# Patient Record
Sex: FEMALE | Race: WHITE | NOT HISPANIC OR LATINO | Employment: FULL TIME | URBAN - METROPOLITAN AREA
[De-identification: names, ages, dates, MRNs, and addresses within clinical notes are randomized per-mention and may not be internally consistent; named-entity substitution may affect disease eponyms.]

---

## 2017-01-13 ENCOUNTER — OFFICE VISIT (OUTPATIENT)
Dept: URGENT CARE | Facility: PHYSICIAN GROUP | Age: 57
End: 2017-01-13
Payer: COMMERCIAL

## 2017-01-13 VITALS
TEMPERATURE: 97 F | SYSTOLIC BLOOD PRESSURE: 104 MMHG | RESPIRATION RATE: 16 BRPM | DIASTOLIC BLOOD PRESSURE: 76 MMHG | HEART RATE: 72 BPM | WEIGHT: 202 LBS | HEIGHT: 62 IN | OXYGEN SATURATION: 98 % | BODY MASS INDEX: 37.17 KG/M2

## 2017-01-13 DIAGNOSIS — R11.0 NAUSEA IN ADULT: ICD-10-CM

## 2017-01-13 PROCEDURE — 99214 OFFICE O/P EST MOD 30 MIN: CPT | Performed by: NURSE PRACTITIONER

## 2017-01-13 RX ORDER — ONDANSETRON 2 MG/ML
4 INJECTION INTRAMUSCULAR; INTRAVENOUS ONCE
Status: COMPLETED | OUTPATIENT
Start: 2017-01-13 | End: 2017-01-13

## 2017-01-13 RX ORDER — ONDANSETRON 4 MG/1
4 TABLET, FILM COATED ORAL EVERY 4 HOURS PRN
Qty: 20 TAB | Refills: 0 | Status: SHIPPED | OUTPATIENT
Start: 2017-01-13 | End: 2017-05-23

## 2017-01-13 RX ADMIN — ONDANSETRON 4 MG: 2 INJECTION INTRAMUSCULAR; INTRAVENOUS at 08:25

## 2017-01-13 ASSESSMENT — ENCOUNTER SYMPTOMS
FEVER: 0
VOMITING: 1
ABDOMINAL PAIN: 0
CONSTIPATION: 0
CHILLS: 0
DIZZINESS: 0
NUMBER OF EPISODES OF EMESIS TODAY: 1
MYALGIAS: 0
SHORTNESS OF BREATH: 0
HEADACHES: 0
DIARRHEA: 0
COUGH: 0
WEAKNESS: 0
NAUSEA: 1

## 2017-01-13 NOTE — PROGRESS NOTES
"Subjective:      Harmony Monroe is a 56 y.o. female who presents with Emesis            Emesis  Associated symptoms include nausea and vomiting. Pertinent negatives include no abdominal pain, chills, coughing, fever, headaches, myalgias or weakness.   Harmony is a 56 year old female who is here for n/v x 2 days. States has been \"sipping\" fluids and taking small amounts of crackers. Vomited 2x in the nighttime 2 days ago then has had a \"queasy\" stomach yesterday and today. Denies fever, HA, abdominal pain/cramping, diarrhea or back pain. States unknown trigger of n/v. Denies recent illness.     PMH:  has a past medical history of Allergy; Hypertension; ASTHMA; Depression (2010); Tobacco use disorder (2010); Post-menopausal (2014); Basal cell carcinoma of left medial cheek (2015); and History of tobacco use disorder (2010). She also has no past medical history of Adrenal disorder (HCC), Clotting disorder (HCC), Heart attack (HCC), OSTEOPOROSIS, COPD, Heart murmur, Pituitary disease (HCC), Anemia, Cushings syndrome (HCC), HIV (human immunodeficiency virus infection), Seizure (HCC), Anxiety, Hyperlipidemia, Sickle cell disease (HCC), Arrhythmia, Diabetes, Parathyroid disorder (HCC), Stroke (HCC), Arthritis, Diabetic neuropathy (HCC), Substance abuse, EMPHYSEMA, IBD (inflammatory bowel disease), Thyroid disease, Blood transfusion, GERD (gastroesophageal reflux disease), Kidney disease, Tuberculosis, Glaucoma, Meningitis, Ulcer (HCC), CATARACT, Goiter, Migraine, or UTI of .  MEDS:   Current outpatient prescriptions:   •  ondansetron (ZOFRAN) 4 MG Tab tablet, Take 1 Tab by mouth every four hours as needed for Nausea/Vomiting., Disp: 20 Tab, Rfl: 0  •  temazepam (RESTORIL) 15 MG Cap, TAKE 1 TO 2 CAPSULES BY MOUTH AT BEDTIME AS NEEDED FOR SLEEP, Disp: 60 Cap, Rfl: 1  •  fluticasone (FLONASE) 50 MCG/ACT nasal spray, Spray 1 Spray in nose every day., Disp: 16 g, Rfl: 11  •  " amoxicillin-clavulanate (AUGMENTIN) 875-125 MG Tab, Take 1 Tab by mouth 2 times a day., Disp: 20 Tab, Rfl: 0  •  escitalopram (LEXAPRO) 10 MG Tab, Take 1 Tab by mouth every day. TAKE 1 TAB BY MOUTH EVERY DAY., Disp: 90 Tab, Rfl: 4  •   verapamil ER (CALAN SR) 120 MG CAPSULE SR 24 HR, Take 1 Cap by mouth every day., Disp: 90 Cap, Rfl: 4  •  hydrochlorothiazide (HYDRODIURIL) 25 MG Tab, Take 1 Tab by mouth every day. TAKE 1 TAB BY MOUTH EVERY DAY., Disp: 90 Tab, Rfl: 4  •  EPINEPHrine (EPIPEN 2-ESTEVAN) 0.3 MG/0.3ML Solution Auto-injector solution for injection, Inject 0.3 mL as instructed as needed., Disp: 1 Each, Rfl: 2  •  terbinafine (LAMISIL) 250 MG Tab, Take 1 Tab by mouth every day., Disp: 30 Tab, Rfl: 3  •  simvastatin (ZOCOR) 40 MG Tab, TAKE 1 TAB BY MOUTH EVERY EVENING., Disp: 90 Tab, Rfl: 4  •  estradiol (ESTRACE) 1 MG TABS, Take 1 mg by mouth every day. From gyn, Disp: , Rfl:   ALLERGIES:   Allergies   Allergen Reactions   • Avelox [Moxifloxacin Hcl In Nacl] Swelling   • Levaquin Anaphylaxis     Myalgias arthralgias    • Promethazine      Twitching feeling   • Tape      SURGHX:   Past Surgical History   Procedure Laterality Date   • Cyst excision  1974     Anterior Left Foot   • Abdominal hysterectomy total  11/2005     SOCHX:  reports that she quit smoking about a year ago. She started smoking about 19 years ago. She has never used smokeless tobacco. She reports that she does not drink alcohol or use illicit drugs.  FH: Family history was reviewed, no pertinent findings to report      Review of Systems   Constitutional: Negative for fever, chills and malaise/fatigue.   Respiratory: Negative for cough and shortness of breath.    Gastrointestinal: Positive for nausea and vomiting. Negative for abdominal pain, diarrhea and constipation.   Genitourinary: Negative for dysuria.   Musculoskeletal: Negative for myalgias.   Neurological: Negative for dizziness, weakness and headaches.          Objective:     /76  "mmHg  Pulse 72  Temp(Src) 36.1 °C (97 °F)  Resp 16  Ht 1.575 m (5' 2.01\")  Wt 91.627 kg (202 lb)  BMI 36.94 kg/m2  SpO2 98%     Physical Exam   Constitutional: She is oriented to person, place, and time. She appears well-developed and well-nourished.   HENT:   Head: Normocephalic.   Eyes: Conjunctivae and EOM are normal. Pupils are equal, round, and reactive to light.   Cardiovascular: Normal rate.    Pulmonary/Chest: Effort normal.   Abdominal: Soft. Bowel sounds are normal. She exhibits no distension. There is no tenderness. There is no rigidity, no rebound, no guarding and no CVA tenderness.   Musculoskeletal: Normal range of motion.   Neurological: She is alert and oriented to person, place, and time.   Skin: Skin is warm and dry.   Vitals reviewed.              Assessment/Plan:     1. Nausea in adult    - ondansetron (ZOFRAN) syringe/vial injection 4 mg; 2 mL by Intramuscular route Once.  - ondansetron (ZOFRAN) 4 MG Tab tablet; Take 1 Tab by mouth every four hours as needed for Nausea/Vomiting.  Dispense: 20 Tab; Refill: 0        "

## 2017-01-13 NOTE — PROGRESS NOTES
Chief Complaint   Patient presents with   • Emesis     2 days       HISTORY OF PRESENT ILLNESS: Patient is a 56 y.o. female who presents today for    Patient Active Problem List    Diagnosis Date Noted   • Actinic keratosis 06/20/2016   • Basal cell carcinoma of left medial cheek 11/30/2015   • Essential hypertension 08/19/2015   • Post-menopausal 08/26/2014   • Bee sting allergy 04/19/2012   • Asthma in adult 01/07/2010   • Depression 01/07/2010   • History of tobacco use disorder 01/07/2010       Allergies:Avelox; Levaquin; Promethazine; and Tape    Current Outpatient Prescriptions Ordered in [facility]   Medication Sig Dispense Refill   • temazepam (RESTORIL) 15 MG Cap TAKE 1 TO 2 CAPSULES BY MOUTH AT BEDTIME AS NEEDED FOR SLEEP 60 Cap 1   • fluticasone (FLONASE) 50 MCG/ACT nasal spray Spray 1 Spray in nose every day. 16 g 11   • amoxicillin-clavulanate (AUGMENTIN) 875-125 MG Tab Take 1 Tab by mouth 2 times a day. 20 Tab 0   • escitalopram (LEXAPRO) 10 MG Tab Take 1 Tab by mouth every day. TAKE 1 TAB BY MOUTH EVERY DAY. 90 Tab 4   •  verapamil ER (CALAN SR) 120 MG CAPSULE SR 24 HR Take 1 Cap by mouth every day. 90 Cap 4   • hydrochlorothiazide (HYDRODIURIL) 25 MG Tab Take 1 Tab by mouth every day. TAKE 1 TAB BY MOUTH EVERY DAY. 90 Tab 4   • EPINEPHrine (EPIPEN 2-ESTEVAN) 0.3 MG/0.3ML Solution Auto-injector solution for injection Inject 0.3 mL as instructed as needed. 1 Each 2   • terbinafine (LAMISIL) 250 MG Tab Take 1 Tab by mouth every day. 30 Tab 3   • simvastatin (ZOCOR) 40 MG Tab TAKE 1 TAB BY MOUTH EVERY EVENING. 90 Tab 4   • estradiol (ESTRACE) 1 MG TABS Take 1 mg by mouth every day. From gyn       No current Epic-ordered facility-administered medications on file.       Past Medical History   Diagnosis Date   • Allergy    • Hypertension    • ASTHMA    • Depression 1/7/2010   • Tobacco use disorder 1/7/2010   • Post-menopausal 8/26/2014   • Basal cell carcinoma of left medial cheek 11/30/2015   • History of tobacco  use disorder 2010       Social History   Substance Use Topics   • Smoking status: Former Smoker -- 0.50 packs/day for 19 years     Start date: 1997     Quit date: 2016   • Smokeless tobacco: Never Used      Comment: encouraged to not restart   • Alcohol Use: No       Family Status   Relation Status Death Age   • Mother Alive    • Father       Family History   Problem Relation Age of Onset   • Hyperlipidemia Mother    • Arthritis Mother    • Heart Disease Father    • Cancer Neg Hx    • Hypertension Neg Hx    • Stroke Neg Hx    • Diabetes Sister        ROS:  Review of Systems   Constitutional: Negative for fever, chills, weight loss and malaise/fatigue.   HENT: Negative for ear pain, nosebleeds, congestion, sore throat and neck pain.    Eyes: Negative for blurred vision.   Respiratory: Negative for cough, sputum production, shortness of breath and wheezing.    Cardiovascular: Negative for chest pain, palpitations, orthopnea and leg swelling.   Gastrointestinal: Negative for heartburn, nausea, vomiting and abdominal pain.   Genitourinary: Negative for dysuria, urgency and frequency.     Exam:  There were no vitals taken for this visit.  General:  Well nourished, well developed female in NAD  Eyes: PERRLA, EOM within normal limits, no conjunctival injection, no scleral icterus, visual fields and acuity grossly intact.  Ears: Normal shape and symmetry, no tenderness, no discharge. External canals are without any significant edema or erythema. Tympanic membranes are without any inflammation, no effusion. Gross auditory acuity is intact  Nose: Symmetrical, sinuses without tenderness, no discharge.   Mouth: reasonable hygiene, no erythema exudates or tonsillar enlargement.  Neck: no masses, range of motion within normal limits, no tracheal deviation. No lymphadenopathy  Pulmonary: Normal respiratory effort, no wheezes, crackles, or rhonchi.  Cardiovascular: regular rate and rhythm without murmurs,  rubs, or gallops.  Abdomen: Soft, nontender, nondistended. Normal bowel sounds. No hepatosplenomegaly or masses, or hernias. No rebound or guarding.  Skin: No visible rashes or lesion. Warm, pink, dry.   Extremities: no clubbing, cyanosis, or edema.  Neuro: A&O x 3. Speech normal/clear.  Normal gait.         Assessment/Plan:  No diagnosis found.               Supportive care, differential diagnoses, and indications for immediate follow-up discussed with patient.   Pathogenesis of diagnosis discussed including typical length and natural progression.   Instructed to return to clinic or nearest emergency department for any change in condition, further concerns, or worsening of symptoms.  Patient states understanding of the plan of care and discharge instructions.  Instructed to make an appointment, for follow up, with their primary care provider.      Kelly Moss PA-C

## 2017-01-13 NOTE — MR AVS SNAPSHOT
"        Harmony Monroe   2017 8:00 AM   Office Visit   MRN: [id number]    Department:  [facility]   Dept Phone:  [phone]    Description:  Female : 1960   Provider:  WILFREDO Cruz           Reason for Visit     Emesis 2 days      Allergies as of 2017     Allergen Noted Reactions    Avelox [Moxifloxacin Hcl In Nacl] 2011   Swelling    Levaquin 10/26/2014   Anaphylaxis    Myalgias arthralgias     Promethazine 2016       Twitching feeling    Tape 2016         You were diagnosed with     Nausea in adult   [403802]         Vital Signs     Blood Pressure Pulse Temperature Respirations Height Weight    104/76 mmHg 72 36.1 °C (97 °F) 16 1.575 m (5' 2.01\") 91.627 kg (202 lb)    Body Mass Index Oxygen Saturation Smoking Status             36.94 kg/m2 98% Former Smoker         Basic Information     Date Of Birth Sex Race Ethnicity Preferred Language    1960 Female White Non- English      Your appointments     2017 10:40 AM   Established Patient with Safia Park D.O.   [facility])    [address], [address]  [place] [address] [phone]           You will be receiving a confirmation call a few days before your appointment from our automated call confirmation system.            2017  1:00 PM   DX INJECTION PROC-SHOULDER with [address] DX 2, RADIOLOGIST, [facility] - DIAGNOSTIC - [address]    [address]  [place] [address] [phone]           Need an H&P faxed (good for only 30 days).            2017  2:00 PM   MR EXTREMITY 60 with [address] MRI 1   [facility] - MRI - [address]    [address]  [place] [address] [phone]              Problem List              ICD-10-CM Priority Class Noted - Resolved    Asthma in adult J45.909   2010 - Present    Depression F32.9   2010 - Present    History of tobacco use " disorder Z87.891   1/7/2010 - Present    Bee sting allergy    4/19/2012 - Present    Post-menopausal Z78.0   8/26/2014 - Present    Essential hypertension I10   8/19/2015 - Present    Basal cell carcinoma of left medial cheek C44.319   11/30/2015 - Present    Actinic keratosis L57.0   6/20/2016 - Present      Health Maintenance        Date Due Completion Dates    MAMMOGRAM 8/7/2017 8/7/2015, 4/11/2014, 2/24/2005    COLONOSCOPY 8/12/2021 8/12/2011 (N/S)    Override on 8/12/2011: (N/S)    IMM DTaP/Tdap/Td Vaccine (2 - Td) 10/3/2022 10/3/2012            Current Immunizations     Hepatitis A Vaccine, Ped/Adol 4/19/2012    Hepatitis B Vaccine Non-Recombivax (Ped/Adol) 4/19/2012    Influenza TIV (IM) 1/2/2014    Influenza Vaccine Pediatric 12/4/2012    Influenza Vaccine Quad Inj (Preserved) 11/7/2016, 9/25/2015  3:00 PM, 10/10/2014    Pneumococcal polysaccharide vaccine (PPSV-23) 6/20/2016    Tdap Vaccine 10/3/2012    Tuberculin Skin Test 7/21/2014 10:00 AM, 7/30/2013  3:25 PM, 7/22/2013  3:16 PM, 5/21/2013  9:57 AM, 5/14/2013  9:50 AM, 5/9/2012  9:35 AM, 5/16/2011 12:50 PM      Below and/or attached are the medications your provider expects you to take. Review all of your home medications and newly ordered medications with your provider and/or pharmacist. Follow medication instructions as directed by your provider and/or pharmacist. Please keep your medication list with you and share with your provider. Update the information when medications are discontinued, doses are changed, or new medications (including over-the-counter products) are added; and carry medication information at all times in the event of emergency situations     Allergies:  AVELOX - Swelling     LEVAQUIN - Anaphylaxis     PROMETHAZINE - (reactions not documented)     TAPE - (reactions not documented)               Medications  Valid as of: January 13, 2017 -  9:20 AM    Generic Name Brand Name Tablet Size Instructions for use    Amoxicillin-Pot  Clavulanate (Tab) AUGMENTIN 875-125 MG Take 1 Tab by mouth 2 times a day.        EPINEPHrine (Solution Auto-injector) EPIPEN 0.3 MG/0.3ML Inject 0.3 mL as instructed as needed.        Escitalopram Oxalate (Tab) LEXAPRO 10 MG Take 1 Tab by mouth every day. TAKE 1 TAB BY MOUTH EVERY DAY.        Estradiol (Tab) ESTRACE 1 MG Take 1 mg by mouth every day. From gyn        Fluticasone Propionate (Suspension) FLONASE 50 MCG/ACT Spray 1 Spray in nose every day.        HydroCHLOROthiazide (Tab) HYDRODIURIL 25 MG Take 1 Tab by mouth every day. TAKE 1 TAB BY MOUTH EVERY DAY.        Ondansetron HCl (Tab) ZOFRAN 4 MG Take 1 Tab by mouth every four hours as needed for Nausea/Vomiting.        Simvastatin (Tab) ZOCOR 40 MG TAKE 1 TAB BY MOUTH EVERY EVENING.        Temazepam (Cap) RESTORIL 15 MG TAKE 1 TO 2 CAPSULES BY MOUTH AT BEDTIME AS NEEDED FOR SLEEP        Terbinafine HCl (Tab) LAMISIL 250 MG Take 1 Tab by mouth every day.        Verapamil HCl (CAPSULE SR 24 HR) CALAN  MG Take 1 Cap by mouth every day.        .                 Medicines prescribed today were sent to:     [id number] - [place] - [address]    [address] [place] [address]    Phone: [phone] Fax: [phone]    Open 24 Hours?: No      Medication refill instructions:       If your prescription bottle indicates you have medication refills left, it is not necessary to call your provider’s office. Please contact your pharmacy and they will refill your medication.    If your prescription bottle indicates you do not have any refills left, you may request refills at any time through one of the following ways: The online [employer] system (except Urgent Care), by calling your provider’s office, or by asking your pharmacy to contact your provider’s office with a refill request. Medication refills are processed only during regular business hours and may not be available until the next business day. Your provider may request additional information or to  have a follow-up visit with you prior to refilling your medication.   *Please Note: Medication refills are assigned a new Rx number when refilled electronically. Your pharmacy may indicate that no refills were authorized even though a new prescription for the same medication is available at the pharmacy. Please request the medicine by name with the pharmacy before contacting your provider for a refill.           [employer] Access Code: Activation code not generated  Current [employer] Status: Active

## 2017-01-25 ENCOUNTER — APPOINTMENT (OUTPATIENT)
Dept: RADIOLOGY | Facility: IMAGING CENTER | Age: 57
End: 2017-01-25
Attending: FAMILY MEDICINE
Payer: COMMERCIAL

## 2017-01-25 ENCOUNTER — OFFICE VISIT (OUTPATIENT)
Dept: MEDICAL GROUP | Facility: PHYSICIAN GROUP | Age: 57
End: 2017-01-25
Payer: COMMERCIAL

## 2017-01-25 VITALS
HEIGHT: 62 IN | TEMPERATURE: 97.9 F | SYSTOLIC BLOOD PRESSURE: 120 MMHG | HEART RATE: 91 BPM | WEIGHT: 202 LBS | DIASTOLIC BLOOD PRESSURE: 78 MMHG | BODY MASS INDEX: 37.17 KG/M2 | RESPIRATION RATE: 16 BRPM | OXYGEN SATURATION: 97 %

## 2017-01-25 DIAGNOSIS — M25.561 ACUTE PAIN OF RIGHT KNEE: ICD-10-CM

## 2017-01-25 PROCEDURE — 99213 OFFICE O/P EST LOW 20 MIN: CPT | Performed by: FAMILY MEDICINE

## 2017-01-25 PROCEDURE — 73560 X-RAY EXAM OF KNEE 1 OR 2: CPT | Mod: TC,RT | Performed by: FAMILY MEDICINE

## 2017-01-25 PROCEDURE — A6448 LT COMPRES BAND <3"/YD: HCPCS | Performed by: FAMILY MEDICINE

## 2017-01-25 NOTE — PROGRESS NOTES
Subjective:     Chief Complaint   Patient presents with   • Knee Pain       Harmony Monroe is a 57 y.o. female here today for right knee pain and swelling. Patient states that she was on vacation in [place] and walking a great deal each day. Patient states that right knee pain started during walking. Patient reports that she was walking greater than 5 miles per day. Pain is aching/stabbing. Patient states the pain is sometimes so significant that she feels she is going to fall. Patient denies numbness, tingling, loss of sensation. Patient has been trying ibuprofen and ice with mild relief. At worst pain is 6/10 with use of ibuprofen and ice pain is approximately 3 of 10. Pain continues to be worsening with excessive use and standing. Patient reports initial swelling has improved. Patient has tried using Ace bandage without improvement.    Pt has history of PE anaphylactic allergy. Patient never needed to use Epipen.   Pt is taking Lexapro and Restoril to help with sleep.   Patient quit smoking on January 1, 2016.    Allergies   Allergen Reactions   • Avelox [Moxifloxacin Hcl In Nacl] Swelling   • Levaquin Anaphylaxis     Myalgias arthralgias    • Promethazine      Twitching feeling   • Tape      Current medicines (including changes today)  Current Outpatient Prescriptions   Medication Sig Dispense Refill   • ondansetron (ZOFRAN) 4 MG Tab tablet Take 1 Tab by mouth every four hours as needed for Nausea/Vomiting. 20 Tab 0   • temazepam (RESTORIL) 15 MG Cap TAKE 1 TO 2 CAPSULES BY MOUTH AT BEDTIME AS NEEDED FOR SLEEP 60 Cap 1   • fluticasone (FLONASE) 50 MCG/ACT nasal spray Spray 1 Spray in nose every day. 16 g 11   • escitalopram (LEXAPRO) 10 MG Tab Take 1 Tab by mouth every day. TAKE 1 TAB BY MOUTH EVERY DAY. 90 Tab 4   •  verapamil ER (CALAN SR) 120 MG CAPSULE SR 24 HR Take 1 Cap by mouth every day. 90 Cap 4   • hydrochlorothiazide (HYDRODIURIL) 25 MG Tab Take 1 Tab by mouth every day. TAKE 1 TAB BY MOUTH  EVERY DAY. 90 Tab 4   • EPINEPHrine (EPIPEN 2-ESTEVAN) 0.3 MG/0.3ML Solution Auto-injector solution for injection Inject 0.3 mL as instructed as needed. 1 Each 2   • simvastatin (ZOCOR) 40 MG Tab TAKE 1 TAB BY MOUTH EVERY EVENING. 90 Tab 4   • estradiol (ESTRACE) 1 MG TABS Take 1 mg by mouth every day. From gyn       No current facility-administered medications for this visit.     Social History   Substance Use Topics   • Smoking status: Former Smoker -- 0.50 packs/day for 19 years     Start date: 1997     Quit date: 2016   • Smokeless tobacco: Never Used      Comment: encouraged to not restart   • Alcohol Use: No     Family Status   Relation Status Death Age   • Mother Alive    • Father       Family History   Problem Relation Age of Onset   • Hyperlipidemia Mother    • Arthritis Mother    • Heart Disease Father    • Cancer Neg Hx    • Hypertension Neg Hx    • Stroke Neg Hx    • Diabetes Sister      She    has a past medical history of Allergy; Hypertension; ASTHMA; Depression (2010); Post-menopausal (2014); Basal cell carcinoma of left medial cheek (2015); and History of tobacco use disorder (2010). She also has no past medical history of Adrenal disorder (CMS-[facility]), Clotting disorder (CMS-HCC), Heart attack (CMS-HCC), OSTEOPOROSIS, COPD, Heart murmur, Pituitary disease (CMS-HCC), Anemia, Cushings syndrome (CMS-HCC), HIV (human immunodeficiency virus infection), Seizure (CMS-HCC), Anxiety, Hyperlipidemia, Sickle cell disease (CMS-HCC), Arrhythmia, Diabetes, Parathyroid disorder (CMS-HCC), Stroke (CMS-HCC), Arthritis, Diabetic neuropathy (CMS-HCC), Substance abuse, EMPHYSEMA, IBD (inflammatory bowel disease), Thyroid disease, Blood transfusion, GERD (gastroesophageal reflux disease), Kidney disease, Tuberculosis, Glaucoma, Meningitis, Ulcer (CMS-[facility]), CATARACT, Goiter, Migraine, or UTI of .        ROS  GEN: no weight loss, fevers, or chills  Resp: no shortness of breath, no  "cough  CV: no racing heart, no irregular beats, no chest pain  Abd: no nausea, no vomiting, no diarrhea, no constipation, no blood in stool, no dark stools, no incontinence  : no dysuria, no hematuria, no urinary incontinence, no increased frequency  MSK: R knee pain as per HPI  Neuro: no headaches, no dizziness, no LOC, no weakness, no numbness/tingling       Objective:     Blood pressure 120/78, pulse 91, temperature 36.6 °C (97.9 °F), resp. rate 16, height 1.575 m (5' 2.01\"), weight 91.627 kg (202 lb), SpO2 97 %. Body mass index is 36.94 kg/(m^2).   Physical Exam:  Constitutional: Alert, no distress.  Skin: Warm, dry, good turgor, no rashes in visible areas.  Eye: Equal, round and reactive, conjunctiva clear, lids normal.  ENMT: Lips without lesions, good dentition, oropharynx non-erythematous, no exudate, moist oral mucosa  Neck: Trachea midline, no masses, no thyromegaly. No cervical or supraclavicular lymphadenopathy. Full ROM  Respiratory: Unlabored respiratory effort, good air movement, lungs clear to auscultation, no wheezes, no ronchi.  Cardiovascular:RRR, +S1, S2, no murmur, no peripheral edema, pedal and radial pulses equal and intact bilat  MSK:5/5 muscle strength in upper extremities as well as lower extremity bilaterally, right knee tender to palpation, positive crepitus, no laxity with valgus or varus strain, negative grind test, mild diffuse edema, no palpable bursitis,   Psych: Alert and oriented x3, appropriate affect and mood.  Neuro: CN2-12 intact, no gross motor or sensory deficits      Assessment and Plan:   The following treatment plan was discussed    1. Acute pain of right knee  Likely osteoarthritis worsened by excessive use. Advised patient to continue ibuprofen and ice. No effusion or bursitis evident.  Advised ice for 15 minutes 3-4 times per day. Also advised to keep knee elevated as much as possible. X-ray results sent via my chart. If pain continues recommend steroid injection.  - " DX-KNEE 2- RIGHT; Future      Followup: Return if symptoms worsen or fail to improve.    Please note that this dictation was created using voice recognition software. I have made every reasonable attempt to correct obvious errors, but I expect that there are errors of grammar and possibly content that I did not discover before finalizing the note.

## 2017-01-25 NOTE — MR AVS SNAPSHOT
"        Harmony Monroe   2017 10:40 AM   Office Visit   MRN: [id number]    Department:  [facility]   Dept Phone:  [phone]    Description:  Female : 1960   Provider:  Safia Park D.O.           Reason for Visit     Medication Refill           Allergies as of 2017     Allergen Noted Reactions    Avelox [Moxifloxacin Hcl In Nacl] 2011   Swelling    Levaquin 10/26/2014   Anaphylaxis    Myalgias arthralgias     Promethazine 2016       Twitching feeling    Tape 2016         You were diagnosed with     Acute pain of right knee   [0033629]         Vital Signs     Blood Pressure Pulse Temperature Respirations Height Weight    120/78 mmHg 91 36.6 °C (97.9 °F) 16 1.575 m (5' 2.01\") 91.627 kg (202 lb)    Body Mass Index Oxygen Saturation Smoking Status             36.94 kg/m2 97% Former Smoker         Basic Information     Date Of Birth Sex Race Ethnicity Preferred Language    1960 Female White Non- English      Your appointments     2017  1:00 PM   DX INJECTION PROC-SHOULDER with [address] DX 2, RADIOLOGIST, [facility]   [facility] - [address]    [address]  [place] [id number] [phone]           Need an H&P faxed (good for only 30 days).            2017  2:00 PM   MR EXTREMITY 60 with [address] MRI 1   [facility] - MRI - [address]    [address]  [place] [id number] [phone]              Problem List              ICD-10-CM Priority Class Noted - Resolved    Asthma in adult J45.909   2010 - Present    Primary insomnia F51.01   2010 - Present    History of tobacco use disorder Z87.891   2010 - Present    Bee sting allergy    2012 - Present    Post-menopausal Z78.0   2014 - Present    Essential hypertension I10   2015 - Present    Basal cell carcinoma of left medial cheek C44.319   2015 - Present    Actinic keratosis L57.0   2016 - Present      "   Health Maintenance        Date Due Completion Dates    MAMMOGRAM 8/7/2017 8/7/2015, 4/11/2014, 2/24/2005    COLONOSCOPY 8/12/2021 8/12/2011 (N/S)    Override on 8/12/2011: (N/S)    IMM DTaP/Tdap/Td Vaccine (2 - Td) 10/3/2022 10/3/2012            Current Immunizations     Hepatitis A Vaccine, Ped/Adol 4/19/2012    Hepatitis B Vaccine Non-Recombivax (Ped/Adol) 4/19/2012    Influenza TIV (IM) 1/2/2014    Influenza Vaccine Pediatric 12/4/2012    Influenza Vaccine Quad Inj (Preserved) 11/7/2016, 9/25/2015  3:00 PM, 10/10/2014    Pneumococcal polysaccharide vaccine (PPSV-23) 6/20/2016    Tdap Vaccine 10/3/2012    Tuberculin Skin Test 7/21/2014 10:00 AM, 7/30/2013  3:25 PM, 7/22/2013  3:16 PM, 5/21/2013  9:57 AM, 5/14/2013  9:50 AM, 5/9/2012  9:35 AM, 5/16/2011 12:50 PM      Below and/or attached are the medications your provider expects you to take. Review all of your home medications and newly ordered medications with your provider and/or pharmacist. Follow medication instructions as directed by your provider and/or pharmacist. Please keep your medication list with you and share with your provider. Update the information when medications are discontinued, doses are changed, or new medications (including over-the-counter products) are added; and carry medication information at all times in the event of emergency situations     Allergies:  AVELOX - Swelling     LEVAQUIN - Anaphylaxis     PROMETHAZINE - (reactions not documented)     TAPE - (reactions not documented)               Medications  Valid as of: January 25, 2017 - 12:34 PM    Generic Name Brand Name Tablet Size Instructions for use    EPINEPHrine (Solution Auto-injector) EPIPEN 0.3 MG/0.3ML Inject 0.3 mL as instructed as needed.        Escitalopram Oxalate (Tab) LEXAPRO 10 MG Take 1 Tab by mouth every day. TAKE 1 TAB BY MOUTH EVERY DAY.        Estradiol (Tab) ESTRACE 1 MG Take 1 mg by mouth every day. From gyn        Fluticasone Propionate (Suspension) FLONASE  50 MCG/ACT Spray 1 Spray in nose every day.        HydroCHLOROthiazide (Tab) HYDRODIURIL 25 MG Take 1 Tab by mouth every day. TAKE 1 TAB BY MOUTH EVERY DAY.        Ondansetron HCl (Tab) ZOFRAN 4 MG Take 1 Tab by mouth every four hours as needed for Nausea/Vomiting.        Simvastatin (Tab) ZOCOR 40 MG TAKE 1 TAB BY MOUTH EVERY EVENING.        Temazepam (Cap) RESTORIL 15 MG TAKE 1 TO 2 CAPSULES BY MOUTH AT BEDTIME AS NEEDED FOR SLEEP        Verapamil HCl (CAPSULE SR 24 HR) CALAN  MG Take 1 Cap by mouth every day.        .                 Medicines prescribed today were sent to:     [facility]Y #[id number] - [place], [place] - [address]    [address] [place] [address]    Phone: [phone] Fax: [phone]    Open 24 Hours?: No      Medication refill instructions:       If your prescription bottle indicates you have medication refills left, it is not necessary to call your provider’s office. Please contact your pharmacy and they will refill your medication.    If your prescription bottle indicates you do not have any refills left, you may request refills at any time through one of the following ways: The online [employer] system (except Urgent Care), by calling your provider’s office, or by asking your pharmacy to contact your provider’s office with a refill request. Medication refills are processed only during regular business hours and may not be available until the next business day. Your provider may request additional information or to have a follow-up visit with you prior to refilling your medication.   *Please Note: Medication refills are assigned a new Rx number when refilled electronically. Your pharmacy may indicate that no refills were authorized even though a new prescription for the same medication is available at the pharmacy. Please request the medicine by name with the pharmacy before contacting your provider for a refill.        Your To Do List     Future Labs/Procedures Complete By Expires    DX-KNEE 2-  RIGHT  As directed 1/25/2018         [employer] Access Code: Activation code not generated  Current [employer] Status: Active

## 2017-01-30 NOTE — TELEPHONE ENCOUNTER
Was the patient seen in the last year in this department? Yes     Does patient have an active prescription for medications requested? No     Received Request Via: Pharmacy      Pt met protocol?: Yes    LAST OV 01/25/17    NO A1C OR LIPIDPROFILE LABS FOUND    BP Readings from Last 1 Encounters:   01/25/17 120/78

## 2017-01-31 RX ORDER — SIMVASTATIN 40 MG
TABLET ORAL
Qty: 90 TAB | Refills: 1 | Status: SHIPPED | OUTPATIENT
Start: 2017-01-31 | End: 2017-08-06 | Stop reason: SDUPTHER

## 2017-01-31 NOTE — TELEPHONE ENCOUNTER
Pt has had OV within the 12 month protocol and lipid panel is current from 9/16. 6 month supply sent to pharmacy.   Lab Results   Component Value Date/Time    CHOLESTEROL, 09/01/2016 07:15 AM    * 09/01/2016 07:15 AM    HDL 51 09/01/2016 07:15 AM    TRIGLYCERIDES 177* 09/01/2016 07:15 AM       Lab Results   Component Value Date/Time    SODIUM 135 08/20/2015 08:07 AM    POTASSIUM 4.2 08/20/2015 08:07 AM    CHLORIDE 102 08/20/2015 08:07 AM    CO2 27 08/20/2015 08:07 AM    GLUCOSE 96 09/01/2016 07:15 AM    BUN 13 08/20/2015 08:07 AM    CREATININE 0.87 08/20/2015 08:07 AM     Lab Results   Component Value Date/Time    ALKALINE PHOSPHATASE 63 01/28/2016 07:17 AM    AST(SGOT) 22 01/28/2016 07:17 AM    ALT(SGPT) 30 01/28/2016 07:17 AM    TOTAL BILIRUBIN 0.3 01/28/2016 07:17 AM

## 2017-02-06 ENCOUNTER — OFFICE VISIT (OUTPATIENT)
Dept: MEDICAL GROUP | Facility: PHYSICIAN GROUP | Age: 57
End: 2017-02-06
Payer: COMMERCIAL

## 2017-02-06 VITALS
DIASTOLIC BLOOD PRESSURE: 72 MMHG | TEMPERATURE: 98.1 F | SYSTOLIC BLOOD PRESSURE: 120 MMHG | HEART RATE: 80 BPM | OXYGEN SATURATION: 97 % | BODY MASS INDEX: 37.17 KG/M2 | WEIGHT: 202 LBS | HEIGHT: 62 IN | RESPIRATION RATE: 16 BRPM

## 2017-02-06 DIAGNOSIS — M25.561 ACUTE PAIN OF RIGHT KNEE: ICD-10-CM

## 2017-02-06 DIAGNOSIS — E66.9 OBESITY (BMI 35.0-39.9 WITHOUT COMORBIDITY): ICD-10-CM

## 2017-02-06 PROCEDURE — 99213 OFFICE O/P EST LOW 20 MIN: CPT | Mod: 25 | Performed by: FAMILY MEDICINE

## 2017-02-06 PROCEDURE — 20610 DRAIN/INJ JOINT/BURSA W/O US: CPT | Performed by: FAMILY MEDICINE

## 2017-02-06 NOTE — MR AVS SNAPSHOT
"        Harmony Monroe   2017 4:00 PM   Office Visit   MRN: [id number]    Department:  [facility]   Dept Phone:  [phone]    Description:  Female : 1960   Provider:  Safia Park D.O.           Reason for Visit     Knee Pain           Allergies as of 2017     Allergen Noted Reactions    Avelox [Moxifloxacin Hcl In Nacl] 2011   Swelling    Levaquin 10/26/2014   Anaphylaxis    Myalgias arthralgias     Promethazine 2016       Twitching feeling    Tape 2016         You were diagnosed with     Acute pain of right knee   [[id number]]       Obesity (BMI 35.0-39.9 without comorbidity) ([facility])   [[id number]]         Vital Signs     Blood Pressure Pulse Temperature Respirations Height Weight    120/72 mmHg 80 36.7 °C (98.1 °F) 16 1.575 m (5' 2.01\") 91.627 kg (202 lb)    Body Mass Index Oxygen Saturation Smoking Status             36.94 kg/m2 97% Former Smoker         Basic Information     Date Of Birth Sex Race Ethnicity Preferred Language    1960 Female White Non- English      Your appointments     2017  4:00 PM   Established Patient with Safia Park D.O.   Formerly [facility])    [address] [place] [address], [address]  [facility] [address] [phone]           You will be receiving a confirmation call a few days before your appointment from our automated call confirmation system.              Problem List              ICD-10-CM Priority Class Noted - Resolved    Asthma in adult J45.909   2010 - Present    Primary insomnia F51.01   2010 - Present    History of tobacco use disorder Z87.891   2010 - Present    Bee sting allergy    2012 - Present    Post-menopausal Z78.0   2014 - Present    Essential hypertension I10   2015 - Present    Basal cell carcinoma of left medial cheek C44.319   2015 - Present    Actinic keratosis L57.0   2016 - Present    Obesity (BMI 35.0-39.9 without comorbidity) ([facility]) " E66.9   2/6/2017 - Present      Health Maintenance        Date Due Completion Dates    MAMMOGRAM 8/7/2017 8/7/2015, 4/11/2014, 2/24/2005    COLONOSCOPY 8/12/2021 8/12/2011 (N/S)    Override on 8/12/2011: (N/S)    IMM DTaP/Tdap/Td Vaccine (2 - Td) 10/3/2022 10/3/2012            Current Immunizations     Hepatitis A Vaccine, Ped/Adol 4/19/2012    Hepatitis B Vaccine Non-Recombivax (Ped/Adol) 4/19/2012    Influenza TIV (IM) 1/2/2014    Influenza Vaccine Pediatric 12/4/2012    Influenza Vaccine Quad Inj (Preserved) 11/7/2016, 9/25/2015  3:00 PM, 10/10/2014    Pneumococcal polysaccharide vaccine (PPSV-23) 6/20/2016    Tdap Vaccine 10/3/2012    Tuberculin Skin Test 7/21/2014 10:00 AM, 7/30/2013  3:25 PM, 7/22/2013  3:16 PM, 5/21/2013  9:57 AM, 5/14/2013  9:50 AM, 5/9/2012  9:35 AM, 5/16/2011 12:50 PM      Below and/or attached are the medications your provider expects you to take. Review all of your home medications and newly ordered medications with your provider and/or pharmacist. Follow medication instructions as directed by your provider and/or pharmacist. Please keep your medication list with you and share with your provider. Update the information when medications are discontinued, doses are changed, or new medications (including over-the-counter products) are added; and carry medication information at all times in the event of emergency situations     Allergies:  AVELOX - Swelling     LEVAQUIN - Anaphylaxis     PROMETHAZINE - (reactions not documented)     TAPE - (reactions not documented)               Medications  Valid as of: February 06, 2017 -  1:58 PM    Generic Name Brand Name Tablet Size Instructions for use    EPINEPHrine (Solution Auto-injector) EPIPEN 0.3 MG/0.3ML Inject 0.3 mL as instructed as needed.        Escitalopram Oxalate (Tab) LEXAPRO 10 MG Take 1 Tab by mouth every day. TAKE 1 TAB BY MOUTH EVERY DAY.        Estradiol (Tab) ESTRACE 1 MG Take 1 mg by mouth every day. From gyn        Fluticasone  Propionate (Suspension) FLONASE 50 MCG/ACT Spray 1 Spray in nose every day.        HydroCHLOROthiazide (Tab) HYDRODIURIL 25 MG Take 1 Tab by mouth every day. TAKE 1 TAB BY MOUTH EVERY DAY.        Ondansetron HCl (Tab) ZOFRAN 4 MG Take 1 Tab by mouth every four hours as needed for Nausea/Vomiting.        Simvastatin (Tab) ZOCOR 40 MG TAKE 1 TAB BY MOUTH EVERY EVENING.        Temazepam (Cap) RESTORIL 15 MG TAKE 1 TO 2 CAPSULES BY MOUTH AT BEDTIME AS NEEDED FOR SLEEP        Verapamil HCl (CAPSULE SR 24 HR) CALAN  MG Take 1 Cap by mouth every day.        .                 Medicines prescribed today were sent to:     [facility]ARMACY #[id number] - [place] - [address]    [address] [place] [address]    Phone: [phone] Fax: [phone]    Open 24 Hours?: No      Medication refill instructions:       If your prescription bottle indicates you have medication refills left, it is not necessary to call your provider’s office. Please contact your pharmacy and they will refill your medication.    If your prescription bottle indicates you do not have any refills left, you may request refills at any time through one of the following ways: The online [employer] system (except Urgent Care), by calling your provider’s office, or by asking your pharmacy to contact your provider’s office with a refill request. Medication refills are processed only during regular business hours and may not be available until the next business day. Your provider may request additional information or to have a follow-up visit with you prior to refilling your medication.   *Please Note: Medication refills are assigned a new Rx number when refilled electronically. Your pharmacy may indicate that no refills were authorized even though a new prescription for the same medication is available at the pharmacy. Please request the medicine by name with the pharmacy before contacting your provider for a refill.           Answer[employer] Access Code: Activation code not  generated  Current MyChart Status: Active

## 2017-02-06 NOTE — PROGRESS NOTES
Subjective:     Chief Complaint   Patient presents with   • Knee Pain       Harmony Monroe is a 57 y.o. female here today for follow up on right knee pain. Patient states that he continues to be in 10 pain. Pain is worse with pressure and walking. Patient states she is barely able to ambulatory pain. Patient requests steroid injection.  Allergies   Allergen Reactions   • Avelox [Moxifloxacin Hcl In Nacl] Swelling   • Levaquin Anaphylaxis     Myalgias arthralgias    • Promethazine      Twitching feeling   • Tape      Current medicines (including changes today)  Current Outpatient Prescriptions   Medication Sig Dispense Refill   • simvastatin (ZOCOR) 40 MG Tab TAKE 1 TAB BY MOUTH EVERY EVENING. 90 Tab 1   • temazepam (RESTORIL) 15 MG Cap TAKE 1 TO 2 CAPSULES BY MOUTH AT BEDTIME AS NEEDED FOR SLEEP 60 Cap 1   • fluticasone (FLONASE) 50 MCG/ACT nasal spray Spray 1 Spray in nose every day. 16 g 11   • escitalopram (LEXAPRO) 10 MG Tab Take 1 Tab by mouth every day. TAKE 1 TAB BY MOUTH EVERY DAY. 90 Tab 4   •  verapamil ER (CALAN SR) 120 MG CAPSULE SR 24 HR Take 1 Cap by mouth every day. 90 Cap 4   • hydrochlorothiazide (HYDRODIURIL) 25 MG Tab Take 1 Tab by mouth every day. TAKE 1 TAB BY MOUTH EVERY DAY. 90 Tab 4   • estradiol (ESTRACE) 1 MG TABS Take 1 mg by mouth every day. From gyn     • ondansetron (ZOFRAN) 4 MG Tab tablet Take 1 Tab by mouth every four hours as needed for Nausea/Vomiting. 20 Tab 0   • EPINEPHrine (EPIPEN 2-ESTEVAN) 0.3 MG/0.3ML Solution Auto-injector solution for injection Inject 0.3 mL as instructed as needed. 1 Each 2     No current facility-administered medications for this visit.     Social History   Substance Use Topics   • Smoking status: Former Smoker -- 0.50 packs/day for 19 years     Start date: 09/01/1997     Quit date: 01/01/2016   • Smokeless tobacco: Never Used      Comment: encouraged to not restart   • Alcohol Use: No     Family Status   Relation Status Death Age   • Mother Alive   "  • Father       Family History   Problem Relation Age of Onset   • Hyperlipidemia Mother    • Arthritis Mother    • Heart Disease Father    • Cancer Neg Hx    • Hypertension Neg Hx    • Stroke Neg Hx    • Diabetes Sister      She    has a past medical history of Allergy; Hypertension; ASTHMA; Depression (2010); Post-menopausal (2014); Basal cell carcinoma of left medial cheek (2015); and History of tobacco use disorder (2010). She also has no past medical history of Adrenal disorder (CMS-[facility]), Clotting disorder (CMS-HCC), Heart attack (CMS-[facility]), OSTEOPOROSIS, COPD, Heart murmur, Pituitary disease (CMS-HCC), Anemia, Cushings syndrome (CMS-HCC), HIV (human immunodeficiency virus infection), Seizure (CMS-HCC), Anxiety, Hyperlipidemia, Sickle cell disease (CMS-HCC), Arrhythmia, Diabetes, Parathyroid disorder (CMS-HCC), Stroke (CMS-HCC), Arthritis, Diabetic neuropathy (CMS-HCC), Substance abuse, EMPHYSEMA, IBD (inflammatory bowel disease), Thyroid disease, Blood transfusion, GERD (gastroesophageal reflux disease), Kidney disease, Tuberculosis, Glaucoma, Meningitis, Ulcer (CMS-[facility]), CATARACT, Goiter, Migraine, or UTI of .        ROS   GEN: no weight loss, fevers, or chills  Resp: no shortness of breath, no cough  CV: no racing heart, no irregular beats, no chest pain  MSK: Right knee pain as per history of present illness          Objective:     Blood pressure 120/72, pulse 80, temperature 36.7 °C (98.1 °F), resp. rate 16, height 1.575 m (5' 2.01\"), weight 91.627 kg (202 lb), SpO2 97 %. Body mass index is 36.94 kg/(m^2).   Physical Exam:  Constitutional: Alert, no distress.  Skin: Warm, dry, good turgor, no rashes in visible areas.  MSK: Right knee tenderness to palpation particularly on the medial aspect, no laxity of ligaments  Psych:  appropriate affect and mood.  Neuro no gross motor or sensory deficits    PROCEDURE NOTE.  Discussed risk and benefit and patient agrees to kenalog " injection of right knee. The joint was prepped with betadine which was allowed to dry. A 22 guage needle was inserted into the anterior medial aspect of the joint. 1 cc of 2% lidocaine without epi, 1cc of bupivacaine and 1 cc of Depo-Medrol 40 was then injected into the joint. The area was cleaned with alcohol swab and band-aid was applied. Patient tolerated procedure well with no complications, scant blood loss. Pt advised to move knee over the next few hours.  Effects of treatment will slowly onset over the next 24-48hrs        Assessment and Plan:   The following treatment plan was discussed    1. Acute pain of right knee  Treated with joint injection as above    2. Obesity (BMI 35.0-39.9 without comorbidity) (HCC)  - Patient identified as having weight management issue.  Appropriate orders and counseling given.        Followup: Return if symptoms worsen or fail to improve.    Please note that this dictation was created using voice recognition software. I have made every reasonable attempt to correct obvious errors, but I expect that there are errors of grammar and possibly content that I did not discover before finalizing the note.

## 2017-02-23 ENCOUNTER — PATIENT MESSAGE (OUTPATIENT)
Dept: MEDICAL GROUP | Facility: PHYSICIAN GROUP | Age: 57
End: 2017-02-23

## 2017-02-23 DIAGNOSIS — M17.11 PRIMARY OSTEOARTHRITIS OF RIGHT KNEE: ICD-10-CM

## 2017-02-23 RX ORDER — IBUPROFEN 800 MG/1
800 TABLET ORAL EVERY 8 HOURS PRN
Qty: 30 TAB | Refills: 3 | Status: SHIPPED | OUTPATIENT
Start: 2017-02-23 | End: 2017-03-21

## 2017-03-09 DIAGNOSIS — G47.00 INSOMNIA, UNSPECIFIED TYPE: ICD-10-CM

## 2017-03-09 RX ORDER — TEMAZEPAM 15 MG/1
CAPSULE ORAL
Qty: 60 CAP | Refills: 1 | Status: SHIPPED
Start: 2017-03-09 | End: 2017-05-17 | Stop reason: SDUPTHER

## 2017-03-13 ENCOUNTER — APPOINTMENT (OUTPATIENT)
Dept: RADIOLOGY | Facility: MEDICAL CENTER | Age: 57
End: 2017-03-13
Attending: ORTHOPAEDIC SURGERY
Payer: COMMERCIAL

## 2017-03-13 DIAGNOSIS — S83.241A ACUTE MEDIAL MENISCUS TEAR OF RIGHT KNEE, INITIAL ENCOUNTER: ICD-10-CM

## 2017-03-13 PROCEDURE — 73721 MRI JNT OF LWR EXTRE W/O DYE: CPT | Mod: RT

## 2017-03-21 DIAGNOSIS — Z01.810 PRE-OPERATIVE CARDIOVASCULAR EXAMINATION: ICD-10-CM

## 2017-03-21 DIAGNOSIS — Z01.812 PRE-OPERATIVE LABORATORY EXAMINATION: ICD-10-CM

## 2017-03-21 LAB
ANION GAP SERPL CALC-SCNC: 6 MMOL/L (ref 0–11.9)
BUN SERPL-MCNC: 15 MG/DL (ref 8–22)
CALCIUM SERPL-MCNC: 9.6 MG/DL (ref 8.4–10.2)
CHLORIDE SERPL-SCNC: 98 MMOL/L (ref 96–112)
CO2 SERPL-SCNC: 29 MMOL/L (ref 20–33)
CREAT SERPL-MCNC: 0.91 MG/DL (ref 0.5–1.4)
EKG IMPRESSION: NORMAL
GFR SERPL CREATININE-BSD FRML MDRD: >60 ML/MIN/1.73 M 2
GLUCOSE SERPL-MCNC: 96 MG/DL (ref 65–99)
POTASSIUM SERPL-SCNC: 3.7 MMOL/L (ref 3.6–5.5)
SODIUM SERPL-SCNC: 133 MMOL/L (ref 135–145)

## 2017-03-21 PROCEDURE — 80048 BASIC METABOLIC PNL TOTAL CA: CPT

## 2017-03-21 PROCEDURE — 36415 COLL VENOUS BLD VENIPUNCTURE: CPT

## 2017-03-21 RX ORDER — OXYCODONE AND ACETAMINOPHEN 10; 325 MG/1; MG/1
1-2 TABLET ORAL EVERY 4 HOURS PRN
COMMUNITY
End: 2017-10-04

## 2017-03-21 NOTE — OR NURSING
Pre admit appt: Pt instructed to continue regularly prescribed medications through day before surgery.Per anesthesia protocol pt instructed to take these medications with a sip of water the day of surgery- per cocet if needed and verapamil.

## 2017-03-23 ENCOUNTER — HOSPITAL ENCOUNTER (OUTPATIENT)
Facility: MEDICAL CENTER | Age: 57
End: 2017-03-23
Attending: ORTHOPAEDIC SURGERY | Admitting: ORTHOPAEDIC SURGERY
Payer: COMMERCIAL

## 2017-03-23 VITALS
OXYGEN SATURATION: 93 % | TEMPERATURE: 97.7 F | SYSTOLIC BLOOD PRESSURE: 104 MMHG | BODY MASS INDEX: 36.05 KG/M2 | HEART RATE: 63 BPM | WEIGHT: 203.48 LBS | RESPIRATION RATE: 16 BRPM | DIASTOLIC BLOOD PRESSURE: 59 MMHG | HEIGHT: 63 IN

## 2017-03-23 PROBLEM — S83.241A ACUTE MEDIAL MENISCUS TEAR OF RIGHT KNEE: Status: ACTIVE | Noted: 2017-03-23

## 2017-03-23 PROCEDURE — 110371 HCHG SHELL REV 272: Performed by: ORTHOPAEDIC SURGERY

## 2017-03-23 PROCEDURE — 160035 HCHG PACU - 1ST 60 MINS PHASE I: Performed by: ORTHOPAEDIC SURGERY

## 2017-03-23 PROCEDURE — 160041 HCHG SURGERY MINUTES - EA ADDL 1 MIN LEVEL 4: Performed by: ORTHOPAEDIC SURGERY

## 2017-03-23 PROCEDURE — 110382 HCHG SHELL REV 271: Performed by: ORTHOPAEDIC SURGERY

## 2017-03-23 PROCEDURE — 160047 HCHG PACU  - EA ADDL 30 MINS PHASE II: Performed by: ORTHOPAEDIC SURGERY

## 2017-03-23 PROCEDURE — 160036 HCHG PACU - EA ADDL 30 MINS PHASE I: Performed by: ORTHOPAEDIC SURGERY

## 2017-03-23 PROCEDURE — 501654 HCHG TUBING, ARTHREX PATIENT REDEUCE: Performed by: ORTHOPAEDIC SURGERY

## 2017-03-23 PROCEDURE — 501336 HCHG SHAVER: Performed by: ORTHOPAEDIC SURGERY

## 2017-03-23 PROCEDURE — 160002 HCHG RECOVERY MINUTES (STAT): Performed by: ORTHOPAEDIC SURGERY

## 2017-03-23 PROCEDURE — 160025 RECOVERY II MINUTES (STATS): Performed by: ORTHOPAEDIC SURGERY

## 2017-03-23 PROCEDURE — 700111 HCHG RX REV CODE 636 W/ 250 OVERRIDE (IP)

## 2017-03-23 PROCEDURE — 160009 HCHG ANES TIME/MIN: Performed by: ORTHOPAEDIC SURGERY

## 2017-03-23 PROCEDURE — 160048 HCHG OR STATISTICAL LEVEL 1-5: Performed by: ORTHOPAEDIC SURGERY

## 2017-03-23 PROCEDURE — 502580 HCHG PACK, KNEE ARTHROSCOPY: Performed by: ORTHOPAEDIC SURGERY

## 2017-03-23 PROCEDURE — 160046 HCHG PACU - 1ST 60 MINS PHASE II: Performed by: ORTHOPAEDIC SURGERY

## 2017-03-23 PROCEDURE — 700101 HCHG RX REV CODE 250

## 2017-03-23 PROCEDURE — 160029 HCHG SURGERY MINUTES - 1ST 30 MINS LEVEL 4: Performed by: ORTHOPAEDIC SURGERY

## 2017-03-23 PROCEDURE — 501838 HCHG SUTURE GENERAL: Performed by: ORTHOPAEDIC SURGERY

## 2017-03-23 PROCEDURE — A4606 OXYGEN PROBE USED W OXIMETER: HCPCS | Performed by: ORTHOPAEDIC SURGERY

## 2017-03-23 PROCEDURE — L1830 KO IMMOB CANVAS LONG PRE OTS: HCPCS | Performed by: ORTHOPAEDIC SURGERY

## 2017-03-23 RX ORDER — DEXAMETHASONE SODIUM PHOSPHATE 4 MG/ML
4 INJECTION, SOLUTION INTRA-ARTICULAR; INTRALESIONAL; INTRAMUSCULAR; INTRAVENOUS; SOFT TISSUE
Status: DISCONTINUED | OUTPATIENT
Start: 2017-03-23 | End: 2017-03-23 | Stop reason: HOSPADM

## 2017-03-23 RX ORDER — ONDANSETRON 2 MG/ML
4 INJECTION INTRAMUSCULAR; INTRAVENOUS EVERY 4 HOURS PRN
Status: DISCONTINUED | OUTPATIENT
Start: 2017-03-23 | End: 2017-03-23 | Stop reason: HOSPADM

## 2017-03-23 RX ORDER — DEXTROSE MONOHYDRATE, SODIUM CHLORIDE, AND POTASSIUM CHLORIDE 50; 1.49; 4.5 G/1000ML; G/1000ML; G/1000ML
INJECTION, SOLUTION INTRAVENOUS CONTINUOUS
Status: DISCONTINUED | OUTPATIENT
Start: 2017-03-23 | End: 2017-03-23 | Stop reason: HOSPADM

## 2017-03-23 RX ORDER — SODIUM CHLORIDE, SODIUM LACTATE, POTASSIUM CHLORIDE, CALCIUM CHLORIDE 600; 310; 30; 20 MG/100ML; MG/100ML; MG/100ML; MG/100ML
1000 INJECTION, SOLUTION INTRAVENOUS
Status: ACTIVE | OUTPATIENT
Start: 2017-03-23 | End: 2017-03-23

## 2017-03-23 RX ORDER — OXYCODONE HYDROCHLORIDE 10 MG/1
10 TABLET ORAL
Status: DISCONTINUED | OUTPATIENT
Start: 2017-03-23 | End: 2017-03-23 | Stop reason: HOSPADM

## 2017-03-23 RX ORDER — DIPHENHYDRAMINE HYDROCHLORIDE 50 MG/ML
25 INJECTION INTRAMUSCULAR; INTRAVENOUS EVERY 6 HOURS PRN
Status: DISCONTINUED | OUTPATIENT
Start: 2017-03-23 | End: 2017-03-23 | Stop reason: HOSPADM

## 2017-03-23 RX ORDER — BUPIVACAINE HYDROCHLORIDE 2.5 MG/ML
INJECTION, SOLUTION EPIDURAL; INFILTRATION; INTRACAUDAL
Status: DISCONTINUED | OUTPATIENT
Start: 2017-03-23 | End: 2017-03-23 | Stop reason: HOSPADM

## 2017-03-23 RX ORDER — OXYCODONE HYDROCHLORIDE 5 MG/1
5 TABLET ORAL
Status: DISCONTINUED | OUTPATIENT
Start: 2017-03-23 | End: 2017-03-23 | Stop reason: HOSPADM

## 2017-03-23 ASSESSMENT — PAIN SCALES - GENERAL
PAINLEVEL_OUTOF10: 0
PAINLEVEL_OUTOF10: 0
PAINLEVEL_OUTOF10: ASSUMED PAIN PRESENT
PAINLEVEL_OUTOF10: 0

## 2017-03-23 NOTE — IP AVS SNAPSHOT
" Home Care Instructions                                                                                                                Name:Harmony Monroe  Medical Record Number:[id number]  CSN: [id number]    YOB: 1960   Age: 57 y.o.  Sex: female  HT:1.6 m (5' 2.99\") WT: 92.3 kg (203 lb 7.8 oz)          Admit Date: 3/23/2017     Discharge Date:   Today's Date: 3/23/2017  Attending Doctor:  Andrews Castro M.D.                  Allergies:  Avelox; Bee; Levaquin; Promethazine; and Tape                Discharge Instructions         ACTIVITY: Rest and take it easy for the first 24 hours.  A responsible adult is recommended to remain with you during that time.  It is normal to feel sleepy.  We encourage you to not do anything that requires balance, judgment or coordination.    MILD FLU-LIKE SYMPTOMS ARE NORMAL. YOU MAY EXPERIENCE GENERALIZED MUSCLE ACHES, THROAT IRRITATION, HEADACHE AND/OR SOME NAUSEA.    FOR 24 HOURS DO NOT:  Drive, operate machinery or run household appliances.  Drink beer or alcoholic beverages.   Make important decisions or sign legal documents.    SPECIAL INSTRUCTIONS: Weight bearing as tolerated to left lower extremity. Keep immobilizer in place until otherwise instructed by Dr Castro. Ice and elevate extremity.    DIET: To avoid nausea, slowly advance diet as tolerated, avoiding spicy or greasy foods for the first day.  Add more substantial food to your diet according to your physician's instructions.  INCREASE FLUIDS AND FIBER TO AVOID CONSTIPATION.    SURGICAL DRESSING/BATHING: Keep dressing clean, dry and intact until otherwise instructed by Dr Casrto.     FOLLOW-UP APPOINTMENT:  A follow-up appointment should be arranged with your doctor in office as instructed; call to schedule.    You should CALL YOUR PHYSICIAN if you develop:  Fever greater than 101 degrees F.  Pain not relieved by medication, or persistent nausea or vomiting.  Excessive bleeding (blood soaking through dressing) " or unexpected drainage from the wound.  Extreme redness or swelling around the incision site, drainage of pus or foul smelling drainage.  Inability to urinate or empty your bladder within 8 hours.  Problems with breathing or chest pain.    You should call 911 if you develop problems with breathing or chest pain.  If you are unable to contact your doctor or surgical center, you should go to the nearest emergency room or urgent care center.  Dr Castro's telephone #: [phone]    If any questions arise, call your doctor.  If your doctor is not available, please feel free to call the Surgical Center at [phone].  The Center is open Monday through Friday from 7AM to 7PM.  You can also call the HEALTH HOTLINE open 24 hours/day, 7 days/week and speak to a nurse at [phone], or toll free at [phone].    A registered nurse may call you a few days after your surgery to see how you are doing after your procedure.    MEDICATIONS: Resume taking daily medication.  Take prescribed pain medication with food.  If no medication is prescribed, you may take non-aspirin pain medication if needed.  PAIN MEDICATION CAN BE VERY CONSTIPATING.  Take a stool softener or laxative such as senokot, pericolace, or milk of magnesia if needed.    Prescription given for Home.  Last pain medication given at _______.    If your physician has prescribed pain medication that includes Acetaminophen (Tylenol), do not take additional Acetaminophen (Tylenol) while taking the prescribed medication.  Depression / Suicide Risk    As you are discharged from this [facility] facility, it is important to learn how to keep safe from harming yourself.    Recognize the warning signs:  · Abrupt changes in personality, positive or negative- including increase in energy   · Giving away possessions  · Change in eating patterns- significant weight changes-  positive or negative  · Change in sleeping patterns- unable to sleep or sleeping all the  time   · Unwillingness or inability to communicate  · Depression  · Unusual sadness, discouragement and loneliness  · Talk of wanting to die  · Neglect of personal appearance   · Rebelliousness- reckless behavior  · Withdrawal from people/activities they love  · Confusion- inability to concentrate     If you or a loved one observes any of these behaviors or has concerns about self-harm, here's what you can do:  · Talk about it- your feelings and reasons for harming yourself  · Remove any means that you might use to hurt yourself (examples: pills, rope, extension cords, firearm)  · Get professional help from the community (Mental Health, Substance Abuse, psychological counseling)  · Do not be alone:Call your Safe Contact- someone whom you trust who will be there for you.  · Call your local CRISIS HOTLINE 820-0498 or 510-364-3862  · Call your local Children's Mobile Crisis Response Team [place] (207) 074-3246 or www.[employer]  · Call the toll free National Suicide Prevention Hotlines   · National Suicide Prevention Lifeline 015-129-RQUV (6190)  [place] Hope Line Network 800-SUICIDE (930-1996)    Discharge Education for patients on LETTY (Obstructive Sleep Apnea) Protocol    Prior to receiving sedation or anesthesia, we screen all patients for Obstructive Sleep Apnea.  During your screening, you were identified as having suspected, but not confirmed Obstructive Sleep Apnea(LETTY).    What is Obstructive Sleep Apnea?  Sleep apnea (AP-ne-ah) is a common disorder which involves breathing pauses that occur during sleep.  These can last from 10 seconds to a minute or longer.  Normal breathing resumes often with a loud snort or choking sound.    Sleep apnea occurs in all age groups and both genders but is more common in men and people over 40 years of age.  It has been estimated that as many as 18 million Americans have sleep apnea.  Most people who have sleep apnea don’t know they have it because it only occurs  during sleep.  A family member and/or bed partner may first notice the signs of sleep apnea.  Sleep apnea is a chronic (ongoing) condition that disrupts the quality and quantity of your sleep repeatedly throughout the night.  This often results in excessive daytime sleepiness or fatigue during the day.  It may also contribute to high blood pressure, heart problems, and complications following medications used for surgery and procedures.    To establish a definitive diagnosis, further testing from a specialist would be needed.  We recommend that you follow up with your primary care physician.    We recommend that you should be with an adult observer for at least 24 hours after your sedation/anesthesia.  If you have a CPAP machine, you should wear it during any sleep period (day or night) for the week following your procedure.  We encourage you to sleep on your side or in a sitting position, even with napping.  Lying flat on your back increases the risk of apnea and airway obstruction during your post procedure recovery period.    It is important to prevent over-sedation that could increase your risk for apnea.  Please take all pain medication as directed by your physician.  If you are not getting pain relief, please contact your physician to discuss possible approaches to relieving pain while minimizing medications that can affect your breathing and oxygen levels.      ·        Medication List      CONTINUE taking these medications        Instructions     verapamil  MG Cp24   Commonly known as:  CALAN SR    Take 1 Cap by mouth every day.   Dose:  120 mg       escitalopram 10 MG Tabs   Commonly known as:  LEXAPRO    Take 1 Tab by mouth every day. TAKE 1 TAB BY MOUTH EVERY DAY.   Dose:  10 mg       estradiol 1 MG Tabs   Commonly known as:  ESTRACE    Take 1 mg by mouth every day. From gyn   Dose:  1 mg       hydrochlorothiazide 25 MG Tabs   Commonly known as:  HYDRODIURIL    Take 1 Tab by mouth every day. TAKE 1  TAB BY MOUTH EVERY DAY.   Dose:  25 mg       ondansetron 4 MG Tabs tablet   Commonly known as:  ZOFRAN    Take 1 Tab by mouth every four hours as needed for Nausea/Vomiting.   Dose:  4 mg       oxycodone-acetaminophen  MG Tabs   Commonly known as:  PERCOCET-10    Take 1-2 Tabs by mouth every four hours as needed for Severe Pain. 1/2 tab as needed   Dose:  1-2 Tab       simvastatin 40 MG Tabs   Commonly known as:  ZOCOR    Doctor's comments:  Authorization of a refill request.   TAKE 1 TAB BY MOUTH EVERY EVENING.       temazepam 15 MG Caps   Commonly known as:  RESTORIL    TAKE 1 TO 2 CAPSULES BY MOUTH AT BEDTIME AS NEEDED FOR SLEEP               Medication Information     Above and/or attached are the medications your physician expects you to take upon discharge. Review all of your home medications and newly ordered medications with your doctor and/or pharmacist. Follow medication instructions as directed by your doctor and/or pharmacist. Please keep your medication list with you and share with your physician. Update the information when medications are discontinued, doses are changed, or new medications (including over-the-counter products) are added; and carry medication information at all times in the event of emergency situations.        Resources     Quit Smoking / Tobacco Use:    I understand the use of any tobacco products increases my chance of suffering from future heart disease or stroke and could cause other illnesses which may shorten my life. Quitting the use of tobacco products is the single most important thing I can do to improve my health. For further information on smoking / tobacco cessation call a Toll Free Quit Line at 1-752.979.9010 (*National Cancer [place]) or 1-451.164.6443 (American Lung Association) or you can access the web based program at www.lungusa.org.    [place] Tobacco Users Help Line:  (912) 395-9497       Toll Free: 1-152.459.9484  Quit Tobacco Program [facility]  Management Services (723)058-1667    Crisis Hotline:    [place] Crisis Hotline:  7-196-BFHKPTI or 1-930.469.4897    [place] Crisis Hotline:    1-639.961.8536 or 271-006-3549    Discharge Survey:   Thank you for choosing [facility]. We hope we did everything we could to make your hospital stay a pleasant one. You may be receiving a survey and we would appreciate your time and participation in answering the questions. Your input is very valuable to us in our efforts to improve our service to our patients and their families.            Signatures     My signature on this form indicates that:    1. I acknowledge receipt and understanding of these Home Care Instruction.  2. My questions regarding this information have been answered to my satisfaction.  3. I have formulated a plan with my discharge nurse to obtain my prescribed medications for home.    __________________________________      __________________________________                   Patient Signature                                 Guardian/Responsible Adult Signature      __________________________________                 __________       ________                       Nurse Signature                                               Date                 Time

## 2017-03-23 NOTE — DISCHARGE INSTRUCTIONS
ACTIVITY: Rest and take it easy for the first 24 hours.  A responsible adult is recommended to remain with you during that time.  It is normal to feel sleepy.  We encourage you to not do anything that requires balance, judgment or coordination.    MILD FLU-LIKE SYMPTOMS ARE NORMAL. YOU MAY EXPERIENCE GENERALIZED MUSCLE ACHES, THROAT IRRITATION, HEADACHE AND/OR SOME NAUSEA.    FOR 24 HOURS DO NOT:  Drive, operate machinery or run household appliances.  Drink beer or alcoholic beverages.   Make important decisions or sign legal documents.    SPECIAL INSTRUCTIONS: Weight bearing as tolerated to left lower extremity. Keep immobilizer in place until otherwise instructed by Dr Castro. Ice and elevate extremity.    DIET: To avoid nausea, slowly advance diet as tolerated, avoiding spicy or greasy foods for the first day.  Add more substantial food to your diet according to your physician's instructions.  INCREASE FLUIDS AND FIBER TO AVOID CONSTIPATION.    SURGICAL DRESSING/BATHING: Keep dressing clean, dry and intact until otherwise instructed by Dr Castro.     FOLLOW-UP APPOINTMENT:  A follow-up appointment should be arranged with your doctor in office as instructed; call to schedule.    You should CALL YOUR PHYSICIAN if you develop:  Fever greater than 101 degrees F.  Pain not relieved by medication, or persistent nausea or vomiting.  Excessive bleeding (blood soaking through dressing) or unexpected drainage from the wound.  Extreme redness or swelling around the incision site, drainage of pus or foul smelling drainage.  Inability to urinate or empty your bladder within 8 hours.  Problems with breathing or chest pain.    You should call 911 if you develop problems with breathing or chest pain.  If you are unable to contact your doctor or surgical center, you should go to the nearest emergency room or urgent care center.  Dr Castro's telephone #: [phone]    If any questions arise, call your doctor.  If your doctor is not  available, please feel free to call the Surgical Center at [phone].  The Center is open Monday through Friday from 7AM to 7PM.  You can also call the HEALTH HOTLINE open 24 hours/day, 7 days/week and speak to a nurse at [phone], or toll free at [phone].    A registered nurse may call you a few days after your surgery to see how you are doing after your procedure.    MEDICATIONS: Resume taking daily medication.  Take prescribed pain medication with food.  If no medication is prescribed, you may take non-aspirin pain medication if needed.  PAIN MEDICATION CAN BE VERY CONSTIPATING.  Take a stool softener or laxative such as senokot, pericolace, or milk of magnesia if needed.    Prescription given for Home.  Last pain medication given at _______.    If your physician has prescribed pain medication that includes Acetaminophen (Tylenol), do not take additional Acetaminophen (Tylenol) while taking the prescribed medication.  Depression / Suicide Risk    As you are discharged from this [facility] facility, it is important to learn how to keep safe from harming yourself.    Recognize the warning signs:  · Abrupt changes in personality, positive or negative- including increase in energy   · Giving away possessions  · Change in eating patterns- significant weight changes-  positive or negative  · Change in sleeping patterns- unable to sleep or sleeping all the time   · Unwillingness or inability to communicate  · Depression  · Unusual sadness, discouragement and loneliness  · Talk of wanting to die  · Neglect of personal appearance   · Rebelliousness- reckless behavior  · Withdrawal from people/activities they love  · Confusion- inability to concentrate     If you or a loved one observes any of these behaviors or has concerns about self-harm, here's what you can do:  · Talk about it- your feelings and reasons for harming yourself  · Remove any means that you might use to hurt yourself (examples: pills,  rope, extension cords, firearm)  · Get professional help from the community (Mental Health, Substance Abuse, psychological counseling)  · Do not be alone:Call your Safe Contact- someone whom you trust who will be there for you.  · Call your local CRISIS HOTLINE 742-7341 or 430-404-7754  · Call your local Children's Mobile Crisis Response Team [place] (635) 767-8905 or www.[employer]  · Call the toll free National Suicide Prevention Hotlines   · National Suicide Prevention Lifeline 339-622-ZUCL (4307)  [place] Hope Line Network 800-SUICIDE (938-8172)    Discharge Education for patients on LETTY (Obstructive Sleep Apnea) Protocol    Prior to receiving sedation or anesthesia, we screen all patients for Obstructive Sleep Apnea.  During your screening, you were identified as having suspected, but not confirmed Obstructive Sleep Apnea(LETTY).    What is Obstructive Sleep Apnea?  Sleep apnea (AP-ne-ah) is a common disorder which involves breathing pauses that occur during sleep.  These can last from 10 seconds to a minute or longer.  Normal breathing resumes often with a loud snort or choking sound.    Sleep apnea occurs in all age groups and both genders but is more common in men and people over 40 years of age.  It has been estimated that as many as 18 million Americans have sleep apnea.  Most people who have sleep apnea don’t know they have it because it only occurs during sleep.  A family member and/or bed partner may first notice the signs of sleep apnea.  Sleep apnea is a chronic (ongoing) condition that disrupts the quality and quantity of your sleep repeatedly throughout the night.  This often results in excessive daytime sleepiness or fatigue during the day.  It may also contribute to high blood pressure, heart problems, and complications following medications used for surgery and procedures.    To establish a definitive diagnosis, further testing from a specialist would be needed.  We recommend that you  follow up with your primary care physician.    We recommend that you should be with an adult observer for at least 24 hours after your sedation/anesthesia.  If you have a CPAP machine, you should wear it during any sleep period (day or night) for the week following your procedure.  We encourage you to sleep on your side or in a sitting position, even with napping.  Lying flat on your back increases the risk of apnea and airway obstruction during your post procedure recovery period.    It is important to prevent over-sedation that could increase your risk for apnea.  Please take all pain medication as directed by your physician.  If you are not getting pain relief, please contact your physician to discuss possible approaches to relieving pain while minimizing medications that can affect your breathing and oxygen levels.      ·

## 2017-03-23 NOTE — IP AVS SNAPSHOT
3/23/2017          Harmony Monroe  [address] [place] [address]    Dear Harmony:    Critical access hospital wants to ensure your discharge home is safe and you or your loved ones have had all your questions answered regarding your care after you leave the hospital.    You may receive a telephone call within two days of your discharge.  This call is to make certain you understand your discharge instructions as well as ensure we provided you with the best care possible during your stay with us.     The call will only last approximately 3-5 minutes and will be done by a nurse.    Once again, we want to ensure your discharge home is safe and that you have a clear understanding of any next steps in your care.  If you have any questions or concerns, please do not hesitate to contact us, we are here for you.  Thank you for choosing [facility] for your healthcare needs.    Sincerely,    Anshul Ley    [facility]

## 2017-03-23 NOTE — OR NURSING
1513 Assumed care of patient; pt does not arouse to voice or touch and in trendelenberg due to hypotension, IVF to gravity through IV free of redness, swelling. 4 extremities remain warm, pink and <3 sec cap refill. +1 R pedal pulse with pink/warm toes as previously charted. RLE elevated on 2 pillows above heart level.   1525 CMS intact to RLE. Pt remains awake without stimulation. HOB elevated to 30 degrees and tolerating well. IVF remain to gravity although BP improving. Pt resting quietly on [facility].   1540 Pt remains awake and appropriate. Denies nausea and pain. Instructed to DB/C; demonstrated understanding. Plan to titrate to RA.   1555 Pt denies pain or nausea. No desaturations noted on RA. Will continue to monitor.   1610 No changes  1617 Transfer back to stage II via [facility].

## 2017-03-23 NOTE — OR SURGEON
Immediate Post-Operative Note      PreOp Diagnosis: R MMT    PostOp Diagnosis: R MMT/LMT    Procedure(s):  R KNEE ARTHROSCOPY - Wound Class: Clean  MEDIAL and lateral  MENISCECTOMY - PARTIAL - Wound Class: Clean  CHONDROPLASTY -  PATELLAR - Wound Class: Clean    Surgeon(s):  Andrews Castro M.D.    Anesthesiologist/Type of Anesthesia:  Anesthesiologist: Mau Morrow M.D./General    Surgical Staff:  Circulator: Marcellus Guo R.N.  Scrub Person: Hema Denise    Specimen: none    Estimated Blood Loss: min    Findings: above    Complications: none        3/23/2017 3:01 PM Andrews Castro

## 2017-03-23 NOTE — OR NURSING
1455 PT RECEIVED IN PACU, REPORT RECEIVED.  PT NON RESPONSIVE, NASAL TRUMPET AS OPA, IN PLACE, NO ACUTE RESPIRATORY DISTRESS OBSERVED. 1513 REPORT GIVEN TO April RN TO ASSUME CARE OF THIS PT. VSS

## 2017-03-24 NOTE — OP REPORT
DATE OF SERVICE:  03/23/2017    PREOPERATIVE DIAGNOSIS:  Right knee medial meniscal tear.    POSTOPERATIVE DIAGNOSES:  1.  Right knee medial meniscal tear.  2.  Lateral meniscal tear.  3.  Early patellofemoral and medial compartment degenerative joint disease.    PROCEDURES PERFORMED:  1.  Right knee arthroscopy with partial medial and lateral meniscectomies.  2.  Chondroplasty of patella and medial compartment.  3.  Anterior synovectomy.    SURGEON:  Andrews Castro MD    ANESTHESIA:  General.    COMPLICATIONS:  None.    BLOOD LOSS:  Minimal.    TOURNIQUET:  None.    INDICATION:  The patient is a 57-year-old woman with a history of right medial   knee pain and an MRI showing medial meniscal tear.  She is indicated for   arthroscopic treatment.    FINDINGS:  Exam under anesthesia revealed a moderate effusion _____ degree   block to extension and otherwise good flexion.  Ligamentous exam was stable.    Arthroscopic examination of the suprapatellar pouch and medial and lateral   gutters revealed diffuse synovitis.  Examination of the patella revealed   diffuse grade III chondrosis.  Examination of the trochlea revealed mild grade   II chondrosis.  Examination of the notch revealed the cruciate ligaments to   be intact.  There was synovitis of the anterior fat pad and anterior synovium.    Examination of the lateral compartment revealed the articular surfaces to be   relatively intact.  There was a small flap tear of the free edge of the   posterior horn of the lateral meniscus and some fraying of the free edge.    Examination of medial compartment revealed extensive grade III to IV   chondrosis with loose chondral flaps on the weightbearing surfaces of the   medial femoral condyle and medial tibial plateau.  There were some free   floating chondral fragments in the joint.  There was complex tearing of the   posterior horn of the medial meniscus with multiple large unstable flaps.    DESCRIPTION OF PROCEDURE:  Following  induction of general anesthesia, time-out   was performed confirming patient, site, and procedure.  2 grams of Ancef IV   were ordered and administered.  The right thigh tourniquet and thigh schaffer   were applied.  The left leg was placed in a well leg schaffer.  Under sterile   conditions, the right knee was injected with 30 mL of 0.25% plain Marcaine in   the standard fashion.  The right lower extremity was prepped and draped in the   usual fashion.  Standard anterolateral and anteromedial portals with   superolateral outflow were established and diagnostic arthroscopy was   performed with the findings as above.  The shaver was used to resect the small   flap from the posterior horn of the lateral meniscus and to trim the free   edge of the lateral meniscus.  Next, the shaver was used to debride the loose   chondral flaps from the medial femoral condyle and medial tibial plateau.    Extensive posterior horn partial medial meniscectomy was done with the shaver,   removing the unstable flaps and smoothing the remaining meniscus.  A portion   of this debridement also had to be done posteromedially through a   posteromedial portal while viewing through the notch.  Anterior fat pad and   synovitis were debrided with the shaver.  A supralateral portal was   established and the shaver was used to debride the fibrillated articular   cartilage on the patella.  The arthroscope was withdrawn.  The portals were   closed with nylon sutures and 30 mL of 0.25% plain Marcaine was injected into   the joint.  Sterile dressing was applied followed by NEVA hose and knee   immobilizer.  The patient was awakened and extubated in the operating room and   transferred to recovery room in stable condition.       ____________________________________     MD WALDO Manzano / ROSELYN    DD:  03/23/2017 14:57:37  DT:  03/23/2017 18:27:02    D#:  [id number]  Job#:  [id number]

## 2017-05-12 DIAGNOSIS — G47.00 INSOMNIA, UNSPECIFIED TYPE: ICD-10-CM

## 2017-05-15 RX ORDER — TEMAZEPAM 15 MG/1
CAPSULE ORAL
Refills: 1 | OUTPATIENT
Start: 2017-05-15

## 2017-05-17 RX ORDER — TEMAZEPAM 15 MG/1
CAPSULE ORAL
Qty: 60 CAP | Refills: 5 | Status: SHIPPED
Start: 2017-05-17 | End: 2017-11-16 | Stop reason: SDUPTHER

## 2017-05-23 ENCOUNTER — OFFICE VISIT (OUTPATIENT)
Dept: URGENT CARE | Facility: PHYSICIAN GROUP | Age: 57
End: 2017-05-23
Payer: COMMERCIAL

## 2017-05-23 VITALS
TEMPERATURE: 98.4 F | HEIGHT: 63 IN | SYSTOLIC BLOOD PRESSURE: 130 MMHG | DIASTOLIC BLOOD PRESSURE: 86 MMHG | RESPIRATION RATE: 16 BRPM | WEIGHT: 194 LBS | BODY MASS INDEX: 34.38 KG/M2 | OXYGEN SATURATION: 98 % | HEART RATE: 68 BPM

## 2017-05-23 DIAGNOSIS — R11.0 NAUSEA: ICD-10-CM

## 2017-05-23 PROCEDURE — 99999 PR NO CHARGE: CPT | Performed by: PHYSICIAN ASSISTANT

## 2017-05-23 PROCEDURE — 99214 OFFICE O/P EST MOD 30 MIN: CPT | Performed by: PHYSICIAN ASSISTANT

## 2017-05-23 RX ORDER — ONDANSETRON 4 MG/1
4 TABLET, ORALLY DISINTEGRATING ORAL ONCE
Status: COMPLETED | OUTPATIENT
Start: 2017-05-23 | End: 2017-05-23

## 2017-05-23 RX ORDER — ONDANSETRON 4 MG/1
4 TABLET, ORALLY DISINTEGRATING ORAL EVERY 8 HOURS PRN
Qty: 10 TAB | Refills: 0 | Status: SHIPPED | OUTPATIENT
Start: 2017-05-23 | End: 2017-10-04

## 2017-05-23 RX ADMIN — ONDANSETRON 4 MG: 4 TABLET, ORALLY DISINTEGRATING ORAL at 19:05

## 2017-05-23 RX ADMIN — ONDANSETRON 4 MG: 4 TABLET, ORALLY DISINTEGRATING ORAL at 11:09

## 2017-05-23 ASSESSMENT — ENCOUNTER SYMPTOMS
DIARRHEA: 0
MYALGIAS: 0
ABDOMINAL PAIN: 0
SORE THROAT: 0
HEADACHES: 0
FEVER: 0
CHILLS: 0
COUGH: 0
PALPITATIONS: 0
SHORTNESS OF BREATH: 0
WHEEZING: 0
VOMITING: 0
NAUSEA: 1

## 2017-05-23 NOTE — MR AVS SNAPSHOT
"        Harmony Ashleytony   2017 10:15 AM   Office Visit   MRN: [id number]    Department:  [facility]   Dept Phone:  [phone]    Description:  Female : 1960   Provider:  Jasper Sebastian PA-C           Reason for Visit     Nausea C/o nausea onset this morning      Allergies as of 2017     Allergen Noted Reactions    Avelox [Moxifloxacin Hcl In Nacl] 2011   Swelling    Bee 2017       Levaquin 10/26/2014   Anaphylaxis    Myalgias arthralgias     Promethazine 2016       Twitching feeling    Tape 2016         You were diagnosed with     Nausea   [[id number]]         Vital Signs     Blood Pressure Pulse Temperature Respirations Height Weight    130/86 mmHg 68 36.9 °C (98.4 °F) 16 1.6 m (5' 2.99\") 87.998 kg (194 lb)    Body Mass Index Oxygen Saturation Smoking Status             34.37 kg/m2 98% Former Smoker         Basic Information     Date Of Birth Sex Race Ethnicity Preferred Language    1960 Female White Non- English      Problem List              ICD-10-CM Priority Class Noted - Resolved    Asthma in adult J45.909   2010 - Present    Primary insomnia F51.01   2010 - Present    History of tobacco use disorder Z87.891   2010 - Present    Bee sting allergy    2012 - Present    Post-menopausal Z78.0   2014 - Present    Essential hypertension I10   2015 - Present    Basal cell carcinoma of left medial cheek C44.319   2015 - Present    Actinic keratosis L57.0   2016 - Present    Obesity (BMI 35.0-39.9 without comorbidity) ([facility]) E66.9   2017 - Present    Acute medial meniscus tear of right knee S83.241A   3/23/2017 - Present      Health Maintenance        Date Due Completion Dates    MAMMOGRAM 2017, 2014, 2005    COLONOSCOPY 2021 (N/S)    Override on 2011: (N/S)    IMM DTaP/Tdap/Td Vaccine (2 - Td) 10/3/2022 10/3/2012            Current Immunizations     Hepatitis A Vaccine, " Ped/Adol 4/19/2012    Hepatitis B Vaccine Non-Recombivax (Ped/Adol) 4/19/2012    Influenza TIV (IM) 1/2/2014    Influenza Vaccine Pediatric 12/4/2012    Influenza Vaccine Quad Inj (Preserved) 11/7/2016, 9/25/2015  3:00 PM, 10/10/2014    Pneumococcal polysaccharide vaccine (PPSV-23) 6/20/2016    Tdap Vaccine 10/3/2012    Tuberculin Skin Test 7/21/2014 10:00 AM, 7/30/2013  3:25 PM, 7/22/2013  3:16 PM, 5/21/2013  9:57 AM, 5/14/2013  9:50 AM, 5/9/2012  9:35 AM, 5/16/2011 12:50 PM      Below and/or attached are the medications your provider expects you to take. Review all of your home medications and newly ordered medications with your provider and/or pharmacist. Follow medication instructions as directed by your provider and/or pharmacist. Please keep your medication list with you and share with your provider. Update the information when medications are discontinued, doses are changed, or new medications (including over-the-counter products) are added; and carry medication information at all times in the event of emergency situations     Allergies:  AVELOX - Swelling     BEE - (reactions not documented)     LEVAQUIN - Anaphylaxis     PROMETHAZINE - (reactions not documented)     TAPE - (reactions not documented)               Medications  Valid as of: May 23, 2017 - 10:41 AM    Generic Name Brand Name Tablet Size Instructions for use    Escitalopram Oxalate (Tab) LEXAPRO 10 MG Take 1 Tab by mouth every day. TAKE 1 TAB BY MOUTH EVERY DAY.        Estradiol (Tab) ESTRACE 1 MG Take 1 mg by mouth every day. From gyn        HydroCHLOROthiazide (Tab) HYDRODIURIL 25 MG Take 1 Tab by mouth every day. TAKE 1 TAB BY MOUTH EVERY DAY.        Oxycodone-Acetaminophen (Tab) PERCOCET-10  MG Take 1-2 Tabs by mouth every four hours as needed for Severe Pain. 1/2 tab as needed        Simvastatin (Tab) ZOCOR 40 MG TAKE 1 TAB BY MOUTH EVERY EVENING.        Temazepam (Cap) RESTORIL 15 MG TAKE 1 TO 2 CAPSULES BY MOUTH AT BEDTIME AS  NEEDED FOR SLEEP        Verapamil HCl (CAPSULE SR 24 HR) CALAN  MG Take 1 Cap by mouth every day.        .                 Medicines prescribed today were sent to:     [facility] #[id number] - [place] [address] - [address] [place] [address]    Phone: [phone] Fax: [phone]    Open 24 Hours?: No      Medication refill instructions:       If your prescription bottle indicates you have medication refills left, it is not necessary to call your provider’s office. Please contact your pharmacy and they will refill your medication.    If your prescription bottle indicates you do not have any refills left, you may request refills at any time through one of the following ways: The online [employer] system (except Urgent Care), by calling your provider’s office, or by asking your pharmacy to contact your provider’s office with a refill request. Medication refills are processed only during regular business hours and may not be available until the next business day. Your provider may request additional information or to have a follow-up visit with you prior to refilling your medication.   *Please Note: Medication refills are assigned a new Rx number when refilled electronically. Your pharmacy may indicate that no refills were authorized even though a new prescription for the same medication is available at the pharmacy. Please request the medicine by name with the pharmacy before contacting your provider for a refill.           Expl[employer]s Access Code: Activation code not generated  Current Expl[employer]s Status: Active

## 2017-05-23 NOTE — PROGRESS NOTES
Subjective:      Harmony Monroe is a 57 y.o. female who presents with Nausea            Nausea  This is a new problem. The current episode started today. The problem occurs constantly. The problem has been gradually worsening. Associated symptoms include nausea. Pertinent negatives include no abdominal pain, chest pain, chills, congestion, coughing, fever, headaches, myalgias, sore throat, urinary symptoms or vomiting. The symptoms are aggravated by eating and drinking. She has tried nothing for the symptoms.   Nausea since this morning. Otherwise completely asymptomatic. No ill contacts, no suspect food intake, no new medications or travel. No dietary changes.      PMH:  has a past medical history of Allergy; Hypertension; ASTHMA; Depression (1/7/2010); Post-menopausal (8/26/2014); Basal cell carcinoma of left medial cheek (11/30/2015); History of tobacco use disorder (1/7/2010); High cholesterol; and Pneumonia (1990,2001). She also has no past medical history of Adrenal disorder ([facility]), Clotting disorder ([facility]), OSTEOPOROSIS, COPD, Pituitary disease ([facility]), Anemia, Cushings syndrome ([facility]), Anxiety, Hyperlipidemia, Sickle cell disease ([facility]), Parathyroid disorder ([facility]), Substance abuse, IBD (inflammatory bowel disease), Blood transfusion, GERD (gastroesophageal reflux disease), Meningitis, Ulcer ([facility]), or Migraine.  MEDS:   Current outpatient prescriptions:   •  temazepam (RESTORIL) 15 MG Cap, TAKE 1 TO 2 CAPSULES BY MOUTH AT BEDTIME AS NEEDED FOR SLEEP, Disp: 60 Cap, Rfl: 5  •  simvastatin (ZOCOR) 40 MG Tab, TAKE 1 TAB BY MOUTH EVERY EVENING., Disp: 90 Tab, Rfl: 1  •  escitalopram (LEXAPRO) 10 MG Tab, Take 1 Tab by mouth every day. TAKE 1 TAB BY MOUTH EVERY DAY., Disp: 90 Tab, Rfl: 4  •   verapamil ER (CALAN SR) 120 MG CAPSULE SR 24 HR, Take 1 Cap by mouth every day., Disp: 90 Cap, Rfl: 4  •  hydrochlorothiazide (HYDRODIURIL) 25 MG Tab, Take 1 Tab by mouth every day. TAKE 1 TAB BY  MOUTH EVERY DAY., Disp: 90 Tab, Rfl: 4  •  estradiol (ESTRACE) 1 MG TABS, Take 1 mg by mouth every day. From gyn, Disp: , Rfl:   •  oxycodone-acetaminophen (PERCOCET-10)  MG Tab, Take 1-2 Tabs by mouth every four hours as needed for Severe Pain. 1/2 tab as needed, Disp: , Rfl:   •  ondansetron (ZOFRAN) 4 MG Tab tablet, Take 1 Tab by mouth every four hours as needed for Nausea/Vomiting., Disp: 20 Tab, Rfl: 0  ALLERGIES:   Allergies   Allergen Reactions   • Avelox [Moxifloxacin Hcl In Nacl] Swelling   • Bee    • Levaquin Anaphylaxis     Myalgias arthralgias    • Promethazine      Twitching feeling   • Tape      SURGHX:   Past Surgical History   Procedure Laterality Date   • Cyst excision  1974     Anterior Left Foot   • Abdominal hysterectomy total  11/2005   • Tonsillectomy and adenoidectomy  2005   • Knee arthroscopy Right 3/23/2017     Procedure: KNEE ARTHROSCOPY;  Surgeon: Andrews Castro M.D.;  Location: SURGERY [facility];  Service:    • Medial meniscectomy  3/23/2017     Procedure: MEDIAL and lateral  MENISCECTOMY - PARTIAL;  Surgeon: Andrews Castro M.D.;  Location: [facility];  Service:    • Chondroplasty  3/23/2017     Procedure: CHONDROPLASTY -  PATELLAR;  Surgeon: Andrews Castro M.D.;  Location: [facility];  Service:      SOCHX:  reports that she quit smoking about 16 months ago. She started smoking about 19 years ago. She has never used smokeless tobacco. She reports that she does not drink alcohol or use illicit drugs.  FH: family history includes Arthritis in her mother; Diabetes in her sister; Heart Disease in her father; Hyperlipidemia in her mother. There is no history of Cancer, Hypertension, or Stroke.      Review of Systems   Constitutional: Negative for fever, chills and malaise/fatigue.   HENT: Negative for congestion, ear pain and sore throat.    Respiratory: Negative for cough, shortness of breath and wheezing.    Cardiovascular: Negative for chest  "pain, palpitations and leg swelling.   Gastrointestinal: Positive for nausea. Negative for vomiting, abdominal pain and diarrhea.   Genitourinary: Negative for dysuria, urgency and hematuria.   Musculoskeletal: Negative for myalgias and joint pain.   Neurological: Negative for headaches.       Medications, Allergies, and current problem list reviewed today in Epic     Objective:     /86 mmHg  Pulse 68  Temp(Src) 36.9 °C (98.4 °F)  Resp 16  Ht 1.6 m (5' 2.99\")  Wt 87.998 kg (194 lb)  BMI 34.37 kg/m2  SpO2 98%     Physical Exam   Constitutional: She is oriented to person, place, and time. She appears well-developed and well-nourished. No distress.   HENT:   Head: Normocephalic and atraumatic.   Right Ear: External ear normal.   Left Ear: External ear normal.   Nose: Nose normal.   Mouth/Throat: Oropharynx is clear and moist. No oropharyngeal exudate.   Eyes: Conjunctivae and EOM are normal. Right eye exhibits no discharge. Left eye exhibits no discharge.   Neck: Normal range of motion. Neck supple.   Cardiovascular: Normal rate, regular rhythm and normal heart sounds.    Pulmonary/Chest: Effort normal and breath sounds normal. No respiratory distress. She has no wheezes.   Abdominal: Soft. She exhibits no distension. There is no tenderness. There is no rebound and no guarding.   Musculoskeletal:   No flank pain bilaterally, no lower leg swelling   Lymphadenopathy:     She has no cervical adenopathy.   Neurological: She is alert and oriented to person, place, and time.   Skin: Skin is warm and dry. She is not diaphoretic.   Psychiatric: She has a normal mood and affect. Her behavior is normal. Judgment and thought content normal.   Nursing note and vitals reviewed.              Assessment/Plan:     1. Nausea  ondansetron (ZOFRAN ODT) dispertab 4 mg    ondansetron (ZOFRAN ODT) dispertab 4 mg    ondansetron (ZOFRAN ODT) 4 MG TABLET DISPERSIBLE     Unclear etiology. Vital signs normal, exam normal. Patient " otherwise asymptomatic. Possible early viral illness  Patient given 4 mg tablet of Zofran. She reports significant improvement posttreatment.  Prescription of Zofran sent to pharmacy.  OTC meds and conservative measures as discussed  Return to clinic or go to ED if symptoms worsen or persist. Indications for ED discussed at length. Patient voices understanding. Follow-up with your primary care provider in 3-5 days. Red flags discussed.    Please note that this dictation was created using voice recognition software. I have made every reasonable attempt to correct obvious errors, but I expect that there are errors of grammar and possibly content that I did not discover before finalizing the note.

## 2017-06-29 DIAGNOSIS — I10 ESSENTIAL HYPERTENSION: ICD-10-CM

## 2017-06-29 RX ORDER — VERAPAMIL HYDROCHLORIDE 120 MG/1
CAPSULE, EXTENDED RELEASE ORAL
Qty: 90 CAP | Refills: 0 | OUTPATIENT
Start: 2017-06-29

## 2017-06-29 NOTE — TELEPHONE ENCOUNTER
Was the patient seen in the last year in this department? Yes     Does patient have an active prescription for medications requested? No     Received Request Via: Pharmacy      Pt met protocol?: Yes pt last ov 2/2017   BP Readings from Last 1 Encounters:   05/23/17 130/86

## 2017-06-30 RX ORDER — VERAPAMIL HYDROCHLORIDE 120 MG/1
120 CAPSULE, EXTENDED RELEASE ORAL
Qty: 90 CAP | Refills: 1 | Status: SHIPPED | OUTPATIENT
Start: 2017-06-30 | End: 2018-01-12 | Stop reason: SDUPTHER

## 2017-06-30 NOTE — TELEPHONE ENCOUNTER
Refill X 6 months, sent to pharmacy.Pt. Seen in the last 6 months per protocol.   Lab Results   Component Value Date/Time    SODIUM 133* 03/21/2017 02:45 PM    POTASSIUM 3.7 03/21/2017 02:45 PM    CHLORIDE 98 03/21/2017 02:45 PM    CO2 29 03/21/2017 02:45 PM    GLUCOSE 96 03/21/2017 02:45 PM    BUN 15 03/21/2017 02:45 PM    CREATININE 0.91 03/21/2017 02:45 PM

## 2017-07-26 ENCOUNTER — OFFICE VISIT (OUTPATIENT)
Dept: URGENT CARE | Facility: PHYSICIAN GROUP | Age: 57
End: 2017-07-26
Payer: COMMERCIAL

## 2017-07-26 VITALS
SYSTOLIC BLOOD PRESSURE: 152 MMHG | HEART RATE: 77 BPM | BODY MASS INDEX: 34.23 KG/M2 | TEMPERATURE: 98.1 F | HEIGHT: 62 IN | WEIGHT: 186 LBS | RESPIRATION RATE: 16 BRPM | OXYGEN SATURATION: 98 % | DIASTOLIC BLOOD PRESSURE: 86 MMHG

## 2017-07-26 DIAGNOSIS — M54.50 ACUTE LEFT-SIDED LOW BACK PAIN WITHOUT SCIATICA: ICD-10-CM

## 2017-07-26 PROCEDURE — 96372 THER/PROPH/DIAG INJ SC/IM: CPT | Performed by: PHYSICIAN ASSISTANT

## 2017-07-26 PROCEDURE — 99213 OFFICE O/P EST LOW 20 MIN: CPT | Mod: 25 | Performed by: PHYSICIAN ASSISTANT

## 2017-07-26 RX ORDER — FLUTICASONE PROPIONATE 50 MCG
SPRAY, SUSPENSION (ML) NASAL
COMMUNITY
Start: 2017-06-27 | End: 2017-10-04

## 2017-07-26 RX ORDER — IBUPROFEN 800 MG/1
TABLET ORAL
COMMUNITY
Start: 2017-05-10 | End: 2017-10-04

## 2017-07-26 RX ORDER — KETOROLAC TROMETHAMINE 30 MG/ML
60 INJECTION, SOLUTION INTRAMUSCULAR; INTRAVENOUS ONCE
Status: COMPLETED | OUTPATIENT
Start: 2017-07-26 | End: 2017-07-26

## 2017-07-26 RX ADMIN — KETOROLAC TROMETHAMINE 60 MG: 30 INJECTION, SOLUTION INTRAMUSCULAR; INTRAVENOUS at 13:00

## 2017-07-26 ASSESSMENT — ENCOUNTER SYMPTOMS
VOMITING: 0
CONSTITUTIONAL NEGATIVE: 1
BRUISES/BLEEDS EASILY: 0
ABDOMINAL PAIN: 0
MYALGIAS: 1
NEUROLOGICAL NEGATIVE: 1
CARDIOVASCULAR NEGATIVE: 1
BACK PAIN: 1
HEADACHES: 0
NAUSEA: 0
RESPIRATORY NEGATIVE: 1

## 2017-07-26 ASSESSMENT — PAIN SCALES - GENERAL: PAINLEVEL: 8=MODERATE-SEVERE PAIN

## 2017-07-26 NOTE — Clinical Note
July 26, 2017         Patient: Harmony Monroe   YOB: 1960   Date of Visit: 7/26/2017           To Whom it May Concern:    Harmony Monroe was seen in my clinic on 7/26/2017. She is to be out tomorrow and Friday to recover.    If you have any questions or concerns, please don't hesitate to call.        Sincerely,           Kelly Moss PA-C  Electronically Signed

## 2017-07-26 NOTE — PROGRESS NOTES
"Subjective:      Harmony Monroe is a 57 y.o. female who presents with Back Pain        Back Pain  Pertinent negatives include no abdominal pain or headaches.   Patient presents today with few hour onset of left lower back pain.  She states she got out of bed this morning and she felt pain and spasm in her back and felt \"it went out on her\"  Happens once or so a year light this.  Hx of DDD in lumbar spine.  Pain is localized to left lower back.  Does not radiate to right back and does not radiate to leg.  No leg weakness, numbness or tingling.  No changes in bladder or bowel function.   Sitting still is most comfortable but even this is not.  Walking, bending and any twisting aggravates her symptoms.   She has not taken anything.  Feels a great deal of stiffness in her back.     Review of Systems   Constitutional: Negative.    Respiratory: Negative.    Cardiovascular: Negative.    Gastrointestinal: Negative for nausea, vomiting and abdominal pain.   Genitourinary: Negative.    Musculoskeletal: Positive for myalgias and back pain.   Skin: Negative for rash.   Neurological: Negative.  Negative for headaches.   Endo/Heme/Allergies: Does not bruise/bleed easily.   All other systems reviewed and are negative.       PMH:  has a past medical history of Allergy; Hypertension; ASTHMA; Depression (1/7/2010); Post-menopausal (8/26/2014); Basal cell carcinoma of left medial cheek (11/30/2015); History of tobacco use disorder (1/7/2010); High cholesterol; and Pneumonia (1990,2001). She also has no past medical history of Adrenal disorder ([facility]), Clotting disorder (CMS-[facility]), OSTEOPOROSIS, COPD, Pituitary disease (CMS-H[facility]), Anemia, Cushings syndrome ([facility]), Anxiety, Hyperlipidemia, Sickle cell disease ([facility]), Parathyroid disorder ([facility]), Substance abuse, IBD (inflammatory bowel disease), Blood transfusion, GERD (gastroesophageal reflux disease), Meningitis, Ulcer ([facility]), or Migraine.  MEDS:   Current " outpatient prescriptions:   •   verapamil ER (CALAN SR) 120 MG CAPSULE SR 24 HR, Take 1 Cap by mouth every day., Disp: 90 Cap, Rfl: 1  •  temazepam (RESTORIL) 15 MG Cap, TAKE 1 TO 2 CAPSULES BY MOUTH AT BEDTIME AS NEEDED FOR SLEEP, Disp: 60 Cap, Rfl: 5  •  simvastatin (ZOCOR) 40 MG Tab, TAKE 1 TAB BY MOUTH EVERY EVENING., Disp: 90 Tab, Rfl: 1  •  escitalopram (LEXAPRO) 10 MG Tab, Take 1 Tab by mouth every day. TAKE 1 TAB BY MOUTH EVERY DAY., Disp: 90 Tab, Rfl: 4  •  hydrochlorothiazide (HYDRODIURIL) 25 MG Tab, Take 1 Tab by mouth every day. TAKE 1 TAB BY MOUTH EVERY DAY., Disp: 90 Tab, Rfl: 4  •  estradiol (ESTRACE) 1 MG TABS, Take 1 mg by mouth every day. From gyn, Disp: , Rfl:   •  fluticasone (FLONASE) 50 MCG/ACT nasal spray, , Disp: , Rfl:   •  ibuprofen (MOTRIN) 800 MG Tab, , Disp: , Rfl:   •  ondansetron (ZOFRAN ODT) 4 MG TABLET DISPERSIBLE, Take 1 Tab by mouth every 8 hours as needed for Nausea/Vomiting., Disp: 10 Tab, Rfl: 0  •  oxycodone-acetaminophen (PERCOCET-10)  MG Tab, Take 1-2 Tabs by mouth every four hours as needed for Severe Pain. 1/2 tab as needed, Disp: , Rfl:   ALLERGIES:   Allergies   Allergen Reactions   • Avelox [Moxifloxacin Hcl In Nacl] Swelling   • Bee    • Levaquin Anaphylaxis     Myalgias arthralgias    • Promethazine      Twitching feeling   • Tape      SURGHX:   Past Surgical History   Procedure Laterality Date   • Cyst excision  1974     Anterior Left Foot   • Abdominal hysterectomy total  11/2005   • Tonsillectomy and adenoidectomy  2005   • Knee arthroscopy Right 3/23/2017     Procedure: KNEE ARTHROSCOPY;  Surgeon: Andrews Castro M.D.;  Location: [facility];  Service:    • Medial meniscectomy  3/23/2017     Procedure: MEDIAL and lateral  MENISCECTOMY - PARTIAL;  Surgeon: Andrews Castro M.D.;  Location: [facility];  Service:    • Chondroplasty  3/23/2017     Procedure: CHONDROPLASTY -  PATELLAR;  Surgeon: Andrews Castro M.D.;  Location: SURGERY  "[facility];  Service:      SOCX:  reports that she quit smoking about 18 months ago. She started smoking about 19 years ago. She has never used smokeless tobacco. She reports that she does not drink alcohol or use illicit drugs.  FH: Family history was reviewed, no pertinent findings to report     Objective:     /86 mmHg  Pulse 77  Temp(Src) 36.7 °C (98.1 °F)  Resp 16  Ht 1.575 m (5' 2\")  Wt 84.369 kg (186 lb)  BMI 34.01 kg/m2  SpO2 98%  Breastfeeding? No     Physical Exam   Constitutional: She is oriented to person, place, and time. She appears well-developed and well-nourished. No distress.   HENT:   Head: Normocephalic and atraumatic.   Eyes: Conjunctivae and EOM are normal. Pupils are equal, round, and reactive to light.   Neck: Normal range of motion.   Cardiovascular: Normal rate and regular rhythm.    Pulmonary/Chest: Effort normal and breath sounds normal.   Musculoskeletal:        Lumbar back: She exhibits decreased range of motion and spasm. She exhibits no bony tenderness and no swelling.        Back:    Neurological: She is alert and oriented to person, place, and time.   Skin: Skin is warm and dry. No rash noted.   Psychiatric: She has a normal mood and affect. Her behavior is normal.   Vitals reviewed.          Assessment/Plan:     1. Acute left-sided low back pain without sciatica  ketorolac (TORADOL) injection 60 mg       -Toradol shot given in clinic, patient tolerated well.  Monitored for 10 mins s/p shot.   -back care discussed, proper lifting mechanics discussed  -recommend heat/ice prn.  Gentle stretches daily.   -Flexeril if needed for bedtime/spasms. No driving, caution drowsy.   -back pain red flags and ER precautions discussed with patient.     Supportive care, differential diagnoses, and indications for immediate follow-up discussed with patient.   Pathogenesis of diagnosis discussed including typical length and natural progression.   Instructed to return to clinic or " nearest emergency department for any change in condition, further concerns, or worsening of symptoms.  Patient states understanding of the plan of care and discharge instructions.  Instructed to make an appointment, for follow up, with their primary care provider.      Kelly Moss PA-C

## 2017-07-26 NOTE — MR AVS SNAPSHOT
"        Harmony Monroe   2017 11:30 AM   Office Visit   MRN: [id number]    Department:  [facility]   Dept Phone:  [phone]    Description:  Female : 1960   Provider:  Kelly Moss PA-C           Reason for Visit     Back Pain Pt was getting out of bed and felt a pull in back. Pain when walking, moving.      Allergies as of 2017     Allergen Noted Reactions    Avelox [Moxifloxacin Hcl In Nacl] 2011   Swelling    Bee 2017       Levaquin 10/26/2014   Anaphylaxis    Myalgias arthralgias     Promethazine 2016       Twitching feeling    Tape 2016         You were diagnosed with     Acute left-sided low back pain without sciatica   [[id number]]         Vital Signs     Blood Pressure Pulse Temperature Respirations Height Weight    152/86 mmHg 77 36.7 °C (98.1 °F) 16 1.575 m (5' 2\") 84.369 kg (186 lb)    Body Mass Index Oxygen Saturation Breastfeeding? Smoking Status          34.01 kg/m2 98% No Former Smoker        Basic Information     Date Of Birth Sex Race Ethnicity Preferred Language    1960 Female White Non- English      Your appointments     2017  8:20 AM   Established Patient with Safia Park D.O.   [facility] [address], [address]  [facility] [id number] [phone]           You will be receiving a confirmation call a few days before your appointment from our automated call confirmation system.              Problem List              ICD-10-CM Priority Class Noted - Resolved    Asthma in adult J45.909   2010 - Present    Primary insomnia F51.01   2010 - Present    History of tobacco use disorder Z87.891   2010 - Present    Bee sting allergy    2012 - Present    Post-menopausal Z78.0   2014 - Present    Essential hypertension I10   2015 - Present    Basal cell carcinoma of left medial cheek C44.319   2015 - Present    Actinic keratosis L57.0   " 6/20/2016 - Present    Obesity (BMI 35.0-39.9 without comorbidity) ([facility]) E66.9   2/6/2017 - Present    Acute medial meniscus tear of right knee S83.241A   3/23/2017 - Present      Health Maintenance        Date Due Completion Dates    MAMMOGRAM 8/7/2017 8/7/2015, 4/11/2014, 2/24/2005    IMM INFLUENZA (1) 9/1/2017 11/7/2016, 9/25/2015, 10/10/2014, 1/2/2014, 12/4/2012    COLONOSCOPY 8/12/2021 8/12/2011 (N/S)    Override on 8/12/2011: (N/S)    IMM DTaP/Tdap/Td Vaccine (2 - Td) 10/3/2022 10/3/2012            Current Immunizations     Hepatitis A Vaccine, Ped/Adol 4/19/2012    Hepatitis B Vaccine Non-Recombivax (Ped/Adol) 4/19/2012    Influenza TIV (IM) 1/2/2014    Influenza Vaccine Pediatric 12/4/2012    Influenza Vaccine Quad Inj (Preserved) 11/7/2016, 9/25/2015  3:00 PM, 10/10/2014    Pneumococcal polysaccharide vaccine (PPSV-23) 6/20/2016    Tdap Vaccine 10/3/2012    Tuberculin Skin Test 7/21/2014 10:00 AM, 7/30/2013  3:25 PM, 7/22/2013  3:16 PM, 5/21/2013  9:57 AM, 5/14/2013  9:50 AM, 5/9/2012  9:35 AM, 5/16/2011 12:50 PM      Below and/or attached are the medications your provider expects you to take. Review all of your home medications and newly ordered medications with your provider and/or pharmacist. Follow medication instructions as directed by your provider and/or pharmacist. Please keep your medication list with you and share with your provider. Update the information when medications are discontinued, doses are changed, or new medications (including over-the-counter products) are added; and carry medication information at all times in the event of emergency situations     Allergies:  AVELOX - Swelling     BEE - (reactions not documented)     LEVAQUIN - Anaphylaxis     PROMETHAZINE - (reactions not documented)     TAPE - (reactions not documented)               Medications  Valid as of: July 26, 2017 -  1:32 PM    Generic Name Brand Name Tablet Size Instructions for use    Escitalopram Oxalate (Tab) LEXAPRO  10 MG Take 1 Tab by mouth every day. TAKE 1 TAB BY MOUTH EVERY DAY.        Estradiol (Tab) ESTRACE 1 MG Take 1 mg by mouth every day. From gyn        Fluticasone Propionate (Suspension) FLONASE 50 MCG/ACT         HydroCHLOROthiazide (Tab) HYDRODIURIL 25 MG Take 1 Tab by mouth every day. TAKE 1 TAB BY MOUTH EVERY DAY.        Ibuprofen (Tab) MOTRIN 800 MG         Ondansetron (TABLET DISPERSIBLE) ZOFRAN ODT 4 MG Take 1 Tab by mouth every 8 hours as needed for Nausea/Vomiting.        Oxycodone-Acetaminophen (Tab) PERCOCET-10  MG Take 1-2 Tabs by mouth every four hours as needed for Severe Pain. 1/2 tab as needed        Simvastatin (Tab) ZOCOR 40 MG TAKE 1 TAB BY MOUTH EVERY EVENING.        Temazepam (Cap) RESTORIL 15 MG TAKE 1 TO 2 CAPSULES BY MOUTH AT BEDTIME AS NEEDED FOR SLEEP        Verapamil HCl (CAPSULE SR 24 HR) CALAN  MG Take 1 Cap by mouth every day.        .                 Medicines prescribed today were sent to:     [facility][id number] - [place] - [address]    [address] [place] [address]    Phone: [phone] Fax: [phone]    Open 24 Hours?: No      Medication refill instructions:       If your prescription bottle indicates you have medication refills left, it is not necessary to call your provider’s office. Please contact your pharmacy and they will refill your medication.    If your prescription bottle indicates you do not have any refills left, you may request refills at any time through one of the following ways: The online [employer] system (except Urgent Care), by calling your provider’s office, or by asking your pharmacy to contact your provider’s office with a refill request. Medication refills are processed only during regular business hours and may not be available until the next business day. Your provider may request additional information or to have a follow-up visit with you prior to refilling your medication.   *Please Note: Medication refills are assigned a new Rx number  when refilled electronically. Your pharmacy may indicate that no refills were authorized even though a new prescription for the same medication is available at the pharmacy. Please request the medicine by name with the pharmacy before contacting your provider for a refill.           MyChart Access Code: Activation code not generated  Current Interanahart Status: Active

## 2017-08-07 RX ORDER — SIMVASTATIN 40 MG
TABLET ORAL
Qty: 90 TAB | Refills: 1 | Status: SHIPPED | OUTPATIENT
Start: 2017-08-07 | End: 2018-02-13 | Stop reason: SDUPTHER

## 2017-08-07 NOTE — TELEPHONE ENCOUNTER
Pt has had OV within the 12 month protocol and lipid panel is current. 6 month supply sent to pharmacy.   Lab Results   Component Value Date/Time    CHOLESTEROL, 09/01/2016 07:15 AM    * 09/01/2016 07:15 AM    HDL 51 09/01/2016 07:15 AM    TRIGLYCERIDES 177* 09/01/2016 07:15 AM       Lab Results   Component Value Date/Time    SODIUM 133* 03/21/2017 02:45 PM    POTASSIUM 3.7 03/21/2017 02:45 PM    CHLORIDE 98 03/21/2017 02:45 PM    CO2 29 03/21/2017 02:45 PM    GLUCOSE 96 03/21/2017 02:45 PM    BUN 15 03/21/2017 02:45 PM    CREATININE 0.91 03/21/2017 02:45 PM     Lab Results   Component Value Date/Time    ALKALINE PHOSPHATASE 63 01/28/2016 07:17 AM    AST(SGOT) 22 01/28/2016 07:17 AM    ALT(SGPT) 30 01/28/2016 07:17 AM    TOTAL BILIRUBIN 0.3 01/28/2016 07:17 AM

## 2017-09-19 DIAGNOSIS — I10 ESSENTIAL HYPERTENSION: ICD-10-CM

## 2017-09-19 RX ORDER — HYDROCHLOROTHIAZIDE 25 MG/1
25 TABLET ORAL
Qty: 90 TAB | Refills: 0 | Status: SHIPPED | OUTPATIENT
Start: 2017-09-19 | End: 2017-12-26 | Stop reason: SDUPTHER

## 2017-09-19 NOTE — TELEPHONE ENCOUNTER
Was the patient seen in the last year in this department? Yes     Does patient have an active prescription for medications requested? No     Received Request Via: Pharmacy      Pt met protocol?: No, OV 2/17   BP Readings from Last 1 Encounters:   07/26/17 152/86

## 2017-09-19 NOTE — TELEPHONE ENCOUNTER
Patient has upcoming appointment with PCP, will send three months to pharmacy.  Varghese Whitehead, PharmD

## 2017-09-20 RX ORDER — ESCITALOPRAM OXALATE 10 MG/1
10 TABLET ORAL
Qty: 90 TAB | Refills: 4 | Status: SHIPPED | OUTPATIENT
Start: 2017-09-20 | End: 2018-10-24 | Stop reason: SDUPTHER

## 2017-09-28 ENCOUNTER — HOSPITAL ENCOUNTER (OUTPATIENT)
Dept: LAB | Facility: MEDICAL CENTER | Age: 57
End: 2017-09-28
Payer: COMMERCIAL

## 2017-09-28 LAB
BDY FAT % MEASURED: 43.2 %
BP DIAS: 74 MMHG
BP SYS: 119 MMHG
CHOLEST SERPL-MCNC: 176 MG/DL (ref 100–199)
DIABETES HTDIA: NO
EVENT NAME HTEVT: NORMAL
FASTING HTFAS: YES
GLUCOSE SERPL-MCNC: 98 MG/DL (ref 65–99)
HDLC SERPL-MCNC: 58 MG/DL
HYPERTENSION HTHYP: YES
LDLC SERPL CALC-MCNC: 86 MG/DL
SCREENING LOC CITY HTCIT: NORMAL
SCREENING LOC STATE HTSTA: NORMAL
SCREENING LOCATION HTLOC: NORMAL
SMOKING HTSMO: YES
SUBSCRIBER ID HTSID: NORMAL
TRIGL SERPL-MCNC: 161 MG/DL (ref 0–149)

## 2017-09-28 PROCEDURE — S5190 WELLNESS ASSESSMENT BY NONPH: HCPCS

## 2017-09-28 PROCEDURE — 80061 LIPID PANEL: CPT

## 2017-09-28 PROCEDURE — 82947 ASSAY GLUCOSE BLOOD QUANT: CPT

## 2017-09-28 PROCEDURE — 36415 COLL VENOUS BLD VENIPUNCTURE: CPT

## 2017-09-29 ENCOUNTER — NON-PROVIDER VISIT (OUTPATIENT)
Dept: URGENT CARE | Facility: PHYSICIAN GROUP | Age: 57
End: 2017-09-29

## 2017-09-29 DIAGNOSIS — Z23 NEED FOR INFLUENZA VACCINATION: ICD-10-CM

## 2017-09-29 PROCEDURE — 90686 IIV4 VACC NO PRSV 0.5 ML IM: CPT | Performed by: PHYSICIAN ASSISTANT

## 2017-09-29 NOTE — NON-PROVIDER
Harmony Monroe 57 year old female  Non provider visit for flu shot   Patient tolerated well   Administered by Iftikhar Zuñiga

## 2017-10-04 ENCOUNTER — OFFICE VISIT (OUTPATIENT)
Dept: MEDICAL GROUP | Facility: PHYSICIAN GROUP | Age: 57
End: 2017-10-04
Payer: COMMERCIAL

## 2017-10-04 VITALS
OXYGEN SATURATION: 97 % | SYSTOLIC BLOOD PRESSURE: 118 MMHG | TEMPERATURE: 98 F | DIASTOLIC BLOOD PRESSURE: 62 MMHG | RESPIRATION RATE: 16 BRPM | HEART RATE: 72 BPM | BODY MASS INDEX: 34.23 KG/M2 | HEIGHT: 62 IN | WEIGHT: 186 LBS

## 2017-10-04 DIAGNOSIS — E66.9 OBESITY (BMI 30.0-34.9): ICD-10-CM

## 2017-10-04 DIAGNOSIS — Z23 NEED FOR VACCINATION: ICD-10-CM

## 2017-10-04 DIAGNOSIS — L82.1 SEBORRHEIC KERATOSIS: ICD-10-CM

## 2017-10-04 DIAGNOSIS — F51.01 PRIMARY INSOMNIA: ICD-10-CM

## 2017-10-04 DIAGNOSIS — I10 ESSENTIAL HYPERTENSION: ICD-10-CM

## 2017-10-04 DIAGNOSIS — C44.319 BASAL CELL CARCINOMA OF LEFT MEDIAL CHEEK: ICD-10-CM

## 2017-10-04 PROBLEM — S83.241A ACUTE MEDIAL MENISCUS TEAR OF RIGHT KNEE: Status: RESOLVED | Noted: 2017-03-23 | Resolved: 2017-10-04

## 2017-10-04 PROCEDURE — 99214 OFFICE O/P EST MOD 30 MIN: CPT | Mod: 25 | Performed by: FAMILY MEDICINE

## 2017-10-04 PROCEDURE — 90471 IMMUNIZATION ADMIN: CPT | Performed by: FAMILY MEDICINE

## 2017-10-04 PROCEDURE — 90736 HZV VACCINE LIVE SUBQ: CPT | Performed by: FAMILY MEDICINE

## 2017-10-04 NOTE — PROGRESS NOTES
Subjective:     Chief Complaint   Patient presents with   • Hypertension       Harmony Monroe is a 57 y.o. female here today forFollow up on chronic conditions including hypertension.    Hypertension: Patient is taking all medications as directed without side effects. Denies blurry vision, change of vision, headaches, chest pain, change in urination or lower extremity swelling.    Patient was previously seen by dermatology for basal cell carcinoma of left cheek. She has been using topical Efudex without significant change. Patient also reports skin changes on low back.    Patient reports good sleep with use of Lexapro and temazepam only if needed.    Patient is followed by OB/GYN and continues estrogen through OB/GYN provider.        Allergies   Allergen Reactions   • Avelox [Moxifloxacin Hcl In Nacl] Swelling   • Bee    • Levaquin Anaphylaxis     Myalgias arthralgias    • Promethazine      Twitching feeling   • Tape      Current medicines (including changes today)  Current Outpatient Prescriptions   Medication Sig Dispense Refill   • escitalopram (LEXAPRO) 10 MG Tab Take 1 Tab by mouth every day. TAKE 1 TAB BY MOUTH EVERY DAY. 90 Tab 4   • hydrochlorothiazide (HYDRODIURIL) 25 MG Tab Take 1 Tab by mouth every day. 90 Tab 0   • simvastatin (ZOCOR) 40 MG Tab TAKE 1 TAB BY MOUTH EVERY EVENING. 90 Tab 1   •  verapamil ER (CALAN SR) 120 MG CAPSULE SR 24 HR Take 1 Cap by mouth every day. 90 Cap 1   • temazepam (RESTORIL) 15 MG Cap TAKE 1 TO 2 CAPSULES BY MOUTH AT BEDTIME AS NEEDED FOR SLEEP 60 Cap 5   • estradiol (ESTRACE) 1 MG TABS Take 1 mg by mouth every day. From gyn       No current facility-administered medications for this visit.      Social History   Substance Use Topics   • Smoking status: Former Smoker     Packs/day: 0.50     Years: 19.00     Start date: 9/1/1997     Quit date: 1/1/2016   • Smokeless tobacco: Never Used      Comment: encouraged to not restart   • Alcohol use No     Family Status  "  Relation Status   • Mother Alive   • Father    • Sister    • Neg Hx      Family History   Problem Relation Age of Onset   • Hyperlipidemia Mother    • Arthritis Mother    • Heart Disease Father    • Diabetes Sister    • Cancer Neg Hx    • Hypertension Neg Hx    • Stroke Neg Hx      She    has a past medical history of Allergy; ASTHMA; Basal cell carcinoma of left medial cheek (2015); Depression (2010); High cholesterol; History of tobacco use disorder (2010); Hypertension; Pneumonia (,); and Post-menopausal (2014). She also has no past medical history of Adrenal disorder (CMS-HCC); Anemia; Anxiety; Blood transfusion; Clotting disorder (CMS-HCC); COPD; Cushings syndrome (CMS-HCC); GERD (gastroesophageal reflux disease); Hyperlipidemia; IBD (inflammatory bowel disease); Meningitis; Migraine; OSTEOPOROSIS; Parathyroid disorder (CMS-HCC); Pituitary disease (CMS-HCC); Sickle cell disease (CMS-HCC); Substance abuse; or Ulcer (CMS-HCC).        ROS   GEN: no weight loss, fevers, or chills  HEENT: no blurry vision or change in vision, no ear pain, no difficulty swallowing, no throat pain, no runny nose, no nasal congestion  Resp: no shortness of breath, no cough  CV: no racing heart, no irregular beats, no chest pain  Abd: no nausea, no vomiting, no diarrhea, no constipation, no blood in stool, no dark stools, no incontinence  : no dysuria, no hematuria, no urinary incontinence, no increased frequency  MSK: no muscle aches, no joint pain, no limited motion  Neuro: no headaches, no dizziness, no LOC, no weakness, no numbness/tingling       Objective:     Blood pressure 118/62, pulse 72, temperature 36.7 °C (98 °F), resp. rate 16, height 1.575 m (5' 2\"), weight 84.4 kg (186 lb), SpO2 97 %. Body mass index is 34.02 kg/m².   Physical Exam:  Constitutional: Alert, no distress.  Skin: Warm, dry, good turgor, L cheek ulcerated 0.5cm skin, mid back and upper back stuck on appearing 2 " hyperpigmented scaly lesions   Eye: Equal, round and reactive, conjunctiva clear, lids normal.  ENMT: Lips without lesions, oropharynx non-erythematous, no exudate, moist oral mucosa  Neck: Trachea midline, no masses, no thyromegaly. No cervical or supraclavicular lymphadenopathy. Full ROM  Respiratory: Unlabored respiratory effort, good air movement, lungs clear to auscultation, no wheezes, no ronchi.  Cardiovascular:RRR, +S1, S2, no murmur, no peripheral edema, pedal and radial pulses equal and intact bilat  Abdomen: Soft, non-tender, no masses, no hepatosplenomegaly.  MSK:5/5 muscle strength in upper extremities as well as lower extremity bilaterally  Psych: Alert and oriented, appropriate affect and mood.  Neuro: CN2-12 intact, no gross motor or sensory deficits      Assessment and Plan:   The following treatment plan was discussed    1. Essential hypertension  Chronic: well controlled    2. Primary insomnia  Chronic: well controlled, suggest weaning off medications as tolerated    3. Seborrheic keratosis  New diagnoses:Recommend Cyro    4. Basal cell carcinoma of left medial cheek  Previously diagnosed. Recommend follow-up with Derm  - REFERRAL TO DERMATOLOGY    5. Obesity (BMI 30.0-34.9)  Pt educated on the increase of morbidity and mortality associated with excess weight including DM, Heart Disease, HTN, stroke, and sleep apnea.  Pt advised weight loss of 5% through reduced calorie, low fat diet and 150 mins of exercise a week  - OBESITY COUNSELING (No Charge): Patient identified as having weight management issue.  Appropriate orders and counseling given.    6. Need for vaccination  I discussed benefits and side effects of each vaccine with patient, and I answered all patient's questions about vaccines.  - VARICELLA ZOSTER VACCINE SQ      Followup: Return removal of skin lesion.    Please note that this dictation was created using voice recognition software. I have made every reasonable attempt to correct  obvious errors, but I expect that there are errors of grammar and possibly content that I did not discover before finalizing the note.

## 2017-10-05 PROBLEM — E66.811 OBESITY (BMI 30.0-34.9): Status: ACTIVE | Noted: 2017-02-06

## 2017-10-09 ENCOUNTER — OFFICE VISIT (OUTPATIENT)
Dept: MEDICAL GROUP | Facility: PHYSICIAN GROUP | Age: 57
End: 2017-10-09
Payer: COMMERCIAL

## 2017-10-09 VITALS
BODY MASS INDEX: 34.23 KG/M2 | OXYGEN SATURATION: 95 % | TEMPERATURE: 97.6 F | SYSTOLIC BLOOD PRESSURE: 120 MMHG | HEART RATE: 76 BPM | HEIGHT: 62 IN | DIASTOLIC BLOOD PRESSURE: 60 MMHG | WEIGHT: 186 LBS | RESPIRATION RATE: 16 BRPM

## 2017-10-09 DIAGNOSIS — D22.9 CHANGING NEVUS: ICD-10-CM

## 2017-10-09 DIAGNOSIS — L82.0 SEBORRHEIC KERATOSES, INFLAMED: ICD-10-CM

## 2017-10-09 PROCEDURE — 11400 EXC TR-EXT B9+MARG 0.5 CM<: CPT | Mod: 59 | Performed by: FAMILY MEDICINE

## 2017-10-09 PROCEDURE — 17110 DESTRUCTION B9 LES UP TO 14: CPT | Performed by: FAMILY MEDICINE

## 2017-10-10 ENCOUNTER — HOSPITAL ENCOUNTER (OUTPATIENT)
Facility: MEDICAL CENTER | Age: 57
End: 2017-10-10
Attending: FAMILY MEDICINE
Payer: COMMERCIAL

## 2017-10-10 PROCEDURE — 88305 TISSUE EXAM BY PATHOLOGIST: CPT

## 2017-10-12 NOTE — PROGRESS NOTES
Subjective:     Chief Complaint   Patient presents with   • Other     skin issues       Harmony Monroe is a 57 y.o. female here today for 3 changing pigmented skin lesions on back. Patient reports they have been present for many years. Patient reports chronic irritation and inflammation due to irritation from [place]. Patient is not able to monitor lesions consistently due to location and difficulty in seeing.        Allergies   Allergen Reactions   • Avelox [Moxifloxacin Hcl In Nacl] Swelling   • Bee    • Levaquin Anaphylaxis     Myalgias arthralgias    • Promethazine      Twitching feeling   • Tape      Current medicines (including changes today)  Current Outpatient Prescriptions   Medication Sig Dispense Refill   • escitalopram (LEXAPRO) 10 MG Tab Take 1 Tab by mouth every day. TAKE 1 TAB BY MOUTH EVERY DAY. 90 Tab 4   • hydrochlorothiazide (HYDRODIURIL) 25 MG Tab Take 1 Tab by mouth every day. 90 Tab 0   • simvastatin (ZOCOR) 40 MG Tab TAKE 1 TAB BY MOUTH EVERY EVENING. 90 Tab 1   •  verapamil ER (CALAN SR) 120 MG CAPSULE SR 24 HR Take 1 Cap by mouth every day. 90 Cap 1   • temazepam (RESTORIL) 15 MG Cap TAKE 1 TO 2 CAPSULES BY MOUTH AT BEDTIME AS NEEDED FOR SLEEP 60 Cap 5   • estradiol (ESTRACE) 1 MG TABS Take 1 mg by mouth every day. From gyn       No current facility-administered medications for this visit.      Social History   Substance Use Topics   • Smoking status: Former Smoker     Packs/day: 0.50     Years: 19.00     Start date: 1997     Quit date: 2016   • Smokeless tobacco: Never Used      Comment: encouraged to not restart   • Alcohol use No     Family Status   Relation Status   • Mother Alive   • Father    • Sister    • Neg Hx      Family History   Problem Relation Age of Onset   • Hyperlipidemia Mother    • Arthritis Mother    • Heart Disease Father    • Diabetes Sister    • Cancer Neg Hx    • Hypertension Neg Hx    • Stroke Neg Hx      She    has a past medical history of  "Allergy; ASTHMA; Basal cell carcinoma of left medial cheek (11/30/2015); Depression (1/7/2010); High cholesterol; History of tobacco use disorder (1/7/2010); Hypertension; Pneumonia (1990,2001); and Post-menopausal (8/26/2014). She also has no past medical history of Adrenal disorder (CMS-HCC); Anemia; Anxiety; Blood transfusion; Clotting disorder (CMS-HCC); COPD; Cushings syndrome (CMS-HCC); GERD (gastroesophageal reflux disease); Hyperlipidemia; IBD (inflammatory bowel disease); Meningitis; Migraine; OSTEOPOROSIS; Parathyroid disorder (CMS-HCC); Pituitary disease (CMS-HCC); Sickle cell disease (CMS-HCC); Substance abuse; or Ulcer (CMS-HCC).        ROS   As per history of present illness. All others reviewed and negative       Objective:     Blood pressure 120/60, pulse 76, temperature 36.4 °C (97.6 °F), resp. rate 16, height 1.575 m (5' 2\"), weight 84.4 kg (186 lb), SpO2 95 %. Body mass index is 34.02 kg/m².   Physical Exam:  Constitutional: Alert, no distress.  Skin: Warm, dry, good turgor,  Lesion 1: Right lower back approximately L1   4 inches lateral to midline. A 0.3 cm raised regular stuck on scaly hyperpigmented papule,  Lesion 2: right low back approximately T 11 8 inches lateral to midline there is 8.4 cm stuck on hyperpigmented scaly papule  Lesion 3: Right upper back approximately 4 inches lateral to midline at T2 there is a 0.4 cm raised regular erythematous papule   Eye: Equal, round and reactive, conjunctiva clear, lids normal.  ENMT: Lips without lesions, oropharynx non-erythematous, no exudate, moist oral mucosa, bilateral tympanic membranes: No bulging, no retraction, no fluid, nonerythematous, positive light reflex, external auditory canals: Clear, scant cerumen, nonerythematous  Neck: Trachea midline, no masses, no thyromegaly. No cervical or supraclavicular lymphadenopathy. Full ROM  Respiratory: Unlabored respiratory effort, good air movement, lungs clear to auscultation, no wheezes, no " chinyere.  Cardiovascular:RRR, +S1, S2, no murmur, no peripheral edema, pedal and radial pulses equal and intact bilat      Assessment and Plan:   The following treatment plan was discussed    1. Changing nevus  Lesion 3 as above  PROCEDURE NOTE, Excision/Biopsy  Indication: Changing nevi unable to be monitored      History of allergy to iodine: no     The risks (including bleeding and infection) and benefits of the   procedure and verbal informed consent obtained.     Local anesthesia was performed with    Lidocaine 2% without epinephrine, prepped with povidone iodine, and   draped in the usual sterile fashion. Shave biopsy was performed with dermal blade.    The wound was closed with cauterization.  The specimen was sent for pathologic examination.     The patient tolerated the procedure well and without apparent   complications.     The patient was instructed to keep the wound dry and covered for   24-48h and clean thereafter, and warning signs of infection were   reviewed. Followup sooner for any concerns.  - PATHOLOGY SPECIMEN; Future    2. Seborrheic keratoses, inflamed  CRYOTHERAPY:  Discussed risks and benefits of cryotherapy. Patient verbally agreed. 3 applications of cryotherapy were applied to 2 lesion on back. (Noted as lesions one and 2 above). Patient tolerated procedure well. Aftercare instructions given.        Followup: Return if symptoms worsen or fail to improve.    Please note that this dictation was created using voice recognition software. I have made every reasonable attempt to correct obvious errors, but I expect that there are errors of grammar and possibly content that I did not discover before finalizing the note.

## 2017-10-16 DIAGNOSIS — Z11.59 NEED FOR HEPATITIS C SCREENING TEST: ICD-10-CM

## 2017-10-16 DIAGNOSIS — I10 ESSENTIAL HYPERTENSION: ICD-10-CM

## 2017-10-18 ENCOUNTER — HOSPITAL ENCOUNTER (OUTPATIENT)
Dept: LAB | Facility: MEDICAL CENTER | Age: 57
End: 2017-10-18
Attending: FAMILY MEDICINE
Payer: COMMERCIAL

## 2017-10-18 DIAGNOSIS — I10 ESSENTIAL HYPERTENSION: ICD-10-CM

## 2017-10-18 LAB
ALBUMIN SERPL BCP-MCNC: 4.2 G/DL (ref 3.2–4.9)
ALBUMIN/GLOB SERPL: 1.6 G/DL
ALP SERPL-CCNC: 63 U/L (ref 30–99)
ALT SERPL-CCNC: 29 U/L (ref 2–50)
ANION GAP SERPL CALC-SCNC: 10 MMOL/L (ref 0–11.9)
AST SERPL-CCNC: 25 U/L (ref 12–45)
BILIRUB SERPL-MCNC: 0.3 MG/DL (ref 0.1–1.5)
BUN SERPL-MCNC: 22 MG/DL (ref 8–22)
CALCIUM SERPL-MCNC: 9.9 MG/DL (ref 8.5–10.5)
CHLORIDE SERPL-SCNC: 104 MMOL/L (ref 96–112)
CO2 SERPL-SCNC: 24 MMOL/L (ref 20–33)
CREAT SERPL-MCNC: 0.66 MG/DL (ref 0.5–1.4)
GFR SERPL CREATININE-BSD FRML MDRD: >60 ML/MIN/1.73 M 2
GLOBULIN SER CALC-MCNC: 2.7 G/DL (ref 1.9–3.5)
GLUCOSE SERPL-MCNC: 106 MG/DL (ref 65–99)
HCV AB SER QL: NEGATIVE
POTASSIUM SERPL-SCNC: 4 MMOL/L (ref 3.6–5.5)
PROT SERPL-MCNC: 6.9 G/DL (ref 6–8.2)
SODIUM SERPL-SCNC: 138 MMOL/L (ref 135–145)

## 2017-10-18 PROCEDURE — 80053 COMPREHEN METABOLIC PANEL: CPT

## 2017-10-18 PROCEDURE — 36415 COLL VENOUS BLD VENIPUNCTURE: CPT

## 2017-10-18 PROCEDURE — 86803 HEPATITIS C AB TEST: CPT

## 2017-11-17 DIAGNOSIS — G47.00 INSOMNIA, UNSPECIFIED TYPE: ICD-10-CM

## 2017-11-17 RX ORDER — TEMAZEPAM 15 MG/1
CAPSULE ORAL
Qty: 60 CAP | Refills: 5 | Status: SHIPPED
Start: 2017-11-17 | End: 2018-05-23 | Stop reason: SDUPTHER

## 2017-12-07 ENCOUNTER — OFFICE VISIT (OUTPATIENT)
Dept: DERMATOLOGY | Facility: IMAGING CENTER | Age: 57
End: 2017-12-07
Payer: COMMERCIAL

## 2017-12-07 ENCOUNTER — HOSPITAL ENCOUNTER (OUTPATIENT)
Facility: MEDICAL CENTER | Age: 57
End: 2017-12-07
Attending: DERMATOLOGY
Payer: COMMERCIAL

## 2017-12-07 VITALS — WEIGHT: 196 LBS | BODY MASS INDEX: 34.73 KG/M2 | TEMPERATURE: 98.3 F | HEIGHT: 63 IN

## 2017-12-07 DIAGNOSIS — L81.4 LENTIGINES: ICD-10-CM

## 2017-12-07 DIAGNOSIS — D18.01 CHERRY ANGIOMA: ICD-10-CM

## 2017-12-07 DIAGNOSIS — D48.5 NEOPLASM OF UNCERTAIN BEHAVIOR OF SKIN: ICD-10-CM

## 2017-12-07 PROCEDURE — 11100 PR BIOPSY OF SKIN LESION: CPT | Performed by: DERMATOLOGY

## 2017-12-07 PROCEDURE — 11101 PR BIOPSY, EACH ADDED LESION: CPT | Performed by: DERMATOLOGY

## 2017-12-07 PROCEDURE — 99203 OFFICE O/P NEW LOW 30 MIN: CPT | Mod: 25 | Performed by: DERMATOLOGY

## 2017-12-07 PROCEDURE — 88305 TISSUE EXAM BY PATHOLOGIST: CPT

## 2017-12-07 ASSESSMENT — ENCOUNTER SYMPTOMS
FEVER: 0
CHILLS: 0

## 2017-12-07 NOTE — PROGRESS NOTES
Dermatology New Patient Visit    Chief Complaint   Patient presents with   • Skin Lesion       Subjective:     HPI:   Harmony Monroe is a 57 y.o. female presenting for    Full skin exam - was diagnosed with BCC on the left cheek 10/2015, was treated with efudex  Believes lesion has grown back in the same area  Intermittently itchy, bleeds easily  No recent treatments    Spot on the mid lower back   First appeared a few months ago  Irritating, occasionally painful  No aggravating/alleviating factors  No treatment    Burning along the frontal (right) hairline  Started a few weeks ago  Occurs intermittently  No aggravating/alleviating factors  No treatments    Does not like the brown spots all over the face  Interested in cosmetic treatment options    History of skin cancer: Yes, Details:  Basal 2 years ago  (above)  History of blistering/severe sunburns:No  Family history of skin cancer:No - but brother with ocular melanoma, dx 03/2017  Family history of atypical moles:No        Past Medical History:   Diagnosis Date   • Allergy    • ASTHMA    • Basal cell carcinoma of left medial cheek 11/30/2015   • Depression 1/7/2010   • High cholesterol    • History of tobacco use disorder 1/7/2010   • Hypertension    • Pneumonia 1990,2001   • Post-menopausal 8/26/2014       Current Outpatient Prescriptions on File Prior to Visit   Medication Sig Dispense Refill   • temazepam (RESTORIL) 15 MG Cap TAKE 1 TO 2 CAPSULES BY MOUTH AT BEDTIME AS NEEDED FOR SLEEP 60 Cap 5   • escitalopram (LEXAPRO) 10 MG Tab Take 1 Tab by mouth every day. TAKE 1 TAB BY MOUTH EVERY DAY. 90 Tab 4   • hydrochlorothiazide (HYDRODIURIL) 25 MG Tab Take 1 Tab by mouth every day. 90 Tab 0   • simvastatin (ZOCOR) 40 MG Tab TAKE 1 TAB BY MOUTH EVERY EVENING. 90 Tab 1   •  verapamil ER (CALAN SR) 120 MG CAPSULE SR 24 HR Take 1 Cap by mouth every day. 90 Cap 1   • estradiol (ESTRACE) 1 MG TABS Take 1 mg by mouth every day. From gyn       No current  "facility-administered medications on file prior to visit.        Allergies   Allergen Reactions   • Avelox [Moxifloxacin Hcl In Nacl] Swelling   • Bee    • Levaquin Anaphylaxis     Myalgias arthralgias    • Promethazine      Twitching feeling   • Tape        Family History   Problem Relation Age of Onset   • Hyperlipidemia Mother    • Arthritis Mother    • Heart Disease Father    • Diabetes Sister    • Cancer Neg Hx    • Hypertension Neg Hx    • Stroke Neg Hx        Social History     Social History   • Marital status: Single     Spouse name: N/A   • Number of children: N/A   • Years of education: N/A     Occupational History   • Not on file.     Social History Main Topics   • Smoking status: Former Smoker     Packs/day: 0.50     Years: 19.00     Start date: 9/1/1997     Quit date: 1/1/2016   • Smokeless tobacco: Never Used      Comment: encouraged to not restart   • Alcohol use No   • Drug use: No   • Sexual activity: No     Other Topics Concern   • Not on file     Social History Narrative   • No narrative on file       Review of Systems   Constitutional: Negative for chills and fever.   Skin: Negative for itching and rash.   All other systems reviewed and are negative.       Objective:     A full mucocutaneous exam was completed including: scalp, hair, ears, face, eyelids, conjunctiva, lips, gums/tongue/oropharynx, neck, chest breasts, abdomen, back, left and right upper extremities (including hands/digits and fingernails), left and right lower extremities (including feet/toes, toenails), buttocks, excluding external genitalia (patient refusal) with the following pertinent findings listed below. Remaining above-listed examined areas within normal limits / negative for rashes or lesions.    Temperature 36.8 °C (98.3 °F), height 1.6 m (5' 3\"), weight 88.9 kg (196 lb).    Physical Exam   Constitutional: She is oriented to person, place, and time and well-developed, well-nourished, and in no distress.   HENT:   Head: " Normocephalic and atraumatic.       Right Ear: External ear normal.   Left Ear: External ear normal.   Nose: Nose normal.   Mouth/Throat: Oropharynx is clear and moist.   Eyes: Conjunctivae and lids are normal.   Neck: Normal range of motion. Neck supple.   Cardiovascular: Intact distal pulses.    Pulmonary/Chest: Effort normal.   Neurological: She is alert and oriented to person, place, and time.   Skin: Skin is warm and dry.        Psychiatric: Mood and affect normal.   Vitals reviewed.      DATA: none applicable to review    Assessment and Plan:     1. Neoplasm of uncertain behavior of skin - left cheek  Procedure Note   Procedure: Biopsy by shave technique  Location: as noted above  Size: as noted in exam  Preoperative diagnosis: recurrent BCC  Risks, benefits and alternatives of procedure discussed and written informed consent obtained. Time out completed. Area of biopsy prepped with alcohol. Anesthesia with 1% lidocaine with epinephrine administered with 30 gauge needle. Shave biopsy of the site performed. Hemostasis achieved with pressure and aluminum chloride. Vaseline applied to wound with bandage. Patient tolerated procedure well and there were no complications. The specimen was sent to the pathology lab by the staff. Wound care was discussed.    2. Neoplasm of uncertain behavior of skin - mid lower back  Procedure Note   Procedure: Biopsy by shave technique  Location: as noted above  Size: as noted in exam  Preoperative diagnosis:neurofibroma vs other  Risks, benefits and alternatives of procedure discussed and written informed consent obtained. Time out completed. Area of biopsy prepped with alcohol. Anesthesia with 1% lidocaine with epinephrine administered with 30 gauge needle. Shave biopsy of the site performed. Hemostasis achieved with pressure and aluminum chloride. Vaseline applied to wound with bandage. Patient tolerated procedure well and there were no complications. The specimen was sent to the  pathology lab by the staff. Wound care was discussed.    3. Lentigines  - Benign-appearing nature of lesions discussed. Advised to return to clinic for any new or concerning changes.  - patient will make cosmetic consultation appointment per her schedul    4. Cherry angiomas  - Benign-appearing nature of lesions discussed. Advised to return to clinic for any new or concerning changes.    5. Altered sensation over the right frontal hairline - unclear etiology   - no concerning features for morphea or any evidence of dermatitis  - will monitor    Followup: Return in about 3 months (around 3/7/2018) for thanh.    Sidra Jones M.D.

## 2017-12-12 ENCOUNTER — TELEPHONE (OUTPATIENT)
Dept: DERMATOLOGY | Facility: IMAGING CENTER | Age: 57
End: 2017-12-12

## 2017-12-12 DIAGNOSIS — C44.319 BASAL CELL CARCINOMA OF SKIN OF OTHER PART OF FACE: ICD-10-CM

## 2017-12-26 DIAGNOSIS — I10 ESSENTIAL HYPERTENSION: ICD-10-CM

## 2017-12-27 RX ORDER — HYDROCHLOROTHIAZIDE 25 MG/1
25 TABLET ORAL
Qty: 90 TAB | Refills: 1 | Status: SHIPPED | OUTPATIENT
Start: 2017-12-27 | End: 2018-06-25 | Stop reason: SDUPTHER

## 2017-12-27 NOTE — TELEPHONE ENCOUNTER
Was the patient seen in the last year in this department? Yes     Does patient have an active prescription for medications requested? No     Received Request Via: Pharmacy      Pt met protocol?: Yes, OV 10/17   BP Readings from Last 1 Encounters:   10/09/17 120/60

## 2017-12-27 NOTE — TELEPHONE ENCOUNTER
Refill X 6 months, sent to pharmacy.Pt. Seen in the last 6 months per protocol.   Lab Results   Component Value Date/Time    SODIUM 138 10/18/2017 10:08 AM    POTASSIUM 4.0 10/18/2017 10:08 AM    CHLORIDE 104 10/18/2017 10:08 AM    CO2 24 10/18/2017 10:08 AM    GLUCOSE 106 (H) 10/18/2017 10:08 AM    BUN 22 10/18/2017 10:08 AM    CREATININE 0.66 10/18/2017 10:08 AM    CREATININE 0.8 10/24/2005 12:00 PM

## 2018-01-05 ENCOUNTER — OFFICE VISIT (OUTPATIENT)
Dept: MEDICAL GROUP | Facility: PHYSICIAN GROUP | Age: 58
End: 2018-01-05
Payer: COMMERCIAL

## 2018-01-05 ENCOUNTER — APPOINTMENT (OUTPATIENT)
Dept: RADIOLOGY | Facility: IMAGING CENTER | Age: 58
End: 2018-01-05
Attending: FAMILY MEDICINE
Payer: COMMERCIAL

## 2018-01-05 VITALS
TEMPERATURE: 98.5 F | BODY MASS INDEX: 34.2 KG/M2 | SYSTOLIC BLOOD PRESSURE: 118 MMHG | HEIGHT: 63 IN | HEART RATE: 66 BPM | WEIGHT: 193 LBS | OXYGEN SATURATION: 96 % | DIASTOLIC BLOOD PRESSURE: 60 MMHG

## 2018-01-05 DIAGNOSIS — M25.562 MEDIAL KNEE PAIN, LEFT: ICD-10-CM

## 2018-01-05 PROCEDURE — 73564 X-RAY EXAM KNEE 4 OR MORE: CPT | Mod: TC,LT | Performed by: FAMILY MEDICINE

## 2018-01-05 PROCEDURE — 99214 OFFICE O/P EST MOD 30 MIN: CPT | Performed by: FAMILY MEDICINE

## 2018-01-05 RX ORDER — IBUPROFEN 800 MG/1
800 TABLET ORAL EVERY 8 HOURS PRN
Qty: 90 TAB | Refills: 0 | Status: SHIPPED | OUTPATIENT
Start: 2018-01-05 | End: 2018-02-20 | Stop reason: SDUPTHER

## 2018-01-05 ASSESSMENT — PATIENT HEALTH QUESTIONNAIRE - PHQ9: CLINICAL INTERPRETATION OF PHQ2 SCORE: 0

## 2018-01-05 NOTE — PROGRESS NOTES
Subjective:     Chief Complaint   Patient presents with   • Knee Pain     Left       Harmony Monroe is a 57 y.o. female here today for Left knee pain.  Pt reports pain began 3 weeks prior after dancing for several hours. Pain is located on medial and patellar aspects. It is 8/10 with walking, climbing stairs, and standing.  Pt is ambulating with a limp due to pain Pain is impairing ambulation at work. It is taking ibuprofen 800 daily if working with improvement in pain.        Allergies   Allergen Reactions   • Avelox [Moxifloxacin Hcl In Nacl] Swelling   • Bee    • Levaquin Anaphylaxis     Myalgias arthralgias    • Promethazine      Twitching feeling   • Tape      Current medicines (including changes today)  Current Outpatient Prescriptions   Medication Sig Dispense Refill   • ibuprofen (MOTRIN) 800 MG Tab Take 1 Tab by mouth every 8 hours as needed for up to 30 days. 90 Tab 0   • hydrochlorothiazide (HYDRODIURIL) 25 MG Tab TAKE 1 TAB BY MOUTH EVERY DAY. 90 Tab 1   • temazepam (RESTORIL) 15 MG Cap TAKE 1 TO 2 CAPSULES BY MOUTH AT BEDTIME AS NEEDED FOR SLEEP 60 Cap 5   • escitalopram (LEXAPRO) 10 MG Tab Take 1 Tab by mouth every day. TAKE 1 TAB BY MOUTH EVERY DAY. 90 Tab 4   • simvastatin (ZOCOR) 40 MG Tab TAKE 1 TAB BY MOUTH EVERY EVENING. 90 Tab 1   •  verapamil ER (CALAN SR) 120 MG CAPSULE SR 24 HR Take 1 Cap by mouth every day. 90 Cap 1   • estradiol (ESTRACE) 1 MG TABS Take 1 mg by mouth every day. From gyn       No current facility-administered medications for this visit.      Social History   Substance Use Topics   • Smoking status: Former Smoker     Packs/day: 0.50     Years: 19.00     Types: Cigarettes     Start date: 1997     Quit date: 2016   • Smokeless tobacco: Never Used      Comment: encouraged to not restart   • Alcohol use No     Family Status   Relation Status   • Mother Alive   • Father    • Sister    • Neg Hx      Family History   Problem Relation Age of Onset   •  "Hyperlipidemia Mother    • Arthritis Mother    • Heart Disease Father    • Diabetes Sister    • Cancer Neg Hx    • Hypertension Neg Hx    • Stroke Neg Hx      She    has a past medical history of Allergy; ASTHMA; Basal cell carcinoma of left medial cheek (11/30/2015); Depression (1/7/2010); High cholesterol; History of tobacco use disorder (1/7/2010); Hypertension; Pneumonia (1990,2001); and Post-menopausal (8/26/2014).        ROS   As per history of present illness. All others reviewed and negative         Objective:     Blood pressure 118/60, pulse 66, temperature 36.9 °C (98.5 °F), height 1.6 m (5' 3\"), weight 87.5 kg (193 lb), SpO2 96 %. Body mass index is 34.19 kg/m².   Physical Exam:  Constitutional: Alert, no distress.  Skin: Warm, dry, good turgor, no rashes in visible areas.  Eye: Equal, round and reactive, conjunctiva clear, lids normal.  Respiratory: Unlabored respiratory effort, good air movement, lungs clear to auscultation, no wheezes, no ronchi.  Cardiovascular:RRR, +S1, S2, no murmur, no peripheral edema, pedal and radial pulses equal and intact bilat  MSK:5/5 muscle strength in upper extremities as well as lower extremity bilaterally, left knee tenderness to palpation over medial aspect with edema, TTP over the patellar tendon, positive medial click with strain, negative anterior drawers  Psych: Alert and oriented, appropriate affect and mood.  Neuro: no gross motor or sensory deficits      Assessment and Plan:   The following treatment plan was discussed    1. Medial knee pain, left  + medial click, concerning for meniscal tear. Recommend ibuprofen with food PRN  - DX-KNEE COMPLETE 4+ LEFT; Future  - REFERRAL TO ORTHOPEDICS  - ibuprofen (MOTRIN) 800 MG Tab; Take 1 Tab by mouth every 8 hours as needed for up to 30 days.  Dispense: 90 Tab; Refill: 0      Followup: Return if symptoms worsen or fail to improve, for follow up with Dr. Wilkes .    Please note that this dictation was created using voice " recognition software. I have made every reasonable attempt to correct obvious errors, but I expect that there are errors of grammar and possibly content that I did not discover before finalizing the note.

## 2018-01-11 ENCOUNTER — HOSPITAL ENCOUNTER (OUTPATIENT)
Dept: RADIOLOGY | Facility: MEDICAL CENTER | Age: 58
End: 2018-01-11
Attending: ORTHOPAEDIC SURGERY
Payer: COMMERCIAL

## 2018-01-11 DIAGNOSIS — S83.242A OTHER TEAR OF MEDIAL MENISCUS, CURRENT INJURY, LEFT KNEE, INITIAL ENCOUNTER: ICD-10-CM

## 2018-01-11 PROCEDURE — 73721 MRI JNT OF LWR EXTRE W/O DYE: CPT | Mod: LT

## 2018-01-12 DIAGNOSIS — I10 ESSENTIAL HYPERTENSION: ICD-10-CM

## 2018-01-12 RX ORDER — VERAPAMIL HYDROCHLORIDE 120 MG/1
120 CAPSULE, EXTENDED RELEASE ORAL
Qty: 90 CAP | Refills: 1 | Status: SHIPPED | OUTPATIENT
Start: 2018-01-12 | End: 2018-09-05 | Stop reason: SDUPTHER

## 2018-01-12 NOTE — TELEPHONE ENCOUNTER
Was the patient seen in the last year in this department? Yes     Does patient have an active prescription for medications requested? No     Received Request Via: Pharmacy      Pt met protocol?: Yes, OV earlier this month   BP Readings from Last 1 Encounters:   01/05/18 118/60

## 2018-01-29 DIAGNOSIS — Z01.812 PRE-OPERATIVE LABORATORY EXAMINATION: ICD-10-CM

## 2018-01-29 DIAGNOSIS — Z01.810 PRE-OPERATIVE CARDIOVASCULAR EXAMINATION: ICD-10-CM

## 2018-01-29 LAB — EKG IMPRESSION: NORMAL

## 2018-01-29 PROCEDURE — 80048 BASIC METABOLIC PNL TOTAL CA: CPT

## 2018-01-29 PROCEDURE — 93005 ELECTROCARDIOGRAM TRACING: CPT

## 2018-01-29 PROCEDURE — 36415 COLL VENOUS BLD VENIPUNCTURE: CPT

## 2018-01-29 PROCEDURE — 93010 ELECTROCARDIOGRAM REPORT: CPT | Performed by: INTERNAL MEDICINE

## 2018-01-29 NOTE — OR NURSING
"Preadmit appointment: \" Preparing for your Procedure information\" sheet given to patient with verbal and written instructions. Patient instructed to continue prescribed medications through the day before surgery, instructed to take the following medications the day of surgery per anesthesia protocol: none,  Pt states her blood pressure medication at 1400 daily  "

## 2018-01-30 LAB
ANION GAP SERPL CALC-SCNC: 6 MMOL/L (ref 0–11.9)
BUN SERPL-MCNC: 15 MG/DL (ref 8–22)
CALCIUM SERPL-MCNC: 9.5 MG/DL (ref 8.5–10.5)
CHLORIDE SERPL-SCNC: 105 MMOL/L (ref 96–112)
CO2 SERPL-SCNC: 26 MMOL/L (ref 20–33)
CREAT SERPL-MCNC: 0.73 MG/DL (ref 0.5–1.4)
GLUCOSE SERPL-MCNC: 92 MG/DL (ref 65–99)
POTASSIUM SERPL-SCNC: 4 MMOL/L (ref 3.6–5.5)
SODIUM SERPL-SCNC: 137 MMOL/L (ref 135–145)

## 2018-02-05 ENCOUNTER — HOSPITAL ENCOUNTER (OUTPATIENT)
Facility: MEDICAL CENTER | Age: 58
End: 2018-02-05
Attending: ORTHOPAEDIC SURGERY | Admitting: ORTHOPAEDIC SURGERY
Payer: COMMERCIAL

## 2018-02-05 VITALS
HEIGHT: 63 IN | TEMPERATURE: 97.2 F | OXYGEN SATURATION: 94 % | DIASTOLIC BLOOD PRESSURE: 80 MMHG | HEART RATE: 68 BPM | RESPIRATION RATE: 16 BRPM | WEIGHT: 194.89 LBS | SYSTOLIC BLOOD PRESSURE: 123 MMHG | BODY MASS INDEX: 34.53 KG/M2

## 2018-02-05 PROCEDURE — 160029 HCHG SURGERY MINUTES - 1ST 30 MINS LEVEL 4: Performed by: ORTHOPAEDIC SURGERY

## 2018-02-05 PROCEDURE — A9270 NON-COVERED ITEM OR SERVICE: HCPCS | Performed by: ORTHOPAEDIC SURGERY

## 2018-02-05 PROCEDURE — 160025 RECOVERY II MINUTES (STATS): Performed by: ORTHOPAEDIC SURGERY

## 2018-02-05 PROCEDURE — 160035 HCHG PACU - 1ST 60 MINS PHASE I: Performed by: ORTHOPAEDIC SURGERY

## 2018-02-05 PROCEDURE — 700111 HCHG RX REV CODE 636 W/ 250 OVERRIDE (IP): Performed by: ORTHOPAEDIC SURGERY

## 2018-02-05 PROCEDURE — 700111 HCHG RX REV CODE 636 W/ 250 OVERRIDE (IP)

## 2018-02-05 PROCEDURE — A6222 GAUZE <=16 IN NO W/SAL W/O B: HCPCS | Performed by: ORTHOPAEDIC SURGERY

## 2018-02-05 PROCEDURE — 160048 HCHG OR STATISTICAL LEVEL 1-5: Performed by: ORTHOPAEDIC SURGERY

## 2018-02-05 PROCEDURE — 160046 HCHG PACU - 1ST 60 MINS PHASE II: Performed by: ORTHOPAEDIC SURGERY

## 2018-02-05 PROCEDURE — 160002 HCHG RECOVERY MINUTES (STAT): Performed by: ORTHOPAEDIC SURGERY

## 2018-02-05 PROCEDURE — A9270 NON-COVERED ITEM OR SERVICE: HCPCS

## 2018-02-05 PROCEDURE — 160041 HCHG SURGERY MINUTES - EA ADDL 1 MIN LEVEL 4: Performed by: ORTHOPAEDIC SURGERY

## 2018-02-05 PROCEDURE — 700101 HCHG RX REV CODE 250

## 2018-02-05 PROCEDURE — 502580 HCHG PACK, KNEE ARTHROSCOPY: Performed by: ORTHOPAEDIC SURGERY

## 2018-02-05 PROCEDURE — 700102 HCHG RX REV CODE 250 W/ 637 OVERRIDE(OP): Performed by: ORTHOPAEDIC SURGERY

## 2018-02-05 PROCEDURE — 160036 HCHG PACU - EA ADDL 30 MINS PHASE I: Performed by: ORTHOPAEDIC SURGERY

## 2018-02-05 PROCEDURE — 160009 HCHG ANES TIME/MIN: Performed by: ORTHOPAEDIC SURGERY

## 2018-02-05 PROCEDURE — 700102 HCHG RX REV CODE 250 W/ 637 OVERRIDE(OP)

## 2018-02-05 PROCEDURE — 501838 HCHG SUTURE GENERAL: Performed by: ORTHOPAEDIC SURGERY

## 2018-02-05 PROCEDURE — 700105 HCHG RX REV CODE 258: Performed by: ORTHOPAEDIC SURGERY

## 2018-02-05 RX ORDER — CELECOXIB 200 MG/1
CAPSULE ORAL
Status: COMPLETED
Start: 2018-02-05 | End: 2018-02-05

## 2018-02-05 RX ORDER — BUPIVACAINE HYDROCHLORIDE 2.5 MG/ML
INJECTION, SOLUTION EPIDURAL; INFILTRATION; INTRACAUDAL
Status: DISCONTINUED | OUTPATIENT
Start: 2018-02-05 | End: 2018-02-05 | Stop reason: HOSPADM

## 2018-02-05 RX ORDER — DIPHENHYDRAMINE HYDROCHLORIDE 50 MG/ML
INJECTION INTRAMUSCULAR; INTRAVENOUS
Status: COMPLETED
Start: 2018-02-05 | End: 2018-02-05

## 2018-02-05 RX ORDER — ACETAMINOPHEN 500 MG
TABLET ORAL
Status: COMPLETED
Start: 2018-02-05 | End: 2018-02-05

## 2018-02-05 RX ORDER — OXYCODONE HYDROCHLORIDE AND ACETAMINOPHEN 5; 325 MG/1; MG/1
2 TABLET ORAL EVERY 4 HOURS PRN
Status: DISCONTINUED | OUTPATIENT
Start: 2018-02-05 | End: 2018-02-05 | Stop reason: HOSPADM

## 2018-02-05 RX ORDER — DEXAMETHASONE SODIUM PHOSPHATE 4 MG/ML
4 INJECTION, SOLUTION INTRA-ARTICULAR; INTRALESIONAL; INTRAMUSCULAR; INTRAVENOUS; SOFT TISSUE
Status: DISCONTINUED | OUTPATIENT
Start: 2018-02-05 | End: 2018-02-05 | Stop reason: HOSPADM

## 2018-02-05 RX ORDER — OXYCODONE HYDROCHLORIDE AND ACETAMINOPHEN 5; 325 MG/1; MG/1
1 TABLET ORAL EVERY 4 HOURS PRN
Status: DISCONTINUED | OUTPATIENT
Start: 2018-02-05 | End: 2018-02-05 | Stop reason: HOSPADM

## 2018-02-05 RX ORDER — ONDANSETRON 2 MG/ML
INJECTION INTRAMUSCULAR; INTRAVENOUS
Status: COMPLETED
Start: 2018-02-05 | End: 2018-02-05

## 2018-02-05 RX ORDER — GABAPENTIN 300 MG/1
CAPSULE ORAL
Status: COMPLETED
Start: 2018-02-05 | End: 2018-02-05

## 2018-02-05 RX ORDER — SCOLOPAMINE TRANSDERMAL SYSTEM 1 MG/1
1 PATCH, EXTENDED RELEASE TRANSDERMAL
Status: DISCONTINUED | OUTPATIENT
Start: 2018-02-05 | End: 2018-02-05 | Stop reason: HOSPADM

## 2018-02-05 RX ORDER — SODIUM CHLORIDE, SODIUM LACTATE, POTASSIUM CHLORIDE, CALCIUM CHLORIDE 600; 310; 30; 20 MG/100ML; MG/100ML; MG/100ML; MG/100ML
INJECTION, SOLUTION INTRAVENOUS
Status: DISCONTINUED | OUTPATIENT
Start: 2018-02-05 | End: 2018-02-05 | Stop reason: HOSPADM

## 2018-02-05 RX ORDER — ONDANSETRON 2 MG/ML
4 INJECTION INTRAMUSCULAR; INTRAVENOUS EVERY 4 HOURS PRN
Status: DISCONTINUED | OUTPATIENT
Start: 2018-02-05 | End: 2018-02-05 | Stop reason: HOSPADM

## 2018-02-05 RX ORDER — IBUPROFEN 200 MG
200 TABLET ORAL EVERY 6 HOURS PRN
COMMUNITY
End: 2018-03-21

## 2018-02-05 RX ORDER — DIPHENHYDRAMINE HYDROCHLORIDE 50 MG/ML
25 INJECTION INTRAMUSCULAR; INTRAVENOUS EVERY 6 HOURS PRN
Status: DISCONTINUED | OUTPATIENT
Start: 2018-02-05 | End: 2018-02-05 | Stop reason: HOSPADM

## 2018-02-05 RX ORDER — OXYCODONE HCL 20 MG/1
TABLET, FILM COATED, EXTENDED RELEASE ORAL
Status: COMPLETED
Start: 2018-02-05 | End: 2018-02-05

## 2018-02-05 RX ADMIN — SODIUM CHLORIDE, POTASSIUM CHLORIDE, SODIUM LACTATE AND CALCIUM CHLORIDE: 600; 310; 30; 20 INJECTION, SOLUTION INTRAVENOUS at 07:44

## 2018-02-05 RX ADMIN — CELECOXIB 400 MG: 200 CAPSULE ORAL at 08:45

## 2018-02-05 RX ADMIN — ACETAMINOPHEN 1000 MG: 500 TABLET, COATED ORAL at 08:45

## 2018-02-05 RX ADMIN — GABAPENTIN 300 MG: 300 CAPSULE ORAL at 08:45

## 2018-02-05 RX ADMIN — DIPHENHYDRAMINE HYDROCHLORIDE 12.5 MG: 50 INJECTION, SOLUTION INTRAMUSCULAR; INTRAVENOUS at 10:34

## 2018-02-05 RX ADMIN — OXYCODONE HYDROCHLORIDE 20 MG: 20 TABLET, FILM COATED, EXTENDED RELEASE ORAL at 08:45

## 2018-02-05 RX ADMIN — ONDANSETRON 4 MG: 2 INJECTION INTRAMUSCULAR; INTRAVENOUS at 10:15

## 2018-02-05 ASSESSMENT — PAIN SCALES - GENERAL
PAINLEVEL_OUTOF10: 0

## 2018-02-05 NOTE — OR NURSING
1005 report from Ousmane  Left leg dressing c/d/i  Dp2+  Foot warm  Good movement  Elevated   Ice pack in place  No c/o pain  C/o slight nausea  Medicated

## 2018-02-05 NOTE — OP REPORT
DATE OF SERVICE:  02/05/2018    PREOPERATIVE DIAGNOSIS:  Left knee medial meniscal tear.    POSTOPERATIVE DIAGNOSES:  1.  Left knee medial meniscal tear.  2.  Lateral meniscal tear.  3.  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    PROCEDURES PERFORMED:  1.  Left knee arthroscopy with partial medial and lateral meniscectomies.  2.  Chondroplasty of medial femoral condyle.    SURGEON:  Andrews Castro MD    ANESTHESIA:  General.    COMPLICATIONS:  None.    BLOOD LOSS:  Minimal.    TOURNIQUET:  None.    INDICATION:  The patient is a 58-year-old woman who developed significant left   medial knee pain and mechanical symptoms 2 months ago, which have been   refractory to conservative management.  MRI shows complex medial meniscal   tear.  She is indicated for arthroscopic management.    FINDINGS:  Exam under anesthesia revealed a moderate effusion, full range of   motion, ligamentous exam is stable.  Arthroscopic examination of the   suprapatellar pouch and medial and lateral gutters was unremarkable.    Examination of the patella revealed some diffuse grade II chondrosis.  The   trochlea was unremarkable.  Examination of the notch revealed the cruciate   ligaments to be intact.  Examination of the lateral compartment revealed a   small flap tear on the free edge of the lateral meniscus with some fraying of   the lateral meniscus.  There is very mild grade I to II chondrosis of the   lateral compartment.  Examination of the medial compartment revealed a complex   posterior horn medial meniscal tear with a radial component extending to the   meniscosynovial junction and unstable flaps on either side of the tear.  There   was diffuse grade III chondrosis of the medial femoral condyle with some   fibrillation and some loose chondral flaps.    DESCRIPTION OF PROCEDURE:  Following induction of general anesthesia, time-out   was performed confirming patient, site and procedure.  Ancef 2 g IV were   ordered and  administered.  The left thigh tourniquet and thigh schaffer were   applied and the right leg was placed in a well leg schaffer.  Under sterile   conditions, the left knee was injected with 30 mL of 0.25% plain Marcaine in   the standard fashion.  The left lower extremity was prepped and draped in the   usual fashion.  Standard anterolateral and anteromedial portals with   superolateral outflow were established and diagnostic arthroscopy was   performed with the findings as above.  Shaver was used to resect the small   loose flap on the lateral meniscus and to smooth the free edge of the   meniscus.  Next, the shaver was used to resect the unstable flaps, the medial   meniscus and to trim the meniscus back to a stable configuration.  Loose   chondral flaps on the medial femoral condyle were debrided with the shaver.    The arthroscope was withdrawn, the portals were closed with nylon sutures and   30 mL of 0.25% plain Marcaine was injected into the joint.  Sterile dressing   was applied followed by NEVA hose and knee immobilizer.  The patient was   awakened and extubated in the operating room and transferred to recovery room   in stable condition.       ____________________________________     MD WALDO Manzano / ROSELYN    DD:  02/05/2018 09:41:10  DT:  02/05/2018 10:09:44    D#:  [id number]  Job#:  [id number]

## 2018-02-05 NOTE — DISCHARGE INSTRUCTIONS
ACTIVITY: Rest and take it easy for the first 24 hours.  A responsible adult is recommended to remain with you during that time.  It is normal to feel sleepy.  We encourage you to not do anything that requires balance, judgment or coordination.    MILD FLU-LIKE SYMPTOMS ARE NORMAL. YOU MAY EXPERIENCE GENERALIZED MUSCLE ACHES, THROAT IRRITATION, HEADACHE AND/OR SOME NAUSEA.    FOR 24 HOURS DO NOT:  Drive, operate machinery or run household appliances.  Drink beer or alcoholic beverages.   Make important decisions or sign legal documents.    SPECIAL INSTRUCTIONS: Weight bearing as tolerated  Has crutches  Leave brace intact at all times until after physiclal therapy    DIET: To avoid nausea, slowly advance diet as tolerated, avoiding spicy or greasy foods for the first day.  Add more substantial food to your diet according to your physician's instructions.  Babies can be fed formula or breast milk as soon as they are hungry.  INCREASE FLUIDS AND FIBER TO AVOID CONSTIPATION.    SURGICAL DRESSING/BATHING: Keep dressing clean dry and intact per Dr Castro instructions    FOLLOW-UP APPOINTMENT:  A follow-up appointment should be arranged with your doctor in call to schedule    You should CALL YOUR PHYSICIAN if you develop:  Fever greater than 101 degrees F.  Pain not relieved by medication, or persistent nausea or vomiting.  Excessive bleeding (blood soaking through dressing) or unexpected drainage from the wound.  Extreme redness or swelling around the incision site, drainage of pus or foul smelling drainage.  Inability to urinate or empty your bladder within 8 hours.  Problems with breathing or chest pain.    You should call 911 if you develop problems with breathing or chest pain.  If you are unable to contact your doctor or surgical center, you should go to the nearest emergency room or urgent care center.  Physician's telephone #: [phone]    If any questions arise, call your doctor.  If your doctor is not available,  please feel free to call the Surgical Center at [phone].  The Center is open Monday through Friday from 7AM to 7PM.  You can also call the HEALTH HOTLINE open 24 hours/day, 7 days/week and speak to a nurse at [phone], or toll free at [phone].    A registered nurse may call you a few days after your surgery to see how you are doing after your procedure.    MEDICATIONS: Resume taking daily medication.  Take prescribed pain medication with food.  If no medication is prescribed, you may take non-aspirin pain medication if needed.  PAIN MEDICATION CAN BE VERY CONSTIPATING.  Take a stool softener or laxative such as senokot, pericolace, or milk of magnesia if needed.    Prescription at home.  Last pain medication given at     If your physician has prescribed pain medication that includes Acetaminophen (Tylenol), do not take additional Acetaminophen (Tylenol) while taking the prescribed medication.    Depression / Suicide Risk    As you are discharged from this [facility] facility, it is important to learn how to keep safe from harming yourself.    Recognize the warning signs:  · Abrupt changes in personality, positive or negative- including increase in energy   · Giving away possessions  · Change in eating patterns- significant weight changes-  positive or negative  · Change in sleeping patterns- unable to sleep or sleeping all the time   · Unwillingness or inability to communicate  · Depression  · Unusual sadness, discouragement and loneliness  · Talk of wanting to die  · Neglect of personal appearance   · Rebelliousness- reckless behavior  · Withdrawal from people/activities they love  · Confusion- inability to concentrate     If you or a loved one observes any of these behaviors or has concerns about self-harm, here's what you can do:  · Talk about it- your feelings and reasons for harming yourself  · Remove any means that you might use to hurt yourself (examples: pills, rope, extension cords,  firearm)  · Get professional help from the community (Mental Health, Substance Abuse, psychological counseling)  · Do not be alone:Call your Safe Contact- someone whom you trust who will be there for you.  · Call your local CRISIS HOTLINE 771-7277 or 258-821-3672  · Call your local Children's Mobile Crisis Response Team [place] (268) 132-7996 or www.[employer]  · Call the toll free National Suicide Prevention Hotlines   · National Suicide Prevention Lifeline 493-591-YYKB (6551)  · National Hope Line Network 800-SUICIDE (427-2007)

## 2018-02-05 NOTE — OR SURGEON
Immediate Post OP Note    PreOp Diagnosis: L MMT  PostOp Diagnosis: L MMT/LMT    Procedure(s):  KNEE ARTHROSCOPY - Wound Class: Clean  MEDIAL AND LATERAL MENISCECTOMY - PARTIAL - Wound Class: Clean    Surgeon(s):  Andrews Castro M.D.    Anesthesiologist/Type of Anesthesia:  Anesthesiologist: Woodrow Kaminski M.D./General    Surgical Staff:  Circulator: Carisa Sadler R.N.  Scrub Person: Debra Alejandra    Specimens:  * No specimens in log *    Estimated Blood Loss: min    Findings: above    Complications: none        2/5/2018 9:41 AM Andrews Castro

## 2018-02-05 NOTE — OR NURSING
Patient to preop, allergies and NPO status verified, home medications reconciled, belongings secured, verbalizes understanding of pain scale, surgical site verified, IV access established, NEVA hose in place.

## 2018-02-05 NOTE — OR NURSING
1100 received from pacu  Up to chair with assist  Dressing remains c/d/i  Brace intact  No c/o pain  Up to bathroom with assist  1135  Meets discharge criteria

## 2018-02-13 ENCOUNTER — TELEPHONE (OUTPATIENT)
Dept: MEDICAL GROUP | Facility: PHYSICIAN GROUP | Age: 58
End: 2018-02-13

## 2018-02-13 DIAGNOSIS — I10 ESSENTIAL HYPERTENSION: ICD-10-CM

## 2018-02-13 NOTE — TELEPHONE ENCOUNTER
Was the patient seen in the last year in this department? Yes     Does patient have an active prescription for medications requested? No     Received Request Via: Pharmacy    Pt met protocol?: Yes     Last OV 01/2018    Lab Results  Component Value Date/Time   CHOLSTRLTOT 176 09/28/2017 1202   TRIGLYCERIDE 161 (H) 09/28/2017 1202   HDL 58 09/28/2017 1202   LDL 86 09/28/2017 1202

## 2018-02-14 RX ORDER — SIMVASTATIN 40 MG
40 TABLET ORAL
Qty: 90 TAB | Refills: 0 | Status: SHIPPED | OUTPATIENT
Start: 2018-02-14 | End: 2019-04-30

## 2018-02-14 NOTE — TELEPHONE ENCOUNTER
Was the patient seen in the last year in this department? Yes     Does patient have an active prescription for medications requested? No     Received Request Via: Pharmacy      Pt met protocol?: Yes, lipid labs 9/17 trig high, ov 1/18 with Panjensen, no appt with new pcp

## 2018-02-15 RX ORDER — SIMVASTATIN 40 MG
40 TABLET ORAL
Qty: 90 TAB | Refills: 0 | OUTPATIENT
Start: 2018-02-15

## 2018-02-20 DIAGNOSIS — M25.562 MEDIAL KNEE PAIN, LEFT: ICD-10-CM

## 2018-02-20 RX ORDER — IBUPROFEN 200 MG
200 TABLET ORAL EVERY 6 HOURS PRN
Qty: 30 TAB | OUTPATIENT
Start: 2018-02-20

## 2018-02-20 RX ORDER — IBUPROFEN 800 MG/1
800 TABLET ORAL EVERY 8 HOURS PRN
Qty: 90 TAB | Refills: 0 | Status: SHIPPED | OUTPATIENT
Start: 2018-02-20 | End: 2018-03-22

## 2018-02-20 NOTE — TELEPHONE ENCOUNTER
Was the patient seen in the last year in this department? Yes     Does patient have an active prescription for medications requested? No     Received Request Via: Patient      Pt met protocol?: Yes, last ov 1/18,   Patient requesting the 800 mg ibuprofen, hospitalist cory'maci it on 2/4/18

## 2018-03-20 ENCOUNTER — TELEPHONE (OUTPATIENT)
Dept: MEDICAL GROUP | Facility: PHYSICIAN GROUP | Age: 58
End: 2018-03-20

## 2018-03-20 NOTE — TELEPHONE ENCOUNTER
Future Appointments       Provider Department Center    3/21/2018 1:05 PM Ayleen Millard P.A.-C. [facility]        ESTABLISHED PATIENT PRE-VISIT PLANNING     Note: Patient will not be contacted if there is no indication to call.     1.  Reviewed notes from the last few office visits within the medical group: Yes 01/05/2018    2.  If any orders were placed at last visit or intended to be done for this visit (i.e. 6 mos follow-up), do we have Results/Consult Notes?        •  Labs - Labs ordered, NOT completed. Patient advised to complete prior to next appointment.       •  Imaging - Imaging ordered, completed and results are in chart.       •  Referrals - Referral ordered, patient was seen and consult notes are in chart. Care Teams updated  YES.    3. Is this appointment scheduled as a Hospital Follow-Up? No    4.  Immunizations were updated in [employer] using Web[employer]?: Yes       •  Web Iz Recommendations: MMR , TD and VARICELLA (Chicken Pox)     5.  Patient is due for the following Health Maintenance Topics:   Health Maintenance Due   Topic Date Due   • MAMMOGRAM  08/07/2017       6.  MDX printed and highlighted for Provider? N\A [facility]     7.  Patient was informed to arrive 15 min prior to their scheduled appointment and bring in their medication bottles. KVNG

## 2018-03-21 ENCOUNTER — OFFICE VISIT (OUTPATIENT)
Dept: MEDICAL GROUP | Facility: PHYSICIAN GROUP | Age: 58
End: 2018-03-21
Payer: COMMERCIAL

## 2018-03-21 VITALS
TEMPERATURE: 98.8 F | OXYGEN SATURATION: 96 % | BODY MASS INDEX: 34.2 KG/M2 | HEIGHT: 63 IN | DIASTOLIC BLOOD PRESSURE: 68 MMHG | HEART RATE: 76 BPM | WEIGHT: 193 LBS | SYSTOLIC BLOOD PRESSURE: 100 MMHG

## 2018-03-21 DIAGNOSIS — Z79.890 POSTMENOPAUSAL HRT (HORMONE REPLACEMENT THERAPY): ICD-10-CM

## 2018-03-21 DIAGNOSIS — F51.01 PRIMARY INSOMNIA: ICD-10-CM

## 2018-03-21 DIAGNOSIS — E66.9 OBESITY (BMI 30.0-34.9): ICD-10-CM

## 2018-03-21 DIAGNOSIS — R06.02 SHORTNESS OF BREATH: ICD-10-CM

## 2018-03-21 DIAGNOSIS — C44.319 BASAL CELL CARCINOMA OF LEFT MEDIAL CHEEK: ICD-10-CM

## 2018-03-21 DIAGNOSIS — I10 ESSENTIAL HYPERTENSION: ICD-10-CM

## 2018-03-21 PROBLEM — R06.00 DYSPNEA: Status: ACTIVE | Noted: 2018-03-21

## 2018-03-21 PROCEDURE — 99214 OFFICE O/P EST MOD 30 MIN: CPT | Performed by: PHYSICIAN ASSISTANT

## 2018-03-21 RX ORDER — ONDANSETRON 4 MG/1
4 TABLET, FILM COATED ORAL EVERY 4 HOURS PRN
Qty: 20 TAB | Refills: 0 | Status: SHIPPED | OUTPATIENT
Start: 2018-03-21 | End: 2018-05-11

## 2018-03-21 NOTE — ASSESSMENT & PLAN NOTE
Patient states she has noticed some shortness of breath for about the last 6 months. Notices the most when talking. Has never been formally evaluated. Has 19 year smoking history, quit 2 years ago. Is concerned that smoking may have damaged her lungs.

## 2018-03-22 NOTE — PROGRESS NOTES
Subjective:   Harmony Monroe is a 58 y.o. female here today for shortness of breath, follow-up on chronic conditions. Is a new patient to me and is also establishing care today.    Previous PCP: Safia Park,     HPI: Patient has the following current medical problems/concerns:    Dyspnea  Patient states she has noticed some shortness of breath for about the last 6 months. Notices the most when talking. Has never been formally evaluated. Has 19 year smoking history, quit 2 years ago. Is concerned that smoking may have damaged her lungs.    Primary insomnia  Patient states she has had insomnia for many years, stemming to as far back as her childhood. She was started on temazepam several years back and states the medication works very well for her.     Postmenopausal HRT (hormone replacement therapy)  On estrace 1 mg daily which is prescribed by her gynecologist.    Essential hypertension  Currently treated with verapamil  mg and HCTZ 25 mg daily. Denies concerns regarding her blood pressure. Is motivated to possibly get off some of the medication with lifestyle change including weight loss.    Basal cell carcinoma of left medial cheek  Established with dermatology. She states she is scheduled for MOHS surgery next month.    Obesity (BMI 30.0-34.9)  Current BMI 34.19. Is very motivated to lose weight. Recently hired a  and has started regular exercise.      Patient is also requesting script for Zofran to have on hand to take as needed for nausea. States that nausea is not a frequent symptom for her (estimates about once every few months on average), but would like to have available. Has taken Zofran in the past with good relief of nausea.    Current medicines (including changes today)  Current Outpatient Prescriptions   Medication Sig Dispense Refill   • ondansetron (ZOFRAN) 4 MG Tab tablet Take 1 Tab by mouth every four hours as needed for Nausea/Vomiting. 20 Tab 0   • ibuprofen  "(MOTRIN) 800 MG Tab Take 1 Tab by mouth every 8 hours as needed for up to 30 days. 90 Tab 0   • simvastatin (ZOCOR) 40 MG Tab Take 1 Tab by mouth every bedtime. Please remind pt to get labs done 90 Tab 0   • Multiple Vitamins-Minerals (MULTIVITAMIN ADULT PO) Take  by mouth every day.     •  verapamil ER (CALAN SR) 120 MG CAPSULE SR 24 HR TAKE 1 CAP BY MOUTH EVERY DAY. 90 Cap 1   • hydrochlorothiazide (HYDRODIURIL) 25 MG Tab TAKE 1 TAB BY MOUTH EVERY DAY. 90 Tab 1   • temazepam (RESTORIL) 15 MG Cap TAKE 1 TO 2 CAPSULES BY MOUTH AT BEDTIME AS NEEDED FOR SLEEP 60 Cap 5   • escitalopram (LEXAPRO) 10 MG Tab Take 1 Tab by mouth every day. TAKE 1 TAB BY MOUTH EVERY DAY. (Patient taking differently: Take 10 mg by mouth every day. TAKE 1 TAB BY MOUTH EVERY EVENING) 90 Tab 4   • estradiol (ESTRACE) 1 MG TABS Take 1 mg by mouth every day. From gyn     • NON SPECIFIED Protein/ mineral supplement drink 3 times per week       No current facility-administered medications for this visit.      She  has a past medical history of Allergy; Arthritis; ASTHMA; Basal cell carcinoma of left medial cheek (11/30/2015); Depression (1/7/2010); High cholesterol; History of tobacco use disorder (1/7/2010); Hypertension; Pneumonia (1990,2001); and Post-menopausal (8/26/2014).    ROS  Constitutional ROS: Positive for insomnia   Pulmonary ROS: Positive for dyspnea at rest, dyspnea on exertion  Musculoskeletal/Extremities ROS: Positive for arthritis, pain in left knee       Objective:     Blood pressure 100/68, pulse 76, temperature 37.1 °C (98.8 °F), height 1.6 m (5' 3\"), weight 87.5 kg (193 lb), SpO2 96 %. Body mass index is 34.19 kg/m².     Physical Exam:  Constitutional: Alert, very pleasant, no distress.  Skin: No rashes in visible areas.  Eye: Conjunctiva clear, lids normal.  ENMT: Lips without lesions, moist mucus membranes.      Assessment and Plan:   The following treatment plan was discussed    1. Shortness of breath  New problem. Given " ongoing dyspnea and smoking hx, recommend PFTs to rule out COPD, asthma. Patient advised to follow-up with me sometime after results are back to discuss results and possible treatment options.  - PULMONARY FUNCTION TESTS Test requested: Complete Pulmonary Function Test    2. Primary insomnia  Chronic issue, well-controlled on nightly temazepam. Continue current management.    3. Postmenopausal HRT (hormone replacement therapy)  Chronic issue, well-controlled on daily Estrace. Continue current management. Plans to continue to see gynecologist for medication refills.    4. Essential hypertension  Chronic issue, well-controlled on current medications. Continue verapamil and HCTZ.     5. Basal cell carcinoma of left medial cheek  Established problem, will be having upcoming MOHS surgery to remove. Will continue to follow with dermatology.    6. Obesity (BMI 30.0-34.9)  Patient encouraged regarding her plans to routinely exercise and lose weight.  - Patient identified as having weight management issue.  Appropriate orders and counseling given.    Small amount of ondansetron prescribed per patient request.    Followup: Return in about 2 months (around 5/21/2018) for f/u PFT results; Short.    Ayleen Millard P.A.-C.

## 2018-03-22 NOTE — ASSESSMENT & PLAN NOTE
Current BMI 34.19. Is very motivated to lose weight. Recently hired a  and has started regular exercise.

## 2018-03-22 NOTE — ASSESSMENT & PLAN NOTE
Patient states she has had insomnia for many years, stemming to as far back as her childhood. She was started on temazepam several years back and states the medication works very well for her.

## 2018-03-22 NOTE — ASSESSMENT & PLAN NOTE
Currently treated with verapamil  mg and HCTZ 25 mg daily. Denies concerns regarding her blood pressure. Is motivated to possibly get off some of the medication with lifestyle change including weight loss.

## 2018-05-09 ENCOUNTER — OFFICE VISIT (OUTPATIENT)
Dept: URGENT CARE | Facility: PHYSICIAN GROUP | Age: 58
End: 2018-05-09
Payer: COMMERCIAL

## 2018-05-09 VITALS
SYSTOLIC BLOOD PRESSURE: 124 MMHG | HEART RATE: 71 BPM | TEMPERATURE: 97.9 F | DIASTOLIC BLOOD PRESSURE: 80 MMHG | OXYGEN SATURATION: 98 %

## 2018-05-09 DIAGNOSIS — J98.01 ACUTE BRONCHOSPASM: ICD-10-CM

## 2018-05-09 DIAGNOSIS — J45.21 ASTHMA EXACERBATION, NON-ALLERGIC, MILD INTERMITTENT: Primary | ICD-10-CM

## 2018-05-09 PROCEDURE — 99214 OFFICE O/P EST MOD 30 MIN: CPT | Performed by: PHYSICIAN ASSISTANT

## 2018-05-09 RX ORDER — ALBUTEROL SULFATE 90 UG/1
2 AEROSOL, METERED RESPIRATORY (INHALATION) EVERY 6 HOURS PRN
Qty: 8.5 G | Refills: 0 | Status: SHIPPED | OUTPATIENT
Start: 2018-05-09 | End: 2018-08-13 | Stop reason: SDUPTHER

## 2018-05-09 RX ORDER — IPRATROPIUM BROMIDE AND ALBUTEROL SULFATE 2.5; .5 MG/3ML; MG/3ML
3 SOLUTION RESPIRATORY (INHALATION) ONCE
Status: COMPLETED | OUTPATIENT
Start: 2018-05-09 | End: 2018-05-09

## 2018-05-09 RX ORDER — PREDNISONE 20 MG/1
TABLET ORAL
Qty: 21 TAB | Refills: 0 | Status: SHIPPED | OUTPATIENT
Start: 2018-05-09 | End: 2018-05-11

## 2018-05-09 RX ADMIN — IPRATROPIUM BROMIDE AND ALBUTEROL SULFATE 3 ML: 2.5; .5 SOLUTION RESPIRATORY (INHALATION) at 16:15

## 2018-05-09 ASSESSMENT — ENCOUNTER SYMPTOMS
RHINORRHEA: 0
CHILLS: 0
FEVER: 0
COUGH: 1
PALPITATIONS: 0
WHEEZING: 1
SHORTNESS OF BREATH: 1
SPUTUM PRODUCTION: 0
HEMOPTYSIS: 0
DIZZINESS: 0

## 2018-05-10 NOTE — PROGRESS NOTES
Subjective:      Harmony Monroe is a 58 y.o. female who presents with Medication Refill (Pt is requesting refill of inhailer)    Pt PMH, SocHx, SurgHx, FamHx, Drug allergies and medications reviewed with pt/EPIC.      Family history reviewed, it is not pertinent to this complaint.           Cough   This is a new problem. The current episode started in the past 7 days. The problem has been waxing and waning. The problem occurs every few minutes. The cough is non-productive. Associated symptoms include shortness of breath and wheezing. Pertinent negatives include no chest pain, chills, fever, hemoptysis, nasal congestion, postnasal drip or rhinorrhea. The symptoms are aggravated by exercise and pollens. Risk factors for lung disease include smoking/tobacco exposure. She has tried body position changes and rest for the symptoms. The treatment provided no relief. Her past medical history is significant for asthma, environmental allergies and pneumonia.       Review of Systems   Constitutional: Negative for chills and fever.   HENT: Negative for postnasal drip and rhinorrhea.    Respiratory: Positive for cough, shortness of breath and wheezing. Negative for hemoptysis and sputum production.    Cardiovascular: Negative for chest pain, palpitations and leg swelling.   Neurological: Negative for dizziness.   Endo/Heme/Allergies: Positive for environmental allergies.   All other systems reviewed and are negative.         Objective:     /80   Pulse 71   Temp 36.6 °C (97.9 °F)   SpO2 98%      Physical Exam   Constitutional: She is oriented to person, place, and time. She appears well-developed and well-nourished.   HENT:   Head: Normocephalic.   Nose: Nose normal.   Mouth/Throat: Oropharynx is clear and moist.   Eyes: Conjunctivae and EOM are normal. Pupils are equal, round, and reactive to light.   Neck: Normal range of motion. Neck supple.   Cardiovascular: Normal rate, regular rhythm and normal heart sounds.     Pulmonary/Chest: No respiratory distress. She has decreased breath sounds in the right lower field and the left lower field. She has wheezes (faint in bases).   Abdominal: Soft.   Musculoskeletal: Normal range of motion.   Neurological: She is alert and oriented to person, place, and time.   Skin: Skin is warm and dry. Capillary refill takes less than 2 seconds.   Nursing note and vitals reviewed.         Pt states symptoms have improved after neb treatment, on re-eval wheezing has improved and air movement better throughout.        Assessment/Plan:     1. Asthma exacerbation, non-allergic, mild intermittent  ipratropium-albuterol (DUONEB) nebulizer solution    predniSONE (DELTASONE) 20 MG Tab    albuterol 108 (90 Base) MCG/ACT Aero Soln inhalation aerosol   2. Acute bronchospasm  ipratropium-albuterol (DUONEB) nebulizer solution    predniSONE (DELTASONE) 20 MG Tab    albuterol 108 (90 Base) MCG/ACT Aero Soln inhalation aerosol     PT was instructed to continue her daily OTC allergy medication for relief of allergy symptoms.      Pt can also take OTC benadryl at bedtime if symptoms are keeping them awake at night.     PT should follow up with PCP in 1-2 days for re-evaluation if symptoms have not improved.  Discussed red flags and reasons to return to UC or ED.  Pt and/or family verbalized understanding of diagnosis and follow up instructions and was offered informational handout on diagnosis.  PT discharged.

## 2018-05-11 ENCOUNTER — HOSPITAL ENCOUNTER (EMERGENCY)
Facility: MEDICAL CENTER | Age: 58
End: 2018-05-11
Attending: EMERGENCY MEDICINE
Payer: COMMERCIAL

## 2018-05-11 ENCOUNTER — APPOINTMENT (OUTPATIENT)
Dept: RADIOLOGY | Facility: MEDICAL CENTER | Age: 58
End: 2018-05-11
Attending: EMERGENCY MEDICINE
Payer: COMMERCIAL

## 2018-05-11 VITALS
SYSTOLIC BLOOD PRESSURE: 121 MMHG | OXYGEN SATURATION: 98 % | DIASTOLIC BLOOD PRESSURE: 78 MMHG | HEIGHT: 63 IN | BODY MASS INDEX: 34.88 KG/M2 | WEIGHT: 196.87 LBS | HEART RATE: 75 BPM | TEMPERATURE: 98.6 F | RESPIRATION RATE: 18 BRPM

## 2018-05-11 DIAGNOSIS — R10.9 ABDOMINAL WALL PAIN: ICD-10-CM

## 2018-05-11 DIAGNOSIS — R10.12 ABDOMINAL PAIN, LUQ (LEFT UPPER QUADRANT): ICD-10-CM

## 2018-05-11 LAB
ALBUMIN SERPL BCP-MCNC: 4.1 G/DL (ref 3.2–4.9)
ALBUMIN/GLOB SERPL: 1.8 G/DL
ALP SERPL-CCNC: 64 U/L (ref 30–99)
ALT SERPL-CCNC: 26 U/L (ref 2–50)
ANION GAP SERPL CALC-SCNC: 6 MMOL/L (ref 0–11.9)
APPEARANCE UR: CLEAR
AST SERPL-CCNC: 25 U/L (ref 12–45)
BACTERIA #/AREA URNS HPF: ABNORMAL /HPF
BASOPHILS # BLD AUTO: 0.7 % (ref 0–1.8)
BASOPHILS # BLD: 0.06 K/UL (ref 0–0.12)
BILIRUB SERPL-MCNC: 0.5 MG/DL (ref 0.1–1.5)
BILIRUB UR QL STRIP.AUTO: NEGATIVE
BUN SERPL-MCNC: 17 MG/DL (ref 8–22)
CALCIUM SERPL-MCNC: 8.9 MG/DL (ref 8.4–10.2)
CHLORIDE SERPL-SCNC: 105 MMOL/L (ref 96–112)
CO2 SERPL-SCNC: 24 MMOL/L (ref 20–33)
COLOR UR: YELLOW
CREAT SERPL-MCNC: 0.71 MG/DL (ref 0.5–1.4)
EOSINOPHIL # BLD AUTO: 0.4 K/UL (ref 0–0.51)
EOSINOPHIL NFR BLD: 4.6 % (ref 0–6.9)
EPI CELLS #/AREA URNS HPF: ABNORMAL /HPF
ERYTHROCYTE [DISTWIDTH] IN BLOOD BY AUTOMATED COUNT: 43.5 FL (ref 35.9–50)
GLOBULIN SER CALC-MCNC: 2.3 G/DL (ref 1.9–3.5)
GLUCOSE SERPL-MCNC: 105 MG/DL (ref 65–99)
GLUCOSE UR STRIP.AUTO-MCNC: NEGATIVE MG/DL
HCT VFR BLD AUTO: 46.2 % (ref 37–47)
HGB BLD-MCNC: 15.6 G/DL (ref 12–16)
IMM GRANULOCYTES # BLD AUTO: 0.02 K/UL (ref 0–0.11)
IMM GRANULOCYTES NFR BLD AUTO: 0.2 % (ref 0–0.9)
KETONES UR STRIP.AUTO-MCNC: NEGATIVE MG/DL
LEUKOCYTE ESTERASE UR QL STRIP.AUTO: NEGATIVE
LIPASE SERPL-CCNC: 26 U/L (ref 7–58)
LYMPHOCYTES # BLD AUTO: 2.9 K/UL (ref 1–4.8)
LYMPHOCYTES NFR BLD: 33.6 % (ref 22–41)
MCH RBC QN AUTO: 30 PG (ref 27–33)
MCHC RBC AUTO-ENTMCNC: 33.8 G/DL (ref 33.6–35)
MCV RBC AUTO: 88.8 FL (ref 81.4–97.8)
MICRO URNS: ABNORMAL
MONOCYTES # BLD AUTO: 0.64 K/UL (ref 0–0.85)
MONOCYTES NFR BLD AUTO: 7.4 % (ref 0–13.4)
NEUTROPHILS # BLD AUTO: 4.62 K/UL (ref 2–7.15)
NEUTROPHILS NFR BLD: 53.5 % (ref 44–72)
NITRITE UR QL STRIP.AUTO: NEGATIVE
NRBC # BLD AUTO: 0 K/UL
NRBC BLD-RTO: 0 /100 WBC
PH UR STRIP.AUTO: 7.5 [PH]
PLATELET # BLD AUTO: 296 K/UL (ref 164–446)
PMV BLD AUTO: 10 FL (ref 9–12.9)
POTASSIUM SERPL-SCNC: 3.5 MMOL/L (ref 3.6–5.5)
PROT SERPL-MCNC: 6.4 G/DL (ref 6–8.2)
PROT UR QL STRIP: NEGATIVE MG/DL
RBC # BLD AUTO: 5.2 M/UL (ref 4.2–5.4)
RBC # URNS HPF: ABNORMAL /HPF
RBC UR QL AUTO: ABNORMAL
SODIUM SERPL-SCNC: 135 MMOL/L (ref 135–145)
SP GR UR STRIP.AUTO: <=1.005
WBC # BLD AUTO: 8.6 K/UL (ref 4.8–10.8)

## 2018-05-11 PROCEDURE — 74177 CT ABD & PELVIS W/CONTRAST: CPT

## 2018-05-11 PROCEDURE — 83690 ASSAY OF LIPASE: CPT

## 2018-05-11 PROCEDURE — 36415 COLL VENOUS BLD VENIPUNCTURE: CPT

## 2018-05-11 PROCEDURE — 85025 COMPLETE CBC W/AUTO DIFF WBC: CPT

## 2018-05-11 PROCEDURE — 80053 COMPREHEN METABOLIC PANEL: CPT

## 2018-05-11 PROCEDURE — 81001 URINALYSIS AUTO W/SCOPE: CPT

## 2018-05-11 PROCEDURE — 700117 HCHG RX CONTRAST REV CODE 255: Performed by: EMERGENCY MEDICINE

## 2018-05-11 PROCEDURE — 94760 N-INVAS EAR/PLS OXIMETRY 1: CPT

## 2018-05-11 PROCEDURE — 99284 EMERGENCY DEPT VISIT MOD MDM: CPT

## 2018-05-11 RX ORDER — SODIUM CHLORIDE 9 MG/ML
1000 INJECTION, SOLUTION INTRAVENOUS ONCE
Status: DISCONTINUED | OUTPATIENT
Start: 2018-05-11 | End: 2018-05-11 | Stop reason: HOSPADM

## 2018-05-11 RX ORDER — NAPROXEN SODIUM 220 MG
220 TABLET ORAL 2 TIMES DAILY PRN
Status: SHIPPED | COMMUNITY
End: 2020-05-25

## 2018-05-11 RX ORDER — IBUPROFEN 800 MG/1
400 TABLET ORAL EVERY 8 HOURS PRN
Status: SHIPPED | COMMUNITY
End: 2020-03-10

## 2018-05-11 RX ORDER — DIPHENHYDRAMINE HCL 25 MG
25 TABLET ORAL NIGHTLY PRN
Status: SHIPPED | COMMUNITY
End: 2021-09-01

## 2018-05-11 RX ADMIN — IOHEXOL 100 ML: 350 INJECTION, SOLUTION INTRAVENOUS at 11:39

## 2018-05-11 ASSESSMENT — PAIN SCALES - GENERAL
PAINLEVEL_OUTOF10: 10
PAINLEVEL_OUTOF10: 4

## 2018-05-11 NOTE — DISCHARGE INSTRUCTIONS
Abdominal Pain, Adult  Abdominal pain can be caused by many things. Often, abdominal pain is not serious and it gets better with no treatment or by being treated at home. However, sometimes abdominal pain is serious. Your health care provider will do a medical history and a physical exam to try to determine the cause of your abdominal pain.  Follow these instructions at home:  · Take over-the-counter and prescription medicines only as told by your health care provider. Do not take a laxative unless told by your health care provider.  · Drink enough fluid to keep your urine clear or pale yellow.  · Watch your condition for any changes.  · Keep all follow-up visits as told by your health care provider. This is important.  Contact a health care provider if:  · Your abdominal pain changes or gets worse.  · You are not hungry or you lose weight without trying.  · You are constipated or have diarrhea for more than 2-3 days.  · You have pain when you urinate or have a bowel movement.  · Your abdominal pain wakes you up at night.  · Your pain gets worse with meals, after eating, or with certain foods.  · You are throwing up and cannot keep anything down.  · You have a fever.  Get help right away if:  · Your pain does not go away as soon as your health care provider told you to expect.  · You cannot stop throwing up.  · Your pain is only in areas of the abdomen, such as the right side or the left lower portion of the abdomen.  · You have bloody or black stools, or stools that look like tar.  · You have severe pain, cramping, or bloating in your abdomen.  · You have signs of dehydration, such as:  ¨ Dark urine, very little urine, or no urine.  ¨ Cracked lips.  ¨ Dry mouth.  ¨ Sunken eyes.  ¨ Sleepiness.  ¨ Weakness.  This information is not intended to replace advice given to you by your health care provider. Make sure you discuss any questions you have with your health care provider.  Document Released: 09/27/2006 Document  Revised: 07/07/2017 Document Reviewed: 05/31/2017  [employer] Interactive Patient Education © 2017 [employer]

## 2018-05-11 NOTE — ED NOTES
D/c pt home, no rx given . Pt aware of f/u instructions , aware to return for any changes or concerns. No further questions upon d/c home from ed

## 2018-05-11 NOTE — ED PROVIDER NOTES
ED Provider Note    CHIEF COMPLAINT  No chief complaint on file.      HPI  Harmony Monroe is a 58 y.o. female who presents to the emergency department the chief complaint of abdominal pain.  The patient points to her left upper quadrant as the location of pain.  She says that on Wednesday she had a coughing attack and felt something pop or pull in the left upper quadrant of her abdomen.  She describes as a sharp stabbing pain deep down inside.  She denies any numbness, tingling, weakness.  No chest pain.  No abdominal pain.  No leg pain or swelling.  No hemoptysis.  No nausea or vomiting.  No fevers.  The pain seems to be worse with movement.  No alleviating factors.  No radiation.    REVIEW OF SYSTEMS  See HPI for further details. All other systems are negative.     PAST MEDICAL HISTORY  Past Medical History:   Diagnosis Date   • Allergy    • Arthritis     osteoarthritis; right knee   • ASTHMA     1/29/2018 not an issue, currently no medications needed   • Basal cell carcinoma of left medial cheek 11/30/2015   • Depression 1/7/2010   • High cholesterol    • History of tobacco use disorder 1/7/2010   • Hypertension    • Pneumonia 1990,2001   • Post-menopausal 8/26/2014       FAMILY HISTORY  Family History   Problem Relation Age of Onset   • Hyperlipidemia Mother    • Arthritis Mother    • Heart Disease Father    • Diabetes Sister    • Cancer Neg Hx    • Hypertension Neg Hx    • Stroke Neg Hx        SOCIAL HISTORY  Social History     Social History   • Marital status: Single     Spouse name: N/A   • Number of children: N/A   • Years of education: N/A     Social History Main Topics   • Smoking status: Former Smoker     Packs/day: 0.50     Years: 19.00     Types: Cigarettes     Start date: 9/1/1997     Quit date: 1/1/2016   • Smokeless tobacco: Never Used      Comment: encouraged to not restart   • Alcohol use No   • Drug use: No   • Sexual activity: No     Other Topics Concern   • Not on file     Social History  Narrative   • No narrative on file       SURGICAL HISTORY  Past Surgical History:   Procedure Laterality Date   • KNEE ARTHROSCOPY Left 2/5/2018    Procedure: KNEE ARTHROSCOPY;  Surgeon: Andrews Castro M.D.;  Location: SURGERY [facility];  Service: Orthopedics   • MEDIAL MENISCECTOMY Left 2/5/2018    Procedure: MEDIAL MENISCECTOMY - PARTIAL;  Surgeon: Andrews Castro M.D.;  Location: SURGERY [facility];  Service: Orthopedics   • KNEE ARTHROSCOPY Right 3/23/2017    Procedure: KNEE ARTHROSCOPY;  Surgeon: Andrews Castro M.D.;  Location: [facility];  Service:    • MEDIAL MENISCECTOMY  3/23/2017    Procedure: MEDIAL and lateral  MENISCECTOMY - PARTIAL;  Surgeon: Andrews Castro M.D.;  Location: [facility];  Service:    • CHONDROPLASTY  3/23/2017    Procedure: CHONDROPLASTY -  PATELLAR;  Surgeon: Andrews Castro M.D.;  Location: [facility];  Service:    • ABDOMINAL HYSTERECTOMY TOTAL  11/2005   • TONSILLECTOMY AND ADENOIDECTOMY  1980   • NASAL SEPTAL RECONSTRUCTION  1980   • CYST EXCISION  1974    Anterior Left Foot       CURRENT MEDICATIONS  Home Medications     Reviewed by Jessica Lemus (Pharmacy Tech) on 05/11/18 at 1117  Med List Status: Complete   Medication Last Dose Status    verapamil ER (CALAN SR) 120 MG CAPSULE SR 24 HR 5/10/2018 Active   albuterol 108 (90 Base) MCG/ACT Aero Soln inhalation aerosol 5/10/2018 Active   diphenhydrAMINE (BENADRYL) 25 MG Tab 5/10/2018 Active   escitalopram (LEXAPRO) 10 MG Tab 5/10/2018 Active   estradiol (ESTRACE) 1 MG TABS 5/10/2018 Active   hydrochlorothiazide (HYDRODIURIL) 25 MG Tab 5/10/2018 Active   ibuprofen (MOTRIN) 800 MG Tab 5/10/2018 Active   Multiple Vitamins-Minerals (MULTIVITAMIN ADULT PO) 5/10/2018 Active   naproxen (ALEVE) 220 MG tablet 5/10/2018 Active   Phenyleph-Doxylamine-DM-APAP (ETHAN SELTZER PLUS PO) 5/9/2018 Active   simvastatin (ZOCOR) 40 MG Tab 5/10/2018 Active   temazepam (RESTORIL) 15 MG Cap  "5/10/2018 Active                ALLERGIES  Allergies   Allergen Reactions   • Bee Anaphylaxis   • Levaquin Anaphylaxis     Myalgias arthralgias    • Avelox [Moxifloxacin Hcl In Nacl] Swelling   • Promethazine      Twitching feeling   • Tape      Redness; itching \"paper tape ok\"       PHYSICAL EXAM  VITAL SIGNS: /68   Pulse 79   Temp (!) 35.6 °C (96 °F)   Resp 16   Ht 1.6 m (5' 3\")   Wt 89.3 kg (196 lb 13.9 oz)   SpO2 97%   BMI 34.87 kg/m²   Constitutional: Well developed, Well nourished, mild distress, Non-toxic appearance.   HENT: Normocephalic, Atraumatic, Bilateral external ears normal, Oropharynx moist, No oral exudates, Nose normal.   Eyes: PERRL, EOMI, Conjunctiva normal, No discharge.   Neck: Normal range of motion, No tenderness, Supple, No stridor.   Lymphatic: No lymphadenopathy noted.   Cardiovascular: Normal heart rate, Normal rhythm, No murmurs, No rubs, No gallops.   Thorax & Lungs: Normal breath sounds, No respiratory distress, No wheezing, No chest tenderness.   Abdomen: Tenderness to palpation the left upper quadrant, no tenderness of the costal margin, no palpable masses, abdomen is obese but soft.  Active bowel sounds.  No palpable AAA.  No CVA tenderness.  Skin: Warm, Dry, No erythema, No rash.   Back: No tenderness, No CVA tenderness.   Extremities: Intact distal pulses, No edema, No tenderness, No cyanosis, No clubbing.   Neurologic: Alert & oriented x 3, Normal motor function, Normal sensory function, No focal deficits noted.       RADIOLOGY/PROCEDURES  CT-ABDOMEN-PELVIS WITH   Final Result      No evidence of abdominal or pelvic mass, adenopathy, or inflammatory change.        Results for orders placed or performed during the hospital encounter of 05/11/18   CBC WITH DIFFERENTIAL   Result Value Ref Range    WBC 8.6 4.8 - 10.8 K/uL    RBC 5.20 4.20 - 5.40 M/uL    Hemoglobin 15.6 12.0 - 16.0 g/dL    Hematocrit 46.2 37.0 - 47.0 %    MCV 88.8 81.4 - 97.8 fL    MCH 30.0 27.0 - 33.0 pg "    MCHC 33.8 33.6 - 35.0 g/dL    RDW 43.5 35.9 - 50.0 fL    Platelet Count 296 164 - 446 K/uL    MPV 10.0 9.0 - 12.9 fL    Neutrophils-Polys 53.50 44.00 - 72.00 %    Lymphocytes 33.60 22.00 - 41.00 %    Monocytes 7.40 0.00 - 13.40 %    Eosinophils 4.60 0.00 - 6.90 %    Basophils 0.70 0.00 - 1.80 %    Immature Granulocytes 0.20 0.00 - 0.90 %    Nucleated RBC 0.00 /100 WBC    Neutrophils (Absolute) 4.62 2.00 - 7.15 K/uL    Lymphs (Absolute) 2.90 1.00 - 4.80 K/uL    Monos (Absolute) 0.64 0.00 - 0.85 K/uL    Eos (Absolute) 0.40 0.00 - 0.51 K/uL    Baso (Absolute) 0.06 0.00 - 0.12 K/uL    Immature Granulocytes (abs) 0.02 0.00 - 0.11 K/uL    NRBC (Absolute) 0.00 K/uL   COMP METABOLIC PANEL   Result Value Ref Range    Sodium 135 135 - 145 mmol/L    Potassium 3.5 (L) 3.6 - 5.5 mmol/L    Chloride 105 96 - 112 mmol/L    Co2 24 20 - 33 mmol/L    Anion Gap 6.0 0.0 - 11.9    Glucose 105 (H) 65 - 99 mg/dL    Bun 17 8 - 22 mg/dL    Creatinine 0.71 0.50 - 1.40 mg/dL    Calcium 8.9 8.4 - 10.2 mg/dL    AST(SGOT) 25 12 - 45 U/L    ALT(SGPT) 26 2 - 50 U/L    Alkaline Phosphatase 64 30 - 99 U/L    Total Bilirubin 0.5 0.1 - 1.5 mg/dL    Albumin 4.1 3.2 - 4.9 g/dL    Total Protein 6.4 6.0 - 8.2 g/dL    Globulin 2.3 1.9 - 3.5 g/dL    A-G Ratio 1.8 g/dL   LIPASE   Result Value Ref Range    Lipase 26 7 - 58 U/L   URINALYSIS CULTURE, IF INDICATED   Result Value Ref Range    Color Yellow     Character Clear     Specific Gravity <=1.005 <1.035    Ph 7.5 5.0 - 8.0    Glucose Negative Negative mg/dL    Ketones Negative Negative mg/dL    Protein Negative Negative mg/dL    Bilirubin Negative Negative    Nitrite Negative Negative    Leukocyte Esterase Negative Negative    Occult Blood Trace (A) Negative    Micro Urine Req Microscopic    ESTIMATED GFR   Result Value Ref Range    GFR If African American >60 >60 mL/min/1.73 m 2    GFR If Non African American >60 >60 mL/min/1.73 m 2   URINE MICROSCOPIC (W/UA)   Result Value Ref Range    RBC Rare /hpf     Bacteria Rare (A) None /hpf    Epithelial Cells Few Few /hpf         COURSE & MEDICAL DECISION MAKING  Pertinent Labs & Imaging studies reviewed. (See chart for details)    The patient presents today with pain in the left upper quadrant.  She has mild tenderness in the left upper quadrant of the abdomen.  No peritoneal findings.  She is afebrile.  Laboratory studies remarkable for normal white blood cell count.  Urinalysis is not consistent with infection.  No hematuria to make me concerned for renal stone.  CT scan is obtained.  No evidence of colitis, spleen abnormality, kidney abnormality.    On reexamination the patient at 1230 she says that she is feeling okay.  She notes that the pain really does seem to be worse with movement.  Given her normal workup I feel that it is likely the patient's pain is related to the abdominal wall.  I recommended that she minimize activity.  Take Tylenol and ibuprofen for symptom control.  Patient states that she starts vacation today so she will be able to rest.  She is to return to the emergency department should she have any new or worsening symptoms.    The patient will return for new or worsening symptoms and is stable at the time of discharge.    The patient is referred to a primary physician for blood pressure management, diabetic screening, and for all other preventative health concerns.    DISPOSITION:  Patient will be discharged home in stable condition.    FOLLOW UP:  [facility], Emergency Dept  [id number]  [place] [address] [phone]    If symptoms worsen    Ayleen Millard P.A.-C.  [address]  [address]  [facility] [address] [phone]    Schedule an appointment as soon as possible for a visit        OUTPATIENT MEDICATIONS:  New Prescriptions    No medications on file         FINAL IMPRESSION  1. Abdominal pain, LUQ (left upper quadrant)    2. Abdominal wall pain            Electronically signed by: Hamlet RAMIREZ  [place], 5/11/2018 12:36 PM

## 2018-05-11 NOTE — ED NOTES
"Pt amb to rm#2; c/o abd pain LUQ x2d. Pt states pain worsens after \"coughing spasms'. Denies n/v/d.   "

## 2018-05-12 ENCOUNTER — PATIENT OUTREACH (OUTPATIENT)
Dept: HEALTH INFORMATION MANAGEMENT | Facility: OTHER | Age: 58
End: 2018-05-12

## 2018-05-23 DIAGNOSIS — G47.00 INSOMNIA, UNSPECIFIED TYPE: ICD-10-CM

## 2018-05-23 RX ORDER — TEMAZEPAM 15 MG/1
CAPSULE ORAL
Qty: 15 CAP | Refills: 0 | Status: SHIPPED
Start: 2018-05-23 | End: 2018-05-29 | Stop reason: SDUPTHER

## 2018-05-23 RX ORDER — TEMAZEPAM 15 MG/1
CAPSULE ORAL
Qty: 60 CAP | Refills: 5 | Status: CANCELLED | OUTPATIENT
Start: 2018-05-23

## 2018-05-23 NOTE — TELEPHONE ENCOUNTER
Patient PCP as Ayleen Millard is called in sick today and the patient requesting for emergent need of medication temazepam which she is on for several years and exhausted. She is going to get an appointment with Ayleen and as a covering provider I'm refilling for 15 days.Thank you.  Arlette Rogers M.D.

## 2018-05-29 DIAGNOSIS — G47.00 INSOMNIA, UNSPECIFIED TYPE: ICD-10-CM

## 2018-05-29 RX ORDER — TEMAZEPAM 15 MG/1
CAPSULE ORAL
Qty: 60 CAP | Refills: 5 | Status: SHIPPED
Start: 2018-06-07 | End: 2018-07-06

## 2018-06-04 ENCOUNTER — HOSPITAL ENCOUNTER (OUTPATIENT)
Facility: MEDICAL CENTER | Age: 58
End: 2018-06-04
Attending: PREVENTIVE MEDICINE
Payer: COMMERCIAL

## 2018-06-04 ENCOUNTER — EH NON-PROVIDER (OUTPATIENT)
Dept: OCCUPATIONAL MEDICINE | Facility: CLINIC | Age: 58
End: 2018-06-04

## 2018-06-04 DIAGNOSIS — Z02.89 ENCOUNTER FOR OCCUPATIONAL HEALTH EXAMINATION: Primary | ICD-10-CM

## 2018-06-04 DIAGNOSIS — Z02.89 ENCOUNTER FOR OCCUPATIONAL HEALTH EXAMINATION: ICD-10-CM

## 2018-06-04 LAB — MUV IGG SER IA-ACNC: 3.01

## 2018-06-04 PROCEDURE — 86735 MUMPS ANTIBODY: CPT | Performed by: PREVENTIVE MEDICINE

## 2018-06-25 DIAGNOSIS — I10 ESSENTIAL HYPERTENSION: ICD-10-CM

## 2018-06-25 RX ORDER — HYDROCHLOROTHIAZIDE 25 MG/1
25 TABLET ORAL
Qty: 90 TAB | Refills: 1 | Status: SHIPPED | OUTPATIENT
Start: 2018-06-25 | End: 2019-01-06 | Stop reason: SDUPTHER

## 2018-06-25 NOTE — TELEPHONE ENCOUNTER
Was the patient seen in the last year in this department? Yes     Does patient have an active prescription for medications requested? No     Received Request Via: Pharmacy      Pt met protocol?: Yes pt last ov 3/18   BP Readings from Last 1 Encounters:   05/11/18 121/78

## 2018-06-25 NOTE — TELEPHONE ENCOUNTER
Refill X 6 months, sent to pharmacy.Pt. Seen in the last 6 months per protocol.   Lab Results   Component Value Date/Time    SODIUM 135 05/11/2018 10:43 AM    POTASSIUM 3.5 (L) 05/11/2018 10:43 AM    CHLORIDE 105 05/11/2018 10:43 AM    CO2 24 05/11/2018 10:43 AM    GLUCOSE 105 (H) 05/11/2018 10:43 AM    BUN 17 05/11/2018 10:43 AM    CREATININE 0.71 05/11/2018 10:43 AM    CREATININE 0.8 10/24/2005 12:00 PM

## 2018-07-04 ENCOUNTER — OFFICE VISIT (OUTPATIENT)
Dept: URGENT CARE | Facility: PHYSICIAN GROUP | Age: 58
End: 2018-07-04
Payer: COMMERCIAL

## 2018-07-04 VITALS
HEART RATE: 80 BPM | BODY MASS INDEX: 34.2 KG/M2 | HEIGHT: 63 IN | TEMPERATURE: 97.2 F | DIASTOLIC BLOOD PRESSURE: 66 MMHG | OXYGEN SATURATION: 97 % | SYSTOLIC BLOOD PRESSURE: 124 MMHG | WEIGHT: 193 LBS

## 2018-07-04 DIAGNOSIS — R07.81 RIB PAIN ON LEFT SIDE: ICD-10-CM

## 2018-07-04 PROCEDURE — 99214 OFFICE O/P EST MOD 30 MIN: CPT | Performed by: FAMILY MEDICINE

## 2018-07-04 RX ORDER — IBUPROFEN 800 MG/1
800 TABLET ORAL EVERY 8 HOURS PRN
Qty: 30 TAB | Refills: 0 | Status: SHIPPED | OUTPATIENT
Start: 2018-07-04 | End: 2018-07-04 | Stop reason: CLARIF

## 2018-07-04 RX ORDER — ONDANSETRON 4 MG/1
4 TABLET, ORALLY DISINTEGRATING ORAL EVERY 8 HOURS PRN
Qty: 10 TAB | Refills: 0 | Status: SHIPPED | OUTPATIENT
Start: 2018-07-04 | End: 2018-08-13

## 2018-07-04 NOTE — LETTER
July 4, 2018         Patient: Harmony Monroe   YOB: 1960   Date of Visit: 7/4/2018           To Whom it May Concern:    Harmony Monroe was seen in my clinic on 7/4/2018.     Please excuse her for recent absence.    She may return to work on Sunday.    If you have any questions or concerns, please don't hesitate to call.        Sincerely,           Irvin Shaw M.D.  Electronically Signed

## 2018-07-04 NOTE — PROGRESS NOTES
"Subjective:         Chief Complaint   Patient presents with   • Rib Injury     Lt sided rib pain, posterior and antiriorly - onset last night. Pt states she hurt a \"pop\" when she coughed.                   Rib Pain       Pt c/o sharp, constant right lower ribcage pain x 2 days.    Pain is constant and worse with deep breathing.   Denies acute trauma, but states she felt a \"pop\" over left ribs when she was lifting something yesterday.       Pain not improved with motrin, but she has been taking percocet which helps with the pain. .        denies dyspnea.       Pertinent negatives include no claudication, cough, exertional chest pressure, fever, nausea, near-syncope, numbness, syncope or vomiting.           Past Medical History:   Diagnosis Date   • Allergy    • Arthritis     osteoarthritis; right knee   • ASTHMA     1/29/2018 not an issue, currently no medications needed   • Basal cell carcinoma of left medial cheek 11/30/2015   • Depression 1/7/2010   • High cholesterol    • History of tobacco use disorder 1/7/2010   • Hypertension    • Pneumonia 1990,2001   • Post-menopausal 8/26/2014         Social History   Substance Use Topics   • Smoking status: Former Smoker     Packs/day: 0.50     Years: 19.00     Types: Cigarettes     Start date: 9/1/1997     Quit date: 1/1/2016   • Smokeless tobacco: Never Used      Comment: encouraged to not restart   • Alcohol use No         Family history was reviewed and not pertinent       Review of Systems:  Review of Systems   Constitutional: Negative for fever.   HENT: no neck pain, headache or dizziness  Eyes: denies vision changes  Respiratory: no cough, congestion, SOB  Cardiovascular: denies palpations     Gastrointestinal: denies diarrhea, abdominal pain or constipation.  No blood in stool.  Musculoskeletal: denies back pain or joint pain    Skin: no itching or rash  Neurological: No numbness or tingling.   10 point ROS otherwise negative, except per HPI         Objective: " "    Blood pressure 124/66, pulse 80, temperature 36.2 °C (97.2 °F), height 1.6 m (5' 3\"), weight 87.5 kg (193 lb), SpO2 97 %.      Physical Exam   Constitutional: pt is oriented to person, place, and time. Pt appears well-developed and well-nourished.   HENT:   Head: Normocephalic and atraumatic.   Mouth/Throat: Oropharynx is clear and moist.   Eyes: Conjunctivae and EOM are normal. Pupils are equal, round, and reactive to light. No scleral icterus.   Neck: Normal range of motion. Neck supple. No JVD present. No thyromegaly present.   Cardiovascular: Normal rate, regular rhythm, normal heart sounds and intact distal pulses.  Exam reveals no gallop and no friction rub.    No murmur heard.  Pulmonary/Chest: Effort normal and breath sounds normal. No respiratory distress. Pt has no wheezes. Pt has no rales. Pt exhibits point tenderness over several lower ribs on right side   Abdominal: Soft.   Musculoskeletal: Normal range of motion. Pt exhibits no edema.   Lymphadenopathy:     Pt has no cervical adenopathy.   Neurological: pt is alert and oriented to person, place, and time. No cranial nerve deficit.   Skin: Skin is warm and dry. No erythema.   Psychiatric: pt has a normal mood and affect. patient's behavior is normal.          Assessment/Plan:      1. Rib pain on left side   cxr ordered to r/o fxr    - Diclofenac Sodium (VOLTAREN) 1 % Gel; Apply 4 g to skin as directed 3 times a day as needed.  Dispense: 1 Tube; Refill: 0  - [id number] (WITH 1-VIEW CXR) LEFT; Future         "

## 2018-07-05 ENCOUNTER — APPOINTMENT (OUTPATIENT)
Dept: RADIOLOGY | Facility: IMAGING CENTER | Age: 58
End: 2018-07-05
Attending: FAMILY MEDICINE
Payer: COMMERCIAL

## 2018-07-05 DIAGNOSIS — R07.81 RIB PAIN ON LEFT SIDE: ICD-10-CM

## 2018-07-05 PROCEDURE — 71101 X-RAY EXAM UNILAT RIBS/CHEST: CPT | Mod: TC,LT | Performed by: FAMILY MEDICINE

## 2018-08-10 ENCOUNTER — TELEPHONE (OUTPATIENT)
Dept: MEDICAL GROUP | Facility: PHYSICIAN GROUP | Age: 58
End: 2018-08-10

## 2018-08-10 NOTE — TELEPHONE ENCOUNTER
Future Appointments       Provider Department Center    8/13/2018 4:25 PM Ayleen Millard P.A.-C. [facility]        ESTABLISHED PATIENT PRE-VISIT PLANNING     Note: Patient will not be contacted if there is no indication to call.     1.  Reviewed notes from the last few office visits within the medical group: Yes    2.  If any orders were placed at last visit or intended to be done for this visit (i.e. 6 mos follow-up), do we have Results/Consult Notes?        •  Labs - Labs ordered, completed on 05/11/18 and results are in chart.       •  Imaging - Imaging ordered, completed and results are in chart.       •  Referrals - No referrals were ordered at last office visit.    3. Is this appointment scheduled as a Hospital Follow-Up? No    4.  Immunizations were updated in [employer] using Web[employer]?: Yes       •  Web [employer] Recommendations: FLU, HEPATITIS A , HEPATITIS B, MMR , TD, VARICELLA (Chicken Pox)  and ZOSTAVAX (Shingles)    5.  Patient is due for the following Health Maintenance Topics:   Health Maintenance Due   Topic Date Due   • MAMMOGRAM  08/07/2017   • IMM ZOSTER VACCINES (2 of 3) 11/29/2017       6.  MDX printed for Provider? NO INS [facility]     7.  Patient was informed to arrive 15 min prior to their scheduled appointment and bring in their medication bottles.

## 2018-08-13 ENCOUNTER — OFFICE VISIT (OUTPATIENT)
Dept: MEDICAL GROUP | Facility: PHYSICIAN GROUP | Age: 58
End: 2018-08-13
Payer: COMMERCIAL

## 2018-08-13 VITALS
WEIGHT: 194 LBS | HEART RATE: 80 BPM | DIASTOLIC BLOOD PRESSURE: 62 MMHG | OXYGEN SATURATION: 97 % | HEIGHT: 63 IN | BODY MASS INDEX: 34.38 KG/M2 | TEMPERATURE: 98.9 F | SYSTOLIC BLOOD PRESSURE: 126 MMHG

## 2018-08-13 DIAGNOSIS — D48.9 NEOPLASM OF UNCERTAIN BEHAVIOR: ICD-10-CM

## 2018-08-13 DIAGNOSIS — J98.01 BRONCHOSPASM: ICD-10-CM

## 2018-08-13 PROCEDURE — 99214 OFFICE O/P EST MOD 30 MIN: CPT | Performed by: PHYSICIAN ASSISTANT

## 2018-08-13 RX ORDER — ALBUTEROL SULFATE 90 UG/1
2 AEROSOL, METERED RESPIRATORY (INHALATION) EVERY 6 HOURS PRN
Qty: 8.5 G | Refills: 2 | Status: SHIPPED | OUTPATIENT
Start: 2018-08-13 | End: 2019-04-30 | Stop reason: SDUPTHER

## 2018-08-15 NOTE — PROGRESS NOTES
Subjective:   Harmony Monroe is a 58 y.o. female here today for skin lesion, albuterol refill. Is an established patient of mine.    HPI:    Patient presents to the office today with concern for a small lesion on the right side of her nose that she's noticed for about the last 4 months. She states that the lesion has been bleeding off and on which concerns her. Had a similar spot on the left side of her nose/upper cheek area which ended up being basal cell carcinoma. She is already established with a dermatologist, Dr. Jones, and will be having Mohs surgery in the near future.    She also requests refills of albuterol inhaler. Since the last time I saw her, she was in the urgent care with worsening breathing and some wheezing. Was given albuterol inhaler to use which she found significantly helpful. She feels that allergies have been a contributing factor for her breathing symptoms, as she was using the albuterol on a near-daily basis from May-June. She feels that  Her allergy symptoms have improved recently and hasn't needed to use the inhaler at all in the last couple of weeks.      Current medicines (including changes today)  Current Outpatient Prescriptions   Medication Sig Dispense Refill   • albuterol 108 (90 Base) MCG/ACT Aero Soln inhalation aerosol Inhale 2 Puffs by mouth every 6 hours as needed for Shortness of Breath. 8.5 g 2   • hydroCHLOROthiazide (HYDRODIURIL) 25 MG Tab Take 1 Tab by mouth every day. 90 Tab 1   • simvastatin (ZOCOR) 40 MG Tab Take 1 Tab by mouth every bedtime. Please remind pt to get labs done 90 Tab 0   • Multiple Vitamins-Minerals (MULTIVITAMIN ADULT PO) Take 1 Tab by mouth every day.     •  verapamil ER (CALAN SR) 120 MG CAPSULE SR 24 HR TAKE 1 CAP BY MOUTH EVERY DAY. 90 Cap 1   • escitalopram (LEXAPRO) 10 MG Tab Take 1 Tab by mouth every day. TAKE 1 TAB BY MOUTH EVERY DAY. 90 Tab 4   • estradiol (ESTRACE) 1 MG TABS Take 1 mg by mouth every day. From gyn     • naproxen  "(ALEVE) 220 MG tablet Take 220 mg by mouth 2 times a day, with meals.     • ibuprofen (MOTRIN) 800 MG Tab Take 400 mg by mouth every 8 hours as needed.     • diphenhydrAMINE (BENADRYL) 25 MG Tab Take 25 mg by mouth at bedtime as needed (ALLERGIES).       No current facility-administered medications for this visit.      She  has a past medical history of Allergy; Arthritis; ASTHMA; Basal cell carcinoma of left medial cheek (11/30/2015); Depression (1/7/2010); High cholesterol; History of tobacco use disorder (1/7/2010); Hypertension; Pneumonia (1990,2001); and Post-menopausal (8/26/2014).    ROS  ENT ROS: No current rhinorrhea  Pulmonary ROS: Positive for intermittent wheezing/SOB--none currently  Cardiovascular ROS: No chest pain  Skin/Integumentary ROS: Positive for lesion on nose       Objective:     Blood pressure 126/62, pulse 80, temperature 37.2 °C (98.9 °F), height 1.6 m (5' 3\"), weight 88 kg (194 lb), SpO2 97 %. Body mass index is 34.37 kg/m².     Physical Exam:  Constitutional: Alert, no distress.  Skin: Warm, dry, good turgor. Small ~2 mm slightly raised, non-tender erythematous lesion on right inferior nasal bridge. Not actively bleeding.  Eye: Pupils are equal and round, conjunctiva clear, lids normal.  ENMT: Lips without lesions, moist mucus membranes.  Neck: No masses. No submandibular or cervical lymphadenopathy.  Respiratory: Unlabored respiratory effort, SpO2 97% on room air. Lungs clear to auscultation, no wheezes, no rhonchi.  Cardiovascular: Normal S1, S2, no murmur.      Assessment and Plan:   The following treatment plan was discussed    1. Neoplasm of uncertain behavior  New problem. Concerning for skin cancer especially given known history of BCC. Recommend follow-up with her dermatologist to discuss biopsy of lesion.    2. Bronchospasm  Established problem, currently under good control. Should continue intermittent albuterol use for relief of dyspnea/wheezing/chest tightness. Refills " ordered. Given history of worsening with allergies, suspect allergic asthma. I previously ordered PFTs which have done yet been done. Should continue OTC allergy medication for relief of allergy symptoms.  - albuterol 108 (90 Base) MCG/ACT Aero Soln inhalation aerosol; Inhale 2 Puffs by mouth every 6 hours as needed for Shortness of Breath.  Dispense: 8.5 g; Refill: 2      Followup: Return if symptoms worsen or fail to improve.    Ayleen Millard P.A.-C.

## 2018-09-05 DIAGNOSIS — I10 ESSENTIAL HYPERTENSION: ICD-10-CM

## 2018-09-05 RX ORDER — VERAPAMIL HYDROCHLORIDE 120 MG/1
120 CAPSULE, EXTENDED RELEASE ORAL
Qty: 90 CAP | Refills: 0 | Status: SHIPPED | OUTPATIENT
Start: 2018-09-05 | End: 2018-12-06 | Stop reason: SDUPTHER

## 2018-09-05 NOTE — TELEPHONE ENCOUNTER
Was the patient seen in the last year in this department? Yes    Does patient have an active prescription for medications requested? No     Received Request Via: Pharmacy      Pt met protocol?: Yes, OV last month   BP Readings from Last 1 Encounters:   08/13/18 126/62

## 2018-09-18 ENCOUNTER — HOSPITAL ENCOUNTER (OUTPATIENT)
Facility: MEDICAL CENTER | Age: 58
End: 2018-09-18
Attending: DERMATOLOGY
Payer: COMMERCIAL

## 2018-09-18 ENCOUNTER — OFFICE VISIT (OUTPATIENT)
Dept: DERMATOLOGY | Facility: IMAGING CENTER | Age: 58
End: 2018-09-18
Payer: COMMERCIAL

## 2018-09-18 DIAGNOSIS — D48.5 NEOPLASM OF UNCERTAIN BEHAVIOR OF SKIN: ICD-10-CM

## 2018-09-18 DIAGNOSIS — L82.1 SEBORRHEIC KERATOSES: ICD-10-CM

## 2018-09-18 PROCEDURE — 99212 OFFICE O/P EST SF 10 MIN: CPT | Mod: 25 | Performed by: DERMATOLOGY

## 2018-09-18 PROCEDURE — 88305 TISSUE EXAM BY PATHOLOGIST: CPT

## 2018-09-18 PROCEDURE — 11100 PR BIOPSY OF SKIN LESION: CPT | Performed by: DERMATOLOGY

## 2018-09-18 ASSESSMENT — ENCOUNTER SYMPTOMS
FEVER: 0
CHILLS: 0

## 2018-09-18 NOTE — PROGRESS NOTES
Dermatology Return Patient Visit    Chief Complaint   Patient presents with   • Other       Subjective:     HPI:   Harmony Monroe is a 57 y.o. female presenting for    HPI: non healing lesion   Location: right side of nose   Time present: 4-6 months   Painful lesion: No  Itching lesion: No  Enlarging lesion: Yes  Anything make it better or worse? N/a    HPI/location: spots on the left ankle  Time present: years  Painful lesion: No  Itching lesion: Yes  Enlarging lesion: No  Anything make it better or worse? n/a    BCC on the left cheek 10/2015, was treated with efudex, recent treated with Mohs (recurrence)  Intermittently itchy, bleeds easily  No recent treatments    History of skin cancer: Yes, Details:  BCC, 10/2015  History of blistering/severe sunburns:No  Family history of skin cancer:No - but brother with ocular melanoma, dx 03/2017  Family history of atypical moles:No        Past Medical History:   Diagnosis Date   • Allergy    • Arthritis     osteoarthritis; right knee   • ASTHMA     1/29/2018 not an issue, currently no medications needed   • Basal cell carcinoma of left medial cheek 11/30/2015   • Depression 1/7/2010   • High cholesterol    • History of tobacco use disorder 1/7/2010   • Hypertension    • Pneumonia 1990,2001   • Post-menopausal 8/26/2014       Current Outpatient Prescriptions on File Prior to Visit   Medication Sig Dispense Refill   •  verapamil ER (CALAN SR) 120 MG CAPSULE SR 24 HR Take 1 Cap by mouth every day. 90 Cap 0   • albuterol 108 (90 Base) MCG/ACT Aero Soln inhalation aerosol Inhale 2 Puffs by mouth every 6 hours as needed for Shortness of Breath. 8.5 g 2   • hydroCHLOROthiazide (HYDRODIURIL) 25 MG Tab Take 1 Tab by mouth every day. 90 Tab 1   • naproxen (ALEVE) 220 MG tablet Take 220 mg by mouth 2 times a day, with meals.     • ibuprofen (MOTRIN) 800 MG Tab Take 400 mg by mouth every 8 hours as needed.     • diphenhydrAMINE (BENADRYL) 25 MG Tab Take 25 mg by mouth at  "bedtime as needed (ALLERGIES).     • simvastatin (ZOCOR) 40 MG Tab Take 1 Tab by mouth every bedtime. Please remind pt to get labs done 90 Tab 0   • Multiple Vitamins-Minerals (MULTIVITAMIN ADULT PO) Take 1 Tab by mouth every day.     • escitalopram (LEXAPRO) 10 MG Tab Take 1 Tab by mouth every day. TAKE 1 TAB BY MOUTH EVERY DAY. 90 Tab 4   • estradiol (ESTRACE) 1 MG TABS Take 1 mg by mouth every day. From gyn       No current facility-administered medications on file prior to visit.        Allergies   Allergen Reactions   • Bee Anaphylaxis   • Levaquin Anaphylaxis     Myalgias arthralgias    • Avelox [Moxifloxacin Hcl In Nacl] Swelling   • Promethazine      Twitching feeling   • Tape      Redness; itching \"paper tape ok\"       Family History   Problem Relation Age of Onset   • Hyperlipidemia Mother    • Arthritis Mother    • Heart Disease Father    • Diabetes Sister    • Cancer Neg Hx    • Hypertension Neg Hx    • Stroke Neg Hx        Social History     Social History   • Marital status: Single     Spouse name: N/A   • Number of children: N/A   • Years of education: N/A     Occupational History   • Not on file.     Social History Main Topics   • Smoking status: Former Smoker     Packs/day: 0.50     Years: 19.00     Types: Cigarettes     Start date: 9/1/1997     Quit date: 1/1/2016   • Smokeless tobacco: Never Used      Comment: encouraged to not restart   • Alcohol use No   • Drug use: No   • Sexual activity: No     Other Topics Concern   • Not on file     Social History Narrative   • No narrative on file       Review of Systems   Constitutional: Negative for chills and fever.   Skin: Positive for itching. Negative for rash.   All other systems reviewed and are negative.       Objective:     A focused cutaneous exam was completed including: face, eyelids, conjunctiva, lips, neck, left and right lower extremities with the following pertinent findings listed below. Remaining above-listed examined areas within normal " limits / negative for rashes or lesions.    There were no vitals taken for this visit.    Physical Exam   Constitutional: She is oriented to person, place, and time and well-developed, well-nourished, and in no distress.   HENT:   Head: Normocephalic and atraumatic.       Nose:       Eyes: Conjunctivae and lids are normal.   Neck: Normal range of motion. Neck supple.   Cardiovascular: Intact distal pulses.    Pulmonary/Chest: Effort normal.   Neurological: She is alert and oriented to person, place, and time.   Skin: Skin is warm and dry.        Psychiatric: Mood and affect normal.       DATA: none applicable to review    Assessment and Plan:     1. Neoplasm of uncertain behavior of skin - right nasal ala  Procedure Note   Procedure: Biopsy by shave technique  Location: as noted above  Size: as noted in exam  Preoperative diagnosis:r/o BCC  Risks, benefits and alternatives of procedure discussed and written informed consent obtained. Time out completed. Area of biopsy prepped with alcohol. Anesthesia with 1% lidocaine with epinephrine administered with 30 gauge needle. Shave biopsy of the site performed. Hemostasis achieved with pressure and aluminum chloride. Vaseline applied to wound with bandage. Patient tolerated procedure well and there were no complications. The specimen was sent to the pathology lab by the staff. Wound care was discussed.    2. Seborrheic keratoses  - Benign-appearing nature of lesions discussed. Advised to return to clinic for any new or concerning changes.      Followup: Return for thanh, 4-6 months, sooner pending results.    Sidra Jones M.D.

## 2018-09-20 DIAGNOSIS — C44.311 BASAL CELL CARCINOMA (BCC) OF SKIN OF NOSE: ICD-10-CM

## 2018-09-27 ENCOUNTER — HOSPITAL ENCOUNTER (OUTPATIENT)
Dept: LAB | Facility: MEDICAL CENTER | Age: 58
End: 2018-09-27
Payer: COMMERCIAL

## 2018-09-27 ENCOUNTER — NON-PROVIDER VISIT (OUTPATIENT)
Dept: URGENT CARE | Facility: PHYSICIAN GROUP | Age: 58
End: 2018-09-27

## 2018-09-27 DIAGNOSIS — Z23 FLU VACCINE NEED: Primary | ICD-10-CM

## 2018-09-27 LAB
BDY FAT % MEASURED: 43.3 %
BP DIAS: 65 MMHG
BP SYS: 113 MMHG
DIABETES HTDIA: NO
EVENT NAME HTEVT: NORMAL
HYPERTENSION HTHYP: YES
SCREENING LOC CITY HTCIT: NORMAL
SCREENING LOC STATE HTSTA: NORMAL
SCREENING LOCATION HTLOC: NORMAL
SMOKING HTSMO: YES
SUBSCRIBER ID HTSID: NORMAL

## 2018-09-27 PROCEDURE — S5190 WELLNESS ASSESSMENT BY NONPH: HCPCS

## 2018-09-27 PROCEDURE — 36415 COLL VENOUS BLD VENIPUNCTURE: CPT

## 2018-09-27 PROCEDURE — 82947 ASSAY GLUCOSE BLOOD QUANT: CPT

## 2018-09-27 PROCEDURE — 80061 LIPID PANEL: CPT

## 2018-09-28 LAB
CHOLEST SERPL-MCNC: 180 MG/DL (ref 100–199)
FASTING HTFAS: YES
GLUCOSE SERPL-MCNC: 95 MG/DL (ref 65–99)
HDLC SERPL-MCNC: 59 MG/DL
LDLC SERPL CALC-MCNC: 89 MG/DL
TRIGL SERPL-MCNC: 160 MG/DL (ref 0–149)

## 2018-09-28 PROCEDURE — 90686 IIV4 VACC NO PRSV 0.5 ML IM: CPT | Performed by: PHYSICIAN ASSISTANT

## 2018-09-28 NOTE — NON-PROVIDER
"Harmony Monroe is a 58 y.o. female here for a non-provider visit for:   FLU    Reason for immunization: Annual Flu Vaccine  Immunization records indicate need for vaccine: Yes, confirmed with Epic  Minimum interval has been met for this vaccine: Yes  ABN completed: Not Indicated    Order and dose verified by: SHONDA BE Dated   was given to patient: Yes  All IAC Questionnaire questions were answered \"No.\"    Patient tolerated injection and no adverse effects were observed or reported: Yes    Pt scheduled for next dose in series: Not Indicated  "

## 2018-10-24 NOTE — TELEPHONE ENCOUNTER
Was the patient seen in the last year in this department? Yes    Does patient have an active prescription for medications requested? No     Received Request Via: Pharmacy      Pt met protocol?: Yes pt last ov 8/2018

## 2018-10-25 RX ORDER — ESCITALOPRAM OXALATE 10 MG/1
10 TABLET ORAL
Qty: 90 TAB | Refills: 0 | Status: SHIPPED | OUTPATIENT
Start: 2018-10-25 | End: 2019-01-21 | Stop reason: SDUPTHER

## 2018-10-25 NOTE — TELEPHONE ENCOUNTER
Was the patient seen in the last year in this department? Yes    Does patient have an active prescription for medications requested? No     Received Request Via: Patient     Refill encounter was already complete

## 2018-11-10 ENCOUNTER — OFFICE VISIT (OUTPATIENT)
Dept: URGENT CARE | Facility: PHYSICIAN GROUP | Age: 58
End: 2018-11-10
Payer: COMMERCIAL

## 2018-11-10 VITALS
HEIGHT: 63 IN | DIASTOLIC BLOOD PRESSURE: 90 MMHG | BODY MASS INDEX: 31.89 KG/M2 | TEMPERATURE: 97.7 F | SYSTOLIC BLOOD PRESSURE: 140 MMHG | WEIGHT: 180 LBS | OXYGEN SATURATION: 97 % | HEART RATE: 75 BPM | RESPIRATION RATE: 18 BRPM

## 2018-11-10 DIAGNOSIS — R11.0 NAUSEA: ICD-10-CM

## 2018-11-10 DIAGNOSIS — K29.00 ACUTE GASTRITIS WITHOUT HEMORRHAGE, UNSPECIFIED GASTRITIS TYPE: Primary | ICD-10-CM

## 2018-11-10 PROCEDURE — 99214 OFFICE O/P EST MOD 30 MIN: CPT | Performed by: FAMILY MEDICINE

## 2018-11-10 RX ORDER — ONDANSETRON 4 MG/1
4 TABLET, ORALLY DISINTEGRATING ORAL EVERY 6 HOURS PRN
Qty: 12 TAB | Refills: 0 | Status: SHIPPED | OUTPATIENT
Start: 2018-11-10 | End: 2018-11-13

## 2018-11-10 RX ORDER — ONDANSETRON 2 MG/ML
4 INJECTION INTRAMUSCULAR; INTRAVENOUS ONCE
Status: COMPLETED | OUTPATIENT
Start: 2018-11-10 | End: 2018-11-10

## 2018-11-10 RX ADMIN — ONDANSETRON 4 MG: 2 INJECTION INTRAMUSCULAR; INTRAVENOUS at 08:55

## 2018-11-10 ASSESSMENT — ENCOUNTER SYMPTOMS
FATIGUE: 0
CARDIOVASCULAR NEGATIVE: 1
CHILLS: 0
CHANGE IN BOWEL HABIT: 0
VOMITING: 0
COUGH: 0
MUSCULOSKELETAL NEGATIVE: 1
WEAKNESS: 0
ABDOMINAL PAIN: 0
NAUSEA: 1
RESPIRATORY NEGATIVE: 1
FEVER: 0
DIARRHEA: 0

## 2018-11-10 NOTE — PROGRESS NOTES
Subjective:   Harmony Monroe is a 58 y.o. female who presents for Nausea (started this am )        Nausea   This is a new problem. The current episode started today (around 4 AM). The problem occurs constantly. The problem has been gradually worsening. Associated symptoms include nausea. Pertinent negatives include no abdominal pain, change in bowel habit, chest pain, chills, coughing, fatigue, fever, urinary symptoms, vomiting or weakness. Nothing aggravates the symptoms. She has tried nothing for the symptoms.     Patient has a h/o htn which has been stable on medication.  No h/o ulcer disease.         Review of Systems   Constitutional: Negative for chills, fatigue and fever.   HENT: Negative.    Respiratory: Negative.  Negative for cough.    Cardiovascular: Negative.  Negative for chest pain.   Gastrointestinal: Positive for nausea. Negative for abdominal pain, change in bowel habit, diarrhea and vomiting.   Genitourinary: Negative.    Musculoskeletal: Negative.    Skin: Negative.    Neurological: Negative for weakness.     PMH:  has a past medical history of Allergy; Arthritis; ASTHMA; Basal cell carcinoma of left medial cheek (11/30/2015); Depression (1/7/2010); High cholesterol; History of tobacco use disorder (1/7/2010); Hypertension; Pneumonia (1990,2001); and Post-menopausal (8/26/2014).  MEDS:   Current Outpatient Prescriptions:   •  ondansetron (ZOFRAN ODT) 4 MG TABLET DISPERSIBLE, Take 1 Tab by mouth every 6 hours as needed for Nausea for up to 3 days., Disp: 12 Tab, Rfl: 0  •  escitalopram (LEXAPRO) 10 MG Tab, Take 1 Tab by mouth every day., Disp: 90 Tab, Rfl: 0  •   verapamil ER (CALAN SR) 120 MG CAPSULE SR 24 HR, Take 1 Cap by mouth every day., Disp: 90 Cap, Rfl: 0  •  albuterol 108 (90 Base) MCG/ACT Aero Soln inhalation aerosol, Inhale 2 Puffs by mouth every 6 hours as needed for Shortness of Breath., Disp: 8.5 g, Rfl: 2  •  hydroCHLOROthiazide (HYDRODIURIL) 25 MG Tab, Take 1 Tab by mouth  "every day., Disp: 90 Tab, Rfl: 1  •  naproxen (ALEVE) 220 MG tablet, Take 220 mg by mouth 2 times a day, with meals., Disp: , Rfl:   •  ibuprofen (MOTRIN) 800 MG Tab, Take 400 mg by mouth every 8 hours as needed., Disp: , Rfl:   •  diphenhydrAMINE (BENADRYL) 25 MG Tab, Take 25 mg by mouth at bedtime as needed (ALLERGIES)., Disp: , Rfl:   •  simvastatin (ZOCOR) 40 MG Tab, Take 1 Tab by mouth every bedtime. Please remind pt to get labs done, Disp: 90 Tab, Rfl: 0  •  Multiple Vitamins-Minerals (MULTIVITAMIN ADULT PO), Take 1 Tab by mouth every day., Disp: , Rfl:   •  estradiol (ESTRACE) 1 MG TABS, Take 1 mg by mouth every day. From gyn, Disp: , Rfl:   ALLERGIES:   Allergies   Allergen Reactions   • Bee Anaphylaxis   • Levaquin Anaphylaxis     Myalgias arthralgias    • Avelox [Moxifloxacin Hcl In Nacl] Swelling   • Promethazine      Twitching feeling   • Tape      Redness; itching \"paper tape ok\"     SURGHX:   Past Surgical History:   Procedure Laterality Date   • KNEE ARTHROSCOPY Left 2/5/2018    Procedure: KNEE ARTHROSCOPY;  Surgeon: Andrews Castro M.D.;  Location: [facility];  Service: Orthopedics   • MEDIAL MENISCECTOMY Left 2/5/2018    Procedure: MEDIAL MENISCECTOMY - PARTIAL;  Surgeon: Andrews Castro M.D.;  Location: [facility];  Service: Orthopedics   • KNEE ARTHROSCOPY Right 3/23/2017    Procedure: KNEE ARTHROSCOPY;  Surgeon: Andrews Castro M.D.;  Location: [facility];  Service:    • MEDIAL MENISCECTOMY  3/23/2017    Procedure: MEDIAL and lateral  MENISCECTOMY - PARTIAL;  Surgeon: Andrews Castro M.D.;  Location: [facility];  Service:    • CHONDROPLASTY  3/23/2017    Procedure: CHONDROPLASTY -  PATELLAR;  Surgeon: Andrews Castro M.D.;  Location: [facility];  Service:    • ABDOMINAL HYSTERECTOMY TOTAL  11/2005   • TONSILLECTOMY AND ADENOIDECTOMY  1980   • NASAL SEPTAL RECONSTRUCTION  1980   • CYST EXCISION  1974    Anterior Left Foot " "    SOCHX:  reports that she quit smoking about 2 years ago. Her smoking use included Cigarettes. She started smoking about 21 years ago. She has a 9.50 pack-year smoking history. She has never used smokeless tobacco. She reports that she does not drink alcohol or use drugs.  FH: Family history was reviewed, no pertinent findings to report     Objective:   /90   Pulse (!) 105   Temp (!) 38.6 °C (101.5 °F) (Temporal)   Resp 18   Ht 1.6 m (5' 3\")   Wt 81.6 kg (180 lb)   SpO2 96%   BMI 31.89 kg/m²      Physical Exam   Constitutional: She is oriented to person, place, and time. She appears well-developed and well-nourished.   Eyes: EOM are normal.   Neck: Normal range of motion.   Cardiovascular: Normal rate, regular rhythm, normal heart sounds and intact distal pulses.    Pulmonary/Chest: Effort normal and breath sounds normal. No respiratory distress.   Abdominal: Soft. Bowel sounds are normal. There is tenderness in the epigastric area. There is no rebound and negative Adkins's sign.   Musculoskeletal: Normal range of motion.   Neurological: She is alert and oriented to person, place, and time.   Skin: Skin is warm and dry. Capillary refill takes less than 2 seconds.   Psychiatric: She has a normal mood and affect. Her behavior is normal. Judgment and thought content normal.   Vitals reviewed.       Assessment/Plan:   1. Acute gastritis without hemorrhage, unspecified gastritis type    2. Nausea  - ondansetron (ZOFRAN) syringe/vial injection 4 mg; 2 mL by Intramuscular route Once.  - ondansetron (ZOFRAN ODT) 4 MG TABLET DISPERSIBLE; Take 1 Tab by mouth every 6 hours as needed for Nausea for up to 3 days.  Dispense: 12 Tab; Refill: 0    Rechecked vital signs while pt at work in clinic. 11/10/18 11:24 AM. Pulse 75, temp 36.5 C oral, 97% RA, RR 18. Pt reports feeling less nauseous.    Differential diagnosis, natural history, supportive care, and indications for immediate follow-up discussed. Discussed OTC " antacids PRN, fluids, rest. Rx sent electronically.    Return for Follow-up with primary care provider, Return to UC or ER if symptoms change or worsen.     The case was discussed and reviewed with Dr Mcclellan during Josias MANZANARES's training period.

## 2018-11-20 ENCOUNTER — TELEPHONE (OUTPATIENT)
Dept: DERMATOLOGY | Facility: IMAGING CENTER | Age: 58
End: 2018-11-20

## 2018-12-06 DIAGNOSIS — I10 ESSENTIAL HYPERTENSION: ICD-10-CM

## 2018-12-06 RX ORDER — VERAPAMIL HYDROCHLORIDE 120 MG/1
CAPSULE, EXTENDED RELEASE ORAL
Qty: 90 CAP | Refills: 0 | Status: SHIPPED | OUTPATIENT
Start: 2018-12-06 | End: 2019-03-08 | Stop reason: SDUPTHER

## 2018-12-14 DIAGNOSIS — F51.04 CHRONIC INSOMNIA: ICD-10-CM

## 2018-12-18 RX ORDER — TEMAZEPAM 15 MG/1
CAPSULE ORAL
Qty: 60 CAP | Refills: 5 | Status: SHIPPED | OUTPATIENT
Start: 2018-12-18 | End: 2019-02-16

## 2018-12-18 NOTE — TELEPHONE ENCOUNTER
Refill done.  reviewed. Last fill of temazepam on 11/14/18--#60 with 5 refills.  Ayleen Millard P.A.-C.

## 2019-01-06 DIAGNOSIS — I10 ESSENTIAL HYPERTENSION: ICD-10-CM

## 2019-01-08 RX ORDER — HYDROCHLOROTHIAZIDE 25 MG/1
TABLET ORAL
Qty: 90 TAB | Refills: 1 | Status: SHIPPED | OUTPATIENT
Start: 2019-01-08 | End: 2019-07-12 | Stop reason: SDUPTHER

## 2019-01-08 NOTE — TELEPHONE ENCOUNTER
Refill X 6 months, sent to pharmacy.Pt. Seen in the last 6 months per protocol.   Lab Results   Component Value Date/Time    SODIUM 135 05/11/2018 10:43 AM    POTASSIUM 3.5 (L) 05/11/2018 10:43 AM    CHLORIDE 105 05/11/2018 10:43 AM    CO2 24 05/11/2018 10:43 AM    GLUCOSE 95 09/27/2018 02:33 PM    BUN 17 05/11/2018 10:43 AM    CREATININE 0.71 05/11/2018 10:43 AM    CREATININE 0.8 10/24/2005 12:00 PM

## 2019-01-08 NOTE — TELEPHONE ENCOUNTER
Was the patient seen in the last year in this department? Yes    Does patient have an active prescription for medications requested? No     Received Request Via: Pharmacy      Pt met protocol?: Yes   Pt last ov 8/2018   BP Readings from Last 1 Encounters:   11/10/18 140/90

## 2019-01-21 RX ORDER — ESCITALOPRAM OXALATE 10 MG/1
10 TABLET ORAL
Qty: 90 TAB | Refills: 1 | Status: SHIPPED | OUTPATIENT
Start: 2019-01-21 | End: 2019-07-26 | Stop reason: SDUPTHER

## 2019-01-21 NOTE — TELEPHONE ENCOUNTER
Was the patient seen in the last year in this department? Yes    Does patient have an active prescription for medications requested? No     Received Request Via: Pharmacy      Pt met protocol?: Yes, OV 8/18

## 2019-01-28 ENCOUNTER — OFFICE VISIT (OUTPATIENT)
Dept: URGENT CARE | Facility: PHYSICIAN GROUP | Age: 59
End: 2019-01-28
Payer: COMMERCIAL

## 2019-01-28 VITALS
OXYGEN SATURATION: 98 % | WEIGHT: 180 LBS | HEART RATE: 95 BPM | DIASTOLIC BLOOD PRESSURE: 88 MMHG | SYSTOLIC BLOOD PRESSURE: 162 MMHG | BODY MASS INDEX: 31.89 KG/M2 | RESPIRATION RATE: 14 BRPM | HEIGHT: 63 IN | TEMPERATURE: 97.8 F

## 2019-01-28 DIAGNOSIS — J11.1 INFLUENZA-LIKE ILLNESS: ICD-10-CM

## 2019-01-28 DIAGNOSIS — J45.21 MILD INTERMITTENT ASTHMA WITH EXACERBATION: ICD-10-CM

## 2019-01-28 DIAGNOSIS — I10 HYPERTENSION, UNSPECIFIED TYPE: ICD-10-CM

## 2019-01-28 DIAGNOSIS — R11.0 NAUSEA: ICD-10-CM

## 2019-01-28 DIAGNOSIS — R05.9 COUGH: ICD-10-CM

## 2019-01-28 LAB
FLUAV+FLUBV AG SPEC QL IA: NORMAL
INT CON NEG: NEGATIVE
INT CON POS: POSITIVE

## 2019-01-28 PROCEDURE — 94640 AIRWAY INHALATION TREATMENT: CPT | Performed by: PHYSICIAN ASSISTANT

## 2019-01-28 PROCEDURE — 99214 OFFICE O/P EST MOD 30 MIN: CPT | Mod: 25 | Performed by: PHYSICIAN ASSISTANT

## 2019-01-28 PROCEDURE — 87804 INFLUENZA ASSAY W/OPTIC: CPT | Performed by: PHYSICIAN ASSISTANT

## 2019-01-28 PROCEDURE — 96372 THER/PROPH/DIAG INJ SC/IM: CPT | Performed by: PHYSICIAN ASSISTANT

## 2019-01-28 RX ORDER — AZITHROMYCIN 250 MG/1
TABLET, FILM COATED ORAL
Qty: 1 QUANTITY SUFFICIENT | Refills: 0 | Status: CANCELLED | OUTPATIENT
Start: 2019-01-28

## 2019-01-28 RX ORDER — ALBUTEROL SULFATE 90 UG/1
2 AEROSOL, METERED RESPIRATORY (INHALATION) EVERY 6 HOURS PRN
Qty: 8.5 G | Refills: 2 | Status: SHIPPED | OUTPATIENT
Start: 2019-01-28 | End: 2019-04-30

## 2019-01-28 RX ORDER — ONDANSETRON 4 MG/1
4 TABLET, FILM COATED ORAL EVERY 6 HOURS PRN
Qty: 20 TAB | Refills: 1 | Status: SHIPPED | OUTPATIENT
Start: 2019-01-28 | End: 2020-05-25

## 2019-01-28 RX ORDER — OSELTAMIVIR PHOSPHATE 75 MG/1
75 CAPSULE ORAL 2 TIMES DAILY
Qty: 10 CAP | Refills: 0 | Status: SHIPPED | OUTPATIENT
Start: 2019-01-28 | End: 2019-02-02

## 2019-01-28 RX ORDER — ACETAMINOPHEN 500 MG
500 TABLET ORAL ONCE
Status: COMPLETED | OUTPATIENT
Start: 2019-01-28 | End: 2019-01-28

## 2019-01-28 RX ORDER — IPRATROPIUM BROMIDE AND ALBUTEROL SULFATE 2.5; .5 MG/3ML; MG/3ML
3 SOLUTION RESPIRATORY (INHALATION) ONCE
Status: COMPLETED | OUTPATIENT
Start: 2019-01-28 | End: 2019-01-28

## 2019-01-28 RX ORDER — ONDANSETRON 2 MG/ML
4 INJECTION INTRAMUSCULAR; INTRAVENOUS ONCE
Status: COMPLETED | OUTPATIENT
Start: 2019-01-28 | End: 2019-01-28

## 2019-01-28 RX ORDER — CODEINE PHOSPHATE/GUAIFENESIN 10-100MG/5
5 LIQUID (ML) ORAL 3 TIMES DAILY PRN
Qty: 80 ML | Refills: 0 | Status: SHIPPED | OUTPATIENT
Start: 2019-01-28 | End: 2019-02-04

## 2019-01-28 RX ORDER — KETOROLAC TROMETHAMINE 30 MG/ML
60 INJECTION, SOLUTION INTRAMUSCULAR; INTRAVENOUS ONCE
Status: COMPLETED | OUTPATIENT
Start: 2019-01-28 | End: 2019-01-28

## 2019-01-28 RX ORDER — ONDANSETRON 4 MG/1
4 TABLET, ORALLY DISINTEGRATING ORAL EVERY 6 HOURS PRN
Qty: 12 TAB | Refills: 2 | Status: CANCELLED | OUTPATIENT
Start: 2019-01-28

## 2019-01-28 RX ORDER — ALBUTEROL SULFATE 90 UG/1
2 AEROSOL, METERED RESPIRATORY (INHALATION) EVERY 6 HOURS PRN
Qty: 8.5 G | Refills: 2 | Status: SHIPPED | OUTPATIENT
Start: 2019-01-28 | End: 2019-01-28 | Stop reason: SDUPTHER

## 2019-01-28 RX ORDER — METHYLPREDNISOLONE 4 MG/1
TABLET ORAL
Qty: 1 KIT | Refills: 0 | Status: CANCELLED | OUTPATIENT
Start: 2019-01-28

## 2019-01-28 RX ADMIN — KETOROLAC TROMETHAMINE 60 MG: 30 INJECTION, SOLUTION INTRAMUSCULAR; INTRAVENOUS at 09:28

## 2019-01-28 RX ADMIN — Medication 500 MG: at 09:02

## 2019-01-28 RX ADMIN — ONDANSETRON 4 MG: 2 INJECTION INTRAMUSCULAR; INTRAVENOUS at 09:00

## 2019-01-28 RX ADMIN — IPRATROPIUM BROMIDE AND ALBUTEROL SULFATE 3 ML: 2.5; .5 SOLUTION RESPIRATORY (INHALATION) at 09:13

## 2019-01-28 ASSESSMENT — ENCOUNTER SYMPTOMS
NAUSEA: 1
COUGH: 1
SPUTUM PRODUCTION: 0
CHILLS: 1
PALPITATIONS: 0
SORE THROAT: 0
ABDOMINAL PAIN: 0
DIARRHEA: 0
SHORTNESS OF BREATH: 0
VOMITING: 1
MYALGIAS: 1
FEVER: 1
WHEEZING: 0

## 2019-01-28 NOTE — PROGRESS NOTES
Subjective:   Harmony Monroe is a 59 y.o. female who presents for Cough (fever, chills, vomitting, not eating )        Patient notes she woke Saturday morning with nausea fever chills.  Max temp has been 100.3.  She complains of significant fevers chills body aches and sweats.  She complains of nausea vomiting.  She did have some resolution of vomiting with Zofran which she had some of at home.  She has run out and resumed vomiting once again this morning.  She complains of persistent dry cough.  Sinus congestion headache.  Patient did get flu shot.  Patient denies sore throat complains of mild ear discomfort denies abdominal pain complains of mild diarrhea denies blood per stool.  Notes remote history of pneumonia, known history of asthma requests refill of MDI.    Additionally patient is a known past medical history of hypertension.  She is not taking her blood pressure medicine this morning. She denies chest pain or palpitations.       Cough   This is a new problem. The current episode started in the past 7 days. Associated symptoms include chills, ear pain ( mild, R>L), a fever and myalgias. Pertinent negatives include no chest pain, rash, sore throat, shortness of breath or wheezing.     Review of Systems   Constitutional: Positive for chills and fever.   HENT: Positive for congestion and ear pain ( mild, R>L). Negative for sore throat.    Respiratory: Positive for cough. Negative for sputum production, shortness of breath and wheezing.    Cardiovascular: Negative for chest pain and palpitations.   Gastrointestinal: Positive for nausea and vomiting. Negative for abdominal pain and diarrhea.   Musculoskeletal: Positive for myalgias.   Skin: Negative for rash.     Allergies   Allergen Reactions   • Bee Anaphylaxis   • Levaquin Anaphylaxis     Myalgias arthralgias    • Avelox [Moxifloxacin Hcl In Nacl] Swelling   • Phenergan  [Benzyl Alc-Promethazine]    • Promethazine      Twitching feeling   • Tape       "Redness; itching \"paper tape ok\"   I have worn a mask for the entire encounter with this patient.    Objective:   BP (!) 162/88   Pulse 95   Temp 36.6 °C (97.8 °F)   Resp 14   Ht 1.6 m (5' 3\")   Wt 81.6 kg (180 lb)   SpO2 98%   BMI 31.89 kg/m²     Physical Exam   Constitutional: She is oriented to person, place, and time. She appears well-developed and well-nourished. No distress.   HENT:   Head: Normocephalic and atraumatic.   Right Ear: Tympanic membrane, external ear and ear canal normal.   Left Ear: Tympanic membrane, external ear and ear canal normal.   Nose: Nose normal.   Mouth/Throat: Uvula is midline and mucous membranes are normal. Posterior oropharyngeal erythema ( mild) present. No oropharyngeal exudate, posterior oropharyngeal edema or tonsillar abscesses.   Eyes: Conjunctivae and lids are normal. Right eye exhibits no discharge. Left eye exhibits no discharge. No scleral icterus.   Neck: Neck supple.   Pulmonary/Chest: Effort normal. No respiratory distress. She has no decreased breath sounds. She has no wheezes ( slightly tight). She has no rhonchi. She has no rales.   Musculoskeletal: Normal range of motion.   Lymphadenopathy:     She has cervical adenopathy ( mild bilat).   Neurological: She is alert and oriented to person, place, and time. She is not disoriented.   Skin: Skin is warm and dry. She is not diaphoretic. No erythema. No pallor.   Psychiatric: Her speech is normal and behavior is normal.   Nursing note and vitals reviewed.  POCT flu - NEG  Tylenol - tolerates well  Duoneb - tolerates well  toradol - tolerates well      Assessment/Plan:   1. Influenza-like illness  - acetaminophen (TYLENOL) tablet 500 mg; Take 1 Tab by mouth Once.  - oseltamivir (TAMIFLU) 75 MG Cap; Take 1 Cap by mouth 2 times a day for 5 days.  Dispense: 10 Cap; Refill: 0  - ketorolac (TORADOL) injection 60 mg; 2 mL by Intramuscular route Once.    2. Cough  - POCT Influenza A/B  - guaifenesin-codeine " (TUSSI-ORGANIDIN NR) 100-10 MG/5ML syrup; Take 5 mL by mouth 3 times a day as needed for Cough for up to 7 days.  Dispense: 80 mL; Refill: 0    3. Nausea  - ondansetron (ZOFRAN) syringe/vial injection 4 mg; 2 mL by Intravenous route Once.  - ondansetron (ZOFRAN) 4 MG Tab tablet; Take 1 Tab by mouth every 6 hours as needed for Nausea/Vomiting.  Dispense: 20 Tab; Refill: 1    4. Mild intermittent asthma with exacerbation  - ipratropium-albuterol (DUONEB) nebulizer solution; 3 mL by Nebulization route Once.  - albuterol 108 (90 Base) MCG/ACT Aero Soln inhalation aerosol; Inhale 2 Puffs by mouth every 6 hours as needed for Shortness of Breath.  Dispense: 8.5 g; Refill: 2  - albuterol 108 (90 Base) MCG/ACT Aero Soln inhalation aerosol; Inhale 2 Puffs by mouth every 6 hours as needed for Shortness of Breath.  Dispense: 8.5 g; Refill: 2    5. Hypertension, unspecified type  Supportive care is reviewed with patient/caregiver - recommend to push PO fluids and electrolytes, suspect influenza-like process with presentation-Tamiflu now, fever reducers, cough suppressant, Zofran as needed, sent with inhaler, we discussed typical timeframe and patient is sent with work note  Return to clinic with lack of resolution or progression of symptoms.  ER precautions with any worsening symptoms are reviewed with patient/caregiver and they do express understanding    Differential diagnosis, natural history, supportive care, and indications for immediate follow-up discussed.

## 2019-01-29 ENCOUNTER — TELEPHONE (OUTPATIENT)
Dept: URGENT CARE | Facility: PHYSICIAN GROUP | Age: 59
End: 2019-01-29

## 2019-03-07 ENCOUNTER — NON-PROVIDER VISIT (OUTPATIENT)
Dept: MEDICAL GROUP | Facility: PHYSICIAN GROUP | Age: 59
End: 2019-03-07
Payer: COMMERCIAL

## 2019-03-07 DIAGNOSIS — Z23 NEED FOR VACCINATION: ICD-10-CM

## 2019-03-07 PROCEDURE — 90471 IMMUNIZATION ADMIN: CPT | Performed by: PHYSICIAN ASSISTANT

## 2019-03-07 PROCEDURE — 90750 HZV VACC RECOMBINANT IM: CPT | Performed by: PHYSICIAN ASSISTANT

## 2019-03-08 DIAGNOSIS — I10 ESSENTIAL HYPERTENSION: ICD-10-CM

## 2019-03-08 NOTE — PROGRESS NOTES
"Harmony Monroe is a 59 y.o. female here for a non-provider visit for:   SHINGRIX (Shingles)    Reason for immunization: Overdue/Provider Recommended  Immunization records indicate need for vaccine: Yes, confirmed with Epic  Minimum interval has been met for this vaccine: Yes  ABN completed: Not Indicated    Order and dose verified by: AT  VIS was given to patient: Yes  All IAC Questionnaire questions were answered \"No.\"    Patient tolerated injection and no adverse effects were observed or reported: Yes    Pt scheduled for next dose in series: No    "

## 2019-03-11 RX ORDER — VERAPAMIL HYDROCHLORIDE 120 MG/1
120 CAPSULE, EXTENDED RELEASE ORAL
Qty: 90 CAP | Refills: 0 | Status: SHIPPED | OUTPATIENT
Start: 2019-03-11 | End: 2019-06-07 | Stop reason: SDUPTHER

## 2019-04-25 ENCOUNTER — HOSPITAL ENCOUNTER (OUTPATIENT)
Dept: LAB | Facility: MEDICAL CENTER | Age: 59
End: 2019-04-25
Attending: PHYSICIAN ASSISTANT
Payer: COMMERCIAL

## 2019-04-25 ENCOUNTER — APPOINTMENT (OUTPATIENT)
Dept: RADIOLOGY | Facility: IMAGING CENTER | Age: 59
End: 2019-04-25
Attending: PHYSICIAN ASSISTANT
Payer: COMMERCIAL

## 2019-04-25 ENCOUNTER — OFFICE VISIT (OUTPATIENT)
Dept: MEDICAL GROUP | Facility: PHYSICIAN GROUP | Age: 59
End: 2019-04-25
Payer: COMMERCIAL

## 2019-04-25 VITALS
WEIGHT: 204 LBS | HEIGHT: 63 IN | DIASTOLIC BLOOD PRESSURE: 82 MMHG | HEART RATE: 97 BPM | BODY MASS INDEX: 36.14 KG/M2 | OXYGEN SATURATION: 72 % | TEMPERATURE: 97.2 F | SYSTOLIC BLOOD PRESSURE: 124 MMHG

## 2019-04-25 DIAGNOSIS — M25.60 JOINT STIFFNESS: ICD-10-CM

## 2019-04-25 DIAGNOSIS — E78.5 HYPERLIPIDEMIA, UNSPECIFIED HYPERLIPIDEMIA TYPE: ICD-10-CM

## 2019-04-25 DIAGNOSIS — M79.89 BILATERAL HAND SWELLING: ICD-10-CM

## 2019-04-25 DIAGNOSIS — R53.83 FATIGUE, UNSPECIFIED TYPE: ICD-10-CM

## 2019-04-25 DIAGNOSIS — M65.332 TRIGGER FINGER, LEFT MIDDLE FINGER: ICD-10-CM

## 2019-04-25 LAB
25(OH)D3 SERPL-MCNC: 16 NG/ML (ref 30–100)
BASOPHILS # BLD AUTO: 0.6 % (ref 0–1.8)
BASOPHILS # BLD: 0.05 K/UL (ref 0–0.12)
CHOLEST SERPL-MCNC: 290 MG/DL (ref 100–199)
CRP SERPL HS-MCNC: 0.61 MG/DL (ref 0–0.75)
EOSINOPHIL # BLD AUTO: 0.39 K/UL (ref 0–0.51)
EOSINOPHIL NFR BLD: 4.4 % (ref 0–6.9)
ERYTHROCYTE [DISTWIDTH] IN BLOOD BY AUTOMATED COUNT: 44 FL (ref 35.9–50)
ERYTHROCYTE [SEDIMENTATION RATE] IN BLOOD BY WESTERGREN METHOD: 11 MM/HOUR (ref 0–30)
HCT VFR BLD AUTO: 51.5 % (ref 37–47)
HDLC SERPL-MCNC: 60 MG/DL
HGB BLD-MCNC: 16.5 G/DL (ref 12–16)
IMM GRANULOCYTES # BLD AUTO: 0.04 K/UL (ref 0–0.11)
IMM GRANULOCYTES NFR BLD AUTO: 0.4 % (ref 0–0.9)
LDLC SERPL CALC-MCNC: 184 MG/DL
LYMPHOCYTES # BLD AUTO: 3.57 K/UL (ref 1–4.8)
LYMPHOCYTES NFR BLD: 40.2 % (ref 22–41)
MCH RBC QN AUTO: 29.3 PG (ref 27–33)
MCHC RBC AUTO-ENTMCNC: 32 G/DL (ref 33.6–35)
MCV RBC AUTO: 91.5 FL (ref 81.4–97.8)
MONOCYTES # BLD AUTO: 0.57 K/UL (ref 0–0.85)
MONOCYTES NFR BLD AUTO: 6.4 % (ref 0–13.4)
NEUTROPHILS # BLD AUTO: 4.27 K/UL (ref 2–7.15)
NEUTROPHILS NFR BLD: 48 % (ref 44–72)
NRBC # BLD AUTO: 0 K/UL
NRBC BLD-RTO: 0 /100 WBC
PLATELET # BLD AUTO: 356 K/UL (ref 164–446)
PMV BLD AUTO: 10.2 FL (ref 9–12.9)
RBC # BLD AUTO: 5.63 M/UL (ref 4.2–5.4)
RHEUMATOID FACT SER IA-ACNC: <10 IU/ML (ref 0–14)
TRIGL SERPL-MCNC: 228 MG/DL (ref 0–149)
TSH SERPL DL<=0.005 MIU/L-ACNC: 2.78 UIU/ML (ref 0.38–5.33)
WBC # BLD AUTO: 8.9 K/UL (ref 4.8–10.8)

## 2019-04-25 PROCEDURE — 84443 ASSAY THYROID STIM HORMONE: CPT

## 2019-04-25 PROCEDURE — 82306 VITAMIN D 25 HYDROXY: CPT

## 2019-04-25 PROCEDURE — 36415 COLL VENOUS BLD VENIPUNCTURE: CPT

## 2019-04-25 PROCEDURE — 77077 JOINT SURVEY SINGLE VIEW: CPT | Mod: TC | Performed by: PHYSICIAN ASSISTANT

## 2019-04-25 PROCEDURE — 80061 LIPID PANEL: CPT

## 2019-04-25 PROCEDURE — 86200 CCP ANTIBODY: CPT

## 2019-04-25 PROCEDURE — 85652 RBC SED RATE AUTOMATED: CPT

## 2019-04-25 PROCEDURE — 99214 OFFICE O/P EST MOD 30 MIN: CPT | Performed by: PHYSICIAN ASSISTANT

## 2019-04-25 PROCEDURE — 86431 RHEUMATOID FACTOR QUANT: CPT

## 2019-04-25 PROCEDURE — 86140 C-REACTIVE PROTEIN: CPT

## 2019-04-25 PROCEDURE — 86038 ANTINUCLEAR ANTIBODIES: CPT

## 2019-04-25 PROCEDURE — 85025 COMPLETE CBC W/AUTO DIFF WBC: CPT

## 2019-04-25 ASSESSMENT — PATIENT HEALTH QUESTIONNAIRE - PHQ9: CLINICAL INTERPRETATION OF PHQ2 SCORE: 0

## 2019-04-25 NOTE — PROGRESS NOTES
"Subjective:   Harmony Monroe is a 59 y.o. female here today for joint pain. Is an established patient of mine.    HPI:    Harmony presents today with concern regarding ongoing hand pain that she has been noticing over the last year.  Specifically, has noticed the pain in her left third digit.  It started off just in her finger but lately has been spreading down the rest of her hand.  It has started \"locking\" periodically which also has been worsening, to the point where she physically has to pull her finger back from a bent position.  In addition to this, both of her hands have felt  generally stiff and periodically swollen over the last year.  Has had similar feelings of stiffness in her hips, knees, and feet, although denies significant pain in these areas.  Has been taking over-the-counter Advil, Tylenol, and Aleve which she does not feel have helped much at all.  She has felt \"extreme fatigue\" over the last year and a half.  Her primary concern is regarding rheumatoid arthritis.  She does have a family history of this and her aunt who developed it at about the same age Harmony is now.     Harmony is also requesting to do repeat lipid test. She states that she stopped taking her simvastatin last August and is wanting to see what her cholesterol levels are.      Current medicines (including changes today)  Current Outpatient Prescriptions   Medication Sig Dispense Refill   • verapamil ER (CALAN SR) 120 MG CAPSULE SR 24 HR Take 1 Cap by mouth every day. 90 Cap 0   • escitalopram (LEXAPRO) 10 MG Tab Take 1 Tab by mouth every day. 90 Tab 1   • hydroCHLOROthiazide (HYDRODIURIL) 25 MG Tab TAKE 1 TABLET BY MOUTH EVERY DAY 90 Tab 1   • albuterol 108 (90 Base) MCG/ACT Aero Soln inhalation aerosol Inhale 2 Puffs by mouth every 6 hours as needed for Shortness of Breath. 8.5 g 2   • ibuprofen (MOTRIN) 800 MG Tab Take 400 mg by mouth every 8 hours as needed.     • Multiple Vitamins-Minerals (MULTIVITAMIN ADULT PO) Take " "1 Tab by mouth every day.     • estradiol (ESTRACE) 1 MG TABS Take 1 mg by mouth every day. From gyn     • albuterol 108 (90 Base) MCG/ACT Aero Soln inhalation aerosol Inhale 2 Puffs by mouth every 6 hours as needed for Shortness of Breath. 8.5 g 2   • ondansetron (ZOFRAN) 4 MG Tab tablet Take 1 Tab by mouth every 6 hours as needed for Nausea/Vomiting. 20 Tab 1   • albuterol 108 (90 Base) MCG/ACT Aero Soln inhalation aerosol Inhale 2 Puffs by mouth every 6 hours as needed for Shortness of Breath. 8.5 g 2   • naproxen (ALEVE) 220 MG tablet Take 220 mg by mouth 2 times a day, with meals.     • diphenhydrAMINE (BENADRYL) 25 MG Tab Take 25 mg by mouth at bedtime as needed (ALLERGIES).     • simvastatin (ZOCOR) 40 MG Tab Take 1 Tab by mouth every bedtime. Please remind pt to get labs done (Patient not taking: Reported on 4/25/2019) 90 Tab 0     No current facility-administered medications for this visit.      She  has a past medical history of Allergy; Arthritis; ASTHMA; Basal cell carcinoma of left medial cheek (11/30/2015); Depression (1/7/2010); High cholesterol; History of tobacco use disorder (1/7/2010); Hypertension; Pneumonia (1990,2001); and Post-menopausal (8/26/2014).    ROS  No hair thinning/loss, brittle nails  +constipation  No palpitations  No cold intolerance       Objective:     /82 (BP Location: Left arm, Patient Position: Sitting, BP Cuff Size: Large adult)   Pulse 97   Temp 36.2 °C (97.2 °F)   Ht 1.6 m (5' 3\")   Wt 92.5 kg (204 lb)   SpO2 (!) 72%  Body mass index is 36.14 kg/m².     Physical Exam:  Constitutional: Alert, well-appearing, no distress.  Skin: Warm, dry, good turgor, no rashes in visible areas.  Eye: Pupils are equal and round, conjunctiva clear, lids normal.  ENMT: Lips without lesions, moist mucus membranes.  Musculoskeletal: Focused exam performed to the bilateral hands.  No visible deformity or edema noted.  No current tenderness over the left third digit or elsewhere in the " bilateral hands.  There is palpable catching/popping sensation noted at the distal palm below the third digit with active extension.      Assessment and Plan:   The following treatment plan was discussed    1. Trigger finger, left middle finger  New problem, uncontrolled.  Patient's left third digit symptoms are secondary to trigger finger.  We discussed treatment options of this.  Will refer to hand surgery for consideration of steroid injection.  We discussed that surgery is sometimes necessary.  In the meantime, recommend that she purchase an over-the-counter trigger finger splint which may help alleviate some of the discomfort. Also recommend trial of diclofenac gel which she states she already has at home but hasn't tried yet.  - REFERRAL TO HAND SURGERY    2. Joint stiffness  3. Bilateral hand swelling  New problems, uncontrolled.  Having ongoing generalized joint stiffness, most markedly in the hands which also have been swelling and feeling painful.  There is concern for rheumatoid arthritis.  I am recommending further evaluation including hand x-rays and lab work. Recommend trial of diclofenac gel.  - RHEUMATOID ARTHRITIS FACTOR; Future  - CCP ANTIBODY; Future  - DX-JOINT SURVEY-HANDS SINGLE VIEW; Future  - CRP QUANTITIVE (NON-CARDIAC); Future  - WESTERGREN SED RATE; Future  - RONDA REFLEXIVE PROFILE; Future    4. Fatigue, unspecified type  New problem, uncontrolled.  We discussed the multiple possible etiologies for fatigue.  I am recommending screening lab work as per below.  - TSH WITH REFLEX TO FT4; Future  - CBC WITH DIFFERENTIAL; Future  - VITAMIN D,25 HYDROXY; Future  - RONDA REFLEXIVE PROFILE; Future    5. Hyperlipidemia, unspecified hyperlipidemia type  Chronic issue, unclear level of control. Given that she has been off her statin for >6 months, OK to do repeat lipid profile per her request to determine ongoing need for medication.   - Lipid Profile      Followup: Return for f/u pending lab/imaging  results.    Ayleen Millard P.A.-C.

## 2019-04-27 LAB
CCP IGG SERPL-ACNC: 5 UNITS (ref 0–19)
NUCLEAR IGG SER QL IA: NORMAL

## 2019-04-29 DIAGNOSIS — E55.9 VITAMIN D DEFICIENCY: ICD-10-CM

## 2019-04-29 RX ORDER — ERGOCALCIFEROL 1.25 MG/1
50000 CAPSULE ORAL
Qty: 12 CAP | Refills: 0 | Status: SHIPPED | OUTPATIENT
Start: 2019-04-29 | End: 2020-04-05

## 2019-04-30 ENCOUNTER — OFFICE VISIT (OUTPATIENT)
Dept: MEDICAL GROUP | Facility: PHYSICIAN GROUP | Age: 59
End: 2019-04-30
Payer: COMMERCIAL

## 2019-04-30 VITALS
HEIGHT: 63 IN | SYSTOLIC BLOOD PRESSURE: 122 MMHG | OXYGEN SATURATION: 96 % | WEIGHT: 204 LBS | TEMPERATURE: 98.3 F | HEART RATE: 80 BPM | DIASTOLIC BLOOD PRESSURE: 78 MMHG | BODY MASS INDEX: 36.14 KG/M2

## 2019-04-30 DIAGNOSIS — Z79.890 POSTMENOPAUSAL HRT (HORMONE REPLACEMENT THERAPY): ICD-10-CM

## 2019-04-30 DIAGNOSIS — R06.83 SNORING: ICD-10-CM

## 2019-04-30 DIAGNOSIS — J45.20 MILD INTERMITTENT ASTHMA IN ADULT WITHOUT COMPLICATION: ICD-10-CM

## 2019-04-30 DIAGNOSIS — M25.60 JOINT STIFFNESS: ICD-10-CM

## 2019-04-30 DIAGNOSIS — E78.5 HYPERLIPIDEMIA, UNSPECIFIED HYPERLIPIDEMIA TYPE: ICD-10-CM

## 2019-04-30 DIAGNOSIS — E66.9 OBESITY (BMI 30.0-34.9): ICD-10-CM

## 2019-04-30 DIAGNOSIS — E55.9 VITAMIN D DEFICIENCY: ICD-10-CM

## 2019-04-30 DIAGNOSIS — Z00.00 ANNUAL PHYSICAL EXAM: ICD-10-CM

## 2019-04-30 DIAGNOSIS — F51.01 PRIMARY INSOMNIA: ICD-10-CM

## 2019-04-30 DIAGNOSIS — M65.332 TRIGGER FINGER, LEFT MIDDLE FINGER: ICD-10-CM

## 2019-04-30 DIAGNOSIS — I10 ESSENTIAL HYPERTENSION: ICD-10-CM

## 2019-04-30 DIAGNOSIS — D58.2 ELEVATED HEMOGLOBIN (HCC): ICD-10-CM

## 2019-04-30 DIAGNOSIS — R53.83 FATIGUE, UNSPECIFIED TYPE: ICD-10-CM

## 2019-04-30 PROCEDURE — 99396 PREV VISIT EST AGE 40-64: CPT | Performed by: PHYSICIAN ASSISTANT

## 2019-04-30 RX ORDER — ALBUTEROL SULFATE 90 UG/1
2 AEROSOL, METERED RESPIRATORY (INHALATION) EVERY 6 HOURS PRN
Qty: 8.5 G | Refills: 5 | Status: SHIPPED | OUTPATIENT
Start: 2019-04-30 | End: 2020-03-30 | Stop reason: SDUPTHER

## 2019-04-30 RX ORDER — SIMVASTATIN 40 MG
40 TABLET ORAL EVERY EVENING
Qty: 90 TAB | Refills: 3 | Status: SHIPPED | OUTPATIENT
Start: 2019-04-30 | End: 2020-04-29

## 2019-04-30 NOTE — ASSESSMENT & PLAN NOTE
Treated with PRN albuterol. Typically does not have trouble breathing on day-to-day basis. Usually only needs to use albuterol when her allergies flare up or when she gets sick with URI. Is requesting inhaler refills today.

## 2019-04-30 NOTE — ASSESSMENT & PLAN NOTE
Current BMI 36.14. Realizes she needs to get the weight off and is having trouble doing this on her own. She is willing to do any sort of weight management program that may help. Also actively looking into cooking classes, meal subscription services in an effort to improve her diet.

## 2019-04-30 NOTE — ASSESSMENT & PLAN NOTE
Recently diagnosed, ongoing over last year. Actively engaged in conservative treatment and has been referred to orthopedics.

## 2019-04-30 NOTE — ASSESSMENT & PLAN NOTE
Discussed at recent office visit. Endorses generalized stiffness, especially in hands, hips, knees, and feet. She expressed concern for RA and underwent screening lab panel for this including RF, CCP, RONDA, inflammatory markers--all of which came back negative/normal.

## 2019-04-30 NOTE — ASSESSMENT & PLAN NOTE
Endorsed severe fatigue over the last 1.5 years at recent office visit. Further lab work-up was obtained which was significant for vitamin D deficiency and elevated H&H--see above and below.

## 2019-04-30 NOTE — ASSESSMENT & PLAN NOTE
Stopped taking simvastatin in August of last year. Recent lipid profile came back elevated. She is needing refills of medication so she can start taking again.

## 2019-04-30 NOTE — PROGRESS NOTES
Chief Complaint: Annual    Harmony Monroe is a 59 y.o. female who presents for annual exam, discuss recent lab results. Is an established patient of mine.    Pt has GYN provider: yes  Last colonoscopy: 8/12/11  Last Td: 10/3/12  Regular exercise: no     Current Problems:  Vitamin D deficiency  Recently diagnosed on lab screening. Has started weekly high-dose vitamin D supplementation.    Trigger finger, left middle finger  Recently diagnosed, ongoing over last year. Actively engaged in conservative treatment and has been referred to orthopedics.    Primary insomnia  Has chronic insomnia for many years treated with temazepam.    Postmenopausal HRT (hormone replacement therapy)  Treated with estradiol 1 mg daily. This is managed by her gynecologist.    Joint stiffness  Discussed at recent office visit. Endorses generalized stiffness, especially in hands, hips, knees, and feet. She expressed concern for RA and underwent screening lab panel for this including RF, CCP, RONDA, inflammatory markers--all of which came back negative/normal.    Hyperlipidemia  Stopped taking simvastatin in August of last year. Recent lipid profile came back elevated. She is needing refills of medication so she can start taking again.    Obesity (BMI 30.0-34.9)  Current BMI 36.14. Realizes she needs to get the weight off and is having trouble doing this on her own. She is willing to do any sort of weight management program that may help. Also actively looking into cooking classes, meal subscription services in an effort to improve her diet.    Fatigue  Endorsed severe fatigue over the last 1.5 years at recent office visit. Further lab work-up was obtained which was significant for vitamin D deficiency and elevated H&H--see above and below.    Essential hypertension  Treated with verapamil  mg daily and HCTZ 25 mg daily. BP today in the office is 122/78.    Asthma in adult  Treated with PRN albuterol. Typically does not have trouble  breathing on day-to-day basis. Usually only needs to use albuterol when her allergies flare up or when she gets sick with URI. Is requesting inhaler refills today.    Elevated hemoglobin (HCC)  Recent lab work came back showing elevated hemoglobin and hematocrit. On further questioning, Harmony does admit to snoring and states that her sisters have commented that she stops breathing during sleep. She has never previously been evaluated for sleep apnea.      She  has a past medical history of Allergy; Arthritis; ASTHMA; Basal cell carcinoma of left medial cheek (11/30/2015); Basal cell carcinoma of left medial cheek (11/30/2015); Depression (1/7/2010); High cholesterol; History of tobacco use disorder (1/7/2010); Hypertension; Pneumonia (1990,2001); and Post-menopausal (8/26/2014).  She  has a past surgical history that includes cyst excision (1974); abdominal hysterectomy total (11/2005); tonsillectomy and adenoidectomy (1980); knee arthroscopy (Right, 3/23/2017); medial meniscectomy (3/23/2017); chondroplasty (3/23/2017); nasal septal reconstruction (1980); knee arthroscopy (Left, 2/5/2018); and medial meniscectomy (Left, 2/5/2018).  Current Outpatient Prescriptions   Medication Sig Dispense Refill   • albuterol 108 (90 Base) MCG/ACT Aero Soln inhalation aerosol Inhale 2 Puffs by mouth every 6 hours as needed for Shortness of Breath. 8.5 g 5   • simvastatin (ZOCOR) 40 MG Tab Take 1 Tab by mouth every evening. 90 Tab 3   • vitamin D, Ergocalciferol, (DRISDOL) 25844 units Cap capsule Take 1 Cap by mouth every 7 days. 12 Cap 0   • verapamil ER (CALAN SR) 120 MG CAPSULE SR 24 HR Take 1 Cap by mouth every day. 90 Cap 0   • ondansetron (ZOFRAN) 4 MG Tab tablet Take 1 Tab by mouth every 6 hours as needed for Nausea/Vomiting. 20 Tab 1   • escitalopram (LEXAPRO) 10 MG Tab Take 1 Tab by mouth every day. 90 Tab 1   • hydroCHLOROthiazide (HYDRODIURIL) 25 MG Tab TAKE 1 TABLET BY MOUTH EVERY DAY 90 Tab 1   • naproxen (ALEVE) 220  "MG tablet Take 220 mg by mouth 2 times a day, with meals.     • ibuprofen (MOTRIN) 800 MG Tab Take 400 mg by mouth every 8 hours as needed.     • Multiple Vitamins-Minerals (MULTIVITAMIN ADULT PO) Take 1 Tab by mouth every day.     • estradiol (ESTRACE) 1 MG TABS Take 1 mg by mouth every day. From gyn     • diphenhydrAMINE (BENADRYL) 25 MG Tab Take 25 mg by mouth at bedtime as needed (ALLERGIES).       No current facility-administered medications for this visit.      Social History   Substance Use Topics   • Smoking status: Former Smoker     Packs/day: 0.50     Years: 19.00     Types: Cigarettes     Start date: 9/1/1997     Quit date: 1/1/2016   • Smokeless tobacco: Never Used      Comment: encouraged to not restart   • Alcohol use No       Review Of Systems  General: +weight gain, fatigue  Skin: negative for rash, changing moles, abnormal pigmentation, hair or nail changes.  Eyes: negative for visual blurring, double vision, eye pain, floaters and discharge from eyes  Ears/Nose/Throat: +ear \"crackling\", intermittent rhinitis  Respiratory: negative for dyspnea  Gastrointestinal: negative for abdominal pain,   Musculoskeletal: +hand swelling/pain, joint stiffness  Psychiatric: +chronic insomnia.   Endocrine: +hot flashes - well-controlled with HRT      PHYSICAL EXAMINATION:  /78 (BP Location: Left arm, Patient Position: Sitting, BP Cuff Size: Adult)   Pulse 80   Temp 36.8 °C (98.3 °F)   Ht 1.6 m (5' 3\")   Wt 92.5 kg (204 lb)   SpO2 96%   Body mass index is 36.14 kg/m².  Wt Readings from Last 4 Encounters:   04/30/19 92.5 kg (204 lb)   04/25/19 92.5 kg (204 lb)   01/28/19 81.6 kg (180 lb)   11/10/18 81.6 kg (180 lb)       Constitutional: Alert, obese but otherwise well-appearing, pleasant, no distress.  Skin: Warm, dry, good turgor, no rashes or suspicious lesions in visible areas.  Eye: pupils equal, round and reactive to light, conjunctivae clear, lids normal.  ENMT: Lips without lesions, good dentition, " oropharynx clear. Pinnae skin normal with no lesions. TM pearly gray with normal light reflex. Minimal cerumen.  Neck: supple, no masses. No submandibular, anterior cervical adenopathy. No thyromegaly.  Respiratory: Unlabored respiratory effort, lungs clear to auscultation, no wheezes, no ronchi.  Cardiovascular: Normal S1, S2, no murmur.  Abdomen: Abdomen Soft, non-tender, no masses, no hepatosplenomegaly.   Psych: Alert and oriented x3, normal affect and mood.  Musc/Skel: Normal motion UE and LE proximal and distal.        ASSESSMENT/PLAN:    1. Annual physical exam  Normal exam today with exception of bolded areas above  HCM reviewed  Recent lab results discussed  Immunizations per orders  Pt counseled about skin care, diet, supplements, and exercise.    2. Vitamin D deficiency  New problem, uncontrolled. Recent vitamin D level is 16. Patient counseled to begin high-dose weekly vitamin D supplementation as prescribed and have vitamin D level rechecked after finishing in about 12 weeks.    3. Trigger finger, left middle finger  Established problem, uncontrolled.  This was discussed at recent office visit.  She will continue conservative management to include diclofenac gel, splint, activity modification.  I have already referred her to orthopedics for further management.    4. Primary insomnia  Chronic issue, well controlled with nightly temazepam.  There is concern for potential sleep apnea, however, so this will need to be evaluated, see below.    5. Postmenopausal HRT (hormone replacement therapy)  Chronic issue, doing well on estradiol which is prescribed by her gynecologist.  Continue current management.    6. Joint stiffness  Established problem, uncontrolled.  This was discussed at recent office visit.  No concern for rheumatologic disease based on recent lab results.  Specifically, rheumatoid factor, CCP, RONDA, and ESR/CRP were all unremarkable.  Vitamin D deficiency may be contributing.  She will be  starting supplementation for this.    7. Hyperlipidemia, unspecified hyperlipidemia type  Chronic issue, uncontrolled since stopping simvastatin last summer.  I have recommended that she restart this which she is agreeable with.  I have refilled this to her pharmacy.  She is also going to work on lifestyle improvement including dietary improvement, increased exercise, and weight loss.  - simvastatin (ZOCOR) 40 MG Tab; Take 1 Tab by mouth every evening.  Dispense: 90 Tab; Refill: 3    8. Obesity (BMI 30.0-34.9)  Chronic issue, uncontrolled and worsening.  She has gained weight since the beginning of the year.  She is fully aware of her weight issue and expresses strong desire to lose weight.  We discussed the [facility] weight management program which she is very interested in so I have placed referral for her.  She plans to follow closely with me for monitoring of her weight as well.  - REFERRAL TO [facility]PROV[facility]GR[facility] (HIP) Services Requested: Physician Medical Weight Management Program, Registered Dietitian for Medical Nutrition Therapy; Reason for Referral? BMI>30; Reason for Visit: Overweight/Obesity  - Patient identified as having weight management issue.  Appropriate orders and counseling given.  - REFERRAL TO SLEEP STUDIES    9. Snoring  New problem, uncontrolled.  She does admit to snoring and witnessed apnea on further questioning today.  This, combined with her severe daytime fatigue and elevated hemoglobin and hematocrit lab results is concerning for sleep apnea.  I have recommended that she be further evaluated for this by sleep medicine.  I have placed referral.  - REFERRAL TO SLEEP STUDIES    10. Fatigue, unspecified type  Established problem, uncontrolled.  Although vitamin D deficiency may be contributing, bigger concern is for sleep apnea as described above.  - REFERRAL TO SLEEP STUDIES    11. Essential hypertension  Chronic issue, well controlled with verapamil and hydrochlorothiazide.   Blood pressure is at goal.  Continue current management.    12. Mild intermittent asthma in adult without complication  Chronic issue, well controlled with as needed albuterol.  She is taking this pretty sparingly.  She does not have consistent symptoms to warrant maintenance inhaler therapy at this time.  We will continue to monitor.  - albuterol 108 (90 Base) MCG/ACT Aero Soln inhalation aerosol; Inhale 2 Puffs by mouth every 6 hours as needed for Shortness of Breath.  Dispense: 8.5 g; Refill: 5    13. Elevated hemoglobin (HCC)  New problem noted on recent lab work, uncontrolled.  Discussed with patient that this can be seen with untreated sleep apnea.  Given her other symptoms as described above, would like her to be evaluated further by sleep medicine.  - REFERRAL TO SLEEP STUDIES      Return in about 6 months (around 10/30/2019).    Ayleen Millard P.A.-C.

## 2019-04-30 NOTE — ASSESSMENT & PLAN NOTE
Recent lab work came back showing elevated hemoglobin and hematocrit. On further questioning, Harmony does admit to snoring and states that her sisters have commented that she stops breathing during sleep. She has never previously been evaluated for sleep apnea.

## 2019-06-07 DIAGNOSIS — I10 ESSENTIAL HYPERTENSION: ICD-10-CM

## 2019-06-07 NOTE — TELEPHONE ENCOUNTER
Was the patient seen in the last year in this department? Yes    Does patient have an active prescription for medications requested? No     Received Request Via: Pharmacy      Pt met protocol?: Yes last ov 4/19  BP Readings from Last 1 Encounters:   04/30/19 122/78   Patient has Insurance handled by [employer] please consider sending a 100 day supply if appropriate.

## 2019-06-10 RX ORDER — VERAPAMIL HYDROCHLORIDE 120 MG/1
CAPSULE, EXTENDED RELEASE ORAL
Qty: 90 CAP | Refills: 1 | Status: SHIPPED | OUTPATIENT
Start: 2019-06-10 | End: 2020-01-07

## 2019-06-12 DIAGNOSIS — F51.04 CHRONIC INSOMNIA: ICD-10-CM

## 2019-06-17 RX ORDER — TEMAZEPAM 15 MG/1
CAPSULE ORAL
Qty: 60 CAP | Refills: 5 | Status: SHIPPED | OUTPATIENT
Start: 2019-06-17 | End: 2019-07-17

## 2019-06-18 ENCOUNTER — TELEPHONE (OUTPATIENT)
Dept: MEDICAL GROUP | Facility: PHYSICIAN GROUP | Age: 59
End: 2019-06-18

## 2019-06-18 NOTE — TELEPHONE ENCOUNTER
MEDICATION PRIOR AUTHORIZATION NEEDED:    1. Name of Medication: TEMAZEPAM 15MG    2. Requested By (Name of Pharmacy): [facility]     3. Is insurance on file current? YES    4. What is the name & phone number of the 3rd party payor? [employer]        PA input over phone. [employer] requesting last OV prog note to complete prior auth and will fax response when done.

## 2019-06-19 NOTE — TELEPHONE ENCOUNTER
FINAL PRIOR AUTHORIZATION STATUS:    1.  Name of Medication & Dose: TEMAZEPAM     2. Prior Auth Status: Approved through [employer]     3. Action Taken: Pharmacy Notified: yes Patient Notified: yes

## 2019-07-12 DIAGNOSIS — I10 ESSENTIAL HYPERTENSION: ICD-10-CM

## 2019-07-12 NOTE — TELEPHONE ENCOUNTER
*PT NEEDS TO GET LABS UDPATED*  Was the patient seen in the last year in this department? Yes    Does patient have an active prescription for medications requested? No     Received Request Via: Pharmacy      Pt met protocol?: NO    LAST OV 04/30/2018    BP Readings from Last 1 Encounters:   04/30/19 122/78     Lab Results   Component Value Date/Time    CHOLSTRLTOT 290 (H) 04/25/2019 12:15 PM     (H) 04/25/2019 12:15 PM    HDL 60 04/25/2019 12:15 PM    TRIGLYCERIDE 228 (H) 04/25/2019 12:15 PM       Lab Results   Component Value Date/Time    SODIUM 135 05/11/2018 10:43 AM    POTASSIUM 3.5 (L) 05/11/2018 10:43 AM    CHLORIDE 105 05/11/2018 10:43 AM    CO2 24 05/11/2018 10:43 AM    GLUCOSE 95 09/27/2018 02:33 PM    BUN 17 05/11/2018 10:43 AM    CREATININE 0.71 05/11/2018 10:43 AM    CREATININE 0.8 10/24/2005 12:00 PM     Lab Results   Component Value Date/Time    ALKPHOSPHAT 64 05/11/2018 10:43 AM    ASTSGOT 25 05/11/2018 10:43 AM    ALTSGPT 26 05/11/2018 10:43 AM    TBILIRUBIN 0.5 05/11/2018 10:43 AM

## 2019-07-14 RX ORDER — HYDROCHLOROTHIAZIDE 25 MG/1
TABLET ORAL
Qty: 90 TAB | Refills: 1 | Status: SHIPPED | OUTPATIENT
Start: 2019-07-14 | End: 2020-01-07

## 2019-07-15 NOTE — TELEPHONE ENCOUNTER
Refill X 6 months, sent to pharmacy.Pt. Seen in the last 6 months per protocol.   Lab Results   Component Value Date/Time    SODIUM 135 05/11/2018 10:43 AM    POTASSIUM 3.5 (L) 05/11/2018 10:43 AM    CHLORIDE 105 05/11/2018 10:43 AM    CO2 24 05/11/2018 10:43 AM    GLUCOSE 95 09/27/2018 02:33 PM    BUN 17 05/11/2018 10:43 AM    CREATININE 0.71 05/11/2018 10:43 AM    CREATININE 0.8 10/24/2005 12:00 PM       BP Readings from Last 1 Encounters:   04/30/19 122/78

## 2019-07-22 ENCOUNTER — HOSPITAL ENCOUNTER (OUTPATIENT)
Dept: LAB | Facility: MEDICAL CENTER | Age: 59
End: 2019-07-22
Attending: PHYSICIAN ASSISTANT
Payer: COMMERCIAL

## 2019-07-22 DIAGNOSIS — E55.9 VITAMIN D DEFICIENCY: ICD-10-CM

## 2019-07-22 LAB — 25(OH)D3 SERPL-MCNC: 68 NG/ML (ref 30–100)

## 2019-07-22 PROCEDURE — 82306 VITAMIN D 25 HYDROXY: CPT

## 2019-07-22 PROCEDURE — 36415 COLL VENOUS BLD VENIPUNCTURE: CPT

## 2019-07-26 NOTE — TELEPHONE ENCOUNTER
Was the patient seen in the last year in this department? Yes    Does patient have an active prescription for medications requested? No     Received Request Via: Pharmacy      Pt met protocol?: Yes    LAST OV 04/30/2019

## 2019-07-29 RX ORDER — ESCITALOPRAM OXALATE 10 MG/1
TABLET ORAL
Qty: 90 TAB | Refills: 0 | Status: SHIPPED | OUTPATIENT
Start: 2019-07-29 | End: 2019-10-25 | Stop reason: SDUPTHER

## 2019-08-26 ENCOUNTER — TELEPHONE (OUTPATIENT)
Dept: MEDICAL GROUP | Facility: PHYSICIAN GROUP | Age: 59
End: 2019-08-26

## 2019-08-26 NOTE — TELEPHONE ENCOUNTER
----- Message from Harmony Monroe sent at 8/25/2019  1:14 PM PDT -----  Regarding: Prescription Question  Contact: [phone]  Good Morning,  I would like to ask my Provider to authorize an early refill on my sleeping medication, Temazepam.  I would like to be able to  my refill from [facility] on [address] on September 11th which is about four days early.  I am leaving for vacation on September 13th and would really like to have my refill for the trip.    I have not had to do this before, so please advise me on how this is handled.   Do I call in the refill to the pharmacy as early as September 9th to ensure the authorization has time to go through by September 11th?  Thank you very much for your help.  Harmony Monroe

## 2019-08-27 NOTE — TELEPHONE ENCOUNTER
OK for her to refill a few days early. Please call her pharmacy to notify them of requested fill date and let Harmony know.  Ayleen Millard P.A.-C.

## 2019-09-06 ENCOUNTER — OFFICE VISIT (OUTPATIENT)
Dept: URGENT CARE | Facility: PHYSICIAN GROUP | Age: 59
End: 2019-09-06
Payer: COMMERCIAL

## 2019-09-06 VITALS
DIASTOLIC BLOOD PRESSURE: 62 MMHG | SYSTOLIC BLOOD PRESSURE: 98 MMHG | HEART RATE: 69 BPM | RESPIRATION RATE: 16 BRPM | TEMPERATURE: 97.3 F | OXYGEN SATURATION: 98 % | WEIGHT: 194 LBS | HEIGHT: 63 IN | BODY MASS INDEX: 34.38 KG/M2

## 2019-09-06 DIAGNOSIS — R11.0 NAUSEA: ICD-10-CM

## 2019-09-06 LAB
APPEARANCE UR: NORMAL
BILIRUB UR STRIP-MCNC: NORMAL MG/DL
COLOR UR AUTO: NORMAL
GLUCOSE UR STRIP.AUTO-MCNC: NORMAL MG/DL
KETONES UR STRIP.AUTO-MCNC: NORMAL MG/DL
LEUKOCYTE ESTERASE UR QL STRIP.AUTO: NORMAL
NITRITE UR QL STRIP.AUTO: NORMAL
PH UR STRIP.AUTO: 5.5 [PH] (ref 5–8)
PROT UR QL STRIP: NORMAL MG/DL
RBC UR QL AUTO: NORMAL
SP GR UR STRIP.AUTO: 1.03
UROBILINOGEN UR STRIP-MCNC: 0.2 MG/DL

## 2019-09-06 PROCEDURE — 99214 OFFICE O/P EST MOD 30 MIN: CPT | Performed by: PHYSICIAN ASSISTANT

## 2019-09-06 PROCEDURE — 81002 URINALYSIS NONAUTO W/O SCOPE: CPT | Performed by: PHYSICIAN ASSISTANT

## 2019-09-06 RX ORDER — ONDANSETRON 4 MG/1
4 TABLET, ORALLY DISINTEGRATING ORAL ONCE
Status: COMPLETED | OUTPATIENT
Start: 2019-09-06 | End: 2019-09-06

## 2019-09-06 RX ORDER — ONDANSETRON 4 MG/1
4 TABLET, FILM COATED ORAL EVERY 6 HOURS PRN
Qty: 20 TAB | Refills: 2 | Status: SHIPPED | OUTPATIENT
Start: 2019-09-06 | End: 2020-03-10

## 2019-09-06 RX ADMIN — ONDANSETRON 4 MG: 4 TABLET, ORALLY DISINTEGRATING ORAL at 11:32

## 2019-09-06 ASSESSMENT — ENCOUNTER SYMPTOMS
HEADACHES: 1
CONSTIPATION: 0
NAUSEA: 1
ABDOMINAL PAIN: 0
SORE THROAT: 0
FEVER: 0
TINGLING: 0
BRUISES/BLEEDS EASILY: 0
FOCAL WEAKNESS: 0
COUGH: 0
DIZZINESS: 0
FLANK PAIN: 0
SENSORY CHANGE: 0
BLOOD IN STOOL: 0
VOMITING: 0
SHORTNESS OF BREATH: 0
BACK PAIN: 1
DIARRHEA: 0
PALPITATIONS: 0
CHILLS: 0

## 2019-09-06 NOTE — PROGRESS NOTES
"Subjective:   Harmony Monroe is a 59 y.o. female who presents for Nausea (bilateral flank pain, slight headache, x2 days )        Nausea   This is a new problem. The current episode started yesterday. Associated symptoms include headaches and nausea. Pertinent negatives include no abdominal pain, chest pain, chills, congestion, coughing, fever, rash, sore throat or vomiting.     Patient is clinic complaining of last 24 hours of mild nausea and dyspepsia.  He noted single small amount of emesis this morning described as \"spitting up some clear water\".  She denies focal abdominal pain.  She denies fevers chills.  She denies urinary symptoms but complains of some bilateral low back pain.  She notes she has been working outside over the last few days and suspects dehydration and physical work may have contributed to low back discomfort.  She denies recent history with urinary tract infection states she has had one 20 years ago.  Denies dysuria frequency urgency or hematuria currently.  Denies radiation of back pain denies numbness tingling or weakness.  Denies incontinence    Denies chest pain or palpitations.  Patient leaves for vacation in 1 week and is concerned with becoming sick.  Her coworkers recently been diagnosed with viral meningitis and we discussed symptoms of such.  She complains of mild headache.  Denies fever, chills or coughing. Denies sore throat or ear pain. Notes some baseline PND.     Review of Systems   Constitutional: Positive for malaise/fatigue. Negative for chills and fever.   HENT: Negative for congestion and sore throat.    Respiratory: Negative for cough and shortness of breath.    Cardiovascular: Negative for chest pain, palpitations and leg swelling.   Gastrointestinal: Positive for nausea. Negative for abdominal pain, blood in stool, constipation, diarrhea, melena and vomiting.   Genitourinary: Negative for dysuria, flank pain, frequency, hematuria and urgency.   Musculoskeletal: " "Positive for back pain.   Skin: Negative for rash.   Neurological: Positive for headaches. Negative for dizziness, tingling, sensory change and focal weakness.   Endo/Heme/Allergies: Does not bruise/bleed easily.     Allergies   Allergen Reactions   • Bee Anaphylaxis   • Levaquin Anaphylaxis     Myalgias arthralgias    • Avelox [Moxifloxacin Hcl In Nacl] Swelling   • Phenergan  [Benzyl Alc-Promethazine]    • Promethazine      Twitching feeling   • Tape      Redness; itching \"paper tape ok\"      Objective:   BP (!) 98/62 (BP Location: Left arm, Patient Position: Sitting, BP Cuff Size: Adult)   Pulse 69   Temp 36.3 °C (97.3 °F) (Temporal)   Resp 16   Ht 1.6 m (5' 3\")   Wt 88 kg (194 lb)   SpO2 98%   BMI 34.37 kg/m²   Physical Exam   Constitutional: She is oriented to person, place, and time. She appears well-developed and well-nourished. No distress.   HENT:   Head: Normocephalic and atraumatic.   Right Ear: Tympanic membrane, external ear and ear canal normal.   Left Ear: Tympanic membrane, external ear and ear canal normal.   Nose: Nose normal.   Mouth/Throat: Uvula is midline and mucous membranes are normal. Posterior oropharyngeal erythema ( mild PND) present. No oropharyngeal exudate, posterior oropharyngeal edema or tonsillar abscesses.   Eyes: Conjunctivae and lids are normal. Right eye exhibits no discharge. Left eye exhibits no discharge. No scleral icterus.   Neck: Neck supple.   Cardiovascular: Normal rate, regular rhythm and intact distal pulses.  No extrasystoles are present.   Pulmonary/Chest: Effort normal. No respiratory distress. She has no decreased breath sounds. She has no wheezes. She has no rhonchi. She has no rales.   Abdominal: Soft. Normal appearance. Bowel sounds are increased. There is no tenderness. There is no rigidity, no rebound, no guarding, no CVA tenderness ( no CVATB), no tenderness at McBurney's point and negative Adkins's sign.   Musculoskeletal: Normal range of motion.      "   Lumbar back: Normal. She exhibits no tenderness, no bony tenderness, no edema, no deformity, no laceration and no pain.        Back:    Pt locates area of prior back pain, none on exam today   Lymphadenopathy:     She has no cervical adenopathy.   Neurological: She is alert and oriented to person, place, and time. She is not disoriented.   Skin: Skin is warm and dry. She is not diaphoretic. No erythema. No pallor.   Psychiatric: Her speech is normal and behavior is normal.   Nursing note and vitals reviewed.  POCT UA -  NEG / NORMAL      Assessment/Plan:   1. Nausea  - ondansetron (ZOFRAN ODT) dispertab 4 mg  - POCT Urinalysis  - ondansetron (ZOFRAN) 4 MG Tab tablet; Take 1 Tab by mouth every 6 hours as needed for Nausea/Vomiting.  Dispense: 20 Tab; Refill: 2  Supportive care is reviewed with patient/caregiver - recommend to push PO fluids and electrolytes, unclear etiology, no concerns on exam or history, will send w/ zofran now - trend s/sx  Return to clinic with lack of resolution or progression of symptoms.      Differential diagnosis, natural history, supportive care, and indications for immediate follow-up discussed.

## 2019-09-11 NOTE — TELEPHONE ENCOUNTER
1. Caller Name: Harmony                                         Call Back Number: [phone] (home)       Patient approves a detailed voicemail message: N\A    Pt called stating pharmacy told her she was unable to  script. Contacted pharmacy again to authorize early refill for 9/11/19.

## 2019-09-24 ENCOUNTER — HOSPITAL ENCOUNTER (OUTPATIENT)
Dept: LAB | Facility: MEDICAL CENTER | Age: 59
End: 2019-09-24
Payer: COMMERCIAL

## 2019-09-24 LAB
BDY FAT % MEASURED: 45.5 %
BP DIAS: 59 MMHG
BP SYS: 107 MMHG
CHOLEST SERPL-MCNC: 181 MG/DL (ref 100–199)
DIABETES HTDIA: NO
EVENT NAME HTEVT: NORMAL
FASTING HTFAS: YES
GLUCOSE SERPL-MCNC: 101 MG/DL (ref 65–99)
HDLC SERPL-MCNC: 56 MG/DL
HYPERTENSION HTHYP: YES
LDLC SERPL CALC-MCNC: 98 MG/DL
SCREENING LOC CITY HTCIT: NORMAL
SCREENING LOC STATE HTSTA: NORMAL
SCREENING LOCATION HTLOC: NORMAL
SMOKING HTSMO: YES
SUBSCRIBER ID HTSID: NORMAL
TRIGL SERPL-MCNC: 136 MG/DL (ref 0–149)

## 2019-09-24 PROCEDURE — 36415 COLL VENOUS BLD VENIPUNCTURE: CPT

## 2019-09-24 PROCEDURE — 80061 LIPID PANEL: CPT

## 2019-09-24 PROCEDURE — 82947 ASSAY GLUCOSE BLOOD QUANT: CPT

## 2019-09-24 PROCEDURE — S5190 WELLNESS ASSESSMENT BY NONPH: HCPCS

## 2019-09-27 ENCOUNTER — NON-PROVIDER VISIT (OUTPATIENT)
Dept: URGENT CARE | Facility: PHYSICIAN GROUP | Age: 59
End: 2019-09-27

## 2019-09-27 PROCEDURE — 90686 IIV4 VACC NO PRSV 0.5 ML IM: CPT | Performed by: PHYSICIAN ASSISTANT

## 2019-09-27 PROCEDURE — 90471 IMMUNIZATION ADMIN: CPT | Performed by: PHYSICIAN ASSISTANT

## 2019-09-30 ENCOUNTER — NON-PROVIDER VISIT (OUTPATIENT)
Dept: MEDICAL GROUP | Facility: PHYSICIAN GROUP | Age: 59
End: 2019-09-30
Payer: COMMERCIAL

## 2019-09-30 DIAGNOSIS — Z23 NEED FOR VACCINATION: ICD-10-CM

## 2019-09-30 PROCEDURE — 90471 IMMUNIZATION ADMIN: CPT | Performed by: PHYSICIAN ASSISTANT

## 2019-09-30 PROCEDURE — 90670 PCV13 VACCINE IM: CPT | Performed by: PHYSICIAN ASSISTANT

## 2019-09-30 NOTE — PROGRESS NOTES
"Harmony Monroe is a 59 y.o. female here for a non-provider visit for:   PREVNAR (PCV13) 1 of 1    Reason for immunization: continue or complete series started at the office  Immunization records indicate need for vaccine: Yes, confirmed with ARIES Contreras  Minimum interval has been met for this vaccine: Yes  ABN completed: Yes    Order and dose verified by: Luz Elena BE Dated  11/05/15 was given to patient: Yes  All IAC Questionnaire questions were answered \"No.\"    Patient tolerated injection and no adverse effects were observed or reported: Yes    Pt scheduled for next dose in series: Not Indicated    "

## 2019-10-28 RX ORDER — ESCITALOPRAM OXALATE 10 MG/1
TABLET ORAL
Qty: 90 TAB | Refills: 0 | Status: SHIPPED | OUTPATIENT
Start: 2019-10-28 | End: 2020-01-24

## 2019-11-15 ENCOUNTER — NON-PROVIDER VISIT (OUTPATIENT)
Dept: MEDICAL GROUP | Facility: PHYSICIAN GROUP | Age: 59
End: 2019-11-15
Payer: COMMERCIAL

## 2019-11-15 DIAGNOSIS — Z23 NEED FOR VACCINATION: ICD-10-CM

## 2019-11-15 PROCEDURE — 90750 HZV VACC RECOMBINANT IM: CPT | Performed by: PHYSICIAN ASSISTANT

## 2019-11-15 PROCEDURE — 90471 IMMUNIZATION ADMIN: CPT | Performed by: PHYSICIAN ASSISTANT

## 2019-11-15 NOTE — PROGRESS NOTES
"Harmony Monroe is a 59 y.o. female here for a non-provider visit for:   SHINGRIX (Shingles)    Reason for immunization: continue or complete series started at the office  Immunization records indicate need for vaccine: Yes, confirmed with Epic  Minimum interval has been met for this vaccine: Yes  ABN completed: Not Indicated    Order and dose verified by: IA  VIS was given to patient: Yes  All IAC Questionnaire questions were answered \"No.\"    Patient tolerated injection and no adverse effects were observed or reported: Yes    Pt scheduled for next dose in series: Not Indicated  "

## 2019-12-05 ENCOUNTER — OFFICE VISIT (OUTPATIENT)
Dept: MEDICAL GROUP | Facility: PHYSICIAN GROUP | Age: 59
End: 2019-12-05
Payer: COMMERCIAL

## 2019-12-05 VITALS
BODY MASS INDEX: 34.64 KG/M2 | OXYGEN SATURATION: 98 % | DIASTOLIC BLOOD PRESSURE: 72 MMHG | SYSTOLIC BLOOD PRESSURE: 114 MMHG | TEMPERATURE: 98.5 F | HEIGHT: 63 IN | HEART RATE: 99 BPM | WEIGHT: 195.5 LBS

## 2019-12-05 DIAGNOSIS — E55.9 VITAMIN D DEFICIENCY: ICD-10-CM

## 2019-12-05 DIAGNOSIS — F51.04 CHRONIC INSOMNIA: ICD-10-CM

## 2019-12-05 DIAGNOSIS — M25.60 JOINT STIFFNESS: ICD-10-CM

## 2019-12-05 DIAGNOSIS — R06.83 SNORING: ICD-10-CM

## 2019-12-05 DIAGNOSIS — I10 ESSENTIAL HYPERTENSION: ICD-10-CM

## 2019-12-05 PROCEDURE — 99214 OFFICE O/P EST MOD 30 MIN: CPT | Performed by: PHYSICIAN ASSISTANT

## 2019-12-05 RX ORDER — TEMAZEPAM 15 MG/1
15-30 CAPSULE ORAL NIGHTLY PRN
Qty: 60 CAP | Refills: 5 | Status: SHIPPED | OUTPATIENT
Start: 2019-12-05 | End: 2020-01-04

## 2019-12-06 NOTE — PROGRESS NOTES
Subjective:   Harmony Monroe is a 59 y.o. female here today for follow-up on hypertension and other medical problems. Is an established patient of mine.    HPI:    Harmony presents to the office today for routine follow-up on chronic conditions. I last saw her back in April of this year. Since that time, she endorses significant improvement in energy levels. She underwent treatment with high-dose vitamin D given previous deficiency with improvement in level to 68 on most recent check in July. She continues on daily OTC supplement--taking total of 6000 IU per day (5,000 IU supplement plus total of 1,000 IU in the Citracal that she takes). She has not yet scheduled sleep medicine appointment. She states she wasn't sure if she still needed to do this given how much better she feels in terms of her energy levels. She does endorse continued snoring but states her sisters have commented that it seems to be better lately.     She has also noticed that her body aches have improved with the vitamin D treatment. The trigger finger she was experiencing completely resolved on its own. Her joint pain is improved as well. Still has some minor hand stiffness but very tolerable. Not having to take any OTC pain medication.    She continues on verapamil  mg daily and HCTZ 25 mg daily for hypertension. BP today in the office is 114/72.    Finally, she continues on temazepam which she has been on for many years for chronic insomnia. This has worked well for her. Typically takes 2 capsules but occasionally will take only 1. She continues to deny side effects including morning sedation/grogginess, dizziness, or mental cloudiness. Is needing refills.      Current medicines (including changes today)  Current Outpatient Medications   Medication Sig Dispense Refill   • temazepam (RESTORIL) 15 MG Cap Take 1-2 Caps by mouth at bedtime as needed for Sleep for up to 30 days. 60 Cap 5   • escitalopram (LEXAPRO) 10 MG Tab TAKE 1  "TABLET BY MOUTH EVERY DAY 90 Tab 0   • ondansetron (ZOFRAN) 4 MG Tab tablet Take 1 Tab by mouth every 6 hours as needed for Nausea/Vomiting. 20 Tab 2   • hydroCHLOROthiazide (HYDRODIURIL) 25 MG Tab TAKE 1 TABLET BY MOUTH EVERY DAY 90 Tab 1   • verapamil ER (CALAN SR) 120 MG CAPSULE SR 24 HR TAKE 1 CAPSULE BY MOUTH EVERY DAY 90 Cap 1   • albuterol 108 (90 Base) MCG/ACT Aero Soln inhalation aerosol Inhale 2 Puffs by mouth every 6 hours as needed for Shortness of Breath. 8.5 g 5   • simvastatin (ZOCOR) 40 MG Tab Take 1 Tab by mouth every evening. 90 Tab 3   • vitamin D, Ergocalciferol, (DRISDOL) 84118 units Cap capsule Take 1 Cap by mouth every 7 days. 12 Cap 0   • ondansetron (ZOFRAN) 4 MG Tab tablet Take 1 Tab by mouth every 6 hours as needed for Nausea/Vomiting. 20 Tab 1   • naproxen (ALEVE) 220 MG tablet Take 220 mg by mouth 2 times a day, with meals.     • ibuprofen (MOTRIN) 800 MG Tab Take 400 mg by mouth every 8 hours as needed.     • diphenhydrAMINE (BENADRYL) 25 MG Tab Take 25 mg by mouth at bedtime as needed (ALLERGIES).     • Multiple Vitamins-Minerals (MULTIVITAMIN ADULT PO) Take 1 Tab by mouth every day.     • estradiol (ESTRACE) 1 MG TABS Take 1 mg by mouth every day. From gyn       No current facility-administered medications for this visit.      She  has a past medical history of Allergy, Arthritis, ASTHMA, Basal cell carcinoma of left medial cheek (11/30/2015), Basal cell carcinoma of left medial cheek (11/30/2015), Depression (1/7/2010), High cholesterol, History of tobacco use disorder (1/7/2010), Hypertension, Pneumonia (1990,2001), and Post-menopausal (8/26/2014).    ROS  As per HPI.       Objective:     /72 (BP Location: Left arm, Patient Position: Sitting, BP Cuff Size: Large adult)   Pulse 99   Temp 36.9 °C (98.5 °F)   Ht 1.6 m (5' 3\")   Wt 88.7 kg (195 lb 8 oz)   SpO2 98%  Body mass index is 34.63 kg/m².     Physical Exam:  Constitutional: Alert, well-appearing, very pleasant, no " distress.  Psychiatric: Fully oriented with fluent speech. Affect is appropriate with euthymic mood.  Skin: Warm, dry, good turgor, no rashes in visible areas.  Eye: Pupils are equal and round, conjunctiva clear, lids normal.  ENMT: Lips without lesions, moist mucus membranes.  Neck: Normal-appearing and without edema.  Respiratory: Unlabored respiratory effort, lungs clear to auscultation, no wheezes, no rhonchi.  Cardiovascular: Normal S1, S2, no murmur.      Assessment and Plan:   The following treatment plan was discussed    1. Vitamin D deficiency  Established problem, well-controlled with vitamin D supplementation. Most recent level in 7/2019 normal at 68. Advised to continue current OTC dose to maintain normal level.    2. Snoring  Established problem, improved but still present. I still recommend further evaluation for sleep apnea given chronic insomnia and elevated H&H on last CBC. She is agreeable and will schedule appointment.    3. Joint stiffness  Established problem, improved since vitamin D treatment. Stiffness not currently severe enough to require any OTC pain medication. Will continue to monitor.    4. Essential hypertension  Established problem, well-controlled with current medications. BP today is at-goal. Continue verapamil ER and HCTZ at current doses.    5. Chronic insomnia  Established problem, well-controlled with temazepam. Has been on for years and is tolerating well without side effects. Emily reviewed showing last fill date of 11/14/19. Refills ordered.  - temazepam (RESTORIL) 15 MG Cap; Take 1-2 Caps by mouth at bedtime as needed for Sleep for up to 30 days.  Dispense: 60 Cap; Refill: 5      Followup: Return in about 6 months (around 6/5/2020).    Ayleen Millard P.A.-C.

## 2019-12-07 ENCOUNTER — HOSPITAL ENCOUNTER (OUTPATIENT)
Facility: MEDICAL CENTER | Age: 59
End: 2019-12-07
Attending: PHYSICIAN ASSISTANT
Payer: COMMERCIAL

## 2019-12-07 ENCOUNTER — OFFICE VISIT (OUTPATIENT)
Dept: URGENT CARE | Facility: PHYSICIAN GROUP | Age: 59
End: 2019-12-07
Payer: COMMERCIAL

## 2019-12-07 VITALS
WEIGHT: 195 LBS | RESPIRATION RATE: 16 BRPM | BODY MASS INDEX: 34.55 KG/M2 | TEMPERATURE: 98 F | HEIGHT: 63 IN | HEART RATE: 84 BPM | DIASTOLIC BLOOD PRESSURE: 60 MMHG | OXYGEN SATURATION: 95 % | SYSTOLIC BLOOD PRESSURE: 102 MMHG

## 2019-12-07 DIAGNOSIS — R39.89 SUSPECTED UTI: ICD-10-CM

## 2019-12-07 LAB
APPEARANCE UR: NORMAL
BILIRUB UR STRIP-MCNC: NORMAL MG/DL
COLOR UR AUTO: NORMAL
GLUCOSE UR STRIP.AUTO-MCNC: NORMAL MG/DL
KETONES UR STRIP.AUTO-MCNC: NORMAL MG/DL
LEUKOCYTE ESTERASE UR QL STRIP.AUTO: NORMAL
NITRITE UR QL STRIP.AUTO: NORMAL
PH UR STRIP.AUTO: 5.5 [PH] (ref 5–8)
PROT UR QL STRIP: NORMAL MG/DL
RBC UR QL AUTO: NORMAL
SP GR UR STRIP.AUTO: 1.03
UROBILINOGEN UR STRIP-MCNC: NORMAL MG/DL

## 2019-12-07 PROCEDURE — 81002 URINALYSIS NONAUTO W/O SCOPE: CPT | Performed by: PHYSICIAN ASSISTANT

## 2019-12-07 PROCEDURE — 87086 URINE CULTURE/COLONY COUNT: CPT

## 2019-12-07 PROCEDURE — 99214 OFFICE O/P EST MOD 30 MIN: CPT | Performed by: PHYSICIAN ASSISTANT

## 2019-12-07 RX ORDER — ONDANSETRON HYDROCHLORIDE 8 MG/1
8 TABLET, FILM COATED ORAL EVERY 8 HOURS PRN
Qty: 15 TAB | Refills: 0 | Status: SHIPPED | OUTPATIENT
Start: 2019-12-07 | End: 2020-03-10

## 2019-12-07 RX ORDER — PHENAZOPYRIDINE HYDROCHLORIDE 100 MG/1
100 TABLET, FILM COATED ORAL 3 TIMES DAILY PRN
Qty: 6 TAB | Refills: 0 | Status: SHIPPED | OUTPATIENT
Start: 2019-12-07 | End: 2020-03-10

## 2019-12-07 RX ORDER — CEFDINIR 300 MG/1
300 CAPSULE ORAL 2 TIMES DAILY
Qty: 14 CAP | Refills: 0 | Status: SHIPPED | OUTPATIENT
Start: 2019-12-07 | End: 2019-12-14

## 2019-12-09 LAB
BACTERIA UR CULT: NORMAL
SIGNIFICANT IND 70042: NORMAL
SITE SITE: NORMAL
SOURCE SOURCE: NORMAL

## 2019-12-10 ASSESSMENT — ENCOUNTER SYMPTOMS
WHEEZING: 0
DIARRHEA: 1
VOMITING: 0
BACK PAIN: 0
EYE DISCHARGE: 0
NAUSEA: 1
HEADACHES: 0
SORE THROAT: 0
COUGH: 0
ABDOMINAL PAIN: 0
CONSTIPATION: 1
CHANGE IN BOWEL HABIT: 1
CHILLS: 0
MYALGIAS: 0
FATIGUE: 1
ROS GI COMMENTS: SUPRAPUBIC PRESSURE
FEVER: 0
EYE REDNESS: 0

## 2019-12-10 NOTE — PROGRESS NOTES
"Subjective:      Harmony Monroe is a 59 y.o. female who presents with UTI (began this am )            UTI   This is a new problem. The current episode started today. The problem occurs constantly. The problem has been unchanged. Associated symptoms include a change in bowel habit, fatigue, nausea and urinary symptoms. Pertinent negatives include no abdominal pain, chills, congestion, coughing, fever, headaches, myalgias, rash, sore throat or vomiting. Nothing aggravates the symptoms. Treatments tried: zofran this morning.  The treatment provided mild relief.       Review of Systems   Constitutional: Positive for fatigue and malaise/fatigue. Negative for chills and fever.   HENT: Negative for congestion and sore throat.    Eyes: Negative for discharge and redness.   Respiratory: Negative for cough and wheezing.    Gastrointestinal: Positive for change in bowel habit, constipation, diarrhea and nausea. Negative for abdominal pain and vomiting.        Suprapubic pressure   Genitourinary: Positive for dysuria, frequency and urgency. Negative for hematuria.   Musculoskeletal: Negative for back pain and myalgias.   Skin: Negative for itching and rash.   Neurological: Negative for headaches.   All other systems reviewed and are negative.         Objective:     /60 (BP Location: Right arm, Patient Position: Sitting, BP Cuff Size: Adult long)   Pulse 84   Temp 36.7 °C (98 °F) (Tympanic)   Resp 16   Ht 1.6 m (5' 3\")   Wt 88.5 kg (195 lb)   SpO2 95%   BMI 34.54 kg/m²    PMH:  has a past medical history of Allergy, Arthritis, ASTHMA, Basal cell carcinoma of left medial cheek (11/30/2015), Basal cell carcinoma of left medial cheek (11/30/2015), Depression (1/7/2010), High cholesterol, History of tobacco use disorder (1/7/2010), Hypertension, Pneumonia (1990,2001), and Post-menopausal (8/26/2014).  MEDS: Reviewed .   ALLERGIES:   Allergies   Allergen Reactions   • Bee Anaphylaxis   • Levaquin Anaphylaxis     " "Myalgias arthralgias    • Avelox [Moxifloxacin Hcl In Nacl] Swelling   • Phenergan  [Benzyl Alc-Promethazine]    • Promethazine      Twitching feeling   • Tape      Redness; itching \"paper tape ok\"     SURGHX:   Past Surgical History:   Procedure Laterality Date   • KNEE ARTHROSCOPY Left 2/5/2018    Procedure: KNEE ARTHROSCOPY;  Surgeon: Andrews Castro M.D.;  Location: SURGERY [facility];  Service: Orthopedics   • MEDIAL MENISCECTOMY Left 2/5/2018    Procedure: MEDIAL MENISCECTOMY - PARTIAL;  Surgeon: Andrews Castro M.D.;  Location: SURGERY [facility];  Service: Orthopedics   • KNEE ARTHROSCOPY Right 3/23/2017    Procedure: KNEE ARTHROSCOPY;  Surgeon: Andrews Castro M.D.;  Location: [facility];  Service:    • MEDIAL MENISCECTOMY  3/23/2017    Procedure: MEDIAL and lateral  MENISCECTOMY - PARTIAL;  Surgeon: Andrews Castro M.D.;  Location: [facility];  Service:    • CHONDROPLASTY  3/23/2017    Procedure: CHONDROPLASTY -  PATELLAR;  Surgeon: Andrews Castro M.D.;  Location: [facility];  Service:    • ABDOMINAL HYSTERECTOMY TOTAL  11/2005   • TONSILLECTOMY AND ADENOIDECTOMY  1980   • NASAL SEPTAL RECONSTRUCTION  1980   • CYST EXCISION  1974    Anterior Left Foot     SOCHX:  reports that she quit smoking about 3 years ago. Her smoking use included cigarettes. She started smoking about 22 years ago. She has a 9.50 pack-year smoking history. She has never used smokeless tobacco. She reports that she does not drink alcohol or use drugs.  FH: Family history was reviewed, no pertinent findings to report    Physical Exam  Vitals signs reviewed.   Constitutional:       Appearance: She is well-developed.   HENT:      Head: Normocephalic and atraumatic.   Eyes:      Pupils: Pupils are equal, round, and reactive to light.   Neck:      Musculoskeletal: Normal range of motion and neck supple.   Cardiovascular:      Rate and Rhythm: Normal rate and regular rhythm.      Heart " sounds: No murmur.   Pulmonary:      Effort: Pulmonary effort is normal. No respiratory distress.      Breath sounds: Normal breath sounds.   Abdominal:      General: Bowel sounds are normal. There is no distension.      Palpations: Abdomen is soft.      Comments:  Negative CVAT.    Musculoskeletal: Normal range of motion.   Skin:     General: Skin is warm and dry.   Neurological:      Mental Status: She is alert and oriented to person, place, and time.   Psychiatric:         Behavior: Behavior normal.               UA_ neg. LE, and neg. Nitrite.   Assessment/Plan:       1. Suspected UTI  - URINE CULTURE(NEW); Future  - ondansetron (ZOFRAN) 8 MG Tab; Take 1 Tab by mouth every 8 hours as needed for Nausea/Vomiting.  Dispense: 15 Tab; Refill: 0  - phenazopyridine (PYRIDIUM) 100 MG Tab; Take 1 Tab by mouth 3 times a day as needed.  Dispense: 6 Tab; Refill: 0  - cefdinir (OMNICEF) 300 MG Cap; Take 1 Cap by mouth 2 times a day for 7 days.  Dispense: 14 Cap; Refill: 0  - POCT Urinalysis    Pt with classic UTI symptoms- will send off for culture and will write the above.   Pt. Was given ABX therapy today and will change therapy if culture indicates this is necessary. ER precautions given- worsening symptoms, back pain, abd. Pain, or fevers.   Pt. Is to increase fluids, and take the complete duration of the therapy.   Pt. Understands and agrees with the plan.   F/U with PCP in 3-4 days as needed.

## 2020-01-07 DIAGNOSIS — I10 ESSENTIAL HYPERTENSION: ICD-10-CM

## 2020-01-08 RX ORDER — VERAPAMIL HYDROCHLORIDE 120 MG/1
CAPSULE, EXTENDED RELEASE ORAL
Qty: 90 CAP | Refills: 1 | Status: SHIPPED | OUTPATIENT
Start: 2020-01-08 | End: 2020-07-06

## 2020-01-08 RX ORDER — HYDROCHLOROTHIAZIDE 25 MG/1
TABLET ORAL
Qty: 90 TAB | Refills: 1 | Status: SHIPPED | OUTPATIENT
Start: 2020-01-08 | End: 2020-07-06

## 2020-01-08 NOTE — TELEPHONE ENCOUNTER
Refill X 6 months, sent to pharmacy.Pt. Seen in the last 6 months per protocol.   Lab Results   Component Value Date/Time    SODIUM 135 05/11/2018 10:43 AM    POTASSIUM 3.5 (L) 05/11/2018 10:43 AM    CHLORIDE 105 05/11/2018 10:43 AM    CO2 24 05/11/2018 10:43 AM    GLUCOSE 101 (H) 09/24/2019 10:20 AM    BUN 17 05/11/2018 10:43 AM    CREATININE 0.71 05/11/2018 10:43 AM    CREATININE 0.8 10/24/2005 12:00 PM

## 2020-01-08 NOTE — TELEPHONE ENCOUNTER
Was the patient seen in the last year in this department? Yes    Does patient have an active prescription for medications requested? No     Received Request Via: Pharmacy    Pt met protocol?: Yes     Last OV 12/05/19    BP Readings from Last 1 Encounters:   12/07/19 102/60

## 2020-01-24 RX ORDER — ESCITALOPRAM OXALATE 10 MG/1
TABLET ORAL
Qty: 90 TAB | Refills: 1 | Status: SHIPPED | OUTPATIENT
Start: 2020-01-24 | End: 2020-08-03

## 2020-01-24 NOTE — TELEPHONE ENCOUNTER
Was the patient seen in the last year in this department? Yes    Does patient have an active prescription for medications requested? No     Received Request Via: Pharmacy      Pt met protocol?: Yes    LAST OV 12/05/2019

## 2020-03-10 ENCOUNTER — OFFICE VISIT (OUTPATIENT)
Dept: MEDICAL GROUP | Facility: PHYSICIAN GROUP | Age: 60
End: 2020-03-10
Payer: COMMERCIAL

## 2020-03-10 ENCOUNTER — HOSPITAL ENCOUNTER (OUTPATIENT)
Dept: LAB | Facility: MEDICAL CENTER | Age: 60
End: 2020-03-10
Attending: FAMILY MEDICINE
Payer: COMMERCIAL

## 2020-03-10 VITALS
TEMPERATURE: 97.6 F | HEART RATE: 94 BPM | HEIGHT: 62 IN | BODY MASS INDEX: 35.88 KG/M2 | WEIGHT: 195 LBS | OXYGEN SATURATION: 98 % | DIASTOLIC BLOOD PRESSURE: 76 MMHG | SYSTOLIC BLOOD PRESSURE: 144 MMHG

## 2020-03-10 DIAGNOSIS — R00.0 TACHYCARDIA: ICD-10-CM

## 2020-03-10 LAB
ALBUMIN SERPL BCP-MCNC: 4.4 G/DL (ref 3.2–4.9)
ALBUMIN/GLOB SERPL: 1.5 G/DL
ALP SERPL-CCNC: 66 U/L (ref 30–99)
ALT SERPL-CCNC: 17 U/L (ref 2–50)
ANION GAP SERPL CALC-SCNC: 10 MMOL/L (ref 0–11.9)
AST SERPL-CCNC: 19 U/L (ref 12–45)
BASOPHILS # BLD AUTO: 0.5 % (ref 0–1.8)
BASOPHILS # BLD: 0.07 K/UL (ref 0–0.12)
BILIRUB SERPL-MCNC: 0.6 MG/DL (ref 0.1–1.5)
BUN SERPL-MCNC: 20 MG/DL (ref 8–22)
CALCIUM SERPL-MCNC: 10.6 MG/DL (ref 8.5–10.5)
CHLORIDE SERPL-SCNC: 98 MMOL/L (ref 96–112)
CO2 SERPL-SCNC: 28 MMOL/L (ref 20–33)
CREAT SERPL-MCNC: 0.97 MG/DL (ref 0.5–1.4)
EOSINOPHIL # BLD AUTO: 0.25 K/UL (ref 0–0.51)
EOSINOPHIL NFR BLD: 1.9 % (ref 0–6.9)
ERYTHROCYTE [DISTWIDTH] IN BLOOD BY AUTOMATED COUNT: 42.8 FL (ref 35.9–50)
GLOBULIN SER CALC-MCNC: 2.9 G/DL (ref 1.9–3.5)
GLUCOSE SERPL-MCNC: 128 MG/DL (ref 65–99)
HCT VFR BLD AUTO: 51.9 % (ref 37–47)
HGB BLD-MCNC: 17.2 G/DL (ref 12–16)
IMM GRANULOCYTES # BLD AUTO: 0.06 K/UL (ref 0–0.11)
IMM GRANULOCYTES NFR BLD AUTO: 0.4 % (ref 0–0.9)
LYMPHOCYTES # BLD AUTO: 3.45 K/UL (ref 1–4.8)
LYMPHOCYTES NFR BLD: 25.8 % (ref 22–41)
MAGNESIUM SERPL-MCNC: 2 MG/DL (ref 1.5–2.5)
MCH RBC QN AUTO: 29.2 PG (ref 27–33)
MCHC RBC AUTO-ENTMCNC: 33.1 G/DL (ref 33.6–35)
MCV RBC AUTO: 88.1 FL (ref 81.4–97.8)
MONOCYTES # BLD AUTO: 0.8 K/UL (ref 0–0.85)
MONOCYTES NFR BLD AUTO: 6 % (ref 0–13.4)
NEUTROPHILS # BLD AUTO: 8.75 K/UL (ref 2–7.15)
NEUTROPHILS NFR BLD: 65.4 % (ref 44–72)
NRBC # BLD AUTO: 0 K/UL
NRBC BLD-RTO: 0 /100 WBC
PLATELET # BLD AUTO: 351 K/UL (ref 164–446)
PMV BLD AUTO: 10.3 FL (ref 9–12.9)
POTASSIUM SERPL-SCNC: 3.6 MMOL/L (ref 3.6–5.5)
PROT SERPL-MCNC: 7.3 G/DL (ref 6–8.2)
RBC # BLD AUTO: 5.89 M/UL (ref 4.2–5.4)
SODIUM SERPL-SCNC: 136 MMOL/L (ref 135–145)
TROPONIN T SERPL-MCNC: 11 NG/L (ref 6–19)
WBC # BLD AUTO: 13.4 K/UL (ref 4.8–10.8)

## 2020-03-10 PROCEDURE — 83735 ASSAY OF MAGNESIUM: CPT

## 2020-03-10 PROCEDURE — 80053 COMPREHEN METABOLIC PANEL: CPT

## 2020-03-10 PROCEDURE — 36415 COLL VENOUS BLD VENIPUNCTURE: CPT

## 2020-03-10 PROCEDURE — 84484 ASSAY OF TROPONIN QUANT: CPT

## 2020-03-10 PROCEDURE — 85025 COMPLETE CBC W/AUTO DIFF WBC: CPT

## 2020-03-10 PROCEDURE — 93000 ELECTROCARDIOGRAM COMPLETE: CPT | Performed by: FAMILY MEDICINE

## 2020-03-10 PROCEDURE — 99214 OFFICE O/P EST MOD 30 MIN: CPT | Performed by: FAMILY MEDICINE

## 2020-03-10 RX ORDER — TEMAZEPAM 15 MG/1
15 CAPSULE ORAL NIGHTLY PRN
COMMUNITY
End: 2020-06-10 | Stop reason: SDUPTHER

## 2020-03-10 ASSESSMENT — FIBROSIS 4 INDEX: FIB4 SCORE: 0.83

## 2020-03-10 ASSESSMENT — PATIENT HEALTH QUESTIONNAIRE - PHQ9: CLINICAL INTERPRETATION OF PHQ2 SCORE: 0

## 2020-03-10 NOTE — PROGRESS NOTES
CC:  Palpitations     HISTORY OF THE PRESENT ILLNESS: Patient is a 60 y.o. female. This pleasant patient is here today with complaints of chest pain. She is currently an established patient of Ayleen Millard P.A.-C. who is out of the office.     Nakita Antunez presents today with acute complaints of palpitations. She states that she originalyl started feeling palpitations last night with associated nausea and diaphoresis. She continues to have the same symptoms with similar severity and notes that her heart currently feels as though it is racing. Last night, she was also checking her pulse and felt that it was irregular.She has chronic shortness of breath symptoms without any new symptoms. She denies any chest pain or pre-syncope. 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       Allergies: Bee; Levaquin; Avelox [moxifloxacin hcl in nacl]; Phenergan  [benzyl alc-promethazine]; Promethazine; and Tape    Current Outpatient Medications Ordered in Epic   Medication Sig Dispense Refill   • temazepam (RESTORIL) 15 MG Cap Take 30 mg by mouth at bedtime as needed for Sleep.     • escitalopram (LEXAPRO) 10 MG Tab TAKE 1 TABLET BY MOUTH EVERY DAY 90 Tab 1   • verapamil ER (CALAN SR) 120 MG CAPSULE SR 24 HR TAKE 1 CAPSULE BY MOUTH EVERY DAY 90 Cap 1   • hydroCHLOROthiazide (HYDRODIURIL) 25 MG Tab TAKE 1 TABLET BY MOUTH EVERY DAY 90 Tab 1   • albuterol 108 (90 Base) MCG/ACT Aero Soln inhalation aerosol Inhale 2 Puffs by mouth every 6 hours as needed for Shortness of Breath. 8.5 g 5   • simvastatin (ZOCOR) 40 MG Tab Take 1 Tab by mouth every evening. 90 Tab 3   • vitamin D, Ergocalciferol, (DRISDOL) 80473 units Cap capsule Take 1 Cap by mouth every 7 days. 12 Cap 0   • ondansetron (ZOFRAN) 4 MG Tab tablet Take 1 Tab by mouth every 6 hours as needed for Nausea/Vomiting. 20 Tab 1   • naproxen (ALEVE) 220 MG tablet  Take 220 mg by mouth 2 times a day, with meals.     • diphenhydrAMINE (BENADRYL) 25 MG Tab Take 25 mg by mouth at bedtime as needed (ALLERGIES).     • Multiple Vitamins-Minerals (MULTIVITAMIN ADULT PO) Take 1 Tab by mouth every day.     • estradiol (ESTRACE) 1 MG TABS Take 1 mg by mouth every day. From gyn       No current Epic-ordered facility-administered medications on file.        Past Medical History:   Diagnosis Date   • Allergy    • Arthritis     osteoarthritis; right knee   • ASTHMA     1/29/2018 not an issue, currently no medications needed   • Basal cell carcinoma of left medial cheek 11/30/2015   • Basal cell carcinoma of left medial cheek 11/30/2015   • Depression 1/7/2010   • High cholesterol    • History of tobacco use disorder 1/7/2010   • Hypertension    • Pneumonia 1990,2001   • Post-menopausal 8/26/2014       Past Surgical History:   Procedure Laterality Date   • KNEE ARTHROSCOPY Left 2/5/2018    Procedure: KNEE ARTHROSCOPY;  Surgeon: Andrews Castro M.D.;  Location: [facility];  Service: Orthopedics   • MEDIAL MENISCECTOMY Left 2/5/2018    Procedure: MEDIAL MENISCECTOMY - PARTIAL;  Surgeon: Andrews Castro M.D.;  Location: [facility];  Service: Orthopedics   • KNEE ARTHROSCOPY Right 3/23/2017    Procedure: KNEE ARTHROSCOPY;  Surgeon: Andrews Castro M.D.;  Location: [facility];  Service:    • MEDIAL MENISCECTOMY  3/23/2017    Procedure: MEDIAL and lateral  MENISCECTOMY - PARTIAL;  Surgeon: Andrews Castro M.D.;  Location: [facility];  Service:    • CHONDROPLASTY  3/23/2017    Procedure: CHONDROPLASTY -  PATELLAR;  Surgeon: Andrews Castro M.D.;  Location: [facility];  Service:    • ABDOMINAL HYSTERECTOMY TOTAL  11/2005   • TONSILLECTOMY AND ADENOIDECTOMY  1980   • NASAL SEPTAL RECONSTRUCTION  1980   • CYST EXCISION  1974    Anterior Left Foot       Social History     Tobacco Use   • Smoking status: Current Some Day Smoker      "Packs/day: 0.50     Years: 19.00     Pack years: 9.50     Types: Cigarettes     Start date: 1997     Last attempt to quit: 2016     Years since quittin.1   • Smokeless tobacco: Never Used   • Tobacco comment: 1 pack / week   Substance Use Topics   • Alcohol use: No     Alcohol/week: 0.0 oz   • Drug use: No       Family History   Problem Relation Age of Onset   • Hyperlipidemia Mother    • Arthritis Mother    • Heart Disease Father    • Diabetes Sister    • Cancer Neg Hx    • Hypertension Neg Hx    • Stroke Neg Hx        ROS:     - Constitutional: Diaphoresis. Negative for fever, chills, unexpected weight change, and fatigue/generalized weakness.     - Respiratory: Shortness of breath (chronic and unchanged). Negative for cough, sputum production, chest congestion, dyspnea, wheezing, and crackles.      - Cardiovascular: Palpitations. Negative for chest pain, orthopnea, PND, and bilateral lower extremity edema.     - Gastrointestinal: Nausea. Negative for heartburn, nausea, vomiting, abdominal pain, hematochezia, melena, diarrhea, constipation, and greasy/foul-smelling stools.     - Psychiatric/Behavioral: Increased stress and anxiety. Negative for depression, suicidal/homicidal ideation and memory loss.        Exam: /76 (BP Location: Left arm, Patient Position: Sitting, BP Cuff Size: Large adult)   Pulse 94   Temp 36.4 °C (97.6 °F) (Temporal)   Ht 1.575 m (5' 2\")   Wt 88.5 kg (195 lb)   SpO2 98%  Body mass index is 35.67 kg/m².    General: Well appearing, NAD  Pulmonary: Clear to ausculation.  Normal effort. No rales, ronchi, or wheezing.  Cardiovascular: Regular rate and rhythm without murmur, rubs or gallop.   Lymph: No cervical, supraclavicular or axillary lymph nodes are palpable  Skin: Warm and dry.  No obvious lesions.  Musculoskeletal:  No extremity cyanosis, clubbing, or edema.  Psych: Normal mood and affect. Alert and oriented. Judgment and insight is normal.    EKG " Interpretation:  Interpreted by me    Rhythm:  Normal sinus rhythm   Rate: 93  Axis: normal  Ectopy: Few premature ventricular complexes present  Conduction: normal  ST Segments: no acute change  T Waves: no acute change  Q Waves: none  Clinical Impression: Normal EKG without acute changes        Assessment/Plan  Harmony was seen today for palpitations, nausea and medication refill.    Diagnoses and all orders for this visit:    Tachycardia  Discussed with patient that her EKG appears to show PVC's, but it is overall reassuring. There did appear to be possible biatrial enlargement on the EKG. Discussed that this should be further evaluated with an echocardiogram which I have ordered. I have also ordered lab work, and we will contact her about the results. We also discussed that there could be an anxiety and/or dehydration component to her symptoms due to her increased stress. I advised her to contact me if her symptoms worsen and/or persist as we should consider ordering a holter monitor. Red flags reviewed.   -     EKG  -     TROPONIN; Future  -     Comp Metabolic Panel; Future  -     CBC WITH DIFFERENTIAL; Future  -     MAGNESIUM; Future  -     EC-ECHOCARDIOGRAM COMPLETE W/O CONT; Future    Crissy Tang DO  [facility]    Please note that this dictation was created using voice recognition software. I have made every reasonable attempt to correct obvious errors, but I expect that there are errors of grammar and possibly content that I did not discover before finalizing the note.     IAndrews (Scribe), am scribing for, and in the presence of, Crissy Tang DO.    Electronically signed by: Andrews Olmos (Silvanoibe), 3/10/2020     Crissy KAUR DO personally performed the services described in this documentation, as scribed by Andrews Olmos in my presence, and it is both accurate and complete.

## 2020-03-15 ENCOUNTER — OFFICE VISIT (OUTPATIENT)
Dept: URGENT CARE | Facility: PHYSICIAN GROUP | Age: 60
End: 2020-03-15
Payer: COMMERCIAL

## 2020-03-15 DIAGNOSIS — K08.89 PAIN, DENTAL: ICD-10-CM

## 2020-03-15 PROCEDURE — 99999 PR NO CHARGE: CPT | Performed by: PHYSICIAN ASSISTANT

## 2020-03-15 PROCEDURE — 99212 OFFICE O/P EST SF 10 MIN: CPT | Performed by: PHYSICIAN ASSISTANT

## 2020-03-15 RX ORDER — KETOROLAC TROMETHAMINE 30 MG/ML
60 INJECTION, SOLUTION INTRAMUSCULAR; INTRAVENOUS ONCE
Status: COMPLETED | OUTPATIENT
Start: 2020-03-15 | End: 2020-03-15

## 2020-03-15 RX ADMIN — KETOROLAC TROMETHAMINE 60 MG: 30 INJECTION, SOLUTION INTRAMUSCULAR; INTRAVENOUS at 12:40

## 2020-03-15 ASSESSMENT — ENCOUNTER SYMPTOMS
BLURRED VISION: 0
HEADACHES: 1
DIZZINESS: 0
FEVER: 0
SINUS PAIN: 1
DOUBLE VISION: 0
NAUSEA: 0
CHILLS: 0
VOMITING: 0

## 2020-03-15 NOTE — PROGRESS NOTES
Subjective:      Harmony Monroe is a 60 y.o. female who presents with No chief complaint on file.            HPI  60-year-old female presents to urgent care with new problem of pain over left side of face and teeth.  She is currently being treated for dental infection and started her antibiotics yesterday.  She has taken over-the-counter Tylenol and Motrin with minimal symptomatic relief.  Denies fevers.    Review of Systems   Constitutional: Negative for chills and fever.   HENT: Positive for sinus pain. Negative for congestion.         Dental pain   Eyes: Negative for blurred vision and double vision.   Gastrointestinal: Negative for nausea and vomiting.   Neurological: Positive for headaches. Negative for dizziness.       Past Medical History:   Diagnosis Date   • Allergy    • Arthritis     osteoarthritis; right knee   • ASTHMA     2018 not an issue, currently no medications needed   • Basal cell carcinoma of left medial cheek 2015   • Basal cell carcinoma of left medial cheek 2015   • Depression 2010   • High cholesterol    • History of tobacco use disorder 2010   • Hypertension    • Pneumonia ,   • Post-menopausal 2014     Medications and allergies reviewed in [facility].  Social History     Tobacco Use   • Smoking status: Current Some Day Smoker     Packs/day: 0.50     Years: 19.00     Pack years: 9.50     Types: Cigarettes     Start date: 1997     Last attempt to quit: 2016     Years since quittin.2   • Smokeless tobacco: Never Used   • Tobacco comment: 1 pack / week   Substance Use Topics   • Alcohol use: No     Alcohol/week: 0.0 oz      Objective:     There were no vitals taken for this visit.     Physical Exam  Vitals signs reviewed.   Constitutional:       Appearance: Normal appearance.   HENT:      Head: Normocephalic and atraumatic.     Neurological:      Mental Status: She is alert.   Psychiatric:         Mood and Affect: Mood normal.         Behavior:  Behavior normal.         Thought Content: Thought content normal.         Judgment: Judgment normal.                 Assessment/Plan:     1. Pain, dental  ketorolac (TORADOL) injection 60 mg     60 mg IM Toradol given in clinic, good pain relief.   Recommend patient continue with antibiotics as prescribed for dental infection.  Follow-up with dentistry.   Discussed red flags and reasons to return to UC or ED.  Pt and/or family verbalized understanding of diagnosis and follow up instructions and was offered informational handout on diagnosis.  PT discharged.

## 2020-03-30 DIAGNOSIS — J45.20 MILD INTERMITTENT ASTHMA IN ADULT WITHOUT COMPLICATION: ICD-10-CM

## 2020-03-30 RX ORDER — ALBUTEROL SULFATE 90 UG/1
2 AEROSOL, METERED RESPIRATORY (INHALATION) EVERY 6 HOURS PRN
Qty: 3 INHALER | Refills: 0 | Status: SHIPPED | OUTPATIENT
Start: 2020-03-30 | End: 2020-06-30

## 2020-04-05 ENCOUNTER — HOSPITAL ENCOUNTER (OUTPATIENT)
Facility: MEDICAL CENTER | Age: 60
End: 2020-04-06
Attending: EMERGENCY MEDICINE | Admitting: HOSPITALIST
Payer: COMMERCIAL

## 2020-04-05 ENCOUNTER — APPOINTMENT (OUTPATIENT)
Dept: CARDIOLOGY | Facility: MEDICAL CENTER | Age: 60
End: 2020-04-05
Attending: HOSPITALIST
Payer: COMMERCIAL

## 2020-04-05 ENCOUNTER — APPOINTMENT (OUTPATIENT)
Dept: RADIOLOGY | Facility: MEDICAL CENTER | Age: 60
End: 2020-04-05
Attending: EMERGENCY MEDICINE
Payer: COMMERCIAL

## 2020-04-05 ENCOUNTER — APPOINTMENT (OUTPATIENT)
Dept: RADIOLOGY | Facility: MEDICAL CENTER | Age: 60
End: 2020-04-05
Attending: HOSPITALIST
Payer: COMMERCIAL

## 2020-04-05 DIAGNOSIS — R07.9 ACUTE CHEST PAIN: ICD-10-CM

## 2020-04-05 DIAGNOSIS — F41.9 ANXIETY: ICD-10-CM

## 2020-04-05 LAB
ALBUMIN SERPL BCP-MCNC: 4.2 G/DL (ref 3.2–4.9)
ALBUMIN/GLOB SERPL: 1.8 G/DL
ALP SERPL-CCNC: 68 U/L (ref 30–99)
ALT SERPL-CCNC: 18 U/L (ref 2–50)
ANION GAP SERPL CALC-SCNC: 15 MMOL/L (ref 7–16)
AST SERPL-CCNC: 17 U/L (ref 12–45)
BASOPHILS # BLD AUTO: 0.5 % (ref 0–1.8)
BASOPHILS # BLD: 0.06 K/UL (ref 0–0.12)
BILIRUB SERPL-MCNC: 0.2 MG/DL (ref 0.1–1.5)
BUN SERPL-MCNC: 15 MG/DL (ref 8–22)
CALCIUM SERPL-MCNC: 9.8 MG/DL (ref 8.5–10.5)
CHLORIDE SERPL-SCNC: 100 MMOL/L (ref 96–112)
CO2 SERPL-SCNC: 21 MMOL/L (ref 20–33)
CREAT SERPL-MCNC: 0.77 MG/DL (ref 0.5–1.4)
D DIMER PPP IA.FEU-MCNC: 0.36 UG/ML (FEU) (ref 0–0.5)
EKG IMPRESSION: NORMAL
EKG IMPRESSION: NORMAL
EOSINOPHIL # BLD AUTO: 0.07 K/UL (ref 0–0.51)
EOSINOPHIL NFR BLD: 0.6 % (ref 0–6.9)
ERYTHROCYTE [DISTWIDTH] IN BLOOD BY AUTOMATED COUNT: 41.4 FL (ref 35.9–50)
GLOBULIN SER CALC-MCNC: 2.4 G/DL (ref 1.9–3.5)
GLUCOSE SERPL-MCNC: 134 MG/DL (ref 65–99)
HCT VFR BLD AUTO: 45.9 % (ref 37–47)
HGB BLD-MCNC: 15.6 G/DL (ref 12–16)
IMM GRANULOCYTES # BLD AUTO: 0.04 K/UL (ref 0–0.11)
IMM GRANULOCYTES NFR BLD AUTO: 0.4 % (ref 0–0.9)
LV EJECT FRACT  99904: 70
LV EJECT FRACT MOD 2C 99903: 75.77
LV EJECT FRACT MOD 4C 99902: 76.1
LV EJECT FRACT MOD BP 99901: 76.32
LYMPHOCYTES # BLD AUTO: 2.19 K/UL (ref 1–4.8)
LYMPHOCYTES NFR BLD: 19.7 % (ref 22–41)
MCH RBC QN AUTO: 29.5 PG (ref 27–33)
MCHC RBC AUTO-ENTMCNC: 34 G/DL (ref 33.6–35)
MCV RBC AUTO: 86.8 FL (ref 81.4–97.8)
MONOCYTES # BLD AUTO: 0.62 K/UL (ref 0–0.85)
MONOCYTES NFR BLD AUTO: 5.6 % (ref 0–13.4)
NEUTROPHILS # BLD AUTO: 8.16 K/UL (ref 2–7.15)
NEUTROPHILS NFR BLD: 73.2 % (ref 44–72)
NRBC # BLD AUTO: 0 K/UL
NRBC BLD-RTO: 0 /100 WBC
NT-PROBNP SERPL IA-MCNC: 34 PG/ML (ref 0–125)
PLATELET # BLD AUTO: 309 K/UL (ref 164–446)
PMV BLD AUTO: 9.9 FL (ref 9–12.9)
POTASSIUM SERPL-SCNC: 3.8 MMOL/L (ref 3.6–5.5)
PROT SERPL-MCNC: 6.6 G/DL (ref 6–8.2)
RBC # BLD AUTO: 5.29 M/UL (ref 4.2–5.4)
SODIUM SERPL-SCNC: 136 MMOL/L (ref 135–145)
T4 FREE SERPL-MCNC: 0.96 NG/DL (ref 0.53–1.43)
TROPONIN T SERPL-MCNC: 7 NG/L (ref 6–19)
TROPONIN T SERPL-MCNC: 7 NG/L (ref 6–19)
TSH SERPL DL<=0.005 MIU/L-ACNC: 2.47 UIU/ML (ref 0.38–5.33)
WBC # BLD AUTO: 11.1 K/UL (ref 4.8–10.8)

## 2020-04-05 PROCEDURE — 93005 ELECTROCARDIOGRAM TRACING: CPT | Performed by: EMERGENCY MEDICINE

## 2020-04-05 PROCEDURE — 94760 N-INVAS EAR/PLS OXIMETRY 1: CPT

## 2020-04-05 PROCEDURE — 85025 COMPLETE CBC W/AUTO DIFF WBC: CPT

## 2020-04-05 PROCEDURE — G0378 HOSPITAL OBSERVATION PER HR: HCPCS

## 2020-04-05 PROCEDURE — 96372 THER/PROPH/DIAG INJ SC/IM: CPT

## 2020-04-05 PROCEDURE — 700117 HCHG RX CONTRAST REV CODE 255: Performed by: HOSPITALIST

## 2020-04-05 PROCEDURE — 99220 PR INITIAL OBSERVATION CARE,LEVL III: CPT | Performed by: HOSPITALIST

## 2020-04-05 PROCEDURE — 84484 ASSAY OF TROPONIN QUANT: CPT | Mod: 91

## 2020-04-05 PROCEDURE — 36415 COLL VENOUS BLD VENIPUNCTURE: CPT

## 2020-04-05 PROCEDURE — 93005 ELECTROCARDIOGRAM TRACING: CPT | Performed by: HOSPITALIST

## 2020-04-05 PROCEDURE — 80053 COMPREHEN METABOLIC PANEL: CPT

## 2020-04-05 PROCEDURE — 84439 ASSAY OF FREE THYROXINE: CPT

## 2020-04-05 PROCEDURE — 700111 HCHG RX REV CODE 636 W/ 250 OVERRIDE (IP): Performed by: HOSPITALIST

## 2020-04-05 PROCEDURE — 71045 X-RAY EXAM CHEST 1 VIEW: CPT

## 2020-04-05 PROCEDURE — 71260 CT THORAX DX C+: CPT

## 2020-04-05 PROCEDURE — 85379 FIBRIN DEGRADATION QUANT: CPT

## 2020-04-05 PROCEDURE — 700102 HCHG RX REV CODE 250 W/ 637 OVERRIDE(OP): Performed by: HOSPITALIST

## 2020-04-05 PROCEDURE — 93306 TTE W/DOPPLER COMPLETE: CPT | Mod: 26 | Performed by: INTERNAL MEDICINE

## 2020-04-05 PROCEDURE — 93010 ELECTROCARDIOGRAM REPORT: CPT | Performed by: INTERNAL MEDICINE

## 2020-04-05 PROCEDURE — 99285 EMERGENCY DEPT VISIT HI MDM: CPT

## 2020-04-05 PROCEDURE — A9270 NON-COVERED ITEM OR SERVICE: HCPCS | Performed by: HOSPITALIST

## 2020-04-05 PROCEDURE — 84443 ASSAY THYROID STIM HORMONE: CPT

## 2020-04-05 PROCEDURE — 83880 ASSAY OF NATRIURETIC PEPTIDE: CPT

## 2020-04-05 PROCEDURE — 93306 TTE W/DOPPLER COMPLETE: CPT

## 2020-04-05 RX ORDER — AMOXICILLIN 250 MG
2 CAPSULE ORAL 2 TIMES DAILY
Status: DISCONTINUED | OUTPATIENT
Start: 2020-04-05 | End: 2020-04-06 | Stop reason: HOSPADM

## 2020-04-05 RX ORDER — ACETAMINOPHEN 325 MG/1
650 TABLET ORAL EVERY 6 HOURS PRN
Status: DISCONTINUED | OUTPATIENT
Start: 2020-04-05 | End: 2020-04-06 | Stop reason: HOSPADM

## 2020-04-05 RX ORDER — TEMAZEPAM 15 MG/1
15 CAPSULE ORAL NIGHTLY PRN
Status: DISCONTINUED | OUTPATIENT
Start: 2020-04-05 | End: 2020-04-06 | Stop reason: HOSPADM

## 2020-04-05 RX ORDER — OXYCODONE HYDROCHLORIDE 5 MG/1
2.5 TABLET ORAL
Status: DISCONTINUED | OUTPATIENT
Start: 2020-04-05 | End: 2020-04-06 | Stop reason: HOSPADM

## 2020-04-05 RX ORDER — OMEPRAZOLE 20 MG/1
20 CAPSULE, DELAYED RELEASE ORAL DAILY
Status: DISCONTINUED | OUTPATIENT
Start: 2020-04-05 | End: 2020-04-06 | Stop reason: HOSPADM

## 2020-04-05 RX ORDER — OXYCODONE HYDROCHLORIDE 5 MG/1
5 TABLET ORAL
Status: DISCONTINUED | OUTPATIENT
Start: 2020-04-05 | End: 2020-04-06 | Stop reason: HOSPADM

## 2020-04-05 RX ORDER — VERAPAMIL HYDROCHLORIDE 120 MG/1
120 CAPSULE, EXTENDED RELEASE ORAL DAILY
Status: DISCONTINUED | OUTPATIENT
Start: 2020-04-05 | End: 2020-04-06 | Stop reason: HOSPADM

## 2020-04-05 RX ORDER — ONDANSETRON 4 MG/1
4 TABLET, ORALLY DISINTEGRATING ORAL EVERY 4 HOURS PRN
Status: DISCONTINUED | OUTPATIENT
Start: 2020-04-05 | End: 2020-04-06 | Stop reason: HOSPADM

## 2020-04-05 RX ORDER — ESTRADIOL 1 MG/1
1 TABLET ORAL DAILY
Status: DISCONTINUED | OUTPATIENT
Start: 2020-04-05 | End: 2020-04-06 | Stop reason: HOSPADM

## 2020-04-05 RX ORDER — ONDANSETRON 2 MG/ML
4 INJECTION INTRAMUSCULAR; INTRAVENOUS EVERY 4 HOURS PRN
Status: DISCONTINUED | OUTPATIENT
Start: 2020-04-05 | End: 2020-04-06 | Stop reason: HOSPADM

## 2020-04-05 RX ORDER — HYDROCHLOROTHIAZIDE 25 MG/1
25 TABLET ORAL DAILY
Status: DISCONTINUED | OUTPATIENT
Start: 2020-04-05 | End: 2020-04-06 | Stop reason: HOSPADM

## 2020-04-05 RX ORDER — NITROGLYCERIN 0.4 MG/1
0.4 TABLET SUBLINGUAL
Status: DISCONTINUED | OUTPATIENT
Start: 2020-04-05 | End: 2020-04-06 | Stop reason: HOSPADM

## 2020-04-05 RX ORDER — VERAPAMIL HYDROCHLORIDE 120 MG/1
120 CAPSULE, EXTENDED RELEASE ORAL DAILY
Status: DISCONTINUED | OUTPATIENT
Start: 2020-04-06 | End: 2020-04-05

## 2020-04-05 RX ORDER — SIMVASTATIN 40 MG
40 TABLET ORAL EVERY EVENING
Status: DISCONTINUED | OUTPATIENT
Start: 2020-04-05 | End: 2020-04-06 | Stop reason: HOSPADM

## 2020-04-05 RX ORDER — POLYETHYLENE GLYCOL 3350 17 G/17G
1 POWDER, FOR SOLUTION ORAL
Status: DISCONTINUED | OUTPATIENT
Start: 2020-04-05 | End: 2020-04-06 | Stop reason: HOSPADM

## 2020-04-05 RX ORDER — HYDROMORPHONE HYDROCHLORIDE 1 MG/ML
0.25 INJECTION, SOLUTION INTRAMUSCULAR; INTRAVENOUS; SUBCUTANEOUS
Status: DISCONTINUED | OUTPATIENT
Start: 2020-04-05 | End: 2020-04-06 | Stop reason: HOSPADM

## 2020-04-05 RX ORDER — ESCITALOPRAM OXALATE 10 MG/1
10 TABLET ORAL DAILY
Status: DISCONTINUED | OUTPATIENT
Start: 2020-04-05 | End: 2020-04-06 | Stop reason: HOSPADM

## 2020-04-05 RX ORDER — BISACODYL 10 MG
10 SUPPOSITORY, RECTAL RECTAL
Status: DISCONTINUED | OUTPATIENT
Start: 2020-04-05 | End: 2020-04-06 | Stop reason: HOSPADM

## 2020-04-05 RX ADMIN — ENOXAPARIN SODIUM 40 MG: 100 INJECTION SUBCUTANEOUS at 16:13

## 2020-04-05 RX ADMIN — OMEPRAZOLE 20 MG: 20 CAPSULE, DELAYED RELEASE ORAL at 16:11

## 2020-04-05 RX ADMIN — SENNOSIDES AND DOCUSATE SODIUM 1 TABLET: 8.6; 5 TABLET ORAL at 16:10

## 2020-04-05 RX ADMIN — IOHEXOL 75 ML: 350 INJECTION, SOLUTION INTRAVENOUS at 20:08

## 2020-04-05 RX ADMIN — HYDROCHLOROTHIAZIDE 25 MG: 25 TABLET ORAL at 16:11

## 2020-04-05 RX ADMIN — ASPIRIN 81 MG: 81 TABLET, COATED ORAL at 16:11

## 2020-04-05 RX ADMIN — SIMVASTATIN 40 MG: 40 TABLET, FILM COATED ORAL at 16:11

## 2020-04-05 RX ADMIN — ESTRADIOL 1 MG: 1 TABLET ORAL at 16:11

## 2020-04-05 RX ADMIN — ESCITALOPRAM OXALATE 10 MG: 10 TABLET ORAL at 20:59

## 2020-04-05 RX ADMIN — VERAPAMIL HYDROCHLORIDE 120 MG: 120 CAPSULE, DELAYED RELEASE PELLETS ORAL at 16:11

## 2020-04-05 ASSESSMENT — COGNITIVE AND FUNCTIONAL STATUS - GENERAL
SUGGESTED CMS G CODE MODIFIER MOBILITY: CH
DAILY ACTIVITIY SCORE: 24
SUGGESTED CMS G CODE MODIFIER DAILY ACTIVITY: CH
MOBILITY SCORE: 24

## 2020-04-05 ASSESSMENT — LIFESTYLE VARIABLES
HAVE YOU EVER FELT YOU SHOULD CUT DOWN ON YOUR DRINKING: NO
EVER FELT BAD OR GUILTY ABOUT YOUR DRINKING: NO
TOTAL SCORE: 0
TOTAL SCORE: 0
CONSUMPTION TOTAL: NEGATIVE
EVER_SMOKED: YES
AVERAGE NUMBER OF DAYS PER WEEK YOU HAVE A DRINK CONTAINING ALCOHOL: 0
TOTAL SCORE: 0
HOW MANY TIMES IN THE PAST YEAR HAVE YOU HAD 5 OR MORE DRINKS IN A DAY: 0
ON A TYPICAL DAY WHEN YOU DRINK ALCOHOL HOW MANY DRINKS DO YOU HAVE: 0
HAVE PEOPLE ANNOYED YOU BY CRITICIZING YOUR DRINKING: NO
ALCOHOL_USE: NO
EVER HAD A DRINK FIRST THING IN THE MORNING TO STEADY YOUR NERVES TO GET RID OF A HANGOVER: NO

## 2020-04-05 ASSESSMENT — ENCOUNTER SYMPTOMS
NECK PAIN: 0
PALPITATIONS: 1
NEUROLOGICAL NEGATIVE: 1
POLYDIPSIA: 0
MUSCULOSKELETAL NEGATIVE: 1
NAUSEA: 0
RESPIRATORY NEGATIVE: 1
HEARTBURN: 0
FOCAL WEAKNESS: 0
WEAKNESS: 0
ORTHOPNEA: 0
CHILLS: 0
SHORTNESS OF BREATH: 0
FEVER: 0
DEPRESSION: 0
GASTROINTESTINAL NEGATIVE: 1
CLAUDICATION: 0
NERVOUS/ANXIOUS: 1
HEADACHES: 0
DIZZINESS: 0
COUGH: 0
BACK PAIN: 0
VOMITING: 0
EYES NEGATIVE: 1
PND: 0
BRUISES/BLEEDS EASILY: 0
HEMOPTYSIS: 0
SPUTUM PRODUCTION: 0

## 2020-04-05 ASSESSMENT — PATIENT HEALTH QUESTIONNAIRE - PHQ9
SUM OF ALL RESPONSES TO PHQ9 QUESTIONS 1 AND 2: 0
1. LITTLE INTEREST OR PLEASURE IN DOING THINGS: NOT AT ALL
2. FEELING DOWN, DEPRESSED, IRRITABLE, OR HOPELESS: NOT AT ALL

## 2020-04-05 ASSESSMENT — FIBROSIS 4 INDEX
FIB4 SCORE: 0.79
FIB4 SCORE: 0.78
FIB4 SCORE: 0.79

## 2020-04-05 NOTE — ED NOTES
Med rec complete per phone interview with pt. Allergies reviewed. epr pt, completed 2 weeks of amoxicillin approximately 1 week ago.

## 2020-04-05 NOTE — ASSESSMENT & PLAN NOTE
This presents fairly atypical, the patient might have some risk factors for VTE, I will go ahead and order a d-dimer  Consider CTA  Follow-up troponin levels, order ongoing telemetry monitoring  Echocardiogram  The patient might be appropriate for outpatient stress testing follow-up with cardiology if everything is negative here  Monitor blood pressure and adjust medication as indicated  Rule out possibility of this being esophageal, prescribed PPI and GI cocktail.

## 2020-04-05 NOTE — PROGRESS NOTES
2 RN Skin Check    2 RN skin check complete.   Devices in place: mask.  Skin assessed under devices: yes.  Confirmed pressure ulcers found on: n/a.  New potential pressure ulcers noted on n/a. Wound consult placed N/A.  The following interventions in place Pillows.

## 2020-04-05 NOTE — PROGRESS NOTES
"Assumed care of patient. Patient arrived from ED. Patient continues to have chest pain, patient stated \"has not changed.\" A/O x4. Telemetry applied. Admission navigator completed. Patient updated on plan of care.  "

## 2020-04-05 NOTE — ED PROVIDER NOTES
ED Provider Note    Scribed for Hamlet Manrique M.D. by Gilbert Ochoa. 4/5/2020  10:57 AM    Primary care provider: Crissy Tang D.O.  Means of arrival: EMS  History obtained from: Patient  History limited by: None    CHIEF COMPLAINT  Chief Complaint   Patient presents with   • Chest Pain     Centralized Chest Pain intermitten x 3 weeks. Inncreased intensity and x2.5 days. Severe chest pain onset 0900 this morning.       HPI  Harmony Monroe is a 60 y.o. female who presents to the Emergency Department for acute, intermittent chest pain onset 4 weeks ago. Patient states that her chest pain is located to her right chest and radiates to her neck. She reports having some chills, sweating, and nausea. There are no known alleviating or exacerbating factors. She denies any associated fevers or cough. Patient was seen by her PCP on the 10th of March, who patient states attributed the chest pain to dehydration. She reports that at the beginning of March she had a dental infection and even after treatment and surgery, she still feels ill.     REVIEW OF SYSTEMS  Pertinent positives include chest pain, chills, sweating, and nausea.   Pertinent negatives include no fevers or cough.    All other systems reviewed and negative. See HPI for further details.       PAST MEDICAL HISTORY   has a past medical history of Allergy, Arthritis, ASTHMA, Basal cell carcinoma of left medial cheek (11/30/2015), Basal cell carcinoma of left medial cheek (11/30/2015), Depression (1/7/2010), High cholesterol, History of tobacco use disorder (1/7/2010), Hypertension, Pneumonia (1990,2001), and Post-menopausal (8/26/2014).    SURGICAL HISTORY   has a past surgical history that includes cyst excision (1974); abdominal hysterectomy total (11/2005); tonsillectomy and adenoidectomy (1980); knee arthroscopy (Right, 3/23/2017); medial meniscectomy (3/23/2017); chondroplasty (3/23/2017); nasal septal reconstruction (1980); knee arthroscopy  (Left, 2018); and medial meniscectomy (Left, 2018).    SOCIAL HISTORY  Social History     Tobacco Use   • Smoking status: Current Some Day Smoker     Packs/day: 0.50     Years: 19.00     Pack years: 9.50     Types: Cigarettes     Start date: 1997     Last attempt to quit: 2016     Years since quittin.2   • Smokeless tobacco: Never Used   • Tobacco comment: 1 pack / week   Substance Use Topics   • Alcohol use: No     Alcohol/week: 0.0 oz   • Drug use: No      Social History     Substance and Sexual Activity   Drug Use No       FAMILY HISTORY  Family History   Problem Relation Age of Onset   • Hyperlipidemia Mother    • Arthritis Mother    • Heart Disease Father    • Diabetes Sister    • Cancer Neg Hx    • Hypertension Neg Hx    • Stroke Neg Hx        CURRENT MEDICATIONS  Current Outpatient Medications:   •  albuterol 108 (90 Base) MCG/ACT Aero Soln inhalation aerosol, Inhale 2 Puffs by mouth every 6 hours as needed for Shortness of Breath., Disp: 3 Inhaler, Rfl: 0  •  temazepam (RESTORIL) 15 MG Cap, Take 30 mg by mouth at bedtime as needed for Sleep., Disp: , Rfl:   •  escitalopram (LEXAPRO) 10 MG Tab, TAKE 1 TABLET BY MOUTH EVERY DAY, Disp: 90 Tab, Rfl: 1  •  verapamil ER (CALAN SR) 120 MG CAPSULE SR 24 HR, TAKE 1 CAPSULE BY MOUTH EVERY DAY, Disp: 90 Cap, Rfl: 1  •  hydroCHLOROthiazide (HYDRODIURIL) 25 MG Tab, TAKE 1 TABLET BY MOUTH EVERY DAY, Disp: 90 Tab, Rfl: 1  •  simvastatin (ZOCOR) 40 MG Tab, Take 1 Tab by mouth every evening., Disp: 90 Tab, Rfl: 3  •  vitamin D, Ergocalciferol, (DRISDOL) 75534 units Cap capsule, Take 1 Cap by mouth every 7 days., Disp: 12 Cap, Rfl: 0  •  ondansetron (ZOFRAN) 4 MG Tab tablet, Take 1 Tab by mouth every 6 hours as needed for Nausea/Vomiting., Disp: 20 Tab, Rfl: 1  •  naproxen (ALEVE) 220 MG tablet, Take 220 mg by mouth 2 times a day, with meals., Disp: , Rfl:   •  diphenhydrAMINE (BENADRYL) 25 MG Tab, Take 25 mg by mouth at bedtime as needed (ALLERGIES).,  "Disp: , Rfl:   •  Multiple Vitamins-Minerals (MULTIVITAMIN ADULT PO), Take 1 Tab by mouth every day., Disp: , Rfl:   •  estradiol (ESTRACE) 1 MG TABS, Take 1 mg by mouth every day. From gyn, Disp: , Rfl:     ALLERGIES  Allergies   Allergen Reactions   • Bee Anaphylaxis   • Levaquin Anaphylaxis     Myalgias arthralgias    • Avelox [Moxifloxacin Hcl In Nacl] Swelling   • Phenergan  [Benzyl Alc-Promethazine]    • Promethazine      Twitching feeling   • Tape      Redness; itching \"paper tape ok\"       PHYSICAL EXAM  VITAL SIGNS: /65   Pulse 73   Temp 36.7 °C (98 °F) (Oral)   Resp 12   Ht 1.562 m (5' 1.5\")   Wt 86.2 kg (190 lb)   SpO2 96%   BMI 35.32 kg/m²     Nursing note and vitals reviewed.  Constitutional: Well-developed and well-nourished. No distress.   HENT: Head is normocephalic and atraumatic. Oropharynx is clear and moist without exudate or erythema.   Eyes: Pupils are equal, round, and reactive to light. Conjunctiva are normal.   Cardiovascular: Normal rate and regular rhythm. No murmur heard. Normal radial pulses.   Pulmonary/Chest: Breath sounds normal. No wheezes or rales. No chest wall tenderness.   Abdominal: Soft and non-tender. No distention   Musculoskeletal: Extremities exhibit normal range of motion without edema or tenderness. No calf tenderness or palpable cords.   Neurological: Awake, alert and oriented to person, place, and time. No focal deficits noted.  Skin: Skin is warm and dry. No rash.   Psychiatric: Normal mood and affect. Appropriate for clinical situation    DIAGNOSTIC STUDIES / PROCEDURES    EKG Interpretation  Interpreted by me as below    LABS  Results for orders placed or performed during the hospital encounter of 04/05/20   CBC WITH DIFFERENTIAL   Result Value Ref Range    WBC 11.1 (H) 4.8 - 10.8 K/uL    RBC 5.29 4.20 - 5.40 M/uL    Hemoglobin 15.6 12.0 - 16.0 g/dL    Hematocrit 45.9 37.0 - 47.0 %    MCV 86.8 81.4 - 97.8 fL    MCH 29.5 27.0 - 33.0 pg    MCHC 34.0 33.6 - " 35.0 g/dL    RDW 41.4 35.9 - 50.0 fL    Platelet Count 309 164 - 446 K/uL    MPV 9.9 9.0 - 12.9 fL    Neutrophils-Polys 73.20 (H) 44.00 - 72.00 %    Lymphocytes 19.70 (L) 22.00 - 41.00 %    Monocytes 5.60 0.00 - 13.40 %    Eosinophils 0.60 0.00 - 6.90 %    Basophils 0.50 0.00 - 1.80 %    Immature Granulocytes 0.40 0.00 - 0.90 %    Nucleated RBC 0.00 /100 WBC    Neutrophils (Absolute) 8.16 (H) 2.00 - 7.15 K/uL    Lymphs (Absolute) 2.19 1.00 - 4.80 K/uL    Monos (Absolute) 0.62 0.00 - 0.85 K/uL    Eos (Absolute) 0.07 0.00 - 0.51 K/uL    Baso (Absolute) 0.06 0.00 - 0.12 K/uL    Immature Granulocytes (abs) 0.04 0.00 - 0.11 K/uL    NRBC (Absolute) 0.00 K/uL   COMP METABOLIC PANEL   Result Value Ref Range    Sodium 136 135 - 145 mmol/L    Potassium 3.8 3.6 - 5.5 mmol/L    Chloride 100 96 - 112 mmol/L    Co2 21 20 - 33 mmol/L    Anion Gap 15.0 7.0 - 16.0    Glucose 134 (H) 65 - 99 mg/dL    Bun 15 8 - 22 mg/dL    Creatinine 0.77 0.50 - 1.40 mg/dL    Calcium 9.8 8.5 - 10.5 mg/dL    AST(SGOT) 17 12 - 45 U/L    ALT(SGPT) 18 2 - 50 U/L    Alkaline Phosphatase 68 30 - 99 U/L    Total Bilirubin 0.2 0.1 - 1.5 mg/dL    Albumin 4.2 3.2 - 4.9 g/dL    Total Protein 6.6 6.0 - 8.2 g/dL    Globulin 2.4 1.9 - 3.5 g/dL    A-G Ratio 1.8 g/dL   TROPONIN   Result Value Ref Range    Troponin T 7 6 - 19 ng/L   proBrain Natriuretic Peptide, NT   Result Value Ref Range    NT-proBNP 34 0 - 125 pg/mL   ESTIMATED GFR   Result Value Ref Range    GFR If African American >60 >60 mL/min/1.73 m 2    GFR If Non African American >60 >60 mL/min/1.73 m 2   EKG   Result Value Ref Range    Report       [facility] Emergency Dept.    Test Date:  2020  Pt Name:    ANASTACIO PANIAGUA             Department: ER  MRN:        [id number]                      Room:        10  Gender:     Female                       Technician: [id number]  :        1960                   Requested By:KAELA SUTHERLAND  Order #:    [id number]                     Reading MD: KAELA SUTHERLAND MD    Measurements  Intervals                                Axis  Rate:       75                           P:          39  LA:         152                          QRS:        24  QRSD:       86                           T:          50  QT:         408  QTc:        456    Interpretive Statements  SINUS RHYTHM  Compared to ECG 01/29/2018 13:53:49  No significant changes  Electronically Signed On 4-5-2020 12:17:44 PDT by KAELA SUTHERLAND MD       All labs reviewed by me.    RADIOLOGY  DX-CHEST-PORTABLE (1 VIEW)   Final Result      No acute cardiopulmonary disease.        The radiologist's interpretation of all radiological studies have been reviewed by me.    COURSE & MEDICAL DECISION MAKING  Nursing notes, VS, PMSFHx reviewed in chart.     10:57 AM - Patient seen and examined at bedside.  Ordered DX-chest, CBC with diff, CMP, troponin, BNP, and EKG to evaluate her symptoms. The differential diagnoses include but are not limited to: ACS, chest pain, viral syndrome, pneumonia.      12:14 PM - Patient was reevaluated at bedside. Discussed lab and radiology results with the patient and informed them that I would like to have them hospitalized for a stress test. Patient was understanding and agreeable to updated plan of care.     12:17 PM - I discussed the patient's case and the above findings with Dr. Reza (Hospitalist) who agrees to evaluate the patient for hospitalization.       Patient presents today with chest pain radiating up into her neck.  She has multiple risk factors for coronary artery disease.  Initial troponin is not elevated.  EKG does not demonstrate evidence of ischemia.  However the patient would likely benefit from the stratification.  I spoke with the on-call hospitalist for admission.    DISPOSITION:  Patient will be hospitalized by Dr. Reza in guarded condition.    FINAL IMPRESSION  1. Acute chest pain          Gilbert KAUR (Scribe), venita scribing for,  and in the presence of, Hamlet Manrique M.D..    Electronically signed by: Gilbert Ochoa (Scribe), 4/5/2020    I, Hamlet Manrique M.D. personally performed the services described in this documentation, as scribed by Gilbert Ochoa in my presence, and it is both accurate and complete. C.    The note accurately reflects work and decisions made by me.  Hamlet Manrique M.D.  4/5/2020  12:42 PM

## 2020-04-05 NOTE — H&P
[facility] Medicine History & Physical Note    Date of Service  4/5/2020    Primary Care Physician  Crissy Tang D.O.    Consultants  None    Code Status  Code    Chief Complaint  Chest pain, flushed feeling    History of Presenting Illness  60 y.o. female who presented 4/5/2020 with the above complaints, she states that she was undergoing a treatment for infected tooth approximately 3 weeks ago she required a root canal and was on antibiotics for 2 weeks, amoxicillin was prescribed, since then she has not felt well, developed some central chest pain in her upper chest radiating into her neck.  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  The patient has a history of hypertension, dyslipidemia and a psychiatric diagnosis.  She has a history of tobacco use.  She refers to the ER for evaluation, here found a slight elevation of leukocytes, blood glucose is slightly elevated, chest x-ray is found normal, and EKG shows a sinus rhythm rate of 75 without acute ST-T changes.  The patient denies dyspnea on exertion, she denies lower extremity fullness, pain, she reports being active, she denies any direct exposure to patients with upper respiratory symptoms recently.    Review of Systems  Review of Systems   Constitutional: Positive for malaise/fatigue. Negative for chills and fever.   HENT: Negative.    Eyes: Negative.    Respiratory: Negative.  Negative for cough, hemoptysis, sputum production and shortness of breath.    Cardiovascular: Positive for chest pain and palpitations. Negative for orthopnea, claudication, leg swelling and PND.   Gastrointestinal: Negative.  Negative for heartburn, nausea and vomiting.   Genitourinary: Negative.  Negative for dysuria and frequency.   Musculoskeletal: Negative.  Negative for back pain and neck  "pain.   Skin: Negative.  Negative for itching and rash.   Neurological: Negative.  Negative for dizziness, focal weakness, weakness and headaches.   Endo/Heme/Allergies: Negative.  Negative for polydipsia. Does not bruise/bleed easily.   Psychiatric/Behavioral: Negative for depression. The patient is nervous/anxious.        Past Medical History   has a past medical history of Allergy, Arthritis, ASTHMA, Basal cell carcinoma of left medial cheek (11/30/2015), Basal cell carcinoma of left medial cheek (11/30/2015), Depression (1/7/2010), High cholesterol, History of tobacco use disorder (1/7/2010), Hypertension, Pneumonia (1990,2001), and Post-menopausal (8/26/2014).  Recent dental infection, status post 2weeks treatment with antibiotics and root canal    Surgical History   has a past surgical history that includes cyst excision (1974); abdominal hysterectomy total (11/2005); tonsillectomy and adenoidectomy (1980); knee arthroscopy (Right, 3/23/2017); medial meniscectomy (3/23/2017); chondroplasty (3/23/2017); nasal septal reconstruction (1980); knee arthroscopy (Left, 2/5/2018); and medial meniscectomy (Left, 2/5/2018).     Family History  family history includes Arthritis in her mother; Diabetes in her sister; Heart Disease in her father; Hyperlipidemia in her mother.     Social History   reports that she has been smoking cigarettes. She started smoking about 22 years ago. She has a 9.50 pack-year smoking history. She has never used smokeless tobacco. She reports that she does not drink alcohol or use drugs.    Allergies  Allergies   Allergen Reactions   • Bee Anaphylaxis   • Levofloxacin [Levaquin] Anaphylaxis     Myalgias arthralgias    • Moxifloxacin Hcl In Nacl Swelling   • Promethazine      Twitching feeling   • Tape      Redness; itching \"paper tape ok\"       Medications  Prior to Admission Medications   Prescriptions Last Dose Informant Patient Reported? Taking?   Cholecalciferol (VITAMIN D3) 125 MCG (5000 " UT) Cap 4/4/2020 at Unknown time  Yes Yes   Sig: Take 5,000 Units by mouth every day.   Multiple Vitamins-Minerals (MULTIVITAMIN ADULT PO) 4/4/2020 at Unknown time Patient Yes No   Sig: Take 1 Tab by mouth every day.   albuterol 108 (90 Base) MCG/ACT Aero Soln inhalation aerosol unknown at Unknown time  No No   Sig: Inhale 2 Puffs by mouth every 6 hours as needed for Shortness of Breath.   diphenhydrAMINE (BENADRYL) 25 MG Tab unknown at Unknown time Patient Yes No   Sig: Take 25 mg by mouth at bedtime as needed (ALLERGIES).   escitalopram (LEXAPRO) 10 MG Tab 4/4/2020 at Unknown time  No No   Sig: TAKE 1 TABLET BY MOUTH EVERY DAY   estradiol (ESTRACE) 1 MG TABS 4/4/2020 at Unknown time Patient Yes No   Sig: Take 1 mg by mouth every day. From gyn   hydroCHLOROthiazide (HYDRODIURIL) 25 MG Tab 4/4/2020 at Unknown time  No No   Sig: TAKE 1 TABLET BY MOUTH EVERY DAY   naproxen (ALEVE) 220 MG tablet unknown at Unknown time Patient Yes No   Sig: Take 220 mg by mouth 2 times a day as needed.   ondansetron (ZOFRAN) 4 MG Tab tablet unknown at Unknown time  No No   Sig: Take 1 Tab by mouth every 6 hours as needed for Nausea/Vomiting.   simvastatin (ZOCOR) 40 MG Tab 4/4/2020 at Unknown time  No No   Sig: Take 1 Tab by mouth every evening.   temazepam (RESTORIL) 15 MG Cap unknown at Unknown time  Yes No   Sig: Take 15 mg by mouth at bedtime as needed for Sleep.   verapamil ER (CALAN SR) 120 MG CAPSULE SR 24 HR 4/5/2020 at Unknown time  No No   Sig: TAKE 1 CAPSULE BY MOUTH EVERY DAY      Facility-Administered Medications: None       Physical Exam  Temp:  [36.7 °C (98 °F)] 36.7 °C (98 °F)  Pulse:  [73-79] 73  Resp:  [11-12] 12  BP: (112-144)/(63-78) 112/65  SpO2:  [96 %-97 %] 96 %    Physical Exam  Vitals signs and nursing note reviewed.   Constitutional:       Appearance: She is well-developed. She is not diaphoretic.   HENT:      Head: Normocephalic and atraumatic.      Nose: Nose normal.   Eyes:      Conjunctiva/sclera:  Conjunctivae normal.      Pupils: Pupils are equal, round, and reactive to light.   Neck:      Musculoskeletal: Normal range of motion and neck supple. No neck rigidity or muscular tenderness.      Thyroid: No thyromegaly.      Vascular: No JVD.   Cardiovascular:      Rate and Rhythm: Normal rate and regular rhythm.      Pulses: Normal pulses.      Heart sounds: Normal heart sounds. No murmur. No friction rub. No gallop.    Pulmonary:      Effort: Pulmonary effort is normal. No respiratory distress.      Breath sounds: Normal breath sounds. No stridor. No wheezing, rhonchi or rales.   Chest:      Chest wall: No tenderness.   Abdominal:      General: Bowel sounds are normal. There is no distension.      Palpations: Abdomen is soft. There is no mass.      Tenderness: There is no abdominal tenderness. There is no guarding or rebound.   Musculoskeletal: Normal range of motion.         General: No tenderness.   Lymphadenopathy:      Cervical: No cervical adenopathy.   Skin:     General: Skin is warm and dry.   Neurological:      Mental Status: She is alert and oriented to person, place, and time.      Cranial Nerves: No cranial nerve deficit.   Psychiatric:         Behavior: Behavior normal.         Laboratory:  Recent Labs     04/05/20  1113   WBC 11.1*   RBC 5.29   HEMOGLOBIN 15.6   HEMATOCRIT 45.9   MCV 86.8   MCH 29.5   MCHC 34.0   RDW 41.4   PLATELETCT 309   MPV 9.9     Recent Labs     04/05/20  1113   SODIUM 136   POTASSIUM 3.8   CHLORIDE 100   CO2 21   GLUCOSE 134*   BUN 15   CREATININE 0.77   CALCIUM 9.8     Recent Labs     04/05/20  1113   ALTSGPT 18   ASTSGOT 17   ALKPHOSPHAT 68   TBILIRUBIN 0.2   GLUCOSE 134*         Recent Labs     04/05/20  1113   NTPROBNP 34         Recent Labs     04/05/20  1113   TROPONINT 7       Urinalysis:    No results found     Imaging:  DX-CHEST-PORTABLE (1 VIEW)   Final Result      No acute cardiopulmonary disease.      EC-ECHOCARDIOGRAM COMPLETE W/O CONT    (Results Pending)          Assessment/Plan:  I anticipate this patient is appropriate for observation status at this time.    Pain in the chest- (present on admission)  Assessment & Plan  This presents fairly atypical, the patient might have some risk factors for VTE, I will go ahead and order a d-dimer  Consider CTA  Follow-up troponin levels, order ongoing telemetry monitoring  Echocardiogram  The patient might be appropriate for outpatient stress testing follow-up with cardiology if everything is negative here  Monitor blood pressure and adjust medication as indicated  Rule out possibility of this being esophageal, prescribed PPI and GI cocktail.    Hyperlipidemia- (present on admission)  Assessment & Plan  Continue statin    Essential hypertension- (present on admission)  Assessment & Plan  Currently controlled, monitor, consider medication change or addition of beta-blocker    Plan  Chest pain in a elderly female with risk factors  Presentation though is fairly atypical  Patient complains of upper chest/throat pain/neck pain  Intermittent feelings of being flushed, sweaty, nauseous  Rule out coronary syndrome  Consider CT chest CTA to rule out PE pending d-dimer  Consider outpatient stress test in light of current infectious disease restrictions  See orders    VTE prophylaxis: Lovenox

## 2020-04-06 ENCOUNTER — APPOINTMENT (OUTPATIENT)
Dept: RADIOLOGY | Facility: MEDICAL CENTER | Age: 60
End: 2020-04-06
Attending: NURSE PRACTITIONER
Payer: COMMERCIAL

## 2020-04-06 VITALS
WEIGHT: 194.67 LBS | TEMPERATURE: 98 F | RESPIRATION RATE: 18 BRPM | BODY MASS INDEX: 35.82 KG/M2 | SYSTOLIC BLOOD PRESSURE: 142 MMHG | DIASTOLIC BLOOD PRESSURE: 79 MMHG | HEIGHT: 62 IN | HEART RATE: 76 BPM | OXYGEN SATURATION: 96 %

## 2020-04-06 PROBLEM — R07.9 PAIN IN THE CHEST: Status: RESOLVED | Noted: 2020-04-05 | Resolved: 2020-04-06

## 2020-04-06 LAB
ALBUMIN SERPL BCP-MCNC: 4.2 G/DL (ref 3.2–4.9)
ALBUMIN/GLOB SERPL: 1.7 G/DL
ALP SERPL-CCNC: 68 U/L (ref 30–99)
ALT SERPL-CCNC: 17 U/L (ref 2–50)
ANION GAP SERPL CALC-SCNC: 16 MMOL/L (ref 7–16)
AST SERPL-CCNC: 15 U/L (ref 12–45)
BASOPHILS # BLD AUTO: 0.5 % (ref 0–1.8)
BASOPHILS # BLD: 0.05 K/UL (ref 0–0.12)
BILIRUB SERPL-MCNC: 0.4 MG/DL (ref 0.1–1.5)
BUN SERPL-MCNC: 10 MG/DL (ref 8–22)
CALCIUM SERPL-MCNC: 9.7 MG/DL (ref 8.5–10.5)
CHLORIDE SERPL-SCNC: 99 MMOL/L (ref 96–112)
CHOLEST SERPL-MCNC: 234 MG/DL (ref 100–199)
CO2 SERPL-SCNC: 23 MMOL/L (ref 20–33)
CREAT SERPL-MCNC: 0.61 MG/DL (ref 0.5–1.4)
EOSINOPHIL # BLD AUTO: 0.21 K/UL (ref 0–0.51)
EOSINOPHIL NFR BLD: 1.9 % (ref 0–6.9)
ERYTHROCYTE [DISTWIDTH] IN BLOOD BY AUTOMATED COUNT: 42.2 FL (ref 35.9–50)
GLOBULIN SER CALC-MCNC: 2.5 G/DL (ref 1.9–3.5)
GLUCOSE SERPL-MCNC: 97 MG/DL (ref 65–99)
HCT VFR BLD AUTO: 47.8 % (ref 37–47)
HDLC SERPL-MCNC: 54 MG/DL
HGB BLD-MCNC: 16.3 G/DL (ref 12–16)
IMM GRANULOCYTES # BLD AUTO: 0.03 K/UL (ref 0–0.11)
IMM GRANULOCYTES NFR BLD AUTO: 0.3 % (ref 0–0.9)
LDLC SERPL CALC-MCNC: 138 MG/DL
LYMPHOCYTES # BLD AUTO: 4.35 K/UL (ref 1–4.8)
LYMPHOCYTES NFR BLD: 39.5 % (ref 22–41)
MAGNESIUM SERPL-MCNC: 2 MG/DL (ref 1.5–2.5)
MCH RBC QN AUTO: 29.8 PG (ref 27–33)
MCHC RBC AUTO-ENTMCNC: 34.1 G/DL (ref 33.6–35)
MCV RBC AUTO: 87.4 FL (ref 81.4–97.8)
MONOCYTES # BLD AUTO: 0.95 K/UL (ref 0–0.85)
MONOCYTES NFR BLD AUTO: 8.6 % (ref 0–13.4)
NEUTROPHILS # BLD AUTO: 5.41 K/UL (ref 2–7.15)
NEUTROPHILS NFR BLD: 49.2 % (ref 44–72)
NRBC # BLD AUTO: 0 K/UL
NRBC BLD-RTO: 0 /100 WBC
PLATELET # BLD AUTO: 331 K/UL (ref 164–446)
PMV BLD AUTO: 9.9 FL (ref 9–12.9)
POTASSIUM SERPL-SCNC: 3.7 MMOL/L (ref 3.6–5.5)
PROT SERPL-MCNC: 6.7 G/DL (ref 6–8.2)
RBC # BLD AUTO: 5.47 M/UL (ref 4.2–5.4)
SODIUM SERPL-SCNC: 138 MMOL/L (ref 135–145)
TRIGL SERPL-MCNC: 208 MG/DL (ref 0–149)
WBC # BLD AUTO: 11 K/UL (ref 4.8–10.8)

## 2020-04-06 PROCEDURE — 80053 COMPREHEN METABOLIC PANEL: CPT

## 2020-04-06 PROCEDURE — 36415 COLL VENOUS BLD VENIPUNCTURE: CPT

## 2020-04-06 PROCEDURE — 700102 HCHG RX REV CODE 250 W/ 637 OVERRIDE(OP): Performed by: NURSE PRACTITIONER

## 2020-04-06 PROCEDURE — 83735 ASSAY OF MAGNESIUM: CPT

## 2020-04-06 PROCEDURE — 700102 HCHG RX REV CODE 250 W/ 637 OVERRIDE(OP): Performed by: HOSPITALIST

## 2020-04-06 PROCEDURE — 99217 PR OBSERVATION CARE DISCHARGE: CPT | Performed by: FAMILY MEDICINE

## 2020-04-06 PROCEDURE — 700111 HCHG RX REV CODE 636 W/ 250 OVERRIDE (IP)

## 2020-04-06 PROCEDURE — 80061 LIPID PANEL: CPT

## 2020-04-06 PROCEDURE — A9270 NON-COVERED ITEM OR SERVICE: HCPCS | Performed by: NURSE PRACTITIONER

## 2020-04-06 PROCEDURE — A9270 NON-COVERED ITEM OR SERVICE: HCPCS | Performed by: HOSPITALIST

## 2020-04-06 PROCEDURE — G0378 HOSPITAL OBSERVATION PER HR: HCPCS

## 2020-04-06 PROCEDURE — 85025 COMPLETE CBC W/AUTO DIFF WBC: CPT

## 2020-04-06 PROCEDURE — A9502 TC99M TETROFOSMIN: HCPCS

## 2020-04-06 RX ORDER — REGADENOSON 0.08 MG/ML
0.4 INJECTION, SOLUTION INTRAVENOUS ONCE
Status: COMPLETED | OUTPATIENT
Start: 2020-04-06 | End: 2020-04-06

## 2020-04-06 RX ORDER — ALPRAZOLAM 0.5 MG/1
0.5 TABLET ORAL 3 TIMES DAILY PRN
Status: DISCONTINUED | OUTPATIENT
Start: 2020-04-06 | End: 2020-04-06 | Stop reason: HOSPADM

## 2020-04-06 RX ORDER — REGADENOSON 0.08 MG/ML
INJECTION, SOLUTION INTRAVENOUS
Status: COMPLETED
Start: 2020-04-06 | End: 2020-04-06

## 2020-04-06 RX ORDER — KETOROLAC TROMETHAMINE 10 MG/1
10 TABLET, FILM COATED ORAL EVERY 8 HOURS PRN
Qty: 9 TAB | Refills: 0 | Status: SHIPPED | OUTPATIENT
Start: 2020-04-06 | End: 2020-04-09

## 2020-04-06 RX ORDER — ALPRAZOLAM 0.5 MG/1
0.5 TABLET ORAL 3 TIMES DAILY
Qty: 9 TAB | Refills: 0 | Status: SHIPPED | OUTPATIENT
Start: 2020-04-06 | End: 2020-04-09

## 2020-04-06 RX ORDER — OMEPRAZOLE 20 MG/1
20 CAPSULE, DELAYED RELEASE ORAL DAILY
Qty: 30 CAP | Refills: 0 | Status: SHIPPED | OUTPATIENT
Start: 2020-04-07 | End: 2020-05-07

## 2020-04-06 RX ORDER — KETOROLAC TROMETHAMINE 10 MG/1
10 TABLET, FILM COATED ORAL EVERY 8 HOURS PRN
Status: DISCONTINUED | OUTPATIENT
Start: 2020-04-06 | End: 2020-04-06 | Stop reason: HOSPADM

## 2020-04-06 RX ADMIN — OMEPRAZOLE 20 MG: 20 CAPSULE, DELAYED RELEASE ORAL at 05:10

## 2020-04-06 RX ADMIN — VERAPAMIL HYDROCHLORIDE 120 MG: 120 CAPSULE, DELAYED RELEASE PELLETS ORAL at 05:10

## 2020-04-06 RX ADMIN — HYDROCHLOROTHIAZIDE 25 MG: 25 TABLET ORAL at 05:10

## 2020-04-06 RX ADMIN — ALPRAZOLAM 0.5 MG: 0.5 TABLET ORAL at 10:26

## 2020-04-06 RX ADMIN — ACETAMINOPHEN 650 MG: 325 TABLET, FILM COATED ORAL at 05:10

## 2020-04-06 RX ADMIN — KETOROLAC TROMETHAMINE 10 MG: 10 TABLET, FILM COATED ORAL at 11:37

## 2020-04-06 RX ADMIN — ESTRADIOL 1 MG: 1 TABLET ORAL at 05:10

## 2020-04-06 RX ADMIN — REGADENOSON 0.4 MG: 0.08 INJECTION, SOLUTION INTRAVENOUS at 13:40

## 2020-04-06 RX ADMIN — ASPIRIN 81 MG: 81 TABLET, COATED ORAL at 05:10

## 2020-04-06 RX ADMIN — ESCITALOPRAM OXALATE 10 MG: 10 TABLET ORAL at 05:10

## 2020-04-06 NOTE — DISCHARGE INSTRUCTIONS
Discharge Instructions    Discharged to home by car with relative. Discharged via wheelchair, hospital escort: Yes.  Special equipment needed: Not Applicable    Be sure to schedule a follow-up appointment with your primary care doctor or any specialists as instructed.     Discharge Plan:   Smoking Cessation Offered: Patient Counseled  Influenza Vaccine Indication: Not indicated: Previously immunized this influenza season and > 8 years of age    I understand that a diet low in cholesterol, fat, and sodium is recommended for good health. Unless I have been given specific instructions below for another diet, I accept this instruction as my diet prescription.   Other diet: regular    Special Instructions: Self-Isolating at Home  If it is determined that you do not need to be hospitalized and can be isolated at home please follow the follow the prevention steps below as based on CDC guidelines.  Stay home except to get medical care  People who are mildly ill with unconfirmed COVID-19 or have any other infectious respiratory illness are able to isolate at home during their illness. You should restrict activities outside your home, except for getting medical care. Do not go to work, school, or public areas. Avoid using public transportation, ride-sharing, or taxis.  Call ahead before visiting your doctor  If you have a medical appointment, call the healthcare provider and tell them that you have or may have unconfirmed COVID-19 or another possibly contagious respiratory illness. This will help the healthcare provider’s office take steps to keep other people from getting infected or exposed.  Separate yourself from other people and animals in your home  As much as possible, you should stay in a specific room and away from other people in your home. Also, you should use a separate bathroom, if available.  You should restrict contact with pets and other animals while you are sick, just like you would around other people. When  possible, have another member of your household care for your animals while you are sick. If you must care for your pet or be around animals while you are sick, wash your hands before and after you interact with pets.  Wear a facemask  You should wear a facemask when you are around other people (e.g., sharing a room or vehicle) or pets and before you enter a healthcare provider’s office. If you are not able to wear a facemask (for example, because it causes trouble breathing), then people who live with you should not stay in the same room with you, or they should wear a facemask if they enter your room.  Cover your coughs and sneezes  Cover your mouth and nose with a tissue when you cough or sneeze. Throw used tissues in a lined trash can. Immediately wash your hands with soap and water for at least 20 seconds or, if soap and water are not available, clean your hands with an alcohol-based hand  that contains at least 60% alcohol.  Clean your hands often  Wash your hands often with soap and water for at least 20 seconds, especially after blowing your nose, coughing, or sneezing; going to the bathroom; and before eating or preparing food. If soap and water are not readily available, use an alcohol-based hand  with at least 60% alcohol, covering all surfaces of your hands and rubbing them together until they feel dry.  Soap and water are the best option if hands are visibly dirty. Avoid touching your eyes, nose, and mouth with unwashed hands.  Avoid sharing personal household items  You should not share dishes, drinking glasses, cups, eating utensils, towels, or bedding with other people or pets in your home. After using these items, they should be washed thoroughly with soap and water.  Clean all “high-touch” surfaces everyday  High touch surfaces include counters, tabletops, doorknobs, bathroom fixtures, toilets, phones, keyboards, tablets, and bedside tables. Also, clean any surfaces that may have  blood, stool, or body fluids on them. Use a household cleaning spray or wipe, according to the label instructions. Labels contain instructions for safe and effective use of the cleaning product including precautions you should take when applying the product, such as wearing gloves and making sure you have good ventilation during use of the product.  Monitor your symptoms  Seek prompt medical attention if your illness is worsening (e.g., difficulty breathing). Before seeking care, call your healthcare provider and tell them that you have, or are being evaluated for, unconfirmed COVID-19 or another infectious respiratory illness. Put on a facemask before you enter the facility. These steps will help the healthcare provider’s office to keep other people in the office or waiting room from getting infected or exposed. Ask your healthcare provider to call the local or [facility] health department. Persons who are placed under active monitoring or facilitated self-monitoring should follow instructions provided by their local health department or occupational health professionals, as appropriate. When working with your local health department check their available hours.  If you have a medical emergency and need to call 911, notify the dispatch personnel that you have, or are being evaluated for unconfirmed COVID-19 or another infectious respiratory illness. If possible, put on a facemask before emergency medical services arrive.  Discontinuing home isolation  Patients with unconfirmed COVID-19 or other infectious respiratory illnesses should remain under home isolation precautions until the risk of secondary transmission to others is thought to be low. In general that means 72 hours after fever resolves without the use of fever reducing medications, AND symptoms have improved AND at least 7 days since symptoms first appeared. If you have questions or concerns consult your healthcare providers or your local health  department.  Per CDC guidelines, you are not required to provide a healthcare provider’s note to validate your illness or to return to work, as healthcare provider offices and medical facilities may be extremely busy and not able to provide such documentation in a timely way.    Chest Pain Observation  It is often hard to give a specific diagnosis for the cause of chest pain. Among other possibilities your symptoms might be caused by inadequate oxygen delivery to your heart (angina). Angina that is not treated or evaluated can lead to a heart attack (myocardial infarction) or death.  Blood tests, electrocardiograms, and X-rays may have been done to help determine a possible cause of your chest pain. After evaluation and observation, your health care provider has determined that it is unlikely your pain was caused by an unstable condition that requires hospitalization. However, a full evaluation of your pain may need to be completed, with additional diagnostic testing as directed. It is very important to keep your follow-up appointments. Not keeping your follow-up appointments could result in permanent heart damage, disability, or death. If there is any problem keeping your follow-up appointments, you must call your health care provider.  HOME CARE INSTRUCTIONS   Due to the slight chance that your pain could be angina, it is important to follow your health care provider's treatment plan and also maintain a healthy lifestyle:  · Maintain or work toward achieving a healthy weight.  · Stay physically active and exercise regularly.  · Decrease your salt intake.  · Eat a balanced, healthy diet. Talk to a dietitian to learn about heart-healthy foods.  · Increase your fiber intake by including whole grains, vegetables, fruits, and nuts in your diet.  · Avoid situations that cause stress, anger, or depression.  · Take medicines as advised by your health care provider. Report any side effects to your health care provider. Do  not stop medicines or adjust the dosages on your own.  · Quit smoking. Do not use nicotine patches or gum until you check with your health care provider.  · Keep your blood pressure, blood sugar, and cholesterol levels within normal limits.  · Limit alcohol intake to no more than 1 drink per day for women who are not pregnant and 2 drinks per day for men.  · Do not abuse drugs.  SEEK IMMEDIATE MEDICAL CARE IF:  You have severe chest pain or pressure which may include symptoms such as:  · You feel pain or pressure in your arms, neck, jaw, or back.  · You have severe back or abdominal pain, feel sick to your stomach (nauseous), or throw up (vomit).  · You are sweating profusely.  · You are having a fast or irregular heartbeat.  · You feel short of breath while at rest.  · You notice increasing shortness of breath during rest, sleep, or with activity.  · You have chest pain that does not get better after rest or after taking your usual medicine.  · You wake from sleep with chest pain.  · You are unable to sleep because you cannot breathe.  · You develop a frequent cough or you are coughing up blood.  · You feel dizzy, faint, or experience extreme fatigue.  · You develop severe weakness, dizziness, fainting, or chills.  Any of these symptoms may represent a serious problem that is an emergency. Do not wait to see if the symptoms will go away. Call your local emergency services (911 in the [place] Do not drive yourself to the hospital.  MAKE SURE YOU:  · Understand these instructions.  · Will watch your condition.  · Will get help right away if you are not doing well or get worse.     This information is not intended to replace advice given to you by your health care provider. Make sure you discuss any questions you have with your health care provider.     Document Released: 01/20/2012 Document Revised: 12/23/2014 Document Reviewed: 06/19/2014  Elsevier Interactive Patient Education ©2016 [employer]  Omeprazole  capsules (sprinkle caps) - Rx  What is this medicine?  OMEPRAZOLE (oh ME pray zol) prevents the production of acid in the stomach. It is used to treat gastroesophageal reflux disease (GERD), ulcers, certain bacteria in the stomach, inflammation of the esophagus, and Zollinger-Lucero Syndrome. It is also used to treat other conditions that cause too much stomach acid.  This medicine may be used for other purposes; ask your health care provider or pharmacist if you have questions.  COMMON BRAND NAME(S): Prilosec  What should I tell my health care provider before I take this medicine?  They need to know if you have any of these conditions:  -liver disease  -low levels of magnesium in the blood  -lupus  -an unusual or allergic reaction to omeprazole, other medicines, foods, dyes, or preservatives  -pregnant or trying to get pregnant  -breast-feeding  How should I use this medicine?  Take this medicine by mouth with a glass of water. Follow the directions on the prescription label. Do not crush, break or chew the capsules. They can be opened and the contents sprinkled on a small amount of applesauce or yogurt, given with fruit juices, or swallowed immediately with water. This medicine works best if taken on an empty stomach 30 to 60 minutes before breakfast. Take your doses at regular intervals. Do not take your medicine more often than directed.  Talk to your pediatrician regarding the use of this medicine in children. Special care may be needed.  Overdosage: If you think you have taken too much of this medicine contact a poison control center or emergency room at once.  NOTE: This medicine is only for you. Do not share this medicine with others.  What if I miss a dose?  If you miss a dose, take it as soon as you can. If it is almost time for your next dose, take only that dose. Do not take double or extra doses.  What may interact with this medicine?  Do not take this medicine with any of the following  medications:  -atazanavir  -clopidogrel  -nelfinavir  This medicine may also interact with the following medications:  -ampicillin  -certain medicines for anxiety or sleep  -certain medicines that treat or prevent blood clots like warfarin  -cyclosporine  -diazepam  -digoxin  -disulfiram  -diuretics  -iron salts  -methotrexate  -mycophenolate mofetil  -phenytoin  -prescription medicine for fungal or yeast infection like itraconazole, ketoconazole, voriconazole  -saquinavir  -tacrolimus  This list may not describe all possible interactions. Give your health care provider a list of all the medicines, herbs, non-prescription drugs, or dietary supplements you use. Also tell them if you smoke, drink alcohol, or use illegal drugs. Some items may interact with your medicine.  What should I watch for while using this medicine?  It can take several days before your stomach pain gets better. Check with your doctor or health care professional if your condition does not start to get better, or if it gets worse.  You may need blood work done while you are taking this medicine.  What side effects may I notice from receiving this medicine?  Side effects that you should report to your doctor or health care professional as soon as possible:  -allergic reactions like skin rash, itching or hives, swelling of the face, lips, or tongue  -bone, muscle or joint pain  -breathing problems  -chest pain or chest tightness  -dark yellow or brown urine  -dizziness  -fast, irregular heartbeat  -feeling faint or lightheaded  -fever or sore throat  -muscle spasm  -palpitations  -rash on cheeks or arms that gets worse in the sun  -redness, blistering, peeling or loosening of the skin, including inside the mouth  -seizures  -tremors  -unusual bleeding or bruising  -unusually weak or tired  -yellowing of the eyes or skin  Side effects that usually do not require medical attention (report to your doctor or health care professional if they continue or  are bothersome):  -constipation  -diarrhea  -dry mouth  -headache  -nausea  This list may not describe all possible side effects. Call your doctor for medical advice about side effects. You may report side effects to FDA at 2-198-FDA-7299.  Where should I keep my medicine?  Keep out of the reach of children.  Store at room temperature between 15 and 30 degrees C (59 and 86 degrees F). Protect from light and moisture. Throw away any unused medicine after the expiration date.  NOTE: This sheet is a summary. It may not cover all possible information. If you have questions about this medicine, talk to your doctor, pharmacist, or health care provider.  © 2018 Elsevier/Gold Standard (2017-01-19 12:18:47)  Ketorolac tablets  What is this medicine?  KETOROLAC (rik toe ROLE ak) is a non-steroidal anti-inflammatory drug (NSAID). It is used for a short while to treat moderate to severe pain, including pain after surgery. It should not be used for more than 5 days.  This medicine may be used for other purposes; ask your health care provider or pharmacist if you have questions.  COMMON BRAND NAME(S): Toradol  What should I tell my health care provider before I take this medicine?  They need to know if you have any of these conditions:  -asthma  -bleeding problems like hemophilia  -cigarette smoker  -drink more than 3 alcohol containing drinks a day  -heart disease or circulation problems such as heart failure or leg edema (fluid retention)  -high blood pressure  -kidney disease  -liver disease  -stomach bleeding or ulcers  -an unusual or allergic reaction to ketorolac, aspirin, other NSAIDs, other medicines, foods, dyes, or preservatives  -pregnant or trying to get pregnant  -breast-feeding  How should I use this medicine?  Take this medicine by mouth with a full glass of water. Follow the directions on the prescription label. Take your medicine at regular intervals. Do not take your medicine more often than directed. Do not  take more than the recommended dose.  A special MedGuide will be given to you by the pharmacist with each prescription and refill. Be sure to read this information carefully each time.  Talk to your pediatrician regarding the use of this medicine in children. While this drug may be prescribed for children as young as 16 years of age for selected conditions, precautions do apply.  Patients over 65 years old may have a stronger reaction and need a smaller dose.  Overdosage: If you think you have taken too much of this medicine contact a poison control center or emergency room at once.  NOTE: This medicine is only for you. Do not share this medicine with others.  What if I miss a dose?  If you miss a dose, take it as soon as you can. If it is almost time for your next dose, take only that dose. Do not take double or extra doses.  What may interact with this medicine?  Do not take this medicine with any of the following medications:  -aspirin and aspirin-like medicines  -cidofovir  -methotrexate  -NSAIDs, medicines for pain and inflammation, like ibuprofen or naproxen  -pemetrexed  -probenecid  This medicine may also interact with the following medications:  -alcohol  -alendronate  -alprazolam  -carbamazepine  -cyclosporine  -diuretics  -flavocoxid  -fluoxetine  -ginkgo  -lithium  -medicines for high blood pressure like enalapril  -medicines that affect platelets like pentoxifylline  -medicines that treat or prevent blood clots like heparin, warfarin  -muscle relaxants  -phenytoin  -steroid medicines like prednisone or cortisone  -thiothixene  This list may not describe all possible interactions. Give your health care provider a list of all the medicines, herbs, non-prescription drugs, or dietary supplements you use. Also tell them if you smoke, drink alcohol, or use illegal drugs. Some items may interact with your medicine.  What should I watch for while using this medicine?  Tell your doctor or health care  professional if your pain does not get better. Talk to your doctor before taking another medicine for pain. Do not treat yourself.  This medicine does not prevent heart attack or stroke. In fact, this medicine may increase the chance of a heart attack or stroke. The chance may increase with longer use of this medicine and in people who have heart disease. If you take aspirin to prevent heart attack or stroke, talk with your doctor or health care professional.  Do not take medicines such as ibuprofen and naproxen with this medicine. Side effects such as stomach upset, nausea, or ulcers may be more likely to occur. Many medicines available without a prescription should not be taken with this medicine.  This medicine can cause ulcers and bleeding in the stomach and intestines at any time during treatment. Do not smoke cigarettes or drink alcohol. These increase irritation to your stomach and can make it more susceptible to damage from this medicine. Ulcers and bleeding can happen without warning symptoms and can cause death.  You may get drowsy or dizzy. Do not drive, use machinery, or do anything that needs mental alertness until you know how this medicine affects you. Do not stand or sit up quickly, especially if you are an older patient. This reduces the risk of dizzy or fainting spells.  This medicine can cause you to bleed more easily. Try to avoid damage to your teeth and gums when you brush or floss your teeth.  What side effects may I notice from receiving this medicine?  Side effects that you should report to your doctor or health care professional as soon as possible:  -allergic reactions like skin rash, itching or hives, swelling of the face, lips, or tongue  -breathing problems  -high blood pressure  -nausea, vomiting  -redness, blistering, peeling or loosening of the skin, including inside the mouth  -severe stomach pain  -signs and symptoms of bleeding such as bloody or black, tarry stools; red or  dark-brown urine; spitting up blood or brown material that looks like coffee grounds; red spots on the skin; unusual bruising or bleeding from the eye, gums, or nose  -signs and symptoms of a stroke like changes in vision; confusion; trouble speaking or understanding; severe headaches; sudden numbness or weakness of the face, arm or leg; trouble walking; dizziness; loss of balance or coordination  -trouble passing urine or change in the amount of urine  -unexplained weight gain or swelling  -unusually weak or tired  -yellowing of eyes or skin  Side effects that usually do not require medical attention (report to your doctor or health care professional if they continue or are bothersome):  -diarrhea  -dizziness  -headache  -heartburn  This list may not describe all possible side effects. Call your doctor for medical advice about side effects. You may report side effects to FDA at 2-662-FDA-7877.  Where should I keep my medicine?  Keep out of the reach of children.  Store at room temperature between 20 and 25 degrees C (68 and 77 degrees F). Throw away any unused medicine after the expiration date.  NOTE: This sheet is a summary. It may not cover all possible information. If you have questions about this medicine, talk to your doctor, pharmacist, or health care provider.  © 2018 Elsevier/Gold Standard (2014-05-06 16:36:05)  Alprazolam tablets  What is this medicine?  ALPRAZOLAM (al PRAY blanca quigley) is a benzodiazepine. It is used to treat anxiety and panic attacks.  This medicine may be used for other purposes; ask your health care provider or pharmacist if you have questions.  COMMON BRAND NAME(S): Xanax  What should I tell my health care provider before I take this medicine?  They need to know if you have any of these conditions:  -an alcohol or drug abuse problem  -bipolar disorder, depression, psychosis or other mental health conditions  -glaucoma  -kidney or liver disease  -lung or breathing disease  -myasthenia  gravis  -Parkinson's disease  -porphyria  -seizures or a history of seizures  -suicidal thoughts  -an unusual or allergic reaction to alprazolam, other benzodiazepines, foods, dyes, or preservatives  -pregnant or trying to get pregnant  -breast-feeding  How should I use this medicine?  Take this medicine by mouth with a glass of water. Follow the directions on the prescription label. Take your medicine at regular intervals. Do not take it more often than directed. Do not stop taking except on your doctor's advice.  A special MedGuide will be given to you by the pharmacist with each prescription and refill. Be sure to read this information carefully each time.  Talk to your pediatrician regarding the use of this medicine in children. Special care may be needed.  Overdosage: If you think you have taken too much of this medicine contact a poison control center or emergency room at once.  NOTE: This medicine is only for you. Do not share this medicine with others.  What if I miss a dose?  If you miss a dose, take it as soon as you can. If it is almost time for your next dose, take only that dose. Do not take double or extra doses.  What may interact with this medicine?  Do not take this medicine with any of the following medications:  -certain antiviral medicines for HIV or AIDS like delavirdine, indinavir  -certain medicines for fungal infections like ketoconazole and itraconazole  -narcotic medicines for cough  -sodium oxybate  This medicine may also interact with the following medications:  -alcohol  -antihistamines for allergy, cough and cold  -certain antibiotics like clarithromycin, erythromycin, isoniazid, rifampin, rifapentine, rifabutin, and troleandomycin  -certain medicines for blood pressure, heart disease, irregular heart beat  -certain medicines for depression, like amitriptyline, fluoxetine, sertraline  -certain medicines for seizures like carbamazepine, oxcarbazepine, phenobarbital, phenytoin,  primidone  -cimetidine  -cyclosporine  -female hormones, like estrogens or progestins and birth control pills, patches, rings, or injections  -general anesthetics like halothane, isoflurane, methoxyflurane, propofol  -grapefruit juice  -local anesthetics like lidocaine, pramoxine, tetracaine  -medicines that relax muscles for surgery  -narcotic medicines for pain  -other antiviral medicines for HIV or AIDS  -phenothiazines like chlorpromazine, mesoridazine, prochlorperazine, thioridazine  This list may not describe all possible interactions. Give your health care provider a list of all the medicines, herbs, non-prescription drugs, or dietary supplements you use. Also tell them if you smoke, drink alcohol, or use illegal drugs. Some items may interact with your medicine.  What should I watch for while using this medicine?  Tell your doctor or health care professional if your symptoms do not start to get better or if they get worse.  Do not stop taking except on your doctor's advice. You may develop a severe reaction. Your doctor will tell you how much medicine to take.  You may get drowsy or dizzy. Do not drive, use machinery, or do anything that needs mental alertness until you know how this medicine affects you. To reduce the risk of dizzy and fainting spells, do not stand or sit up quickly, especially if you are an older patient. Alcohol may increase dizziness and drowsiness. Avoid alcoholic drinks.  If you are taking another medicine that also causes drowsiness, you may have more side effects. Give your health care provider a list of all medicines you use. Your doctor will tell you how much medicine to take. Do not take more medicine than directed. Call emergency for help if you have problems breathing or unusual sleepiness.  What side effects may I notice from receiving this medicine?  Side effects that you should report to your doctor or health care professional as soon as possible:  -allergic reactions like  skin rash, itching or hives, swelling of the face, lips, or tongue  -breathing problems  -confusion  -loss of balance or coordination  -signs and symptoms of low blood pressure like dizziness; feeling faint or lightheaded, falls; unusually weak or tired  -suicidal thoughts or other mood changes  Side effects that usually do not require medical attention (report to your doctor or health care professional if they continue or are bothersome):  -dizziness  -dry mouth  -nausea, vomiting  -tiredness  This list may not describe all possible side effects. Call your doctor for medical advice about side effects. You may report side effects to FDA at 9-394-FDA-5038.  Where should I keep my medicine?  Keep out of the reach of children. This medicine can be abused. Keep your medicine in a safe place to protect it from theft. Do not share this medicine with anyone. Selling or giving away this medicine is dangerous and against the law.  Store at room temperature between 20 and 25 degrees C (68 and 77 degrees F). This medicine may cause accidental overdose and death if taken by other adults, children, or pets. Mix any unused medicine with a substance like cat litter or coffee grounds. Then throw the medicine away in a sealed container like a sealed bag or a coffee can with a lid. Do not use the medicine after the expiration date.  NOTE: This sheet is a summary. It may not cover all possible information. If you have questions about this medicine, talk to your doctor, pharmacist, or health care provider.  © 2018 [employer]vier/Gold Standard (2016-09-15 13:47:25)      · Is patient discharged on Warfarin / Coumadin?   No     Depression / Suicide Risk    As you are discharged from this [facility] facility, it is important to learn how to keep safe from harming yourself.    Recognize the warning signs:  · Abrupt changes in personality, positive or negative- including increase in energy   · Giving away possessions  · Change in eating  patterns- significant weight changes-  positive or negative  · Change in sleeping patterns- unable to sleep or sleeping all the time   · Unwillingness or inability to communicate  · Depression  · Unusual sadness, discouragement and loneliness  · Talk of wanting to die  · Neglect of personal appearance   · Rebelliousness- reckless behavior  · Withdrawal from people/activities they love  · Confusion- inability to concentrate     If you or a loved one observes any of these behaviors or has concerns about self-harm, here's what you can do:  · Talk about it- your feelings and reasons for harming yourself  · Remove any means that you might use to hurt yourself (examples: pills, rope, extension cords, firearm)  · Get professional help from the community (Mental Health, Substance Abuse, psychological counseling)  · Do not be alone:Call your Safe Contact- someone whom you trust who will be there for you.  · Call your local CRISIS HOTLINE 019-9770 or 565-750-7888  · Call your local Children's Mobile Crisis Response Team [place] (503) 438-5330 or www.Urjanet  · Call the toll free National Suicide Prevention Hotlines   · National Suicide Prevention Lifeline 221-672-FNJY (0812)  · National Hope Line Network 800-SUICIDE (633-6606)

## 2020-04-06 NOTE — DISCHARGE PLANNING
SW called patient's cell phone for assessment.     Patient is a 60-year old single women who lives alone in [place], [place]. 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 Patient has [employer] as insurance. Has AD documents, full code. Has sister and brother as support.     PCP is Dr. Tang. No other specialists. No issues with ADLS or IADLs. No home O2. No issues with drugs, alcohol, or mental health. [facility] [address].     Upon D/C, patient reports she might need help with a ride home, just wondering about taxi's. SW believes that [employer] is still running. No other concerns for patient.     PLAN: No needs, D/C home with cab if needed.     Care Transition Team Assessment    Information Source  Orientation : Oriented x 4  Information Given By: Patient  Informant's Name: Harmony  Who is responsible for making decisions for patient? : Patient    Readmission Evaluation  Is this a readmission?: No    Elopement Risk  Legal Hold: No  Ambulatory or Self Mobile in Wheelchair: Yes  Disoriented: No  Psychiatric Symptoms: None  History of Wandering: No  Elopement this Admit: No  Vocalizing Wanting to Leave: No  Displays Behaviors, Body Language Wanting to Leave: No-Not at Risk for Elopement  Elopement Risk: Not at Risk for Elopement    Interdisciplinary Discharge Planning  Patient or legal guardian wants to designate a caregiver (see row info): No    Discharge Preparedness  What is your plan after discharge?: Home with help  What are your discharge supports?: Sibling  Prior Functional Level: Ambulatory, Drives Self, Independent with Activities of Daily Living, Independent with Medication Management  Difficulity with ADLs: None  Difficulity with IADLs: None    Functional Assesment  Prior Functional Level: Ambulatory, Drives Self, Independent with Activities of Daily Living, Independent with Medication Management    Finances  Financial Barriers to Discharge: No  Prescription Coverage:  Yes    Vision / Hearing Impairment  Vision Impairment : Yes  Hearing Impairment : No         Advance Directive  Advance Directive?: DPOA for Health Care  Durable Power of  Name and Contact : Basilia Jon    Domestic Abuse  Have you ever been the victim of abuse or violence?: No  Physical Abuse or Sexual Abuse: No  Verbal Abuse or Emotional Abuse: No  Possible Abuse Reported to:: Not Applicable    Psychological Assessment  History of Substance Abuse: None  History of Psychiatric Problems: No  Non-compliant with Treatment: No  Newly Diagnosed Illness: Yes    Discharge Risks or Barriers  Discharge risks or barriers?: No    Anticipated Discharge Information  Anticipated discharge disposition: Home  Discharge Address: [address] [place] [id number]  Discharge Contact Phone Number: [phone]

## 2020-04-06 NOTE — DISCHARGE SUMMARY
Discharge Summary    CHIEF COMPLAINT ON ADMISSION  Chief Complaint   Patient presents with   • Chest Pain     Centralized Chest Pain intermitten x 3 weeks. Inncreased intensity and x2.5 days. Severe chest pain onset 0900 this morning.       Reason for Admission  Chest Pain     Admission Date  4/5/2020    CODE STATUS  Full Code    HPI & HOSPITAL COURSE  Ms. Monroe is a  60 y.o. female who presented 4/5/2020 with the above complaints, she states that she was undergoing a treatment for infected tooth approximately 3 weeks ago she required a root canal and was on antibiotics for 2 weeks, amoxicillin was prescribed, since then she has not felt well, developed some central chest pain in her upper chest radiating into her neck.  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  The patient has a history of hypertension, dyslipidemia and a psychiatric diagnosis.  She has a history of tobacco use.  She refers to the ER for evaluation, here found a slight elevation of leukocytes, blood glucose is slightly elevated, chest x-ray is found normal, and EKG shows a sinus rhythm rate of 75 without acute ST-T changes.  The patient denies dyspnea on exertion, she denies lower extremity fullness, pain, she reports being active, she denies any direct exposure to patients with upper respiratory symptoms recently.Patient admitted for further evaluation and treatment.    During this hospital stay, a cardiac stress test was obtained and NEGATIVE for any acute changes. Extensive education given to patient in regards to possible diagnosis. Patient highly recommended to follow up with PCP specifically for management of anxiety and possible GERD from hiatal hernia. All questions and concerns answered prior to being d/c. Patient d/c home.        Therefore, she is  discharged in good and stable condition to home with close outpatient follow-up.    The patient recovered much more quickly than anticipated on admission.    Discharge Date  04/06/20      FOLLOW UP ITEMS POST DISCHARGE  Please call [phone] to schedule PCP appointment for patient.    Required specialty appointments include:       Discharge Instructions per CORINNA Medina  -Follow up with PCP  -Start taking: Xanax 0.5 tab PO TID for 3 days                        Toradol 10 mg tab PO TID for 3 days                       Prilosec 20mg tab PO daily    DIET: as tolerated    ACTIVITY: as tolerated    DIAGNOSIS: chest pain    Return to ER if symptoms persist, chest pain, palpitation, shortness of breath, numbness, tingling and weakness.    DISCHARGE DIAGNOSES  Active Problems:    Essential hypertension POA: Yes    Hyperlipidemia POA: Yes  Resolved Problems:    Pain in the chest POA: Yes      FOLLOW UP  Future Appointments   Date Time Provider Department Center   4/10/2020  9:30 AM ALEXANDR Ellis     No follow-up provider specified.    MEDICATIONS ON DISCHARGE     Medication List      START taking these medications      Instructions   ALPRAZolam 0.5 MG Tabs  Commonly known as:  XANAX   Take 1 Tab by mouth 3 times a day for 3 days.  Dose:  0.5 mg     ketorolac 10 MG Tabs  Commonly known as:  TORADOL   Take 1 Tab by mouth every 8 hours as needed for Moderate Pain for up to 3 days.  Dose:  10 mg     omeprazole 20 MG delayed-release capsule  Start taking on:  April 7, 2020  Commonly known as:  PRILOSEC   Take 1 Cap by mouth every day for 30 days.  Dose:  20 mg        CONTINUE taking these medications      Instructions   albuterol 108 (90 Base) MCG/ACT Aers inhalation aerosol   Inhale 2 Puffs by mouth every 6 hours as needed for Shortness of Breath.  Dose:  2 Puff     Aleve 220 MG tablet  Generic drug:  naproxen   Take 220 mg by mouth 2 times a day as needed.  Dose:  220 mg    "  diphenhydrAMINE 25 MG Tabs  Commonly known as:  BENADRYL   Take 25 mg by mouth at bedtime as needed (ALLERGIES).  Dose:  25 mg     escitalopram 10 MG Tabs  Commonly known as:  LEXAPRO   TAKE 1 TABLET BY MOUTH EVERY DAY     estradiol 1 MG Tabs  Commonly known as:  ESTRACE   Take 1 mg by mouth every day. From gyn  Dose:  1 mg     hydroCHLOROthiazide 25 MG Tabs  Commonly known as:  HYDRODIURIL   TAKE 1 TABLET BY MOUTH EVERY DAY     MULTIVITAMIN ADULT PO   Take 1 Tab by mouth every day.  Dose:  1 Tab     ondansetron 4 MG Tabs tablet  Commonly known as:  ZOFRAN   Take 1 Tab by mouth every 6 hours as needed for Nausea/Vomiting.  Dose:  4 mg     simvastatin 40 MG Tabs  Commonly known as:  ZOCOR   Take 1 Tab by mouth every evening.  Dose:  40 mg     temazepam 15 MG Caps  Commonly known as:  RESTORIL   Take 15 mg by mouth at bedtime as needed for Sleep.  Dose:  15 mg     verapamil  MG Cp24  Commonly known as:  CALAN SR   TAKE 1 CAPSULE BY MOUTH EVERY DAY     vitamin D3 125 MCG (5000 UT) Caps   Take 5,000 Units by mouth every day.  Dose:  5,000 Units            Allergies  Allergies   Allergen Reactions   • Bee Anaphylaxis   • Levofloxacin [Levaquin] Anaphylaxis     Myalgias arthralgias    • Moxifloxacin Hcl In Nacl Swelling   • Promethazine      Twitching feeling   • Tape      Redness; itching \"paper tape ok\"       DIET  Orders Placed This Encounter   Procedures   • Diet Order Cardiac     Standing Status:   Standing     Number of Occurrences:   1     Order Specific Question:   Diet:     Answer:   Cardiac [6]       ACTIVITY  As tolerated.  Weight bearing as tolerated    CONSULTATIONS  NONE    PROCEDURES  NONE    IMAGING  NM-CARDIAC STRESS TEST   Final Result      CT-CHEST (THORAX) WITH   Final Result      1.  No acute abnormality of the chest.   2.  Small hiatal hernia.   3.  Chronic left rib fracture.               EC-ECHOCARDIOGRAM COMPLETE W/O CONT   Final Result      DX-CHEST-PORTABLE (1 VIEW)   Final Result    "   No acute cardiopulmonary disease.            LABORATORY  Lab Results   Component Value Date    SODIUM 138 04/06/2020    POTASSIUM 3.7 04/06/2020    CHLORIDE 99 04/06/2020    CO2 23 04/06/2020    GLUCOSE 97 04/06/2020    BUN 10 04/06/2020    CREATININE 0.61 04/06/2020    CREATININE 0.8 10/24/2005        Lab Results   Component Value Date    WBC 11.0 (H) 04/06/2020    HEMOGLOBIN 16.3 (H) 04/06/2020    HEMATOCRIT 47.8 (H) 04/06/2020    PLATELETCT 331 04/06/2020        Total time of the discharge process exceeds 36 minutes.  ------------------------------------------------------------------------------------------------------------------------------------------------------------------------------------------------------------------------------------------------------------------------------------------------------------------------------------  Please note that this dictation was created using voice recognition software. I have made every reasonable attempt to correct obvious errors, but there may be errors of grammar and possibly content that I did not discover before finalizing the note.    Electronically signed by:  TIARA Serrano, MSN, APRN, FNP-C  Hospitalist Services  [facility]  [phone]  [email or web address]  04/06/20       1526

## 2020-04-06 NOTE — CARE PLAN
Problem: Communication  Goal: The ability to communicate needs accurately and effectively will improve  Outcome: PROGRESSING AS EXPECTED    Pt whiteboard updated on plan of care  Pt encouraged to call for assistance  Pt encouraged to voice any concerns or questions regarding care plan  Pt updated on plan of care as it develops and changes    Problem: Safety  Goal: Will remain free from injury  Outcome: PROGRESSING AS EXPECTED   Bed locked and lowest position. Bed alarm on. Treaded socks on. Call light and belongings within reach. Pt educated to call for assistance. Pt verbalized understanding. Hourly rounding in place.

## 2020-04-06 NOTE — CARE PLAN
Problem: Infection  Goal: Will remain free from infection  Outcome: PROGRESSING AS EXPECTED    Educated patient on the importance of good hand hygiene for self and staff, verbalized understanding.

## 2020-04-07 NOTE — PROGRESS NOTES
Discharge Summary  Educated patient on new medications, up coming appointment, and s/s to look for when returning to the ER. Patient verbalized understanding. PIV removed with no s/s of bleeding or infection at site. Vitals WNL. Escorted patient downstairs to meet ride home.

## 2020-04-10 ENCOUNTER — OFFICE VISIT (OUTPATIENT)
Dept: MEDICAL GROUP | Facility: PHYSICIAN GROUP | Age: 60
End: 2020-04-10
Payer: COMMERCIAL

## 2020-04-10 VITALS
HEIGHT: 62 IN | BODY MASS INDEX: 34.96 KG/M2 | DIASTOLIC BLOOD PRESSURE: 82 MMHG | OXYGEN SATURATION: 96 % | SYSTOLIC BLOOD PRESSURE: 118 MMHG | HEART RATE: 86 BPM | TEMPERATURE: 98 F | WEIGHT: 190 LBS

## 2020-04-10 DIAGNOSIS — R07.9 CHEST PAIN, UNSPECIFIED TYPE: ICD-10-CM

## 2020-04-10 DIAGNOSIS — I10 ESSENTIAL HYPERTENSION: ICD-10-CM

## 2020-04-10 DIAGNOSIS — Z09 HOSPITAL DISCHARGE FOLLOW-UP: Primary | ICD-10-CM

## 2020-04-10 DIAGNOSIS — F41.9 ANXIETY: ICD-10-CM

## 2020-04-10 PROCEDURE — 99214 OFFICE O/P EST MOD 30 MIN: CPT | Performed by: FAMILY MEDICINE

## 2020-04-10 RX ORDER — ALPRAZOLAM 0.5 MG/1
0.5 TABLET ORAL 3 TIMES DAILY PRN
Qty: 90 TAB | Refills: 0 | Status: SHIPPED | OUTPATIENT
Start: 2020-04-10 | End: 2020-05-10

## 2020-04-10 RX ORDER — ALPRAZOLAM 0.5 MG/1
0.5 TABLET ORAL 3 TIMES DAILY PRN
COMMUNITY
End: 2020-04-10 | Stop reason: SDUPTHER

## 2020-04-10 ASSESSMENT — FIBROSIS 4 INDEX: FIB4 SCORE: 0.66
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
## 2020-04-15 ENCOUNTER — OFFICE VISIT (OUTPATIENT)
Dept: MEDICAL GROUP | Facility: PHYSICIAN GROUP | Age: 60
End: 2020-04-15
Payer: COMMERCIAL

## 2020-04-15 VITALS
SYSTOLIC BLOOD PRESSURE: 128 MMHG | TEMPERATURE: 97.3 F | OXYGEN SATURATION: 97 % | HEIGHT: 62 IN | DIASTOLIC BLOOD PRESSURE: 84 MMHG | HEART RATE: 83 BPM | WEIGHT: 190 LBS | BODY MASS INDEX: 34.96 KG/M2

## 2020-04-15 DIAGNOSIS — I10 ESSENTIAL HYPERTENSION: ICD-10-CM

## 2020-04-15 DIAGNOSIS — F41.9 ANXIETY: ICD-10-CM

## 2020-04-15 DIAGNOSIS — J45.20 MILD INTERMITTENT ASTHMA IN ADULT WITHOUT COMPLICATION: ICD-10-CM

## 2020-04-15 PROCEDURE — 99214 OFFICE O/P EST MOD 30 MIN: CPT | Performed by: FAMILY MEDICINE

## 2020-04-15 RX ORDER — IPRATROPIUM BROMIDE AND ALBUTEROL SULFATE 2.5; .5 MG/3ML; MG/3ML
3 SOLUTION RESPIRATORY (INHALATION) 4 TIMES DAILY
Qty: 30 BULLET | Refills: 5 | Status: SHIPPED | OUTPATIENT
Start: 2020-04-15 | End: 2023-06-21 | Stop reason: SDUPTHER

## 2020-04-15 ASSESSMENT — FIBROSIS 4 INDEX: FIB4 SCORE: 0.66

## 2020-04-15 NOTE — PROGRESS NOTES
CC: Anxiety follow-up    HISTORY OF THE PRESENT ILLNESS: Patient is a 60 y.o. female. This pleasant patient is here today for follow-up.    Patient is here today for follow-up on her anxiety, asthma and hypertension.    With regard to her anxiety, she is doing significantly better at this time.  She continues on Lexapro.  She was recently prescribed Xanax, and took 1 tablet.  However, after we discussed the risks of long-term use of benzodiazepines at her last appointment, she decided to not take any further Xanax.  She does note that she feels comforted just by having the prescription in case she needs it.  She is trying to avoid the news at this time as well to help with her anxiety.    Hypertension has been more well controlled at this time as well.  She is on hydrochlorothiazide and verapamil.  Recently had negative cardiac work-up during hospitalization including echo stress test and chest CT.  She does endorse some occasional chest discomfort after eating, but has known hiatal hernia and is on Prilosec for that.    Has history of asthma.  She recently bought nebulizer and is asking for DuoNeb refills in the event that she catches a respiratory infection.  She has quit smoking and feels that her lung function has improved.  She is also exercising more.  After COVID-19 pandemic settles down, patient is interested in pulmonary function tests to get worked up for possible COPD.    Allergies: Bee; Levofloxacin [levaquin]; Moxifloxacin hcl in nacl; Promethazine; and Tape    Current Outpatient Medications Ordered in Epic   Medication Sig Dispense Refill   • ipratropium-albuterol (DUONEB) 0.5-2.5 (3) MG/3ML nebulizer solution 3 mL by Nebulization route 4 times a day. 30 Bullet 5   • ALPRAZolam (XANAX) 0.5 MG Tab Take 1 Tab by mouth 3 times a day as needed for Anxiety for up to 30 days. 90 Tab 0   • omeprazole (PRILOSEC) 20 MG delayed-release capsule Take 1 Cap by mouth every day for 30 days. 30 Cap 0   •  Cholecalciferol (VITAMIN D3) 125 MCG (5000 UT) Cap Take 5,000 Units by mouth every day.     • albuterol 108 (90 Base) MCG/ACT Aero Soln inhalation aerosol Inhale 2 Puffs by mouth every 6 hours as needed for Shortness of Breath. 3 Inhaler 0   • temazepam (RESTORIL) 15 MG Cap Take 15 mg by mouth at bedtime as needed for Sleep.     • escitalopram (LEXAPRO) 10 MG Tab TAKE 1 TABLET BY MOUTH EVERY DAY 90 Tab 1   • verapamil ER (CALAN SR) 120 MG CAPSULE SR 24 HR TAKE 1 CAPSULE BY MOUTH EVERY DAY 90 Cap 1   • hydroCHLOROthiazide (HYDRODIURIL) 25 MG Tab TAKE 1 TABLET BY MOUTH EVERY DAY 90 Tab 1   • simvastatin (ZOCOR) 40 MG Tab Take 1 Tab by mouth every evening. 90 Tab 3   • ondansetron (ZOFRAN) 4 MG Tab tablet Take 1 Tab by mouth every 6 hours as needed for Nausea/Vomiting. 20 Tab 1   • naproxen (ALEVE) 220 MG tablet Take 220 mg by mouth 2 times a day as needed.     • diphenhydrAMINE (BENADRYL) 25 MG Tab Take 25 mg by mouth at bedtime as needed (ALLERGIES).     • Multiple Vitamins-Minerals (MULTIVITAMIN ADULT PO) Take 1 Tab by mouth every day.     • estradiol (ESTRACE) 1 MG TABS Take 1 mg by mouth every day. From gyn       No current Epic-ordered facility-administered medications on file.        Past Medical History:   Diagnosis Date   • Allergy    • Arthritis     osteoarthritis; right knee   • ASTHMA     1/29/2018 not an issue, currently no medications needed   • Basal cell carcinoma of left medial cheek 11/30/2015   • Basal cell carcinoma of left medial cheek 11/30/2015   • Depression 1/7/2010   • High cholesterol    • History of tobacco use disorder 1/7/2010   • Hypertension    • Pneumonia 1990,2001   • Post-menopausal 8/26/2014       Past Surgical History:   Procedure Laterality Date   • KNEE ARTHROSCOPY Left 2/5/2018    Procedure: KNEE ARTHROSCOPY;  Surgeon: Andrews Castro M.D.;  Location: SURGERY [facility];  Service: Orthopedics   • MEDIAL MENISCECTOMY Left 2/5/2018    Procedure: MEDIAL MENISCECTOMY - PARTIAL;   Surgeon: Andrews Castro M.D.;  Location: [facility];  Service: Orthopedics   • KNEE ARTHROSCOPY Right 3/23/2017    Procedure: KNEE ARTHROSCOPY;  Surgeon: Andrews Castro M.D.;  Location: [facility];  Service:    • MEDIAL MENISCECTOMY  3/23/2017    Procedure: MEDIAL and lateral  MENISCECTOMY - PARTIAL;  Surgeon: Andrews Castro M.D.;  Location: [facility];  Service:    • CHONDROPLASTY  3/23/2017    Procedure: CHONDROPLASTY -  PATELLAR;  Surgeon: Andrews Castro M.D.;  Location: [facility];  Service:    • ABDOMINAL HYSTERECTOMY TOTAL  2005   • TONSILLECTOMY AND ADENOIDECTOMY     • NASAL SEPTAL RECONSTRUCTION     • CYST EXCISION      Anterior Left Foot       Social History     Tobacco Use   • Smoking status: Former Smoker     Packs/day: 0.50     Years: 19.00     Pack years: 9.50     Types: Cigarettes     Start date: 1997     Last attempt to quit: 2/15/2020     Years since quittin.1   • Smokeless tobacco: Never Used   • Tobacco comment: 1 pack / week   Substance Use Topics   • Alcohol use: No     Alcohol/week: 0.0 oz   • Drug use: No       Social History     Social History Narrative   • Not on file       Family History   Problem Relation Age of Onset   • Hyperlipidemia Mother    • Arthritis Mother    • Heart Disease Father    • Diabetes Sister    • Cancer Neg Hx    • Hypertension Neg Hx    • Stroke Neg Hx        ROS:     - Constitutional: Negative for fever, chills, unexpected weight change, and fatigue/generalized weakness.     - Respiratory: Negative for cough, sputum production, chest congestion, dyspnea, wheezing, and crackles.      - Cardiovascular: Positive for intermittent chest discomfort, due to hiatal hernia.  Negative for palpitations, orthopnea, PND, and bilateral lower extremity edema.     Exam: /84 (BP Location: Right arm, Patient Position: Sitting, BP Cuff Size: Adult)   Pulse 83   Temp 36.3 °C (97.3 °F) (Temporal)    "Ht 1.575 m (5' 2\")   Wt 86.2 kg (190 lb)   SpO2 97%  Body mass index is 34.75 kg/m².    General: Well appearing, NAD  Skin: Warm and dry.  No obvious lesions.  Musculoskeletal:  No extremity cyanosis, clubbing, or edema.  Psych: Normal mood and affect. Alert and oriented. Judgment and insight is normal.    Please note that this dictation was created using voice recognition software. I have made every reasonable attempt to correct obvious errors, but I expect that there are errors of grammar and possibly content that I did not discover before finalizing the note.      Assessment/Plan  Harmony was seen today for follow-up and medication refill.    Diagnoses and all orders for this visit:    Essential hypertension  Chronic well-controlled problem.  Continue verapamil and hydrochlorothiazide.    Anxiety  Chronic problem which is greatly improved since recent hospitalization.  Continue Lexapro.  Okay to continue Xanax as needed, as patient plans to only take it once in a great while if needed.    Mild intermittent asthma in adult without complication  Chronic issue for the patient, she has no current breathing concerns today.  Duo nebs prescribed for her nebulizer in the event of respiratory illness.  -     ipratropium-albuterol (DUONEB) 0.5-2.5 (3) MG/3ML nebulizer solution; 3 mL by Nebulization route 4 times a day.      Follow-up in 4 to 6 months or sooner if needed.    Crissy Tang,   [facility]      "

## 2020-04-28 DIAGNOSIS — E78.5 HYPERLIPIDEMIA, UNSPECIFIED HYPERLIPIDEMIA TYPE: ICD-10-CM

## 2020-04-29 RX ORDER — SIMVASTATIN 40 MG
TABLET ORAL
Qty: 90 TAB | Refills: 1 | Status: SHIPPED | OUTPATIENT
Start: 2020-04-29 | End: 2020-11-02

## 2020-04-29 NOTE — TELEPHONE ENCOUNTER
Pt has had OV within the 12 month protocol and lipid panel is current. 6 month supply sent to pharmacy.   Lab Results   Component Value Date/Time    CHOLSTRLTOT 234 (H) 04/06/2020 02:33 AM     (H) 04/06/2020 02:33 AM    HDL 54 04/06/2020 02:33 AM    TRIGLYCERIDE 208 (H) 04/06/2020 02:33 AM       Lab Results   Component Value Date/Time    SODIUM 138 04/06/2020 02:33 AM    POTASSIUM 3.7 04/06/2020 02:33 AM    CHLORIDE 99 04/06/2020 02:33 AM    CO2 23 04/06/2020 02:33 AM    GLUCOSE 97 04/06/2020 02:33 AM    BUN 10 04/06/2020 02:33 AM    CREATININE 0.61 04/06/2020 02:33 AM    CREATININE 0.8 10/24/2005 12:00 PM     Lab Results   Component Value Date/Time    ALKPHOSPHAT 68 04/06/2020 02:33 AM    ASTSGOT 15 04/06/2020 02:33 AM    ALTSGPT 17 04/06/2020 02:33 AM    TBILIRUBIN 0.4 04/06/2020 02:33 AM

## 2020-05-25 ENCOUNTER — OFFICE VISIT (OUTPATIENT)
Dept: URGENT CARE | Facility: PHYSICIAN GROUP | Age: 60
End: 2020-05-25
Payer: COMMERCIAL

## 2020-05-25 VITALS
DIASTOLIC BLOOD PRESSURE: 66 MMHG | SYSTOLIC BLOOD PRESSURE: 126 MMHG | HEART RATE: 76 BPM | BODY MASS INDEX: 35.88 KG/M2 | TEMPERATURE: 98.7 F | HEIGHT: 62 IN | RESPIRATION RATE: 16 BRPM | WEIGHT: 195 LBS | OXYGEN SATURATION: 96 %
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
## 2020-05-25 DIAGNOSIS — S00.96XA INSECT BITE OF HEAD, UNSPECIFIED PART, INITIAL ENCOUNTER: ICD-10-CM

## 2020-05-25 PROCEDURE — 99213 OFFICE O/P EST LOW 20 MIN: CPT | Performed by: FAMILY MEDICINE

## 2020-05-25 RX ORDER — OXYCODONE HYDROCHLORIDE AND ACETAMINOPHEN 5; 325 MG/1; MG/1
TABLET ORAL
COMMUNITY
Start: 2020-03-19 | End: 2020-05-25

## 2020-05-25 RX ORDER — AMOXICILLIN 500 MG/1
CAPSULE ORAL
COMMUNITY
Start: 2020-03-19 | End: 2020-05-25

## 2020-05-25 RX ORDER — ONDANSETRON HYDROCHLORIDE 8 MG/1
8 TABLET, FILM COATED ORAL
COMMUNITY
Start: 2020-03-13 | End: 2020-05-25

## 2020-05-25 RX ORDER — TRIAMCINOLONE ACETONIDE 1 MG/G
OINTMENT TOPICAL
Qty: 1 TUBE | Refills: 0 | Status: SHIPPED | OUTPATIENT
Start: 2020-05-25 | End: 2020-10-04

## 2020-05-25 ASSESSMENT — ENCOUNTER SYMPTOMS
NAUSEA: 0
VOMITING: 0
WEIGHT LOSS: 0
MYALGIAS: 0
EYE REDNESS: 0
EYE DISCHARGE: 0

## 2020-05-25 ASSESSMENT — FIBROSIS 4 INDEX: FIB4 SCORE: 0.66

## 2020-05-25 NOTE — PROGRESS NOTES
"Subjective:      Harmony Monroe is a 60 y.o. female who presents with Rash (spot located on R temple, tender, redness, x4 days )            5 days right temporal skin lesion.  Initially felt mild pain when she brushed her hair back and noticed localized swelling with 2 associated puncture wounds.  Insect or spider bite was not witnessed.  There is been minimal itching.  No eye involvement.  No further rash or burning pain in a dermatome.  No drainage.  No mucosal lesions.  She initially took Benadryl with possible mild improvement.  No other aggravating or alleviating factors.      Review of Systems   Constitutional: Negative for malaise/fatigue and weight loss.   Eyes: Negative for discharge and redness.   Gastrointestinal: Negative for nausea and vomiting.   Musculoskeletal: Negative for joint pain and myalgias.          Objective:     /66 (BP Location: Right arm, Patient Position: Sitting, BP Cuff Size: Adult)   Pulse 76   Temp 37.1 °C (98.7 °F) (Temporal)   Resp 16   Ht 1.562 m (5' 1.5\")   Wt 88.5 kg (195 lb)   SpO2 96%   BMI 36.25 kg/m²      Physical Exam  Constitutional:       General: She is not in acute distress.     Appearance: She is well-developed.   HENT:      Head: Normocephalic and atraumatic.        Right Ear: Tympanic membrane normal.      Left Ear: Tympanic membrane normal.   Eyes:      Extraocular Movements: Extraocular movements intact.      Conjunctiva/sclera: Conjunctivae normal.      Pupils: Pupils are equal, round, and reactive to light.   Cardiovascular:      Rate and Rhythm: Normal rate and regular rhythm.      Heart sounds: Normal heart sounds. No murmur.   Pulmonary:      Effort: Pulmonary effort is normal.      Breath sounds: Normal breath sounds. No wheezing.   Skin:     General: Skin is warm and dry.      Findings: No rash.   Neurological:      Mental Status: She is alert and oriented to person, place, and time.                 Assessment/Plan:       1. Insect bite " of head, unspecified part, initial encounter    - triamcinolone acetonide (KENALOG) 0.1 % Ointment; Apply thin layer to affected area twice daily as needed  Dispense: 1 Tube; Refill: 0    Differential diagnosis, natural history, supportive care, and indications for immediate follow-up discussed at length.     I have a high suspicion of insect/spider bite.  Will Rx topical corticosteroid and Harmony will contact me immediately if symptoms are worse.

## 2020-05-28 NOTE — ED TRIAGE NOTES
5/28/2020: Spoke with the RN in secure chat today. Pt eating a fruit plate and chocolate ice cream for lunch now. Will change supplements to a magic cup with dinner for added nutrition. RD available via consult. Follow per policy.   Pt present to the ED via EMS for Chest Pain. Pt states CP x 3 weeks intermittent, worsening x 2.5 days. Pt states severe CP onset 0900 this AM. Pt denies radiation, back pain, or stomach pain. Pt states pain is localized centrally mid sternum.

## 2020-06-10 DIAGNOSIS — G47.00 INSOMNIA, UNSPECIFIED TYPE: ICD-10-CM

## 2020-06-10 RX ORDER — TEMAZEPAM 15 MG/1
15 CAPSULE ORAL NIGHTLY PRN
Qty: 90 CAP | Refills: 0 | Status: SHIPPED | OUTPATIENT
Start: 2020-06-10 | End: 2020-09-10

## 2020-06-10 NOTE — TELEPHONE ENCOUNTER
----- Message from Harmony Monroe sent at 6/10/2020  3:55 PM PDT -----  Regarding: Prescription Question  Contact: [phone]  I think I need a new script from my new Primary Care physician, Dr. Tang, for Temazepam 15mg, quantity of 90.     I am out of refills and I am not sure how to request a since I switched to Dr. Tang.    Thank you for your help.  [facility] is my pharmacy.

## 2020-06-12 ENCOUNTER — EH NON-PROVIDER (OUTPATIENT)
Dept: OCCUPATIONAL MEDICINE | Facility: CLINIC | Age: 60
End: 2020-06-12

## 2020-06-12 DIAGNOSIS — Z02.89 ENCOUNTER FOR OCCUPATIONAL HEALTH EXAMINATION INVOLVING RESPIRATOR: ICD-10-CM

## 2020-06-12 PROCEDURE — 94010 BREATHING CAPACITY TEST: CPT | Performed by: PREVENTIVE MEDICINE

## 2020-06-12 PROCEDURE — 94375 RESPIRATORY FLOW VOLUME LOOP: CPT | Performed by: PREVENTIVE MEDICINE

## 2020-06-15 ENCOUNTER — EH NON-PROVIDER (OUTPATIENT)
Dept: OCCUPATIONAL MEDICINE | Facility: CLINIC | Age: 60
End: 2020-06-15
Payer: COMMERCIAL

## 2020-06-15 DIAGNOSIS — Z02.89 ENCOUNTER FOR OCCUPATIONAL HEALTH EXAMINATION INVOLVING RESPIRATOR: ICD-10-CM

## 2020-06-18 ENCOUNTER — APPOINTMENT (OUTPATIENT)
Dept: DERMATOLOGY | Facility: IMAGING CENTER | Age: 60
End: 2020-06-18
Payer: COMMERCIAL

## 2020-07-02 DIAGNOSIS — I10 ESSENTIAL HYPERTENSION: ICD-10-CM

## 2020-07-06 RX ORDER — HYDROCHLOROTHIAZIDE 25 MG/1
TABLET ORAL
Qty: 90 TAB | Refills: 3 | Status: SHIPPED | OUTPATIENT
Start: 2020-07-06 | End: 2021-07-15

## 2020-07-06 RX ORDER — VERAPAMIL HYDROCHLORIDE 120 MG/1
CAPSULE, EXTENDED RELEASE ORAL
Qty: 90 CAP | Refills: 3 | Status: SHIPPED | OUTPATIENT
Start: 2020-07-06 | End: 2021-07-15

## 2020-07-06 NOTE — TELEPHONE ENCOUNTER
Last seen by PCP 4/15/2020.   Last Blood Pressure reading was 126/66 on 5/25/2020 (Controlled w/ 6 months)    Lab Results   Component Value Date/Time    SODIUM 138 04/06/2020 02:33 AM    POTASSIUM 3.7 04/06/2020 02:33 AM    CHLORIDE 99 04/06/2020 02:33 AM    CO2 23 04/06/2020 02:33 AM    GLUCOSE 97 04/06/2020 02:33 AM    BUN 10 04/06/2020 02:33 AM    CREATININE 0.61 04/06/2020 02:33 AM    CREATININE 0.8 10/24/2005 12:00 PM     Patient switched PCP to Crissy Tang, orders written by Ayleen Millard, will forward.

## 2020-09-24 ENCOUNTER — NON-PROVIDER VISIT (OUTPATIENT)
Dept: MEDICAL GROUP | Facility: PHYSICIAN GROUP | Age: 60
End: 2020-09-24

## 2020-09-24 DIAGNOSIS — Z23 NEED FOR VACCINATION: ICD-10-CM

## 2020-09-24 PROCEDURE — 90686 IIV4 VACC NO PRSV 0.5 ML IM: CPT | Performed by: PHYSICIAN ASSISTANT

## 2020-09-24 NOTE — PROGRESS NOTES
"Harmony Monroe is a 60 y.o. female here for a non-provider visit for:   FLU    Reason for immunization: Annual Flu Vaccine  Immunization records indicate need for vaccine: Yes, confirmed with Epic  Minimum interval has been met for this vaccine: Yes  ABN completed: Yes    Order and dose verified by: Olive BE was given to patient: Yes  All IAC Questionnaire questions were answered \"No.\"    Patient tolerated injection and no adverse effects were observed or reported: Yes    Pt scheduled for next dose in series: Not Indicated  "

## 2020-10-04 ENCOUNTER — OFFICE VISIT (OUTPATIENT)
Dept: URGENT CARE | Facility: PHYSICIAN GROUP | Age: 60
End: 2020-10-04
Payer: COMMERCIAL

## 2020-10-04 VITALS
BODY MASS INDEX: 32.96 KG/M2 | TEMPERATURE: 97.6 F | OXYGEN SATURATION: 97 % | RESPIRATION RATE: 16 BRPM | DIASTOLIC BLOOD PRESSURE: 86 MMHG | HEIGHT: 63 IN | SYSTOLIC BLOOD PRESSURE: 128 MMHG | WEIGHT: 186 LBS | HEART RATE: 74 BPM

## 2020-10-04 DIAGNOSIS — M17.12 PRIMARY OSTEOARTHRITIS OF LEFT KNEE: ICD-10-CM

## 2020-10-04 PROCEDURE — 20610 DRAIN/INJ JOINT/BURSA W/O US: CPT | Mod: LT | Performed by: FAMILY MEDICINE

## 2020-10-04 RX ORDER — TRIAMCINOLONE ACETONIDE 40 MG/ML
40 INJECTION, SUSPENSION INTRA-ARTICULAR; INTRAMUSCULAR ONCE
Status: COMPLETED | OUTPATIENT
Start: 2020-10-04 | End: 2020-10-04

## 2020-10-04 RX ADMIN — TRIAMCINOLONE ACETONIDE 40 MG: 40 INJECTION, SUSPENSION INTRA-ARTICULAR; INTRAMUSCULAR at 13:47

## 2020-10-04 ASSESSMENT — ENCOUNTER SYMPTOMS
VOMITING: 0
COUGH: 0
FEVER: 0
SORE THROAT: 0

## 2020-10-04 ASSESSMENT — FIBROSIS 4 INDEX: FIB4 SCORE: 0.66

## 2020-10-04 NOTE — PROGRESS NOTES
Subjective:     Harmony Monroe is a 60 y.o. female who presents for Joint Injection (L knee injection, L knee pain, x1 year )    HPI  Pt presents for evaluation of left knee pain   Pt with left knee pain for at least a year   Has hx of knee arthroscopy with partial meniscectomy on the left knee 2 years ago   Pain is constant, around the joint lines, and worse with ambulation   Pain also bothers her along the medial aspect knee  Pain causes her to limp     Review of Systems   Constitutional: Negative for fever.   HENT: Negative for sore throat.    Respiratory: Negative for cough.    Gastrointestinal: Negative for vomiting.   Skin: Negative for rash.     PMH:  has a past medical history of Allergy, Arthritis, ASTHMA, Basal cell carcinoma of left medial cheek (11/30/2015), Basal cell carcinoma of left medial cheek (11/30/2015), Depression (1/7/2010), High cholesterol, History of tobacco use disorder (1/7/2010), Hypertension, Pneumonia (1990,2001), and Post-menopausal (8/26/2014).  MEDS:   Current Outpatient Medications:   •  temazepam (RESTORIL) 15 MG Cap, TAKE 1 CAPSULE BY MOUTH AT BEDTIME AS NEEDED FOR SLEEP FOR UP TO 90 DAYS., Disp: 30 Cap, Rfl: 2  •  escitalopram (LEXAPRO) 10 MG Tab, TAKE 1 TABLET BY MOUTH EVERY DAY, Disp: 90 Tab, Rfl: 3  •  hydroCHLOROthiazide (HYDRODIURIL) 25 MG Tab, TAKE 1 TABLET BY MOUTH EVERY DAY, Disp: 90 Tab, Rfl: 3  •  verapamil ER (CALAN SR) 120 MG CAPSULE SR 24 HR, TAKE 1 CAPSULE BY MOUTH EVERY DAY, Disp: 90 Cap, Rfl: 3  •  albuterol 108 (90 Base) MCG/ACT Aero Soln inhalation aerosol, INHALE 2 PUFFS BY MOUTH EVERY 6 HOURS AS NEEDED FOR SHORTNESS OF BREATH, Disp: 3 Inhaler, Rfl: 1  •  simvastatin (ZOCOR) 40 MG Tab, TAKE 1 TABLET BY MOUTH EVERY DAY IN THE EVENING, Disp: 90 Tab, Rfl: 1  •  ipratropium-albuterol (DUONEB) 0.5-2.5 (3) MG/3ML nebulizer solution, 3 mL by Nebulization route 4 times a day., Disp: 30 Bullet, Rfl: 5  •  Cholecalciferol (VITAMIN D3) 125 MCG (5000 UT) Cap, Take  "5,000 Units by mouth every day., Disp: , Rfl:   •  diphenhydrAMINE (BENADRYL) 25 MG Tab, Take 25 mg by mouth at bedtime as needed (ALLERGIES)., Disp: , Rfl:   •  Multiple Vitamins-Minerals (MULTIVITAMIN ADULT PO), Take 1 Tab by mouth every day., Disp: , Rfl:   •  estradiol (ESTRACE) 1 MG TABS, Take 1 mg by mouth every day. From gyn, Disp: , Rfl:   •  triamcinolone acetonide (KENALOG) 0.1 % Ointment, Apply thin layer to affected area twice daily as needed (Patient not taking: Reported on 10/4/2020), Disp: 1 Tube, Rfl: 0  ALLERGIES:   Allergies   Allergen Reactions   • Bee Anaphylaxis   • Levaquin Anaphylaxis and Unspecified     Myalgias arthralgias    • Tape Hives     Redness; itching \"paper tape ok\"   • Moxifloxacin Hcl In Nacl Swelling   • Promethazine      Twitching feeling     SURGHX:   Past Surgical History:   Procedure Laterality Date   • KNEE ARTHROSCOPY Left 2/5/2018    Procedure: KNEE ARTHROSCOPY;  Surgeon: Andrews Castro M.D.;  Location: [facility];  Service: Orthopedics   • MEDIAL MENISCECTOMY Left 2/5/2018    Procedure: MEDIAL MENISCECTOMY - PARTIAL;  Surgeon: Andrews Castro M.D.;  Location: [facility];  Service: Orthopedics   • KNEE ARTHROSCOPY Right 3/23/2017    Procedure: KNEE ARTHROSCOPY;  Surgeon: Andrews Castro M.D.;  Location: [facility];  Service:    • MEDIAL MENISCECTOMY  3/23/2017    Procedure: MEDIAL and lateral  MENISCECTOMY - PARTIAL;  Surgeon: Andrews Castro M.D.;  Location: [facility];  Service:    • CHONDROPLASTY  3/23/2017    Procedure: CHONDROPLASTY -  PATELLAR;  Surgeon: Andrews Castro M.D.;  Location: [facility];  Service:    • ABDOMINAL HYSTERECTOMY TOTAL  11/2005   • TONSILLECTOMY AND ADENOIDECTOMY  1980   • NASAL SEPTAL RECONSTRUCTION  1980   • CYST EXCISION  1974    Anterior Left Foot     SOCHX:  reports that she quit smoking about 7 months ago. Her smoking use included cigarettes. She started smoking about " "23 years ago. She has a 9.50 pack-year smoking history. She has never used smokeless tobacco. She reports that she does not drink alcohol or use drugs.  FH: Family history was reviewed in EPIC, not contributing to chronic knee pain      Objective:   /86 (BP Location: Left arm, Patient Position: Sitting, BP Cuff Size: Large adult)   Pulse 74   Temp 36.4 °C (97.6 °F) (Temporal)   Resp 16   Ht 1.6 m (5' 3\")   Wt 84.4 kg (186 lb)   SpO2 97%   BMI 32.95 kg/m²     Physical Exam  Constitutional:       General: She is not in acute distress.     Appearance: She is well-developed. She is not diaphoretic.   HENT:      Head: Normocephalic and atraumatic.   Pulmonary:      Effort: Pulmonary effort is normal.   Musculoskeletal:      Comments: Left knee  Appearance - No bruising, erythema, or deformity appreciated  Palpation - +TTP along joint lines and pes anserine  ROM - FROM with crepitus  Strength - 5/5 throughout  Neuro - Sensation equal and intact bilaterally   Skin:     General: Skin is warm and dry.      Findings: No erythema.   Neurological:      Mental Status: She is alert and oriented to person, place, and time.      Sensory: No sensory deficit.   Psychiatric:         Mood and Affect: Mood normal.         Behavior: Behavior normal.         Thought Content: Thought content normal.         Judgment: Judgment normal.     Knee injection   First, a verbal consent and a verbal time-out were done, explaining the risks and benefits of the procedure. Then the patient was placed in a seated position.  Anterior lateral joint line portals were identified. The skin was cleaned with alcohol and the clean, no touch technique was used with a 25-gauge 1.5-inch needle. A combination of 5 mL of 1% lidocaine without epinephrine and 1 milliliter of Kenalog 40 mg/mL was injected into the left knee. The patient tolerated the procedure well and there were no immediate post injection complications. Hemostasis was then achieved with " gentle pressure and a Band-Aid was placed over the lesion.  Post injection instructions for range of motion, ice, activity modification, signs of infection, emergency precautions were discussed with the patient.     Assessment/Plan:   Assessment    1. Primary osteoarthritis of left knee  - triamcinolone acetonide (KENALOG-40) injection 40 mg    Patient opted to have an intra-articular injection with steroids to help her longstanding knee pain.  Injection was tolerated well without acute complications.  Advised knee brace, topical medication use, and given home exercises.  Follow-up with PCP.

## 2020-11-02 DIAGNOSIS — E78.5 HYPERLIPIDEMIA, UNSPECIFIED HYPERLIPIDEMIA TYPE: ICD-10-CM

## 2020-11-02 RX ORDER — SIMVASTATIN 40 MG
TABLET ORAL
Qty: 90 TAB | Refills: 1 | Status: SHIPPED | OUTPATIENT
Start: 2020-11-02 | End: 2021-05-04

## 2020-11-07 ENCOUNTER — OFFICE VISIT (OUTPATIENT)
Dept: URGENT CARE | Facility: PHYSICIAN GROUP | Age: 60
End: 2020-11-07
Payer: COMMERCIAL

## 2020-11-07 VITALS
BODY MASS INDEX: 35.33 KG/M2 | OXYGEN SATURATION: 97 % | TEMPERATURE: 97.6 F | HEART RATE: 78 BPM | RESPIRATION RATE: 18 BRPM | HEIGHT: 62 IN | SYSTOLIC BLOOD PRESSURE: 128 MMHG | DIASTOLIC BLOOD PRESSURE: 82 MMHG | WEIGHT: 192 LBS

## 2020-11-07 DIAGNOSIS — B37.0 THRUSH, ORAL: ICD-10-CM

## 2020-11-07 DIAGNOSIS — B96.89 ACUTE BACTERIAL SINUSITIS: ICD-10-CM

## 2020-11-07 DIAGNOSIS — J01.90 ACUTE BACTERIAL SINUSITIS: ICD-10-CM

## 2020-11-07 PROCEDURE — 99214 OFFICE O/P EST MOD 30 MIN: CPT | Performed by: NURSE PRACTITIONER

## 2020-11-07 RX ORDER — FLUCONAZOLE 100 MG/1
100 TABLET ORAL DAILY
Qty: 7 TAB | Refills: 0 | Status: SHIPPED | OUTPATIENT
Start: 2020-11-07 | End: 2020-11-14

## 2020-11-07 RX ORDER — AMOXICILLIN AND CLAVULANATE POTASSIUM 875; 125 MG/1; MG/1
1 TABLET, FILM COATED ORAL 2 TIMES DAILY
Qty: 14 TAB | Refills: 0 | Status: SHIPPED | OUTPATIENT
Start: 2020-11-07 | End: 2020-12-31

## 2020-11-07 RX ORDER — METHYLPREDNISOLONE 4 MG/1
TABLET ORAL
Qty: 21 TAB | Refills: 0 | Status: SHIPPED | OUTPATIENT
Start: 2020-11-07 | End: 2020-12-31

## 2020-11-07 ASSESSMENT — FIBROSIS 4 INDEX: FIB4 SCORE: 0.66

## 2020-11-07 ASSESSMENT — ENCOUNTER SYMPTOMS
SINUS PRESSURE: 1
NAUSEA: 1
CHILLS: 0
HEADACHES: 1
FEVER: 0
DIARRHEA: 0
SORE THROAT: 0
SHORTNESS OF BREATH: 0
SINUS PAIN: 1
MYALGIAS: 0
COUGH: 1

## 2020-11-07 NOTE — PROGRESS NOTES
Subjective:      Harmony Monroe is a 60 y.o. female who presents with Sinusitis (sinus pain 2 month , thrush, dizziness. )            Sinusitis  This is a new problem. The current episode started more than 1 month ago. The problem has been gradually worsening since onset. There has been no fever. Associated symptoms include congestion, coughing, headaches and sinus pressure. Pertinent negatives include no chills, shortness of breath or sore throat. (+white coating on tongue, no pain.)     Bee, Levaquin, Tape, Moxifloxacin hcl in nacl, and Promethazine  Current Outpatient Medications on File Prior to Visit   Medication Sig Dispense Refill   • simvastatin (ZOCOR) 40 MG Tab TAKE 1 TABLET BY MOUTH EVERY DAY IN THE EVENING 90 Tab 1   • escitalopram (LEXAPRO) 10 MG Tab TAKE 1 TABLET BY MOUTH EVERY DAY 90 Tab 3   • hydroCHLOROthiazide (HYDRODIURIL) 25 MG Tab TAKE 1 TABLET BY MOUTH EVERY DAY 90 Tab 3   • verapamil ER (CALAN SR) 120 MG CAPSULE SR 24 HR TAKE 1 CAPSULE BY MOUTH EVERY DAY 90 Cap 3   • albuterol 108 (90 Base) MCG/ACT Aero Soln inhalation aerosol INHALE 2 PUFFS BY MOUTH EVERY 6 HOURS AS NEEDED FOR SHORTNESS OF BREATH 3 Inhaler 1   • ipratropium-albuterol (DUONEB) 0.5-2.5 (3) MG/3ML nebulizer solution 3 mL by Nebulization route 4 times a day. 30 Bullet 5   • Cholecalciferol (VITAMIN D3) 125 MCG (5000 UT) Cap Take 5,000 Units by mouth every day.     • diphenhydrAMINE (BENADRYL) 25 MG Tab Take 25 mg by mouth at bedtime as needed (ALLERGIES).     • Multiple Vitamins-Minerals (MULTIVITAMIN ADULT PO) Take 1 Tab by mouth every day.     • estradiol (ESTRACE) 1 MG TABS Take 1 mg by mouth every day. From gyn       No current facility-administered medications on file prior to visit.      Social History     Socioeconomic History   • Marital status: Single     Spouse name: Not on file   • Number of children: Not on file   • Years of education: Not on file   • Highest education level: Not on file   Occupational History    • Not on file   Social Needs   • Financial resource strain: Not on file   • Food insecurity     Worry: Not on file     Inability: Not on file   • Transportation needs     Medical: Not on file     Non-medical: Not on file   Tobacco Use   • Smoking status: Former Smoker     Packs/day: 0.50     Years: 19.00     Pack years: 9.50     Types: Cigarettes     Start date: 1997     Quit date: 2/15/2020     Years since quittin.7   • Smokeless tobacco: Never Used   • Tobacco comment: 1 pack / week   Substance and Sexual Activity   • Alcohol use: No     Alcohol/week: 0.0 oz   • Drug use: No   • Sexual activity: Never   Lifestyle   • Physical activity     Days per week: Not on file     Minutes per session: Not on file   • Stress: Not on file   Relationships   • Social connections     Talks on phone: Not on file     Gets together: Not on file     Attends Voodoo service: Not on file     Active member of club or organization: Not on file     Attends meetings of clubs or organizations: Not on file     Relationship status: Not on file   • Intimate partner violence     Fear of current or ex partner: Not on file     Emotionally abused: Not on file     Physically abused: Not on file     Forced sexual activity: Not on file   Other Topics Concern   • Not on file   Social History Narrative   • Not on file     Breast Cancer-related family history is not on file.      Review of Systems   Constitutional: Positive for malaise/fatigue. Negative for chills and fever.   HENT: Positive for congestion, sinus pressure and sinus pain. Negative for sore throat.    Respiratory: Positive for cough. Negative for shortness of breath.    Gastrointestinal: Positive for nausea. Negative for diarrhea.   Musculoskeletal: Negative for myalgias.   Neurological: Positive for headaches.          Objective:     /82 (BP Location: Left arm, Patient Position: Sitting, BP Cuff Size: Adult)   Pulse 78   Temp 36.4 °C (97.6 °F) (Temporal)   Resp 18    "Ht 1.575 m (5' 2\")   Wt 87.1 kg (192 lb)   SpO2 97%   BMI 35.12 kg/m²      Physical Exam  Vitals signs and nursing note reviewed.   Constitutional:       General: She is not in acute distress.     Appearance: Normal appearance. She is well-developed.   HENT:      Head: Normocephalic.      Right Ear: External ear normal.      Left Ear: External ear normal.      Nose: Mucosal edema, congestion and rhinorrhea present.      Mouth/Throat:      Comments: Tongue with minimal white coating.  Eyes:      General:         Right eye: No discharge.         Left eye: No discharge.      Conjunctiva/sclera: Conjunctivae normal.   Neck:      Musculoskeletal: Normal range of motion and neck supple.   Cardiovascular:      Rate and Rhythm: Normal rate and regular rhythm.      Heart sounds: Normal heart sounds.   Pulmonary:      Effort: Pulmonary effort is normal.      Breath sounds: Wheezing present.   Musculoskeletal: Normal range of motion.   Lymphadenopathy:      Cervical: No cervical adenopathy.   Skin:     General: Skin is warm and dry.   Neurological:      Mental Status: She is alert and oriented to person, place, and time.   Psychiatric:         Behavior: Behavior normal.         Thought Content: Thought content normal.                 Assessment/Plan:        1. Acute bacterial sinusitis  amoxicillin-clavulanate (AUGMENTIN) 875-125 MG Tab    methylPREDNISolone (MEDROL DOSEPAK) 4 MG Tablet Therapy Pack   2. Thrush, oral  fluconazole (DIFLUCAN) 100 MG Tab     Differential diagnosis, natural history, supportive care, and indications for immediate follow-up discussed at length.     "

## 2020-11-08 ENCOUNTER — OFFICE VISIT (OUTPATIENT)
Dept: URGENT CARE | Facility: PHYSICIAN GROUP | Age: 60
End: 2020-11-08
Payer: COMMERCIAL

## 2020-11-08 ENCOUNTER — HOSPITAL ENCOUNTER (OUTPATIENT)
Facility: MEDICAL CENTER | Age: 60
End: 2020-11-08
Attending: NURSE PRACTITIONER
Payer: COMMERCIAL

## 2020-11-08 VITALS
RESPIRATION RATE: 16 BRPM | TEMPERATURE: 97.8 F | WEIGHT: 194 LBS | DIASTOLIC BLOOD PRESSURE: 82 MMHG | BODY MASS INDEX: 35.7 KG/M2 | HEART RATE: 89 BPM | HEIGHT: 62 IN | OXYGEN SATURATION: 98 % | SYSTOLIC BLOOD PRESSURE: 140 MMHG

## 2020-11-08 DIAGNOSIS — B96.89 ACUTE BACTERIAL SINUSITIS: ICD-10-CM

## 2020-11-08 DIAGNOSIS — Z20.822 SUSPECTED COVID-19 VIRUS INFECTION: ICD-10-CM

## 2020-11-08 DIAGNOSIS — J01.90 ACUTE BACTERIAL SINUSITIS: ICD-10-CM

## 2020-11-08 DIAGNOSIS — R11.0 NAUSEA: ICD-10-CM

## 2020-11-08 PROCEDURE — 96372 THER/PROPH/DIAG INJ SC/IM: CPT | Performed by: NURSE PRACTITIONER

## 2020-11-08 PROCEDURE — 99214 OFFICE O/P EST MOD 30 MIN: CPT | Mod: CS,25 | Performed by: NURSE PRACTITIONER

## 2020-11-08 PROCEDURE — U0003 INFECTIOUS AGENT DETECTION BY NUCLEIC ACID (DNA OR RNA); SEVERE ACUTE RESPIRATORY SYNDROME CORONAVIRUS 2 (SARS-COV-2) (CORONAVIRUS DISEASE [COVID-19]), AMPLIFIED PROBE TECHNIQUE, MAKING USE OF HIGH THROUGHPUT TECHNOLOGIES AS DESCRIBED BY CMS-2020-01-R: HCPCS

## 2020-11-08 RX ORDER — TEMAZEPAM 15 MG/1
CAPSULE ORAL
COMMUNITY
Start: 2020-10-10 | End: 2020-12-09

## 2020-11-08 RX ORDER — ONDANSETRON 2 MG/ML
4 INJECTION INTRAMUSCULAR; INTRAVENOUS ONCE
Status: COMPLETED | OUTPATIENT
Start: 2020-11-08 | End: 2020-11-08

## 2020-11-08 RX ORDER — AMOXICILLIN 500 MG/1
CAPSULE ORAL
COMMUNITY
End: 2020-12-31

## 2020-11-08 RX ORDER — ERGOCALCIFEROL 1.25 MG/1
CAPSULE ORAL
COMMUNITY
End: 2020-12-31

## 2020-11-08 RX ORDER — ONDANSETRON 4 MG/1
4 TABLET, FILM COATED ORAL EVERY 4 HOURS PRN
Qty: 20 EACH | Refills: 0 | Status: SHIPPED | OUTPATIENT
Start: 2020-11-08 | End: 2020-11-13

## 2020-11-08 RX ORDER — CODEINE PHOSPHATE AND GUAIFENESIN 10; 100 MG/5ML; MG/5ML
SOLUTION ORAL
COMMUNITY
End: 2020-12-31

## 2020-11-08 RX ADMIN — ONDANSETRON 4 MG: 2 INJECTION INTRAMUSCULAR; INTRAVENOUS at 10:40

## 2020-11-08 ASSESSMENT — ENCOUNTER SYMPTOMS
FEVER: 0
WEAKNESS: 0
VERTIGO: 1
MYALGIAS: 0
VOMITING: 1
WHEEZING: 1
SHORTNESS OF BREATH: 0
CHILLS: 0
VISUAL CHANGE: 0
HEADACHES: 1
FATIGUE: 1
SPUTUM PRODUCTION: 0
ABDOMINAL PAIN: 0
SINUS PAIN: 1
SORE THROAT: 0
COUGH: 0
DIARRHEA: 0
NAUSEA: 1
ARTHRALGIAS: 0

## 2020-11-08 ASSESSMENT — FIBROSIS 4 INDEX: FIB4 SCORE: 0.66

## 2020-11-08 NOTE — LETTER
November 8, 2020    To Whom It May Concern:         This is confirmation that Harmony Monroe attended her scheduled appointment with CORINNA Carcamo on 11/08/20.      Your employee was seen in our clinic today.  A concern for COVID-19 has been identified and testing is in progress.  We are asking you to excuse absences as well following self-isolation protocol per Center for Disease Control (CDC).  You employee will be able to access test results through our electronic delivery system called [employer].     If the results of testing are negative, and once there has been no fever (> than 100.4 F)  without treatment, and no vomiting or diarrhea for at least 24 hours, then return to work is approved.    If the results of testing are positive than your employer will be contacted by the [facility] or [facility] department for further instructions on duration of self-isolation and return to work protocol.  In general, this will also follow the CDC guidelines with a minimum of 10 days from the onset of symptoms and without fever, vomiting, or diarrhea as above.     In general, repeat testing is NOT necessary and not offered through the [facility] care.     This is the only note that will be provided from the [facility] for this visit.  Your employee will require an appointment with a primary care provider if FMLA or disability forms are required.    If you have any questions please do not hesitate to call me at the phone number listed below.    Sincerely,          CLEO Carcamo.  [phone]

## 2020-11-08 NOTE — PROGRESS NOTES
Subjective:     Harmony Monroe is a 60 y.o. female who presents for Nausea (dizziness, sinus pain/pressure, cold sweats, x1 day for cold sweats, x2 months sinuses )      Nausea  This is a recurrent (Harmony is a pleasant 60 year old female who presents to  today with complaints of sinus pain, rhinnorea, cough, sweats and nausea. ) problem. The current episode started more than 1 month ago (She states her sinus pain has been present for the past 2 months however, her symptoms are worsening. She was seen in  yesterday and treated for a sinus infection with Augmentin, Medrol dose pack and flucanazole for suspected thrush.). The problem occurs constantly. The problem has been gradually worsening (She states she is now experiencing intermittent vertigo, sweats and N/V. ). Associated symptoms include congestion, fatigue, headaches, nausea, vertigo and vomiting. Pertinent negatives include no abdominal pain, arthralgias, chills, coughing, fever, myalgias, sore throat, visual change or weakness. Associated symptoms comments: Negative for loss of taste and smell. . Nothing aggravates the symptoms. She has tried acetaminophen, NSAIDs and rest for the symptoms. The treatment provided no relief.   She denies SOB, productive cough, sore throat, ear pain, abdominal pain, diarrhea.       Review of Systems   Constitutional: Positive for fatigue and malaise/fatigue. Negative for chills and fever.   HENT: Positive for congestion and sinus pain. Negative for ear pain and sore throat.    Respiratory: Positive for wheezing. Negative for cough, sputum production and shortness of breath.    Gastrointestinal: Positive for nausea and vomiting. Negative for abdominal pain and diarrhea.   Musculoskeletal: Negative for arthralgias and myalgias.   Neurological: Positive for vertigo and headaches. Negative for weakness.       PMH:   Past Medical History:   Diagnosis Date   • Allergy    • Arthritis     osteoarthritis; right knee   •  "ASTHMA     1/29/2018 not an issue, currently no medications needed   • Basal cell carcinoma of left medial cheek 11/30/2015   • Basal cell carcinoma of left medial cheek 11/30/2015   • Depression 1/7/2010   • High cholesterol    • History of tobacco use disorder 1/7/2010   • Hypertension    • Pneumonia 1990,2001   • Post-menopausal 8/26/2014     ALLERGIES:   Allergies   Allergen Reactions   • Bee Anaphylaxis   • Levaquin Anaphylaxis and Unspecified     Myalgias arthralgias    • Tape Hives     Redness; itching \"paper tape ok\"   • Moxifloxacin Hcl In Nacl Swelling   • Promethazine      Twitching feeling     SURGHX:   Past Surgical History:   Procedure Laterality Date   • KNEE ARTHROSCOPY Left 2/5/2018    Procedure: KNEE ARTHROSCOPY;  Surgeon: Andrews Castro M.D.;  Location: [facility];  Service: Orthopedics   • MEDIAL MENISCECTOMY Left 2/5/2018    Procedure: MEDIAL MENISCECTOMY - PARTIAL;  Surgeon: Andrews Castro M.D.;  Location: [facility];  Service: Orthopedics   • KNEE ARTHROSCOPY Right 3/23/2017    Procedure: KNEE ARTHROSCOPY;  Surgeon: Andrews Castro M.D.;  Location: [facility];  Service:    • MEDIAL MENISCECTOMY  3/23/2017    Procedure: MEDIAL and lateral  MENISCECTOMY - PARTIAL;  Surgeon: Andrews Castro M.D.;  Location: [facility];  Service:    • CHONDROPLASTY  3/23/2017    Procedure: CHONDROPLASTY -  PATELLAR;  Surgeon: Andrews Castro M.D.;  Location: [facility];  Service:    • ABDOMINAL HYSTERECTOMY TOTAL  11/2005   • TONSILLECTOMY AND ADENOIDECTOMY  1980   • NASAL SEPTAL RECONSTRUCTION  1980   • CYST EXCISION  1974    Anterior Left Foot     SOCHX:   Social History     Socioeconomic History   • Marital status: Single     Spouse name: Not on file   • Number of children: Not on file   • Years of education: Not on file   • Highest education level: Not on file   Occupational History   • Not on file   Social Needs   • Financial resource " "strain: Not on file   • Food insecurity     Worry: Not on file     Inability: Not on file   • Transportation needs     Medical: Not on file     Non-medical: Not on file   Tobacco Use   • Smoking status: Former Smoker     Packs/day: 0.50     Years: 19.00     Pack years: 9.50     Types: Cigarettes     Start date: 1997     Quit date: 2/15/2020     Years since quittin.7   • Smokeless tobacco: Never Used   • Tobacco comment: 1 pack / week   Substance and Sexual Activity   • Alcohol use: No     Alcohol/week: 0.0 oz   • Drug use: No   • Sexual activity: Never   Lifestyle   • Physical activity     Days per week: Not on file     Minutes per session: Not on file   • Stress: Not on file   Relationships   • Social connections     Talks on phone: Not on file     Gets together: Not on file     Attends Mosque service: Not on file     Active member of club or organization: Not on file     Attends meetings of clubs or organizations: Not on file     Relationship status: Not on file   • Intimate partner violence     Fear of current or ex partner: Not on file     Emotionally abused: Not on file     Physically abused: Not on file     Forced sexual activity: Not on file   Other Topics Concern   • Not on file   Social History Narrative   • Not on file     FH:   Family History   Problem Relation Age of Onset   • Hyperlipidemia Mother    • Arthritis Mother    • Heart Disease Father    • Diabetes Sister    • Cancer Neg Hx    • Hypertension Neg Hx    • Stroke Neg Hx          Objective:   /82 (BP Location: Left arm, Patient Position: Sitting, BP Cuff Size: Adult)   Pulse 89   Temp 36.6 °C (97.8 °F) (Temporal)   Resp 16   Ht 1.575 m (5' 2\")   Wt 88 kg (194 lb)   SpO2 98%   BMI 35.48 kg/m²     Physical Exam  Vitals signs and nursing note reviewed.   Constitutional:       General: She is not in acute distress.     Appearance: Normal appearance. She is ill-appearing. She is not toxic-appearing.   HENT:      Head: " Normocephalic and atraumatic.      Right Ear: Tympanic membrane, ear canal and external ear normal. There is no impacted cerumen.      Left Ear: Tympanic membrane, ear canal and external ear normal. There is no impacted cerumen.      Nose: No congestion or rhinorrhea.      Right Turbinates: Not enlarged, swollen or pale.      Left Turbinates: Not enlarged, swollen or pale.      Right Sinus: Maxillary sinus tenderness and frontal sinus tenderness present.      Left Sinus: Maxillary sinus tenderness and frontal sinus tenderness present.      Mouth/Throat:      Mouth: Mucous membranes are moist.      Tongue: No lesions. Tongue does not deviate from midline.      Pharynx: Uvula midline. No pharyngeal swelling, oropharyngeal exudate, posterior oropharyngeal erythema or uvula swelling.      Tonsils: No tonsillar exudate or tonsillar abscesses. 0 on the right. 0 on the left.      Comments: Tonsils surgically absent. Negative for white patches or signs of thrush.    Eyes:      General:         Right eye: No discharge.         Left eye: No discharge.      Extraocular Movements: Extraocular movements intact.      Pupils: Pupils are equal, round, and reactive to light.   Neck:      Musculoskeletal: Normal range of motion and neck supple. No neck rigidity or muscular tenderness.   Cardiovascular:      Rate and Rhythm: Normal rate and regular rhythm.      Heart sounds: Normal heart sounds.   Pulmonary:      Effort: Pulmonary effort is normal. No respiratory distress.      Breath sounds: No stridor. Examination of the right-lower field reveals decreased breath sounds. Examination of the left-lower field reveals decreased breath sounds. Decreased breath sounds present. No wheezing, rhonchi or rales.   Chest:      Chest wall: No tenderness.   Abdominal:      General: Abdomen is flat. Bowel sounds are normal. There is no distension.      Palpations: Abdomen is soft.      Tenderness: There is no abdominal tenderness. There is no  guarding.   Musculoskeletal:         General: No swelling or tenderness.   Lymphadenopathy:      Cervical: No cervical adenopathy.   Skin:     General: Skin is warm and dry.      Findings: No rash.   Neurological:      General: No focal deficit present.      Mental Status: She is alert and oriented to person, place, and time. Mental status is at baseline.   Psychiatric:         Mood and Affect: Mood normal.         Behavior: Behavior normal.         Thought Content: Thought content normal.         Judgment: Judgment normal.         Assessment/Plan:   Assessment    1. Suspected COVID-19 virus infection  COVID/SARS COV-2 PCR   2. Nausea  ondansetron (ZOFRAN) 4 MG Tab tablet    ondansetron (ZOFRAN) syringe/vial injection 4 mg   3. Acute bacterial sinusitis     Pt was tested for COVID today. I will call with results. Upon entering the room PPE was worn throughout the duration of his visit for both provider and patient. Mask of patient briefly removed for examination of oropharynx. PT was educated to remain in self isolation for at least 10 days following the onset of symptoms and to not return to work until symptoms have resolved for three days without the use of symptom altering medication.   She was encouraged to continue use of Augmentin for likely sinus infection. If COVID is positive will have her discontinue antibiotic. Discontinue Diflucan until current illness resolves. She was given Zofran in clinic for nausea which she tolerated well. We discussed supportive measures including humidifier, warm salt water gargles, over-the-counter Cepacol throat lozenges, rest  and increased fluids. Pt was encouraged to seek treatment back in the ER or urgent care for worsening symptoms,  fever greater than 100.5, wheezes or shortness of breath.   AVS handout given and reviewed with patient. Pt educated on red flags and when to seek treatment back in ER or UC.

## 2020-11-08 NOTE — LETTER
November 8, 2020    To Whom It May Concern:         This is confirmation that Harmony Monroe attended her scheduled appointment with CORINNA Carcamo on 11/08/20.      Your employee was seen in our clinic today.  A concern for COVID-19 has been identified and testing is in progress.  We are asking you to excuse absences as well following self-isolation protocol per Center for Disease Control (CDC).  You employee will be able to access test results through our electronic delivery system called [employer].     If the results of testing are negative, and once there has been no fever (> than 100.4 F)  without treatment, and no vomiting or diarrhea for at least 24 hours, then return to work is approved.    If the results of testing are positive than your employer will be contacted by the [facility] or [facility] department for further instructions on duration of self-isolation and return to work protocol.  In general, this will also follow the CDC guidelines with a minimum of 10 days from the onset of symptoms and without fever, vomiting, or diarrhea as above.     In general, repeat testing is NOT necessary and not offered through the [facility] care.     This is the only note that will be provided from the [facility] for this visit.  Your employee will require an appointment with a primary care provider if FMLA or disability forms are required.           If you have any questions please do not hesitate to call me at the phone number listed below.    Sincerely,          CLEO Carcamo.  [phone]

## 2020-11-08 NOTE — PATIENT INSTRUCTIONS
INSTRUCTIONS FOR COVID-19 OR ANY OTHER INFECTIOUS RESPIRATORY ILLNESSES    The Centers for Disease Control and Prevention (CDC) states that early indications for COVID-19 include cough, shortness of breath, difficulty breathing, or at least two of the following symptoms: chills, shaking with chills, muscle pain, headache, sore throat, and loss of taste or smell. Symptoms can range from mild to severe and may appear up to two weeks after exposure to the virus.    The practice of self-isolation and quarantine helps protect the public and your family by  preventing exposure to people who have or may have a contagious disease. Please follow the prevention steps below as based on CDC guidelines:    WHEN TO STOP ISOLATION: Persons with COVID-19 or any other infectious respiratory illness who have symptoms and were advised to care for themselves at home may discontinue home isolation under the following conditions:  · At least 24 hours have passed since recovery defined as resolution of fever without the use of fever-reducing medications; AND,  · Improvement in respiratory symptoms (e.g., cough, shortness of breath); AND,  · At least 10 days have passed since symptoms first appeared and have had no subsequent illness.    MONITOR YOUR SYMPTOMS: If your illness is worsening, seek prompt medical attention. If you have a medical emergency and need to call 911, notify the dispatch personnel that you have, or are being evaluated for confirmed or suspected COVID-19 or another infectious respiratory illness. Wear a facemask if possible.    ACTIVITY RESTRICTION: restrict activities outside your home, except for getting medical care. Do not go to work, school, or public areas. Avoid using public transportation, ride-sharing, or taxis.    SCHEDULED MEDICAL APPOINTMENTS: Notify your provider that you have, or are being evaluated for, confirmed or suspected COVID-19 or another infectious respiratory. This will help the healthcare  provider’s office safely take care of you and keep other people from getting exposed or infected.    FACEMASKS, when to wear: Anytime you are away from your home or around other people or pets. If you are unable to wear one, maintain a minimum of 6 feet distancing from others.    LIVING ENVIRONMENT: Stay in a separate room from other people and pets. If possible, use a separate bathroom, have someone else care for your pets and avoid sharing household items. Any items used should be washed thoroughly with soap and water. Clean all “high-touch” surfaces every day. Use a household cleaning spray or wipe, according to the label instructions. High touch surfaces include (but are not limited to) counters, tabletops, doorknobs, bathroom fixtures, toilets, phones, keyboards, tablets, and bedside tables.     HAND WASHING: Frequently wash hands with soap and water for at least 20 seconds,  especially after blowing your nose, coughing, or sneezing; going to the bathroom; before and after interacting with pets; and before and after eating or preparing food. If hands are visibly dirty use soap and water. If soap and water are not available, use an alcohol-based hand  with at least 60% alcohol. Avoid touching your eyes, nose, and mouth with unwashed hands. Cover your coughs and sneezes with a tissue. Throw used tissues in a lined trash can. Immediately wash your hands.    ACTIVE/FACILITATED SELF-MONITORING: Follow instructions provided by your local health department or health professionals, as appropriate. When working with your local health department check their available hours.    [facility]   Phone Number   [facility] [phone]   [facility] [place] [phone]   [place] [address]   [place] [phone]     IF YOU HAVE CONFIRMED POSITIVE COVID-19:    Those who have completely recovered from COVID-19 may have immune-boosting antibodies in their plasma--called “convalescent plasma”--that could be  used to treat critically ill COVID19 patients.    [facility] is excited to begin working with [facility] on collecting convalescent plasma from  people who have recovered from COVID-19 as part of a program to treat patients infected with the virus. This FDA-approved “emergency investigational new drug” is a special blood product containing antibodies that may give patients an extra boost to fight the virus.    To be eligible to donate convalescent plasma, you must have a prior COVID-19 diagnosis documented by a laboratory test (or a positive test result for SARS-CoV-2 antibodies) and meet additional eligibility requirements.    If you are interested in donating convalescent plasma or have any additional questions, please contact the [facility] Convalescent Plasma  at [phone] or via e-mail at [email or web address] Frequently Asked Questions  COVID-19 (coronavirus disease) is an infection that is caused by a large family of viruses. Some viruses cause illness in people and others cause illness in animals like camels, cats, and bats. In some cases, the viruses that cause illness in animals can spread to humans.  Where did the coronavirus come from?  In December 2019, [place] told the World Health Organization (WHO) of several cases of lung disease (human respiratory illness). These cases were linked to an open seafood and livestock market in the city of [place]. The link to the seafood and livestock market suggests that the virus may have spread from animals to humans. However, since that first outbreak in December, the virus has also been shown to spread from person to person.  What is the name of the disease and the virus?  Disease name  Early on, this disease was called novel coronavirus. This is because scientists determined that the disease was caused by a new (novel) respiratory virus. The World Health Organization (WHO) has now named the disease COVID-19, or coronavirus  disease.  Virus name  The virus that causes the disease is called severe acute respiratory syndrome coronavirus 2 (SARS-CoV-2).  More information on disease and virus naming  World Health Organization (WHO): www.who.int/emergencies/diseases/novel-coronavirus-2019/technical-guidance/naming-the-coronavirus-disease-(covid-2019)-and-the-virus-that-causes-it  Who is at risk for complications from coronavirus disease?  Some people may be at higher risk for complications from coronavirus disease. This includes older adults and people who have chronic diseases, such as heart disease, diabetes, and lung disease.  If you are at higher risk for complications, take these extra precautions:  · Avoid close contact with people who are sick or have a fever or cough. Stay at least 3-6 ft (1-2 m) away from them, if possible.  · Wash your hands often with soap and water for at least 20 seconds.  · Avoid touching your face, mouth, nose, or eyes.  · Keep supplies on hand at home, such as food, medicine, and cleaning supplies.  · Stay home as much as possible.  · Avoid social gatherings and travel.  How does coronavirus disease spread?  The virus that causes coronavirus disease spreads easily from person to person (is contagious). There are also cases of community-spread disease. This means the disease has spread to:  · People who have no known contact with other infected people.  · People who have not traveled to areas where there are known cases.  It appears to spread from one person to another through droplets from coughing or sneezing.  Can I get the virus from touching surfaces or objects?  There is still a lot that we do not know about the virus that causes coronavirus disease. Scientists are basing a lot of information on what they know about similar viruses, such as:  · Viruses cannot generally survive on surfaces for long. They need a human body (host) to survive.  · It is more likely that the virus is spread by close contact  with people who are sick (direct contact), such as through:  ? Shaking hands or hugging.  ? Breathing in respiratory droplets that travel through the air. This can happen when an infected person coughs or sneezes on or near other people.  · It is less likely that the virus is spread when a person touches a surface or object that has the virus on it (indirect contact). The virus may be able to enter the body if the person touches a surface or object and then touches his or her face, eyes, nose, or mouth.  Can a person spread the virus without having symptoms of the disease?  It may be possible for the virus to spread before a person has symptoms of the disease, but this is most likely not the main way the virus is spreading. It is more likely for the virus to spread by being in close contact with people who are sick and breathing in the respiratory droplets of a sick person's cough or sneeze.  What are the symptoms of coronavirus disease?  Symptoms vary from person to person and can range from mild to severe. Symptoms may include:  · Fever.  · Cough.  · Tiredness, weakness, or fatigue.  · Fast breathing or feeling short of breath.  These symptoms can appear anywhere from 2 to 14 days after you have been exposed to the virus. If you develop symptoms, call your health care provider. People with severe symptoms may need hospital care.  If I am exposed to the virus, how long does it take before symptoms start?  Symptoms of coronavirus disease may appear anywhere from 2 to 14 days after a person has been exposed to the virus. If you develop symptoms, call your health care provider.  Should I be tested for this virus?  Your health care provider will decide whether to test you based on your symptoms, history of exposure, and your risk factors.  How does a health care provider test for this virus?  Health care providers will collect samples to send for testing. Samples may include:  · Taking a swab of fluid from the  nose.  · Taking fluid from the lungs by having you cough up mucus (sputum) into a sterile cup.  · Taking a blood sample.  · Taking a stool or urine sample.  Is there a treatment or vaccine for this virus?  Currently, there is no vaccine to prevent coronavirus disease. Also, there are no medicines like antibiotics or antivirals to treat the virus. A person who becomes sick is given supportive care, which means rest and fluids. A person may also relieve his or her symptoms by using over-the-counter medicines that treat sneezing, coughing, and runny nose. These are the same medicines that a person takes for the common cold.  If you develop symptoms, call your health care provider. People with severe symptoms may need hospital care.  What can I do to protect myself and my family from this virus?         You can protect yourself and your family by taking the same actions that you would take to prevent the spread of other viruses. Take the following actions:  · Wash your hands often with soap and water for at least 20 seconds. If soap and water are not available, use alcohol-based hand .  · Avoid touching your face, mouth, nose, or eyes.  · Cough or sneeze into a tissue, sleeve, or elbow. Do not cough or sneeze into your hand or the air.  ? If you cough or sneeze into a tissue, throw it away immediately and wash your hands.  · Disinfect objects and surfaces that you frequently touch every day.  · Avoid close contact with people who are sick or have a fever or cough. Stay at least 3-6 ft (1-2 m) away from them, if possible.  · Stay home if you are sick, except to get medical care. Call your health care provider before you get medical care.  · Make sure your vaccines are up to date. Ask your health care provider what vaccines you need.  What should I do if I need to travel?  Follow travel recommendations from your local health authority, the CDC, and WHO.  Travel information and advice  · Centers for Disease  Control and Prevention (CDC): www.cdc.gov/coronavirus/2019-ncov/travelers/index.html  · World Health Organization (WHO): www.who.int/emergencies/diseases/novel-coronavirus-2019/travel-advice  Know the risks and take action to protect your health  · You are at higher risk of getting coronavirus disease if you are traveling to areas with an outbreak or if you are exposed to travelers from areas with an outbreak.  · Wash your hands often and practice good hygiene to lower the risk of catching or spreading the virus.  What should I do if I am sick?  General instructions to stop the spread of infection  · Wash your hands often with soap and water for at least 20 seconds. If soap and water are not available, use alcohol-based hand .  · Cough or sneeze into a tissue, sleeve, or elbow. Do not cough or sneeze into your hand or the air.  · If you cough or sneeze into a tissue, throw it away immediately and wash your hands.  · Stay home unless you must get medical care. Call your health care provider or local health authority before you get medical care.  · Avoid public areas. Do not take public transportation, if possible.  · If you can, wear a mask if you must go out of the house or if you are in close contact with someone who is not sick.  Keep your home clean  · Disinfect objects and surfaces that are frequently touched every day. This may include:  ? Counters and tables.  ? Doorknobs and light switches.  ? Sinks and faucets.  ? Electronics such as phones, remote controls, keyboards, computers, and tablets.  · Wash dishes in hot, soapy water or use a . Air-dry your dishes.  · Wash laundry in hot water.  Prevent infecting other household members  · Let healthy household members care for children and pets, if possible. If you have to care for children or pets, wash your hands often and wear a mask.  · Sleep in a different bedroom or bed, if possible.  · Do not share personal items, such as razors,  toothbrushes, deodorant, stevens, brushes, towels, and washcloths.  Where to find more information  Centers for Disease Control and Prevention (CDC)  · Information and news updates: www.cdc.gov/coronavirus/2019-ncov  World Health Organization (WHO)  · Information and news updates: www.who.int/emergencies/diseases/novel-coronavirus-2019  · Coronavirus health topic: www.who.int/health-topics/coronavirus  · Questions and answers on COVID-19: www.who.int/news-room/q-a-detail/k-j-prnnraqblntbt  · Global tracker: who.[employer]  American Academy of Pediatrics (AAP)  · Information for families: www.healthychildren.org/English/health-issues/conditions/chest-lungs/Pages/2019-Novel-Coronavirus.aspx  The coronavirus situation is changing rapidly. Check your local health authority website or the CDC and WHO websites for updates and news.  When should I contact a health care provider?  · Contact your health care provider if you have symptoms of an infection, such as fever or cough, and you:  ? Have been near anyone who is known to have coronavirus disease.  ? Have come into contact with a person who is suspected to have coronavirus disease.  ? Have traveled outside of the country.  When should I get emergency medical care?  · Get help right away by calling your local emergency services (911 in the [place] if you have:  ? Trouble breathing.  ? Pain or pressure in your chest.  ? Confusion.  ? Blue-tinged lips and fingernails.  ? Difficulty waking from sleep.  ? Symptoms that get worse.  Let the emergency medical personnel know if you think you have coronavirus disease.  Summary  · A new respiratory virus is spreading from person to person and causing COVID-19 (coronavirus disease).  · The virus that causes COVID-19 appears to spread easily. It spreads from one person to another through droplets from coughing or sneezing.  · Older adults and those with chronic diseases are at higher risk of disease. If you are at higher risk for  complications, take extra precautions.  · There is currently no vaccine to prevent coronavirus disease. There are no medicines, such as antibiotics or antivirals, to treat the virus.  · You can protect yourself and your family by washing your hands often, avoiding touching your face, and covering your coughs and sneezes.  This information is not intended to replace advice given to you by your health care provider. Make sure you discuss any questions you have with your health care provider.  Document Released: 04/14/2020 Document Revised: 04/14/2020 Document Reviewed: 04/14/2020  Elsevier Patient Education © 2020 [employer]  COVID-19  COVID-19 is a respiratory infection that is caused by a virus called severe acute respiratory syndrome coronavirus 2 (SARS-CoV-2). The disease is also known as coronavirus disease or novel coronavirus. In some people, the virus may not cause any symptoms. In others, it may cause a serious infection. The infection can get worse quickly and can lead to complications, such as:  · Pneumonia, or infection of the lungs.  · Acute respiratory distress syndrome or ARDS. This is fluid build-up in the lungs.  · Acute respiratory failure. This is a condition in which there is not enough oxygen passing from the lungs to the body.  · Sepsis or septic shock. This is a serious bodily reaction to an infection.  · Blood clotting problems.  · Secondary infections due to bacteria or fungus.  The virus that causes COVID-19 is contagious. This means that it can spread from person to person through droplets from coughs and sneezes (respiratory secretions).  What are the causes?  This illness is caused by a virus. You may catch the virus by:  · Breathing in droplets from an infected person's cough or sneeze.  · Touching something, like a table or a doorknob, that was exposed to the virus (contaminated) and then touching your mouth, nose, or eyes.  What increases the risk?  Risk for infection  You are more  likely to be infected with this virus if you:  · Live in or travel to an area with a COVID-19 outbreak.  · Come in contact with a sick person who recently traveled to an area with a COVID-19 outbreak.  · Provide care for or live with a person who is infected with COVID-19.  Risk for serious illness  You are more likely to become seriously ill from the virus if you:  · Are 65 years of age or older.  · Have a long-term disease that lowers your body's ability to fight infection (immunocompromised).  · Live in a nursing home or long-term care facility.  · Have a long-term (chronic) disease such as:  ? Chronic lung disease, including chronic obstructive pulmonary disease or asthma  ? Heart disease.  ? Diabetes.  ? Chronic kidney disease.  ? Liver disease.  · Are obese.  What are the signs or symptoms?  Symptoms of this condition can range from mild to severe. Symptoms may appear any time from 2 to 14 days after being exposed to the virus. They include:  · A fever.  · A cough.  · Difficulty breathing.  · Chills.  · Muscle pains.  · A sore throat.  · Loss of taste or smell.  Some people may also have stomach problems, such as nausea, vomiting, or diarrhea.  Other people may not have any symptoms of COVID-19.  How is this diagnosed?  This condition may be diagnosed based on:  · Your signs and symptoms, especially if:  ? You live in an area with a COVID-19 outbreak.  ? You recently traveled to or from an area where the virus is common.  ? You provide care for or live with a person who was diagnosed with COVID-19.  · A physical exam.  · Lab tests, which may include:  ? A nasal swab to take a sample of fluid from your nose.  ? A throat swab to take a sample of fluid from your throat.  ? A sample of mucus from your lungs (sputum).  ? Blood tests.  · Imaging tests, which may include, X-rays, CT scan, or ultrasound.  How is this treated?  At present, there is no medicine to treat COVID-19. Medicines that treat other diseases are  being used on a trial basis to see if they are effective against COVID-19.  Your health care provider will talk with you about ways to treat your symptoms. For most people, the infection is mild and can be managed at home with rest, fluids, and over-the-counter medicines.  Treatment for a serious infection usually takes places in a hospital intensive care unit (ICU). It may include one or more of the following treatments. These treatments are given until your symptoms improve.  · Receiving fluids and medicines through an IV.  · Supplemental oxygen. Extra oxygen is given through a tube in the nose, a face mask, or a gutierrez.  · Positioning you to lie on your stomach (prone position). This makes it easier for oxygen to get into the lungs.  · Continuous positive airway pressure (CPAP) or bi-level positive airway pressure (BPAP) machine. This treatment uses mild air pressure to keep the airways open. A tube that is connected to a motor delivers oxygen to the body.  · Ventilator. This treatment moves air into and out of the lungs by using a tube that is placed in your windpipe.  · Tracheostomy. This is a procedure to create a hole in the neck so that a breathing tube can be inserted.  · Extracorporeal membrane oxygenation (ECMO). This procedure gives the lungs a chance to recover by taking over the functions of the heart and lungs. It supplies oxygen to the body and removes carbon dioxide.  Follow these instructions at home:  Lifestyle  · If you are sick, stay home except to get medical care. Your health care provider will tell you how long to stay home. Call your health care provider before you go for medical care.  · Rest at home as told by your health care provider.  · Do not use any products that contain nicotine or tobacco, such as cigarettes, e-cigarettes, and chewing tobacco. If you need help quitting, ask your health care provider.  · Return to your normal activities as told by your health care provider. Ask your  health care provider what activities are safe for you.  General instructions  · Take over-the-counter and prescription medicines only as told by your health care provider.  · Drink enough fluid to keep your urine pale yellow.  · Keep all follow-up visits as told by your health care provider. This is important.  How is this prevented?    There is no vaccine to help prevent COVID-19 infection. However, there are steps you can take to protect yourself and others from this virus.  To protect yourself:   · Do not travel to areas where COVID-19 is a risk. The areas where COVID-19 is reported change often. To identify high-risk areas and travel restrictions, check the CDC travel website: wwwnc.cdc.gov/travel/notices  · If you live in, or must travel to, an area where COVID-19 is a risk, take precautions to avoid infection.  ? Stay away from people who are sick.  ? Wash your hands often with soap and water for 20 seconds. If soap and water are not available, use an alcohol-based hand .  ? Avoid touching your mouth, face, eyes, or nose.  ? Avoid going out in public, follow guidance from your state and local health authorities.  ? If you must go out in public, wear a cloth face covering or face mask.  ? Disinfect objects and surfaces that are frequently touched every day. This may include:  § Counters and tables.  § Doorknobs and light switches.  § Sinks and faucets.  § Electronics, such as phones, remote controls, keyboards, computers, and tablets.  To protect others:  If you have symptoms of COVID-19, take steps to prevent the virus from spreading to others.  · If you think you have a COVID-19 infection, contact your health care provider right away. Tell your health care team that you think you may have a COVID-19 infection.  · Stay home. Leave your house only to seek medical care. Do not use public transport.  · Do not travel while you are sick.  · Wash your hands often with soap and water for 20 seconds. If soap  and water are not available, use alcohol-based hand .  · Stay away from other members of your household. Let healthy household members care for children and pets, if possible. If you have to care for children or pets, wash your hands often and wear a mask. If possible, stay in your own room, separate from others. Use a different bathroom.  · Make sure that all people in your household wash their hands well and often.  · Cough or sneeze into a tissue or your sleeve or elbow. Do not cough or sneeze into your hand or into the air.  · Wear a cloth face covering or face mask.  Where to find more information  · Centers for Disease Control and Prevention: www.cdc.gov/coronavirus/2019-ncov/index.html  · World Health Organization: www.who.int/health-topics/coronavirus  Contact a health care provider if:  · You live in or have traveled to an area where COVID-19 is a risk and you have symptoms of the infection.  · You have had contact with someone who has COVID-19 and you have symptoms of the infection.  Get help right away if:  · You have trouble breathing.  · You have pain or pressure in your chest.  · You have confusion.  · You have bluish lips and fingernails.  · You have difficulty waking from sleep.  · You have symptoms that get worse.  These symptoms may represent a serious problem that is an emergency. Do not wait to see if the symptoms will go away. Get medical help right away. Call your local emergency services (911 in the [place] Do not drive yourself to the hospital. Let the emergency medical personnel know if you think you have COVID-19.  Summary  · COVID-19 is a respiratory infection that is caused by a virus. It is also known as coronavirus disease or novel coronavirus. It can cause serious infections, such as pneumonia, acute respiratory distress syndrome, acute respiratory failure, or sepsis.  · The virus that causes COVID-19 is contagious. This means that it can spread from person to person through  droplets from coughs and sneezes.  · You are more likely to develop a serious illness if you are 65 years of age or older, have a weak immunity, live in a nursing home, or have chronic disease.  · There is no medicine to treat COVID-19. Your health care provider will talk with you about ways to treat your symptoms.  · Take steps to protect yourself and others from infection. Wash your hands often and disinfect objects and surfaces that are frequently touched every day. Stay away from people who are sick and wear a mask if you are sick.  This information is not intended to replace advice given to you by your health care provider. Make sure you discuss any questions you have with your health care provider.  Document Released: 01/23/2020 Document Revised: 05/14/2020 Document Reviewed: 01/23/2020  [employer] Patient Education © 2020 [employer]

## 2020-11-08 NOTE — LETTER
November 8, 2020    To Whom It May Concern:         This is confirmation that Harmony Monroe attended her scheduled appointment with CORINNA Carcamo on 11/08/20.         If you have any questions please do not hesitate to call me at the phone number listed below.    Sincerely,          CLEO Carcamo.  [phone]

## 2020-11-08 NOTE — LETTER
November 8, 2020    To Whom It May Concern:         This is confirmation that Harmony Monroe attended her scheduled appointment with CORINNA Carcamo on 11/08/20.         If you have any questions please do not hesitate to call me at the phone number listed below.    Sincerely,          CLEO Carcamo.  [phone]

## 2020-11-09 DIAGNOSIS — Z20.822 SUSPECTED COVID-19 VIRUS INFECTION: ICD-10-CM

## 2020-11-09 LAB
COVID ORDER STATUS COVID19: NORMAL
SARS-COV-2 RNA RESP QL NAA+PROBE: NOTDETECTED
SPECIMEN SOURCE: NORMAL

## 2020-11-10 ENCOUNTER — TELEPHONE (OUTPATIENT)
Dept: URGENT CARE | Facility: PHYSICIAN GROUP | Age: 60
End: 2020-11-10

## 2020-12-09 DIAGNOSIS — G47.00 INSOMNIA, UNSPECIFIED TYPE: ICD-10-CM

## 2020-12-09 RX ORDER — TEMAZEPAM 15 MG/1
CAPSULE ORAL
Qty: 30 CAP | Refills: 2 | Status: SHIPPED | OUTPATIENT
Start: 2020-12-09 | End: 2021-03-08

## 2020-12-16 DIAGNOSIS — Z23 NEED FOR VACCINATION: ICD-10-CM

## 2020-12-21 ENCOUNTER — APPOINTMENT (OUTPATIENT)
Dept: FAMILY PLANNING/WOMEN'S HEALTH CLINIC | Facility: IMMUNIZATION CENTER | Age: 60
End: 2020-12-21
Attending: FAMILY MEDICINE
Payer: COMMERCIAL

## 2020-12-21 DIAGNOSIS — Z23 NEED FOR VACCINATION: ICD-10-CM

## 2020-12-21 PROCEDURE — 91300 PFIZER SARS-COV-2 VACCINE: CPT

## 2020-12-21 PROCEDURE — 0001A PFIZER SARS-COV-2 VACCINE: CPT

## 2020-12-22 ENCOUNTER — IMMUNIZATION (OUTPATIENT)
Dept: FAMILY PLANNING/WOMEN'S HEALTH CLINIC | Facility: IMMUNIZATION CENTER | Age: 60
End: 2020-12-22
Payer: COMMERCIAL

## 2020-12-22 DIAGNOSIS — Z23 ENCOUNTER FOR VACCINATION: Primary | ICD-10-CM

## 2020-12-31 ENCOUNTER — OFFICE VISIT (OUTPATIENT)
Dept: DERMATOLOGY | Facility: IMAGING CENTER | Age: 60
End: 2020-12-31
Payer: COMMERCIAL

## 2020-12-31 DIAGNOSIS — L82.1 SEBORRHEIC KERATOSIS: ICD-10-CM

## 2020-12-31 DIAGNOSIS — D49.2 NEOPLASM OF SKIN: ICD-10-CM

## 2020-12-31 DIAGNOSIS — Z85.828 HISTORY OF BASAL CELL CARCINOMA: ICD-10-CM

## 2020-12-31 DIAGNOSIS — L57.0 ACTINIC KERATOSIS: ICD-10-CM

## 2020-12-31 PROCEDURE — 99213 OFFICE O/P EST LOW 20 MIN: CPT | Mod: 25 | Performed by: DERMATOLOGY

## 2020-12-31 PROCEDURE — 17000 DESTRUCT PREMALG LESION: CPT | Mod: 59 | Performed by: DERMATOLOGY

## 2020-12-31 PROCEDURE — 11102 TANGNTL BX SKIN SINGLE LES: CPT | Performed by: DERMATOLOGY

## 2020-12-31 NOTE — PROGRESS NOTES
CC: skin concerning growths    Subjective: Previously seen patient here for 3 lesions on face.    HPI/location: left cheek  Time present: 2 years  Painful lesion: No  Itching lesion: No  Enlarging lesion: Yes  Anything make it better or worse? No, growing with time, notably    HPI/location: in between eyebrows, will scab and not heal  Time present: 1 year  Painful lesion: No  Itching lesion: No  Enlarging lesion: Yes  Anything make it better or worse? No    HPI/location: brown spot left temple, changing color  Time present: 10+ years  Painful lesion: No  Itching lesion: No  Enlarging lesion: Yes  Anything make it better or worse?    History of skin cancer: Yes, Details:  BCC, 10/2015  History of blistering/severe sunburns:No  Family history of skin cancer:No - but brother with ocular melanoma, dx 03/2017  Family history of atypical moles:No      ROS: no fevers/chills. No itch.  No cough  DermPMH: hc bcc  No problem-specific Assessment & Plan notes found for this encounter.    Relevant PMH:mult med comorbidities  Social: former smoker; [employer]    PE: Gen:WDWN female in NAD. Scalp - hair appearing normal dist.  Face/eyes/lips/neck with findings as noted;  -approx 1.5cm pearly plaque + telang, left cheek  -scar right nares, well healed  -thin hyperkeratotic plaque on nasal bridge  -waxy plaque on left temple, appearing benign  -scattered tan macules on face/few waxy, appearing benign    A/P: AKs:  -counseled on diagnosis and treatment, including risks/benefits/alternatives of cyrotherapy  -LN2 25 sec X 2 cycles X 1lesions  -f/u if persists at 1 month    Neoplasm NOS: left cheek, bcc  -consent for bx, including R/B/A. Cleaned with EtOH, anesthesia with lidocaine 1% + epinephrine, shave bx, AlCl3 for hemostasis  -vaseline/bandage and wound care reviewed  -requests referral to emmett Najeragos/SKs: benign  -reassurance    Hx of skin cancer:  -cont'd sunprotection and skin cancer surveillance  -Q 6mo-annual  exam recommended; f/u suspicious lesions PRN  rec schedule MILTON next visit      I have reviewed medications relevant to my specialty.    F/u 2021

## 2021-01-07 ENCOUNTER — TELEPHONE (OUTPATIENT)
Dept: DERMATOLOGY | Facility: IMAGING CENTER | Age: 61
End: 2021-01-07

## 2021-01-07 DIAGNOSIS — C44.310 BCC (BASAL CELL CARCINOMA), FACE: ICD-10-CM

## 2021-01-07 NOTE — TELEPHONE ENCOUNTER
LMTCB regarding pathology results from 12/31/20.    **Pathology result BCC.  Recommendation for Mohs referral to Dr. Zarco.

## 2021-01-08 ENCOUNTER — TELEPHONE (OUTPATIENT)
Dept: DERMATOLOGY | Facility: IMAGING CENTER | Age: 61
End: 2021-01-08

## 2021-01-08 NOTE — TELEPHONE ENCOUNTER
Patient notified of biopsy results from 12/31/20 and of recommendation for Mohs.  Patient had originally requested to be referred to Dr. Zarco, but now wants to referred elsewhere.  Advised patient I would send referral to [facility].  Patient agreeable, expressed understanding and had no further questions.

## 2021-01-12 ENCOUNTER — IMMUNIZATION (OUTPATIENT)
Dept: FAMILY PLANNING/WOMEN'S HEALTH CLINIC | Facility: IMMUNIZATION CENTER | Age: 61
End: 2021-01-12
Attending: FAMILY MEDICINE
Payer: COMMERCIAL

## 2021-01-12 DIAGNOSIS — Z23 ENCOUNTER FOR VACCINATION: Primary | ICD-10-CM

## 2021-01-12 PROCEDURE — 0002A PFIZER SARS-COV-2 VACCINE: CPT | Performed by: FAMILY MEDICINE

## 2021-01-12 PROCEDURE — 91300 PFIZER SARS-COV-2 VACCINE: CPT | Performed by: FAMILY MEDICINE

## 2021-03-23 NOTE — TELEPHONE ENCOUNTER
Hi Dr. Park, you last saw Harmony 2/17. Please refill this control as you see fit. Thank you.  
Please verify with pharmacy if pt has any remaining refills of Temazepam.  She might have one left still.  If not, I will auth but pt will need appt for additional Rx.  Let me know  Leda Thakkar MD      
Pt doesn't have refills at pharmacy.    Dr Park pt received 6 month supply with Dr Miller, she is wondering if you could do the same.    Thank you.  
Yes

## 2021-04-06 DIAGNOSIS — G47.00 INSOMNIA, UNSPECIFIED TYPE: ICD-10-CM

## 2021-04-06 RX ORDER — TEMAZEPAM 15 MG/1
CAPSULE ORAL
Qty: 30 CAPSULE | Refills: 0 | Status: SHIPPED | OUTPATIENT
Start: 2021-04-06 | End: 2021-05-05

## 2021-04-28 ENCOUNTER — OFFICE VISIT (OUTPATIENT)
Dept: MEDICAL GROUP | Facility: PHYSICIAN GROUP | Age: 61
End: 2021-04-28
Payer: COMMERCIAL

## 2021-04-28 VITALS
HEIGHT: 61 IN | DIASTOLIC BLOOD PRESSURE: 78 MMHG | WEIGHT: 186 LBS | SYSTOLIC BLOOD PRESSURE: 120 MMHG | TEMPERATURE: 98.1 F | HEART RATE: 86 BPM | BODY MASS INDEX: 35.12 KG/M2 | OXYGEN SATURATION: 95 %

## 2021-04-28 DIAGNOSIS — E55.9 VITAMIN D DEFICIENCY: ICD-10-CM

## 2021-04-28 DIAGNOSIS — J45.20 MILD INTERMITTENT ASTHMA IN ADULT WITHOUT COMPLICATION: ICD-10-CM

## 2021-04-28 DIAGNOSIS — I10 ESSENTIAL HYPERTENSION: ICD-10-CM

## 2021-04-28 DIAGNOSIS — R61 NIGHT SWEATS: ICD-10-CM

## 2021-04-28 DIAGNOSIS — C44.310 BCC (BASAL CELL CARCINOMA), FACE: ICD-10-CM

## 2021-04-28 DIAGNOSIS — Z87.891 HISTORY OF TOBACCO USE DISORDER: ICD-10-CM

## 2021-04-28 DIAGNOSIS — E78.5 HYPERLIPIDEMIA, UNSPECIFIED HYPERLIPIDEMIA TYPE: ICD-10-CM

## 2021-04-28 DIAGNOSIS — F51.01 PRIMARY INSOMNIA: ICD-10-CM

## 2021-04-28 PROCEDURE — 99214 OFFICE O/P EST MOD 30 MIN: CPT | Performed by: FAMILY MEDICINE

## 2021-04-28 RX ORDER — DEXAMETHASONE 4 MG/1
1 TABLET ORAL 2 TIMES DAILY
Qty: 3 EACH | Refills: 3 | Status: SHIPPED | OUTPATIENT
Start: 2021-04-28 | End: 2021-04-30

## 2021-04-28 RX ORDER — ALBUTEROL SULFATE 90 UG/1
2 AEROSOL, METERED RESPIRATORY (INHALATION) EVERY 6 HOURS PRN
Qty: 3 EACH | Refills: 3 | Status: SHIPPED | OUTPATIENT
Start: 2021-04-28 | End: 2022-05-25 | Stop reason: SDUPTHER

## 2021-04-28 ASSESSMENT — FIBROSIS 4 INDEX: FIB4 SCORE: 0.67

## 2021-04-28 ASSESSMENT — PATIENT HEALTH QUESTIONNAIRE - PHQ9: CLINICAL INTERPRETATION OF PHQ2 SCORE: 0

## 2021-04-28 NOTE — PROGRESS NOTES
CC: Night sweats    HISTORY OF THE PRESENT ILLNESS: Patient is a 61 y.o. female.     Patient is here today for routine follow-up.    She does have a few concerns.  First of all, she has been experiencing intermittent night sweats.  This has been an ongoing issue, but typically only happens if she is sick with something.  Had very long course of night sweats after second Covid vaccine which have now resolved.  This was not related to menopause she had remote total hysterectomy and did not develop night sweats until later on.    She also has known asthma.  She is currently smoking about 1 pack/week and trying to fully quit cigarettes.  She also is getting healthier in terms of diet and exercise.  Currently on Nutrisystem.  She would like to start a steroid inhaler in addition to her albuterol to see if she can get her breathing a bit better while she exercises.    Hypertension remains well controlled on current medications.    She continues to use Restoril nightly for sleep.  This works well to control her insomnia.    Allergies: Bee, Levaquin, Tape, Moxifloxacin hcl in nacl, Polysporin [bacitracin-polymyxin b], and Promethazine    Current Outpatient Medications Ordered in Epic   Medication Sig Dispense Refill   • albuterol 108 (90 Base) MCG/ACT Aero Soln inhalation aerosol Inhale 2 Puffs every 6 hours as needed for Shortness of Breath. 3 Each 3   • fluticasone (FLOVENT HFA) 110 MCG/ACT Aerosol Inhale 1 Puff 2 times a day for 90 days. 3 Each 3   • temazepam (RESTORIL) 15 MG Cap TAKE 1 CAPSULE BY MOUTH AT BEDTIME AS NEEDED FOR SLEEP FOR UP TO 30 DAYS. NEED APPT 30 capsule 0   • simvastatin (ZOCOR) 40 MG Tab TAKE 1 TABLET BY MOUTH EVERY DAY IN THE EVENING 90 Tab 1   • escitalopram (LEXAPRO) 10 MG Tab TAKE 1 TABLET BY MOUTH EVERY DAY 90 Tab 3   • hydroCHLOROthiazide (HYDRODIURIL) 25 MG Tab TAKE 1 TABLET BY MOUTH EVERY DAY 90 Tab 3   • verapamil ER (CALAN SR) 120 MG CAPSULE SR 24 HR TAKE 1 CAPSULE BY MOUTH EVERY DAY 90  Cap 3   • ipratropium-albuterol (DUONEB) 0.5-2.5 (3) MG/3ML nebulizer solution 3 mL by Nebulization route 4 times a day. 30 Bullet 5   • Cholecalciferol (VITAMIN D3) 125 MCG (5000 UT) Cap Take 5,000 Units by mouth every day.     • diphenhydrAMINE (BENADRYL) 25 MG Tab Take 25 mg by mouth at bedtime as needed (ALLERGIES).     • Multiple Vitamins-Minerals (MULTIVITAMIN ADULT PO) Take 1 Tab by mouth every day.     • estradiol (ESTRACE) 1 MG TABS Take 1 mg by mouth every day. From gyn       No current Epic-ordered facility-administered medications on file.       Past Medical History:   Diagnosis Date   • Allergy    • Arthritis     osteoarthritis; right knee   • ASTHMA     1/29/2018 not an issue, currently no medications needed   • Basal cell carcinoma of left medial cheek 11/30/2015   • Basal cell carcinoma of left medial cheek 11/30/2015   • Depression 1/7/2010   • High cholesterol    • History of tobacco use disorder 1/7/2010   • Hypertension    • Pneumonia 1990,2001   • Post-menopausal 8/26/2014       Past Surgical History:   Procedure Laterality Date   • KNEE ARTHROSCOPY Left 2/5/2018    Procedure: KNEE ARTHROSCOPY;  Surgeon: Andrews Castro M.D.;  Location: [facility];  Service: Orthopedics   • MEDIAL MENISCECTOMY Left 2/5/2018    Procedure: MEDIAL MENISCECTOMY - PARTIAL;  Surgeon: Andrews Castro M.D.;  Location: [facility];  Service: Orthopedics   • KNEE ARTHROSCOPY Right 3/23/2017    Procedure: KNEE ARTHROSCOPY;  Surgeon: Andrews Castro M.D.;  Location: [facility];  Service:    • MEDIAL MENISCECTOMY  3/23/2017    Procedure: MEDIAL and lateral  MENISCECTOMY - PARTIAL;  Surgeon: Andrews Castro M.D.;  Location: [facility];  Service:    • CHONDROPLASTY  3/23/2017    Procedure: CHONDROPLASTY -  PATELLAR;  Surgeon: Andrews Castro M.D.;  Location: [facility];  Service:    • ABDOMINAL HYSTERECTOMY TOTAL  11/2005   • TONSILLECTOMY AND ADENOIDECTOMY   "   • NASAL SEPTAL RECONSTRUCTION     • CYST EXCISION      Anterior Left Foot       Social History     Tobacco Use   • Smoking status: Former Smoker     Packs/day: 0.50     Years: 19.00     Pack years: 9.50     Types: Cigarettes     Start date: 1997     Quit date: 2/15/2020     Years since quittin.2   • Smokeless tobacco: Never Used   • Tobacco comment: 1 pack / week   Substance Use Topics   • Alcohol use: No     Alcohol/week: 0.0 oz   • Drug use: No       Social History     Social History Narrative   • Not on file       Family History   Problem Relation Age of Onset   • Hyperlipidemia Mother    • Arthritis Mother    • Heart Disease Father    • Diabetes Sister    • Cancer Neg Hx    • Hypertension Neg Hx    • Stroke Neg Hx        ROS:   Denies fever, chest pain.  Positive for intermittent shortness of breath.    Exam: /78 (BP Location: Right arm, Patient Position: Sitting, BP Cuff Size: Adult)   Pulse 86   Temp 36.7 °C (98.1 °F) (Temporal)   Ht 1.549 m (5' 1\")   Wt 84.4 kg (186 lb)   SpO2 95%  Body mass index is 35.14 kg/m².    General: Well appearing, NAD  HEENT: Normocephalic. Conjunctiva clear, lids without ptosis, pupils equal and reactive to light accommodation, ears normal shape and contour, canals are clear bilaterally, tympanic membranes without bulging or erythema and normal cone of light  Neck: Supple without JVD. No thyromegaly or nodules  Pulmonary: Clear to ausculation.  Normal effort. No rales, ronchi, or wheezing.  Cardiovascular: Regular rate and rhythm without murmur, rubs or gallop.   Abdomen: Soft, nontender, nondistended. Normal bowel sounds. Liver and spleen are not palpable. No rebound or guarding  Neurologic: normal gait  Lymph: No cervical, supraclavicular lymph nodes are palpable  Skin: Warm and dry.  No obvious lesions.  Musculoskeletal:  No extremity cyanosis, clubbing, or edema.  Psych: Normal mood and affect. Alert and oriented. Judgment and insight is " normal.    Please note that this dictation was created using voice recognition software. I have made every reasonable attempt to correct obvious errors, but I expect that there are errors of grammar and possibly content that I did not discover before finalizing the note.      Assessment/Plan  Harmony was seen today for follow-up, requesting labs and other.    Diagnoses and all orders for this visit:    Mild intermittent asthma in adult without complication  Chronic issue, given allergy season I think it is reasonable to trial steroid inhaler as well at this time.  Prescription for Flovent sent to pharmacy.  -     albuterol 108 (90 Base) MCG/ACT Aero Soln inhalation aerosol; Inhale 2 Puffs every 6 hours as needed for Shortness of Breath.  -     fluticasone (FLOVENT HFA) 110 MCG/ACT Aerosol; Inhale 1 Puff 2 times a day for 90 days.    Night sweats  Chronic intermittent issue.  Recommend lab work at this time.  -     CBC WITH DIFFERENTIAL; Future  -     Comp Metabolic Panel; Future  -     TSH; Future  -     FREE THYROXINE; Future    Vitamin D deficiency  Chronic issue, on supplementation.  Due for recheck.  -     VITAMIN D,25 HYDROXY; Future    Hyperlipidemia, unspecified hyperlipidemia type  Chronic, on simvastatin.  Due for recheck.  -     Lipid Profile; Future    Essential hypertension  Chronic and well controlled on current medication verapamil.  -     Comp Metabolic Panel; Future    History of tobacco use disorder  Pretty minimal use at this time, only about 1 pack/week.  Currently trying to complete tobacco cessation.    BCC (basal cell carcinoma), face  Previous issue.  She is pending a dermatology referral which was placed in January and for some reason it has not been processed yet.  Will have medical assistant look into this.    Primary insomnia  Chronic, well controlled on Restoril.      Follow-up in 6 months or sooner if needed.    Crissy Tang, DO  [facility]

## 2021-05-01 DIAGNOSIS — E78.5 HYPERLIPIDEMIA, UNSPECIFIED HYPERLIPIDEMIA TYPE: ICD-10-CM

## 2021-05-04 RX ORDER — SIMVASTATIN 40 MG
TABLET ORAL
Qty: 90 TABLET | Refills: 0 | Status: SHIPPED | OUTPATIENT
Start: 2021-05-04 | End: 2021-08-03

## 2021-05-05 DIAGNOSIS — G47.00 INSOMNIA, UNSPECIFIED TYPE: ICD-10-CM

## 2021-05-05 RX ORDER — TEMAZEPAM 15 MG/1
15 CAPSULE ORAL NIGHTLY PRN
Qty: 30 CAPSULE | Refills: 2 | Status: SHIPPED | OUTPATIENT
Start: 2021-05-05 | End: 2021-06-04

## 2021-05-20 ENCOUNTER — HOSPITAL ENCOUNTER (OUTPATIENT)
Dept: RADIOLOGY | Facility: MEDICAL CENTER | Age: 61
End: 2021-05-20
Attending: NURSE PRACTITIONER
Payer: COMMERCIAL

## 2021-05-20 ENCOUNTER — OFFICE VISIT (OUTPATIENT)
Dept: URGENT CARE | Facility: PHYSICIAN GROUP | Age: 61
End: 2021-05-20
Payer: COMMERCIAL

## 2021-05-20 VITALS
SYSTOLIC BLOOD PRESSURE: 140 MMHG | WEIGHT: 184 LBS | HEIGHT: 61 IN | OXYGEN SATURATION: 97 % | BODY MASS INDEX: 34.74 KG/M2 | HEART RATE: 95 BPM | RESPIRATION RATE: 14 BRPM | DIASTOLIC BLOOD PRESSURE: 70 MMHG | TEMPERATURE: 97.5 F

## 2021-05-20 DIAGNOSIS — L72.3 SEBACEOUS CYST: ICD-10-CM

## 2021-05-20 DIAGNOSIS — M79.89 SOFT TISSUE MASS: ICD-10-CM

## 2021-05-20 PROCEDURE — 76705 ECHO EXAM OF ABDOMEN: CPT

## 2021-05-20 PROCEDURE — 99213 OFFICE O/P EST LOW 20 MIN: CPT | Performed by: NURSE PRACTITIONER

## 2021-05-20 RX ORDER — SULFAMETHOXAZOLE AND TRIMETHOPRIM 800; 160 MG/1; MG/1
1 TABLET ORAL 2 TIMES DAILY
Qty: 14 TABLET | Refills: 0 | Status: SHIPPED | OUTPATIENT
Start: 2021-05-20 | End: 2021-05-27

## 2021-05-20 ASSESSMENT — FIBROSIS 4 INDEX: FIB4 SCORE: 0.67

## 2021-05-20 ASSESSMENT — PAIN SCALES - GENERAL: PAINLEVEL: NO PAIN

## 2021-05-20 NOTE — PROGRESS NOTES
Subjective:      Harmony Monroe is a 61 y.o. female who presents with Abscess (sx started about 12  days ago with an abscess above super pubic area. Pt states it is red and swollen. )            This is a new problem. Pt reports she noticed a small, non tender mass in her suprapubic region about 12 days ago. She denies any drainage from the area. Reports it has not changed in size. There is slight redness overlying the area in question. She denies trying to treat it with anything. Reports she does not shave her pubic area normally, only did today so that provider could see it better. She has not had anything similar to this in the past.      Review of Systems   Skin:        Non tender soft tissue mass to suprapubic region   All other systems reviewed and are negative.    Past Medical History:   Diagnosis Date   • Allergy    • Arthritis     osteoarthritis; right knee   • ASTHMA     1/29/2018 not an issue, currently no medications needed   • Basal cell carcinoma of left medial cheek 11/30/2015   • Basal cell carcinoma of left medial cheek 11/30/2015   • Depression 1/7/2010   • High cholesterol    • History of tobacco use disorder 1/7/2010   • Hypertension    • Pneumonia 1990,2001   • Post-menopausal 8/26/2014      Past Surgical History:   Procedure Laterality Date   • KNEE ARTHROSCOPY Left 2/5/2018    Procedure: KNEE ARTHROSCOPY;  Surgeon: Andrews Castro M.D.;  Location: [facility];  Service: Orthopedics   • MEDIAL MENISCECTOMY Left 2/5/2018    Procedure: MEDIAL MENISCECTOMY - PARTIAL;  Surgeon: Andrews Castro M.D.;  Location: [facility];  Service: Orthopedics   • KNEE ARTHROSCOPY Right 3/23/2017    Procedure: KNEE ARTHROSCOPY;  Surgeon: Andrews Castro M.D.;  Location: [facility];  Service:    • MEDIAL MENISCECTOMY  3/23/2017    Procedure: MEDIAL and lateral  MENISCECTOMY - PARTIAL;  Surgeon: Andrews Castro M.D.;  Location: [facility];  Service:    •  CHONDROPLASTY  3/23/2017    Procedure: CHONDROPLASTY -  PATELLAR;  Surgeon: Andrews Castro M.D.;  Location: [facility];  Service:    • ABDOMINAL HYSTERECTOMY TOTAL  2005   • TONSILLECTOMY AND ADENOIDECTOMY     • NASAL SEPTAL RECONSTRUCTION     • CYST EXCISION      Anterior Left Foot      Social History     Socioeconomic History   • Marital status: Single     Spouse name: Not on file   • Number of children: Not on file   • Years of education: Not on file   • Highest education level: Not on file   Occupational History   • Not on file   Tobacco Use   • Smoking status: Current Every Day Smoker     Packs/day: 0.50     Years: 19.00     Pack years: 9.50     Types: Cigarettes     Start date: 1997     Last attempt to quit: 2/15/2020     Years since quittin.2   • Smokeless tobacco: Never Used   • Tobacco comment: 1 pack / week   Vaping Use   • Vaping Use: Never used   Substance and Sexual Activity   • Alcohol use: No     Alcohol/week: 0.0 oz   • Drug use: No   • Sexual activity: Never   Other Topics Concern   • Not on file   Social History Narrative   • Not on file     Social Determinants of Health     Financial Resource Strain:    • Difficulty of Paying Living Expenses:    Food Insecurity:    • Worried About Running Out of Food in the Last Year:    • Ran Out of Food in the Last Year:    Transportation Needs:    • Lack of Transportation (Medical):    • Lack of Transportation (Non-Medical):    Physical Activity:    • Days of Exercise per Week:    • Minutes of Exercise per Session:    Stress:    • Feeling of Stress :    Social Connections:    • Frequency of Communication with Friends and Family:    • Frequency of Social Gatherings with Friends and Family:    • Attends Yazidism Services:    • Active Member of Clubs or Organizations:    • Attends Club or Organization Meetings:    • Marital Status:    Intimate Partner Violence:    • Fear of Current or Ex-Partner:    • Emotionally Abused:    •  "Physically Abused:    • Sexually Abused:           Objective:     /70 (BP Location: Right arm, Patient Position: Sitting, BP Cuff Size: Adult)   Pulse 95   Temp 36.4 °C (97.5 °F) (Temporal)   Resp 14   Ht 1.549 m (5' 1\")   Wt 83.5 kg (184 lb)   SpO2 97%   BMI 34.77 kg/m²      Physical Exam  Vitals and nursing note reviewed.   Constitutional:       Appearance: Normal appearance. She is normal weight.   HENT:      Head: Normocephalic and atraumatic.      Nose: Nose normal.      Mouth/Throat:      Mouth: Mucous membranes are moist.      Pharynx: Oropharynx is clear.   Eyes:      Extraocular Movements: Extraocular movements intact.      Pupils: Pupils are equal, round, and reactive to light.   Cardiovascular:      Rate and Rhythm: Normal rate and regular rhythm.   Pulmonary:      Effort: Pulmonary effort is normal.   Abdominal:       Musculoskeletal:         General: Normal range of motion.      Cervical back: Normal range of motion.   Skin:     General: Skin is warm and dry.      Capillary Refill: Capillary refill takes less than 2 seconds.   Neurological:      General: No focal deficit present.      Mental Status: She is alert and oriented to person, place, and time. Mental status is at baseline.   Psychiatric:         Mood and Affect: Mood normal.         Speech: Speech normal.         Thought Content: Thought content normal.         Judgment: Judgment normal.                   5/20/2021 9:54 AM     HISTORY/REASON FOR EXAM:  non tender soft tissue mass in the suprapubic region, ultrasound evaluation     TECHNIQUE/EXAM DESCRIPTION:  Limited ultrasound of the abdominal wall was performed.     COMPARISON: None available.     FINDINGS:  In the area of clinical concern, in the suprapubic abdominal wall, approximately 7 mm deep to the skin, there is a 0.7 x 0.4 x 0.6 cm cystic lesion with low-level internal echoes. It may have a tract connecting the lesion to the skin. No internal   vascular flow. No " aggressive features.     IMPRESSION:     0.7 cm complicated cystic lesion in the suprapubic abdominal wall, most likely representing a sebaceous cyst. No aggressive features.                      Assessment/Plan:        1. Soft tissue mass  - US-ABDOMEN LTD (SOFT TISSUE); Future    2. Sebaceous cyst  - sulfamethoxazole-trimethoprim (BACTRIM DS) 800-160 MG tablet; Take 1 tablet by mouth 2 times a day for 7 days.  Dispense: 14 tablet; Refill: 0    Discussed with patient size of cyst and that I do not suggest I&D at this time  Would like to try abx first to see if cyst will shrink/resorb on it's own or may become more superficial and need draining. She understands and agrees with POC  Take full course of abx  Discussed with patient when to RTC for evaluation   Supportive care, differential diagnoses, and indications for immediate follow-up discussed with patient.    Pathogenesis of diagnosis discussed including typical length and natural progression.      Instructed to return to  or nearest emergency department if symptoms fail to improve, for any change in condition, further concerns, or new concerning symptoms.  Patient states understanding of the plan of care and discharge instructions.

## 2021-06-16 ENCOUNTER — HOSPITAL ENCOUNTER (OUTPATIENT)
Facility: MEDICAL CENTER | Age: 61
End: 2021-06-16
Attending: NURSE PRACTITIONER
Payer: COMMERCIAL

## 2021-06-16 ENCOUNTER — OFFICE VISIT (OUTPATIENT)
Dept: URGENT CARE | Facility: CLINIC | Age: 61
End: 2021-06-16
Payer: COMMERCIAL

## 2021-06-16 VITALS
DIASTOLIC BLOOD PRESSURE: 68 MMHG | BODY MASS INDEX: 35.12 KG/M2 | RESPIRATION RATE: 16 BRPM | TEMPERATURE: 98.3 F | HEIGHT: 61 IN | SYSTOLIC BLOOD PRESSURE: 110 MMHG | HEART RATE: 92 BPM | OXYGEN SATURATION: 97 % | WEIGHT: 186 LBS

## 2021-06-16 DIAGNOSIS — L08.9 INFECTED SEBACEOUS CYST: ICD-10-CM

## 2021-06-16 DIAGNOSIS — L72.3 INFECTED SEBACEOUS CYST: ICD-10-CM

## 2021-06-16 PROCEDURE — 87186 SC STD MICRODIL/AGAR DIL: CPT

## 2021-06-16 PROCEDURE — 10060 I&D ABSCESS SIMPLE/SINGLE: CPT | Performed by: NURSE PRACTITIONER

## 2021-06-16 PROCEDURE — 87070 CULTURE OTHR SPECIMN AEROBIC: CPT

## 2021-06-16 PROCEDURE — 87075 CULTR BACTERIA EXCEPT BLOOD: CPT

## 2021-06-16 PROCEDURE — 87205 SMEAR GRAM STAIN: CPT

## 2021-06-16 PROCEDURE — 87077 CULTURE AEROBIC IDENTIFY: CPT

## 2021-06-16 RX ORDER — SULFAMETHOXAZOLE AND TRIMETHOPRIM 800; 160 MG/1; MG/1
1 TABLET ORAL 2 TIMES DAILY
Qty: 10 TABLET | Refills: 0 | Status: SHIPPED | OUTPATIENT
Start: 2021-06-16 | End: 2021-06-21 | Stop reason: SDUPTHER

## 2021-06-16 ASSESSMENT — FIBROSIS 4 INDEX: FIB4 SCORE: 0.67

## 2021-06-17 LAB
GRAM STN SPEC: NORMAL
SIGNIFICANT IND 70042: NORMAL
SITE SITE: NORMAL
SOURCE SOURCE: NORMAL

## 2021-06-17 ASSESSMENT — ENCOUNTER SYMPTOMS
VOMITING: 0
NAUSEA: 0
CHILLS: 0
SHORTNESS OF BREATH: 0
MYALGIAS: 0
DIZZINESS: 0
FEVER: 0
EYE REDNESS: 0
SORE THROAT: 0

## 2021-06-17 NOTE — PROCEDURES
I and D    Date/Time: 6/16/2021 8:10 PM  Performed by: CORINNA Cruz  Authorized by: CORINNA Cruz   Type: cyst  Body area: trunk  Location details: abdomen  Anesthesia: local infiltration    Anesthesia:  Local Anesthetic: lidocaine 1% with epinephrine  Anesthetic total: 1 mL    Sedation:  Patient sedated: no    Risk factor: underlying major vessel,  underlying major nerve and  coagulopathy  Scalpel size: 11  Incision type: single straight  Incision depth: subcutaneous  Complexity: simple  Drainage: purulent  Drainage amount: moderate  Wound treatment: wound left open  Packing material: none  Patient tolerance: patient tolerated the procedure well with no immediate complications

## 2021-06-17 NOTE — PROGRESS NOTES
Subjective:   Harmony Monroe is a 61 y.o. female who presents for Abscess (lower abdomen/suprapubic, got treatment 3 weeks ago, red and inflamed for 1 week now )      HPI  Patient is a 61-year-old female presents the urgent care for reevaluation of a suspected infection of a sebaceous cyst of the suprapubic region that has become red and inflamed x1 week.  Patient was initially evaluated 5/20 in which an ultrasound was obtained indicating a sebaceous cyst.  Patient was initially treated with oral Bactrim as I&D was not indicated at that time.  Since then patient has had dental work and was on a course of amoxicillin.  She has been off antibiotics for a week and symptoms have progressively worsened.  She denies any fever or chills.  Is scheduled to see dermatology however not for a few weeks.  Review of Systems   Constitutional: Negative for chills and fever.   HENT: Negative for sore throat.    Eyes: Negative for redness.   Respiratory: Negative for shortness of breath.    Cardiovascular: Negative for chest pain.   Gastrointestinal: Negative for nausea and vomiting.   Genitourinary: Negative for dysuria.   Musculoskeletal: Negative for myalgias.   Skin: Negative for rash.        Painful, red bump suprapubic region   Neurological: Negative for dizziness.       Medications:    • albuterol Aers  • diphenhydrAMINE Tabs  • escitalopram Tabs  • estradiol Tabs  • fluticasone-salmeterol Aepb  • hydroCHLOROthiazide Tabs  • ipratropium-albuterol  • MULTIVITAMIN ADULT PO  • simvastatin Tabs  • sulfamethoxazole-trimethoprim  • verapamil ER Cp24  • vitamin D3 Caps    Allergies: Bee, Levaquin, Tape, Moxifloxacin hcl in nacl, Polysporin [bacitracin-polymyxin b], and Promethazine    Problem List: Harmony Monroe does not have any pertinent problems on file.    Surgical History:  Past Surgical History:   Procedure Laterality Date   • KNEE ARTHROSCOPY Left 2/5/2018    Procedure: KNEE ARTHROSCOPY;  Surgeon: Andrews Castro,  "M.D.;  Location: [facility];  Service: Orthopedics   • MEDIAL MENISCECTOMY Left 2/5/2018    Procedure: MEDIAL MENISCECTOMY - PARTIAL;  Surgeon: Andrews Castro M.D.;  Location: [facility];  Service: Orthopedics   • KNEE ARTHROSCOPY Right 3/23/2017    Procedure: KNEE ARTHROSCOPY;  Surgeon: Andrews Castro M.D.;  Location: [facility];  Service:    • MEDIAL MENISCECTOMY  3/23/2017    Procedure: MEDIAL and lateral  MENISCECTOMY - PARTIAL;  Surgeon: Andrews Castro M.D.;  Location: [facility];  Service:    • CHONDROPLASTY  3/23/2017    Procedure: CHONDROPLASTY -  PATELLAR;  Surgeon: Andrews Castro M.D.;  Location: [facility];  Service:    • ABDOMINAL HYSTERECTOMY TOTAL  11/2005   • TONSILLECTOMY AND ADENOIDECTOMY  1980   • NASAL SEPTAL RECONSTRUCTION  1980   • CYST EXCISION  1974    Anterior Left Foot       Past Social Hx: Harmony Monroe  reports that she has been smoking cigarettes. She started smoking about 23 years ago. She has a 9.50 pack-year smoking history. She has never used smokeless tobacco. She reports that she does not drink alcohol and does not use drugs.     Past Family Hx:  Harmony Monroe family history includes Arthritis in her mother; Diabetes in her sister; Heart Disease in her father; Hyperlipidemia in her mother.     Problem list, medications, and allergies reviewed by myself today in Epic.     Objective:     /68 (BP Location: Left arm, Patient Position: Sitting, BP Cuff Size: Adult long)   Pulse 92   Temp 36.8 °C (98.3 °F) (Temporal)   Resp 16   Ht 1.549 m (5' 1\")   Wt 84.4 kg (186 lb)   SpO2 97%   BMI 35.14 kg/m²     Physical Exam  Constitutional:       Appearance: Normal appearance. She is not ill-appearing or toxic-appearing.   HENT:      Head: Normocephalic.      Right Ear: External ear normal.      Left Ear: External ear normal.      Nose: Nose normal.      Mouth/Throat:      Lips: Pink.      " Mouth: Mucous membranes are moist.   Eyes:      General: Lids are normal.         Right eye: No discharge.         Left eye: No discharge.   Pulmonary:      Effort: Pulmonary effort is normal. No accessory muscle usage or respiratory distress.   Musculoskeletal:         General: Normal range of motion.      Cervical back: Full passive range of motion without pain.   Skin:     Coloration: Skin is not pale.      Findings: Abscess present.             Comments: 2 cm erythematous, abscess to the suprapubic region, induration noted.  No fluctuation.  Exquisitely tender to palpation, no discharge   Neurological:      Mental Status: She is alert and oriented to person, place, and time.   Psychiatric:         Mood and Affect: Mood normal.         Thought Content: Thought content normal.         Assessment/Plan:     Diagnosis and associated orders:     1. Infected sebaceous cyst  sulfamethoxazole-trimethoprim (BACTRIM DS) 800-160 MG tablet    ANAEROBIC/AEROBIC/GRAM STAIN    CANCELED: CULTURE WOUND W/ GRAM STAIN      Comments/MDM:   Reviewed previous ultrasound for this encounter from 5/20 which indicated0.7 cm complicated cystic lesion in the suprapubic abdominal wall, most likely representing a sebaceous cyst. No aggressive features.      • I&D performed, patient will be started back on oral antibiotics.  • Use over-the-counter pain reliever, such as acetaminophen (Tylenol), ibuprofen (Advil, Motrin) or naproxen (Aleve) as needed; follow package directions for dosing.  • She will follow up with dermatologist as scheduled  • Differential diagnosis, natural history, supportive care, and indications for immediate follow-up discussed.            Please note that this dictation was created using voice recognition software. I have made a reasonable attempt to correct obvious errors, but I expect that there are errors of grammar and possibly content that I did not discover before finalizing the note.    This note was  electronically signed by Jorge FLOREZ.

## 2021-06-19 LAB
BACTERIA WND AEROBE CULT: ABNORMAL
BACTERIA WND AEROBE CULT: ABNORMAL
GRAM STN SPEC: ABNORMAL
SIGNIFICANT IND 70042: ABNORMAL
SITE SITE: ABNORMAL
SOURCE SOURCE: ABNORMAL

## 2021-06-20 LAB
BACTERIA SPEC ANAEROBE CULT: NORMAL
SIGNIFICANT IND 70042: NORMAL
SITE SITE: NORMAL
SOURCE SOURCE: NORMAL

## 2021-06-21 ENCOUNTER — OFFICE VISIT (OUTPATIENT)
Dept: DERMATOLOGY | Facility: IMAGING CENTER | Age: 61
End: 2021-06-21
Payer: COMMERCIAL

## 2021-06-21 DIAGNOSIS — L08.9 INFECTED SEBACEOUS CYST: ICD-10-CM

## 2021-06-21 DIAGNOSIS — L72.3 INFECTED SEBACEOUS CYST: ICD-10-CM

## 2021-06-21 PROCEDURE — 99024 POSTOP FOLLOW-UP VISIT: CPT | Performed by: NURSE PRACTITIONER

## 2021-06-21 RX ORDER — SULFAMETHOXAZOLE AND TRIMETHOPRIM 800; 160 MG/1; MG/1
1 TABLET ORAL 2 TIMES DAILY
Qty: 20 TABLET | Refills: 0 | Status: SHIPPED | OUTPATIENT
Start: 2021-06-21 | End: 2021-07-01

## 2021-06-21 NOTE — PROGRESS NOTES
"DERMATOLOGY NOTE  NEW VISIT       Chief complaint: Follow-Up and Cyst   was seen in  on 5/20/2021 for soft tissue infection  US was done:  0.7 cm complicated cystic lesion in the suprapubic abdominal wall, most likely representing a sebaceous cyst. No aggressive features    HPI/location: pubic area still inflamed taking  bactrim  Time present: 45 days  Painful lesion: Yes  Itching lesion: No  Enlarging lesion: Yes  Anything make it better or worse?      History of skin cancer: Yes, Details:  BCC, 10/2015  History of blistering/severe sunburns:No  Family history of skin cancer:No - but brother with ocular melanoma, dx 03/2017  Family history of atypical moles:No       Allergies   Allergen Reactions   • Bee Anaphylaxis   • Levaquin Anaphylaxis and Unspecified     Myalgias arthralgias    • Tape Hives     Redness; itching \"paper tape ok\"   • Moxifloxacin Hcl In Nacl Swelling   • Polysporin [Bacitracin-Polymyxin B]      rash   • Promethazine      Twitching feeling        MEDICATIONS:  Medications relevant to specialty reviewed.     REVIEW OF SYSTEMS:   Positive for skin (see HPI)  Negative for fevers and chills       EXAM:  There were no vitals taken for this visit.  Constitutional: Well-developed, well-nourished, and in no distress.     A focused skin exam was performed including the affected areas of the face around mask and suprapubic area. Notable findings on exam today listed below and/or in assessment/plan.     One slightly erythematous slightly mobile cystic structure to suprapubic area    IMPRESSION / PLAN:    1. Infected sebaceous cyst-improving on abx  Continue abx for 10 more days-s/e discussed  Patient requesting removal   Will refer to general surgery as no surgery slots are available until July 29th  - sulfamethoxazole-trimethoprim (BACTRIM DS) 800-160 MG tablet; Take 1 tablet by mouth 2 times a day for 10 days.  Dispense: 20 tablet; Refill: 0       Please note that this dictation was created using voice " recognition software. I have made every reasonable attempt to correct obvious errors, but I expect that there are errors of grammar and possibly content that I did not discover before finalizing the note.       Return to clinic in: Return if symptoms worsen or fail to improve. and as needed for any new or changing skin lesions.

## 2021-06-22 ENCOUNTER — TELEPHONE (OUTPATIENT)
Dept: DERMATOLOGY | Facility: IMAGING CENTER | Age: 61
End: 2021-06-22

## 2021-06-22 NOTE — TELEPHONE ENCOUNTER
----- Message from Tyesha Blackwood sent at 6/22/2021  8:07 AM PDT -----  Regarding: Referral request  Patient was seen yesterday for cyst and has pending appointment with Scotty at the end of July to do excision. Lexi offered to send referral to General Surgery instead to get in sooner possibly and she declined at the time, but would like to have that referral sent in now. Requested referral to be sent in as 'urgent' do to prevent multiple infections.

## 2021-07-01 ENCOUNTER — APPOINTMENT (RX ONLY)
Dept: URBAN - METROPOLITAN AREA CLINIC 36 | Facility: CLINIC | Age: 61
Setting detail: DERMATOLOGY
End: 2021-07-01

## 2021-07-01 DIAGNOSIS — L57.0 ACTINIC KERATOSIS: ICD-10-CM

## 2021-07-01 DIAGNOSIS — L57.8 OTHER SKIN CHANGES DUE TO CHRONIC EXPOSURE TO NONIONIZING RADIATION: ICD-10-CM

## 2021-07-01 PROCEDURE — ? PHOTODYNAMIC THERAPY COUNSELING

## 2021-07-01 PROCEDURE — ? COUNSELING

## 2021-07-01 PROCEDURE — ? MOHS SURGERY

## 2021-07-01 NOTE — PROCEDURE: MOHS SURGERY
Mid-Level Procedure Text (F): After obtaining clear surgical margins the patient was sent to a mid-level provider for surgical repair.  The patient understands they will receive post-surgical care and follow-up from the mid-level provider.
Debridement Text: The wound edges were debrided prior to proceeding with the closure to facilitate wound healing.
Quadrant Reporting?: no
Asc Procedure Text (F): After obtaining clear surgical margins the patient was sent to an ASC for surgical repair.  The patient understands they will receive post-surgical care and follow-up from the ASC physician.
Double O-Z Flap Text: The defect edges were debeveled with a #15 scalpel blade.  Given the location of the defect, shape of the defect and the proximity to free margins a Double O-Z flap was deemed most appropriate.  Using a sterile surgical marker, an appropriate transposition flap was drawn incorporating the defect and placing the expected incisions within the relaxed skin tension lines where possible. The area thus outlined was incised deep to adipose tissue with a #15 scalpel blade.  The skin margins were undermined to an appropriate distance in all directions utilizing iris scissors.
Consent (Scalp)/Introductory Paragraph: The rationale for Mohs was explained to the patient and consent was obtained. The risks, benefits and alternatives to therapy were discussed in detail. Specifically, the risks of changes in hair growth pattern secondary to repair, infection, scarring, bleeding, prolonged wound healing, incomplete removal, allergy to anesthesia, nerve injury and recurrence were addressed. Prior to the procedure, the treatment site was clearly identified and confirmed by the patient. All components of Universal Protocol/PAUSE Rule completed.
Split-Thickness Skin Graft Text: The defect edges were debeveled with a #15 scalpel blade.  Given the location of the defect, shape of the defect and the proximity to free margins a split thickness skin graft was deemed most appropriate.  Using a sterile surgical marker, the primary defect shape was transferred to the donor site. The split thickness graft was then harvested.  The skin graft was then placed in the primary defect and oriented appropriately.
Special Stains Stage 7 - Results: Base On Clearance Noted Above
Closure 2 Information: This tab is for additional flaps and grafts, including complex repair and grafts and complex repair and flaps. You can also specify a different location for the additional defect, if the location is the same you do not need to select a new one. We will insert the automated text for the repair you select below just as we do for solitary flaps and grafts. Please note that at this time if you select a location with a different insurance zone you will need to override the ICD10 and CPT if appropriate.
Donor Site Anesthesia Type: same as repair anesthesia
Mauc Instructions: By selecting yes to the question below the MAUC number will be added into the note.  This will be calculated automatically based on the diagnosis chosen, the size entered, the body zone selected (H,M,L) and the specific indications you chose. You will also have the option to override the Mohs AUC if you disagree with the automatically calculated number and this option is found in the Case Summary tab.
Otolaryngologist Procedure Text (F): After obtaining clear surgical margins the patient was sent to otolaryngology for surgical repair.  The patient understands they will receive post-surgical care and follow-up from the referring physician's office.
Show Biopsy Photo Reviewed Variable: Yes
Repair Anesthesia Type: 1% lidocaine with epinephrine
Cheiloplasty (Less Than 50%) Text: A decision was made to reconstruct the defect with a  cheiloplasty.  The defect was undermined extensively.  Additional obicularis oris muscle was excised with a 15 blade scalpel.  The defect was converted into a full thickness wedge, of less than 50% of the vertical height of the lip, to facilite a better cosmetic result.  Small vessels were then tied off with 5-0 monocyrl. The obicularis oris, superficial fascia, adipose and dermis were then reapproximated.  After the deeper layers were approximated the epidermis was reapproximated with particular care given to realign the vermilion border.
Where Do You Want The Question To Include Opioid Counseling Located?: Case Summary Tab
W Plasty Text: The lesion was extirpated to the level of the fat with a #15 scalpel blade.  Given the location of the defect, shape of the defect and the proximity to free margins a W-plasty was deemed most appropriate for repair.  Using a sterile surgical marker, the appropriate transposition arms of the W-plasty were drawn incorporating the defect and placing the expected incisions within the relaxed skin tension lines where possible.    The area thus outlined was incised deep to adipose tissue with a #15 scalpel blade.  The skin margins were undermined to an appropriate distance in all directions utilizing iris scissors.  The opposing transposition arms were then transposed into place in opposite direction and anchored with interrupted buried subcutaneous sutures.
Cheek Interpolation Flap Text: A decision was made to reconstruct the defect utilizing an interpolation axial flap and a staged reconstruction.  A telfa template was made of the defect.  This telfa template was then used to outline the Cheek Interpolation flap.  The donor area for the pedicle flap was then injected with anesthesia.  The flap was excised through the skin and subcutaneous tissue down to the layer of the underlying musculature.  The interpolation flap was carefully excised within this deep plane to maintain its blood supply.  The edges of the donor site were undermined.   The donor site was closed in a primary fashion.  The pedicle was then rotated into position and sutured.  Once the tube was sutured into place, adequate blood supply was confirmed with blanching and refill.  The pedicle was then wrapped with xeroform gauze and dressed appropriately with a telfa and gauze bandage to ensure continued blood supply and protect the attached pedicle.
Date Of Previous Biopsy (Optional): 12-31-20
Complex Repair Preamble Text (Leave Blank If You Do Not Want): Extensive wide undermining was performed.
Wound Care (No Sutures): Petrolatum
Quadrants Reporting?: 0
Epidermal Closure Graft Donor Site (Optional): running
Referred To Oculoplastics For Closure Text (Leave Blank If You Do Not Want): After obtaining clear surgical margins the patient was sent to oculoplastics for surgical repair.  The patient understands they will receive post-surgical care and follow-up from the referring physician's office.
Area L Indication Text: Tumors in this location are included in Area L (trunk and extremities).  Mohs surgery is indicated for larger tumors, or tumors with aggressive histologic features, in these anatomic locations.
Crescentic Intermediate Repair Preamble Text (Leave Blank If You Do Not Want): Undermining was performed with blunt dissection.
Provider Procedure Text (E): After obtaining clear surgical margins the defect was repaired by another provider.
No Residual Tumor Seen Histology Text: There were no malignant cells seen in the sections examined.
Ftsg Text: The defect edges were debeveled with a #15 scalpel blade.  Given the location of the defect, shape of the defect and the proximity to free margins a full thickness skin graft was deemed most appropriate.  Using a sterile surgical marker, the primary defect shape was transferred to the donor site. The area thus outlined was incised deep to adipose tissue with a #15 scalpel blade.  The harvested graft was then trimmed of adipose tissue until only dermis and epidermis was left.  The skin margins of the secondary defect were undermined to an appropriate distance in all directions utilizing iris scissors.  The secondary defect was closed with interrupted buried subcutaneous sutures.  The skin edges were then re-apposed with running  sutures.  The skin graft was then placed in the primary defect and oriented appropriately.
Partial Purse String (Intermediate) Text: Given the location of the defect and the characteristics of the surrounding skin an intermediate purse string closure was deemed most appropriate.  Undermining was performed circumfirentially around the surgical defect.  A purse string suture was then placed and tightened. Wound tension only allowed a partial closure of the circular defect.
Secondary Intention Text (Leave Blank If You Do Not Want): The defect will heal with secondary intention.
Modified Advancement Flap Text: The defect edges were debeveled with a #15 scalpel blade.  Given the location of the defect, shape of the defect and the proximity to free margins a modified advancement flap was deemed most appropriate.  Using a sterile surgical marker, an appropriate advancement flap was drawn incorporating the defect and placing the expected incisions within the relaxed skin tension lines where possible.    The area thus outlined was incised deep to adipose tissue with a #15 scalpel blade.  The skin margins were undermined to an appropriate distance in all directions utilizing iris scissors.
Muscle Hinge Flap Text: The defect edges were debeveled with a #15 scalpel blade.  Given the size, depth and location of the defect and the proximity to free margins a muscle hinge flap was deemed most appropriate.  Using a sterile surgical marker, an appropriate hinge flap was drawn incorporating the defect. The area thus outlined was incised with a #15 scalpel blade.  The skin margins were undermined to an appropriate distance in all directions utilizing iris scissors.
Bilobed Transposition Flap Text: The defect edges were debeveled with a #15 scalpel blade.  Given the location of the defect and the proximity to free margins a bilobed transposition flap was deemed most appropriate.  Using a sterile surgical marker, an appropriate bilobe flap drawn around the defect.    The area thus outlined was incised deep to adipose tissue with a #15 scalpel blade.  The skin margins were undermined to an appropriate distance in all directions utilizing iris scissors.
Alar Island Pedicle Flap Text: The defect edges were debeveled with a #15 scalpel blade.  Given the location of the defect, shape of the defect and the proximity to the alar rim an island pedicle advancement flap was deemed most appropriate.  Using a sterile surgical marker, an appropriate advancement flap was drawn incorporating the defect, outlining the appropriate donor tissue and placing the expected incisions within the nasal ala running parallel to the alar rim. The area thus outlined was incised with a #15 scalpel blade.  The skin margins were undermined minimally to an appropriate distance in all directions around the primary defect and laterally outward around the island pedicle utilizing iris scissors.  There was minimal undermining beneath the pedicle flap.
Medical Necessity Statement: Based on my medical judgement, Mohs surgery is the most appropriate treatment for this cancer compared to other treatments.
V-Y Plasty Text: The defect edges were debeveled with a #15 scalpel blade.  Given the location of the defect, shape of the defect and the proximity to free margins an V-Y advancement flap was deemed most appropriate.  Using a sterile surgical marker, an appropriate advancement flap was drawn incorporating the defect and placing the expected incisions within the relaxed skin tension lines where possible.    The area thus outlined was incised deep to adipose tissue with a #15 scalpel blade.  The skin margins were undermined to an appropriate distance in all directions utilizing iris scissors.
Bi-Rhombic Flap Text: The defect edges were debeveled with a #15 scalpel blade.  Given the location of the defect and the proximity to free margins a bi-rhombic flap was deemed most appropriate.  Using a sterile surgical marker, an appropriate rhombic flap was drawn incorporating the defect. The area thus outlined was incised deep to adipose tissue with a #15 scalpel blade.  The skin margins were undermined to an appropriate distance in all directions utilizing iris scissors.
O-T Plasty Text: The defect edges were debeveled with a #15 scalpel blade.  Given the location of the defect, shape of the defect and the proximity to free margins an O-T plasty was deemed most appropriate.  Using a sterile surgical marker, an appropriate O-T plasty was drawn incorporating the defect and placing the expected incisions within the relaxed skin tension lines where possible.    The area thus outlined was incised deep to adipose tissue with a #15 scalpel blade.  The skin margins were undermined to an appropriate distance in all directions utilizing iris scissors.
Previous Accession (Optional): outside path
O-L Flap Text: The defect edges were debeveled with a #15 scalpel blade.  Given the location of the defect, shape of the defect and the proximity to free margins an O-L flap was deemed most appropriate.  Using a sterile surgical marker, an appropriate advancement flap was drawn incorporating the defect and placing the expected incisions within the relaxed skin tension lines where possible.    The area thus outlined was incised deep to adipose tissue with a #15 scalpel blade.  The skin margins were undermined to an appropriate distance in all directions utilizing iris scissors.
Double Island Pedicle Flap Text: The defect edges were debeveled with a #15 scalpel blade.  Given the location of the defect, shape of the defect and the proximity to free margins a double island pedicle advancement flap was deemed most appropriate.  Using a sterile surgical marker, an appropriate advancement flap was drawn incorporating the defect, outlining the appropriate donor tissue and placing the expected incisions within the relaxed skin tension lines where possible.    The area thus outlined was incised deep to adipose tissue with a #15 scalpel blade.  The skin margins were undermined to an appropriate distance in all directions around the primary defect and laterally outward around the island pedicle utilizing iris scissors.  There was minimal undermining beneath the pedicle flap.
Mohs Histo Method Verbiage: Each section was then chromacoded and processed in the Mohs lab using the Mohs protocol and submitted for frozen section.
Melolabial Transposition Flap Text: The defect edges were debeveled with a #15 scalpel blade.  Given the location of the defect and the proximity to free margins a melolabial flap was deemed most appropriate.  Using a sterile surgical marker, an appropriate melolabial transposition flap was drawn incorporating the defect.    The area thus outlined was incised deep to adipose tissue with a #15 scalpel blade.  The skin margins were undermined to an appropriate distance in all directions utilizing iris scissors.
Dressing: pressure dressing with telfa
Island Pedicle Flap Text: The defect edges were debeveled with a #15 scalpel blade.  Given the location of the defect, shape of the defect and the proximity to free margins an island pedicle advancement flap was deemed most appropriate.  Using a sterile surgical marker, an appropriate advancement flap was drawn incorporating the defect, outlining the appropriate donor tissue and placing the expected incisions within the relaxed skin tension lines where possible.    The area thus outlined was incised deep to adipose tissue with a #15 scalpel blade.  The skin margins were undermined to an appropriate distance in all directions around the primary defect and laterally outward around the island pedicle utilizing iris scissors.  There was minimal undermining beneath the pedicle flap.
Tissue Cultured Epidermal Autograft Text: The defect edges were debeveled with a #15 scalpel blade.  Given the location of the defect, shape of the defect and the proximity to free margins a tissue cultured epidermal autograft was deemed most appropriate.  The graft was then trimmed to fit the size of the defect.  The graft was then placed in the primary defect and oriented appropriately.
Tarsorrhaphy Text: A tarsorrhaphy was performed using Frost sutures.
Dermal Autograft Text: The defect edges were debeveled with a #15 scalpel blade.  Given the location of the defect, shape of the defect and the proximity to free margins a dermal autograft was deemed most appropriate.  Using a sterile surgical marker, the primary defect shape was transferred to the donor site. The area thus outlined was incised deep to adipose tissue with a #15 scalpel blade.  The harvested graft was then trimmed of adipose and epidermal tissue until only dermis was left.  The skin graft was then placed in the primary defect and oriented appropriately.
Epidermal Sutures: 5-0 Surgipro
Nostril Rim Text: The closure involved the nostril rim.
Hatchet Flap Text: The defect edges were debeveled with a #15 scalpel blade.  Given the location of the defect, shape of the defect and the proximity to free margins a hatchet flap was deemed most appropriate.  Using a sterile surgical marker, an appropriate hatchet flap was drawn incorporating the defect and placing the expected incisions within the relaxed skin tension lines where possible.    The area thus outlined was incised deep to adipose tissue with a #15 scalpel blade.  The skin margins were undermined to an appropriate distance in all directions utilizing iris scissors.
Bcc Histology Text: There were numerous aggregates of basaloid cells.
Plastic Surgeon Procedure Text (E): After obtaining clear surgical margins the patient was sent to plastics for surgical repair.  The patient understands they will receive post-surgical care and follow-up from the referring physician's office.
Mart-1 - Positive Histology Text: MART-1 staining demonstrates areas of higher density and clustering of melanocytes with Pagetoid spread upwards within the epidermis. The surgical margins are positive for tumor cells.
No Repair - Repaired With Adjacent Surgical Defect Text (Leave Blank If You Do Not Want): After obtaining clear surgical margins the defect was repaired concurrently with another surgical defect which was in close approximation.
Consent 2/Introductory Paragraph: Mohs surgery was explained to the patient and consent was obtained. The risks, benefits and alternatives to therapy were discussed in detail. Specifically, the risks of infection, scarring, bleeding, prolonged wound healing, incomplete removal, allergy to anesthesia, nerve injury and recurrence were addressed. Prior to the procedure, the treatment site was clearly identified and confirmed by the patient. All components of Universal Protocol/PAUSE Rule completed.
Purse String (Intermediate) Text: Given the location of the defect and the characteristics of the surrounding skin a purse string intermediate closure was deemed most appropriate.  Undermining was performed circumfirentially around the surgical defect.  A purse string suture was then placed and tightened.
Consent (Spinal Accessory)/Introductory Paragraph: The rationale for Mohs was explained to the patient and consent was obtained. The risks, benefits and alternatives to therapy were discussed in detail. Specifically, the risks of damage to the spinal accessory nerve, infection, scarring, bleeding, prolonged wound healing, incomplete removal, allergy to anesthesia, and recurrence were addressed. Prior to the procedure, the treatment site was clearly identified and confirmed by the patient. All components of Universal Protocol/PAUSE Rule completed.
Double O-Z Plasty Text: The defect edges were debeveled with a #15 scalpel blade.  Given the location of the defect, shape of the defect and the proximity to free margins a Double O-Z plasty (double transposition flap) was deemed most appropriate.  Using a sterile surgical marker, the appropriate transposition flaps were drawn incorporating the defect and placing the expected incisions within the relaxed skin tension lines where possible. The area thus outlined was incised deep to adipose tissue with a #15 scalpel blade.  The skin margins were undermined to an appropriate distance in all directions utilizing iris scissors.  Hemostasis was achieved with electrocautery.  The flaps were then transposed into place, one clockwise and the other counterclockwise, and anchored with interrupted buried subcutaneous sutures.
Anesthesia Volume In Cc: 6
O-Z Plasty Text: The defect edges were debeveled with a #15 scalpel blade.  Given the location of the defect, shape of the defect and the proximity to free margins an O-Z plasty (double transposition flap) was deemed most appropriate.  Using a sterile surgical marker, the appropriate transposition flaps were drawn incorporating the defect and placing the expected incisions within the relaxed skin tension lines where possible.    The area thus outlined was incised deep to adipose tissue with a #15 scalpel blade.  The skin margins were undermined to an appropriate distance in all directions utilizing iris scissors.  Hemostasis was achieved with electrocautery.  The flaps were then transposed into place, one clockwise and the other counterclockwise, and anchored with interrupted buried subcutaneous sutures.
Mucosal Advancement Flap Text: Given the location of the defect, shape of the defect and the proximity to free margins a mucosal advancement flap was deemed most appropriate. Incisions were made with a 15 blade scalpel in the appropriate fashion along the cutaneous vermilion border and the mucosal lip. The remaining actinically damaged mucosal tissue was excised.  The mucosal advancement flap was then elevated to the gingival sulcus with care taken to preserve the neurovascular structures and advanced into the primary defect. Care was taken to ensure that precise realignment of the vermilion border was achieved.
Island Pedicle Flap-Requiring Vessel Identification Text: The defect edges were debeveled with a #15 scalpel blade.  Given the location of the defect, shape of the defect and the proximity to free margins an island pedicle advancement flap was deemed most appropriate.  Using a sterile surgical marker, an appropriate advancement flap was drawn, based on the axial vessel mentioned above, incorporating the defect, outlining the appropriate donor tissue and placing the expected incisions within the relaxed skin tension lines where possible.    The area thus outlined was incised deep to adipose tissue with a #15 scalpel blade.  The skin margins were undermined to an appropriate distance in all directions around the primary defect and laterally outward around the island pedicle utilizing iris scissors.  There was minimal undermining beneath the pedicle flap.
Consent (Lip)/Introductory Paragraph: The rationale for Mohs was explained to the patient and consent was obtained. The risks, benefits and alternatives to therapy were discussed in detail. Specifically, the risks of lip deformity, changes in the oral aperture, infection, scarring, bleeding, prolonged wound healing, incomplete removal, allergy to anesthesia, nerve injury and recurrence were addressed. Prior to the procedure, the treatment site was clearly identified and confirmed by the patient. All components of Universal Protocol/PAUSE Rule completed.
Repair Hemostasis (Optional): Pinpoint electrocautery
Ear Wedge Repair Text: A wedge excision was completed by carrying down an excision through the full thickness of the ear and cartilage with an inward facing Burow's triangle. The wound was then closed in a layered fashion.
Posterior Auricular Interpolation Flap Text: A decision was made to reconstruct the defect utilizing an interpolation axial flap and a staged reconstruction.  A telfa template was made of the defect.  This telfa template was then used to outline the posterior auricular interpolation flap.  The donor area for the pedicle flap was then injected with anesthesia.  The flap was excised through the skin and subcutaneous tissue down to the layer of the underlying musculature.  The pedicle flap was carefully excised within this deep plane to maintain its blood supply.  The edges of the donor site were undermined.   The donor site was closed in a primary fashion.  The pedicle was then rotated into position and sutured.  Once the tube was sutured into place, adequate blood supply was confirmed with blanching and refill.  The pedicle was then wrapped with xeroform gauze and dressed appropriately with a telfa and gauze bandage to ensure continued blood supply and protect the attached pedicle.
Consent (Ear)/Introductory Paragraph: The rationale for Mohs was explained to the patient and consent was obtained. The risks, benefits and alternatives to therapy were discussed in detail. Specifically, the risks of ear deformity, infection, scarring, bleeding, prolonged wound healing, incomplete removal, allergy to anesthesia, nerve injury and recurrence were addressed. Prior to the procedure, the treatment site was clearly identified and confirmed by the patient. All components of Universal Protocol/PAUSE Rule completed.
Consent (Temporal Branch)/Introductory Paragraph: The rationale for Mohs was explained to the patient and consent was obtained. The risks, benefits and alternatives to therapy were discussed in detail. Specifically, the risks of damage to the temporal branch of the facial nerve, infection, scarring, bleeding, prolonged wound healing, incomplete removal, allergy to anesthesia, and recurrence were addressed. Prior to the procedure, the treatment site was clearly identified and confirmed by the patient. All components of Universal Protocol/PAUSE Rule completed.
Mastoid Interpolation Flap Text: A decision was made to reconstruct the defect utilizing an interpolation axial flap and a staged reconstruction.  A telfa template was made of the defect.  This telfa template was then used to outline the mastoid interpolation flap.  The donor area for the pedicle flap was then injected with anesthesia.  The flap was excised through the skin and subcutaneous tissue down to the layer of the underlying musculature.  The pedicle flap was carefully excised within this deep plane to maintain its blood supply.  The edges of the donor site were undermined.   The donor site was closed in a primary fashion.  The pedicle was then rotated into position and sutured.  Once the tube was sutured into place, adequate blood supply was confirmed with blanching and refill.  The pedicle was then wrapped with xeroform gauze and dressed appropriately with a telfa and gauze bandage to ensure continued blood supply and protect the attached pedicle.
Wound Care: Vaseline
Complex Repair And Flap Additional Text (Will Appearing After The Standard Complex Repair Text): The complex repair was not sufficient to completely close the primary defect. The remaining additional defect was repaired with the flap mentioned below.
Crescentic Advancement Flap Text: The defect edges were debeveled with a #15 scalpel blade.  Given the location of the defect and the proximity to free margins a crescentic advancement flap was deemed most appropriate.  Using a sterile surgical marker, the appropriate advancement flap was drawn incorporating the defect and placing the expected incisions within the relaxed skin tension lines where possible.    The area thus outlined was incised deep to adipose tissue with a #15 scalpel blade.  The skin margins were undermined to an appropriate distance in all directions utilizing iris scissors.
Partial Purse String (Simple) Text: Given the location of the defect and the characteristics of the surrounding skin a simple purse string closure was deemed most appropriate.  Undermining was performed circumfirentially around the surgical defect.  A purse string suture was then placed and tightened. Wound tension only allowed a partial closure of the circular defect.
Advancement Flap (Single) Text: The defect edges were debeveled with a #15 scalpel blade.  Given the location of the defect and the proximity to free margins a single advancement flap was deemed most appropriate.  Using a sterile surgical marker, an appropriate advancement flap was drawn incorporating the defect and placing the expected incisions within the relaxed skin tension lines where possible.    The area thus outlined was incised deep to adipose tissue with a #15 scalpel blade.  The skin margins were undermined to an appropriate distance in all directions utilizing iris scissors.
Deep Sutures: 5-0 Maxon
Mercedes Flap Text: The defect edges were debeveled with a #15 scalpel blade.  Given the location of the defect, shape of the defect and the proximity to free margins a Mercedes flap was deemed most appropriate.  Using a sterile surgical marker, an appropriate advancement flap was drawn incorporating the defect and placing the expected incisions within the relaxed skin tension lines where possible. The area thus outlined was incised deep to adipose tissue with a #15 scalpel blade.  The skin margins were undermined to an appropriate distance in all directions utilizing iris scissors.
Information: Selecting Yes will display possible errors in your note based on the variables you have selected. This validation is only offered as a suggestion for you. PLEASE NOTE THAT THE VALIDATION TEXT WILL BE REMOVED WHEN YOU FINALIZE YOUR NOTE. IF YOU WANT TO FAX A PRELIMINARY NOTE YOU WILL NEED TO TOGGLE THIS TO 'NO' IF YOU DO NOT WANT IT IN YOUR FAXED NOTE.
Star Wedge Flap Text: The defect edges were debeveled with a #15 scalpel blade.  Given the location of the defect, shape of the defect and the proximity to free margins a star wedge flap was deemed most appropriate.  Using a sterile surgical marker, an appropriate rotation flap was drawn incorporating the defect and placing the expected incisions within the relaxed skin tension lines where possible. The area thus outlined was incised deep to adipose tissue with a #15 scalpel blade.  The skin margins were undermined to an appropriate distance in all directions utilizing iris scissors.
Post-Care Instructions: I reviewed with the patient in detail post-care instructions. Patient is not to engage in any heavy lifting, exercise, or swimming for the next 14 days. Should the patient develop any fevers, chills, bleeding, severe pain patient will contact the office immediately.
Pain Refusal Text: I offered to prescribe pain medication but the patient refused to take this medication.
Melolabial Interpolation Flap Text: A decision was made to reconstruct the defect utilizing an interpolation axial flap and a staged reconstruction.  A telfa template was made of the defect.  This telfa template was then used to outline the melolabial interpolation flap.  The donor area for the pedicle flap was then injected with anesthesia.  The flap was excised through the skin and subcutaneous tissue down to the layer of the underlying musculature.  The pedicle flap was carefully excised within this deep plane to maintain its blood supply.  The edges of the donor site were undermined.   The donor site was closed in a primary fashion.  The pedicle was then rotated into position and sutured.  Once the tube was sutured into place, adequate blood supply was confirmed with blanching and refill.  The pedicle was then wrapped with xeroform gauze and dressed appropriately with a telfa and gauze bandage to ensure continued blood supply and protect the attached pedicle.
Retention Suture Bite Size: 1 mm
H Plasty Text: Given the location of the defect, shape of the defect and the proximity to free margins a H-plasty was deemed most appropriate for repair.  Using a sterile surgical marker, the appropriate advancement arms of the H-plasty were drawn incorporating the defect and placing the expected incisions within the relaxed skin tension lines where possible. The area thus outlined was incised deep to adipose tissue with a #15 scalpel blade. The skin margins were undermined to an appropriate distance in all directions utilizing iris scissors.  The opposing advancement arms were then advanced into place in opposite direction and anchored with interrupted buried subcutaneous sutures.
Postop Diagnosis: same
Alternatives Discussed Intro (Do Not Add Period): I discussed alternative treatments to Mohs surgery and specifically discussed the risks and benefits of
Rotation Flap Text: The defect edges were debeveled with a #15 scalpel blade.  Given the location of the defect, shape of the defect and the proximity to free margins a rotation flap was deemed most appropriate.  Using a sterile surgical marker, an appropriate rotation flap was drawn incorporating the defect and placing the expected incisions within the relaxed skin tension lines where possible.    The area thus outlined was incised deep to adipose tissue with a #15 scalpel blade.  The skin margins were undermined to an appropriate distance in all directions utilizing iris scissors.
Surgeon Performing Repair (Optional): Cecy Peña MD
Mohs Case Number: [id number]
Referring Physician (Optional): Nataly Adam
Manual Repair Warning Statement: We plan on removing the manually selected variable below in favor of our much easier automatic structured text blocks found in the previous tab. We decided to do this to help make the flow better and give you the full power of structured data. Manual selection is never going to be ideal in our platform and I would encourage you to avoid using manual selection from this point on, especially since I will be sunsetting this feature. It is important that you do one of two things with the customized text below. First, you can save all of the text in a word file so you can have it for future reference. Second, transfer the text to the appropriate area in the Library tab. Lastly, if there is a flap or graft type which we do not have you need to let us know right away so I can add it in before the variable is hidden. No need to panic, we plan to give you roughly 6 months to make the change.
Consent (Nose)/Introductory Paragraph: The rationale for Mohs was explained to the patient and consent was obtained. The risks, benefits and alternatives to therapy were discussed in detail. Specifically, the risks of nasal deformity, changes in the flow of air through the nose, infection, scarring, bleeding, prolonged wound healing, incomplete removal, allergy to anesthesia, nerve injury and recurrence were addressed. Prior to the procedure, the treatment site was clearly identified and confirmed by the patient. All components of Universal Protocol/PAUSE Rule completed.
Anesthesia Volume In Cc: 9
Consent (Marginal Mandibular)/Introductory Paragraph: The rationale for Mohs was explained to the patient and consent was obtained. The risks, benefits and alternatives to therapy were discussed in detail. Specifically, the risks of damage to the marginal mandibular branch of the facial nerve, infection, scarring, bleeding, prolonged wound healing, incomplete removal, allergy to anesthesia, and recurrence were addressed. Prior to the procedure, the treatment site was clearly identified and confirmed by the patient. All components of Universal Protocol/PAUSE Rule completed.
Unna Boot Text: An Unna boot was placed to help immobilize the limb and facilitate more rapid healing.
Island Pedicle Flap With Canthal Suspension Text: The defect edges were debeveled with a #15 scalpel blade.  Given the location of the defect, shape of the defect and the proximity to free margins an island pedicle advancement flap was deemed most appropriate.  Using a sterile surgical marker, an appropriate advancement flap was drawn incorporating the defect, outlining the appropriate donor tissue and placing the expected incisions within the relaxed skin tension lines where possible. The area thus outlined was incised deep to adipose tissue with a #15 scalpel blade.  The skin margins were undermined to an appropriate distance in all directions around the primary defect and laterally outward around the island pedicle utilizing iris scissors.  There was minimal undermining beneath the pedicle flap. A suspension suture was placed in the canthal tendon to prevent tension and prevent ectropion.
Detail Level: Detailed
Skin Substitute Text: The defect edges were debeveled with a #15 scalpel blade.  Given the location of the defect, shape of the defect and the proximity to free margins a skin substitute graft was deemed most appropriate.  The graft material was trimmed to fit the size of the defect. The graft was then placed in the primary defect and oriented appropriately.
Graft Donor Site Dermal Sutures (Optional): 5-0 Polysorb
Paramedian Forehead Flap Text: A decision was made to reconstruct the defect utilizing an interpolation axial flap and a staged reconstruction.  A telfa template was made of the defect.  This telfa template was then used to outline the paramedian forehead pedicle flap.  The donor area for the pedicle flap was then injected with anesthesia.  The flap was excised through the skin and subcutaneous tissue down to the layer of the underlying musculature.  The pedicle flap was carefully excised within this deep plane to maintain its blood supply.  The edges of the donor site were undermined.   The donor site was closed in a primary fashion.  The pedicle was then rotated into position and sutured.  Once the tube was sutured into place, adequate blood supply was confirmed with blanching and refill.  The pedicle was then wrapped with xeroform gauze and dressed appropriately with a telfa and gauze bandage to ensure continued blood supply and protect the attached pedicle.
Non-Graft Cartilage Fenestration Text: The cartilage was fenestrated with a 2mm punch biopsy to help facilitate healing.
Mart-1 - Negative Histology Text: MART-1 staining demonstrates a normal density and pattern of melanocytes along the dermal-epidermal junction. The surgical margins are negative for tumor cells.
Estimated Blood Loss (Cc): minimal
Spiral Flap Text: The defect edges were debeveled with a #15 scalpel blade.  Given the location of the defect, shape of the defect and the proximity to free margins a spiral flap was deemed most appropriate.  Using a sterile surgical marker, an appropriate rotation flap was drawn incorporating the defect and placing the expected incisions within the relaxed skin tension lines where possible. The area thus outlined was incised deep to adipose tissue with a #15 scalpel blade.  The skin margins were undermined to an appropriate distance in all directions utilizing iris scissors.
Vermilion Border Text: The closure involved the vermilion border.
Inflammation Suggestive Of Cancer Camouflage Histology Text: There was a dense lymphocytic infiltrate which prevented adequate histologic evaluation of adjacent structures.
Consent 1/Introductory Paragraph: The rationale for Mohs was explained to the patient and consent was obtained. The risks, benefits and alternatives to therapy were discussed in detail. Specifically, the risks of infection, scarring, bleeding, prolonged wound healing, incomplete removal, allergy to anesthesia, nerve injury and recurrence were addressed. Prior to the procedure, the treatment site was clearly identified and confirmed by the patient. All components of Universal Protocol/PAUSE Rule completed.
Ear Star Wedge Flap Text: The defect edges were debeveled with a #15 blade scalpel.  Given the location of the defect and the proximity to free margins (helical rim) an ear star wedge flap was deemed most appropriate.  Using a sterile surgical marker, the appropriate flap was drawn incorporating the defect and placing the expected incisions between the helical rim and antihelix where possible.  The area thus outlined was incised through and through with a #15 scalpel blade.
Surgeon/Pathologist Verbiage (Will Incorporate Name Of Surgeon From Intro If Not Blank): operated in two distinct and integrated capacities as the surgeon and pathologist.
Transposition Flap Text: The defect edges were debeveled with a #15 scalpel blade.  Given the location of the defect and the proximity to free margins a transposition flap was deemed most appropriate.  Using a sterile surgical marker, an appropriate transposition flap was drawn incorporating the defect.    The area thus outlined was incised deep to adipose tissue with a #15 scalpel blade.  The skin margins were undermined to an appropriate distance in all directions utilizing iris scissors.
Repair Type: None (only Mohs)
Graft Cartilage Fenestration Text: The cartilage was fenestrated with a 2mm punch biopsy to help facilitate graft survival and healing.
Mohs Rapid Report Verbiage: The area of clinically evident tumor was marked with skin marking ink and appropriately hatched.  The initial incision was made following the Mohs approach through the skin.  The specimen was taken to the lab, divided into the necessary number of pieces, chromacoded and processed according to the Mohs protocol.  This was repeated in successive stages until a tumor free defect was achieved.
Suturegard Intro: Intraoperative tissue expansion was performed, utilizing the SUTUREGARD device, in order to reduce wound tension.
A-T Advancement Flap Text: The defect edges were debeveled with a #15 scalpel blade.  Given the location of the defect, shape of the defect and the proximity to free margins an A-T advancement flap was deemed most appropriate.  Using a sterile surgical marker, an appropriate advancement flap was drawn incorporating the defect and placing the expected incisions within the relaxed skin tension lines where possible.    The area thus outlined was incised deep to adipose tissue with a #15 scalpel blade.  The skin margins were undermined to an appropriate distance in all directions utilizing iris scissors.
Subsequent Stages Histo Method Verbiage: Using a similar technique to that described above, a thin layer of tissue was removed from all areas where tumor was visible on the previous stage.  The tissue was again oriented, mapped, dyed, and processed as above.
O-Z Flap Text: The defect edges were debeveled with a #15 scalpel blade.  Given the location of the defect, shape of the defect and the proximity to free margins an O-Z flap was deemed most appropriate.  Using a sterile surgical marker, an appropriate transposition flap was drawn incorporating the defect and placing the expected incisions within the relaxed skin tension lines where possible. The area thus outlined was incised deep to adipose tissue with a #15 scalpel blade.  The skin margins were undermined to an appropriate distance in all directions utilizing iris scissors.
Cartilage Graft Text: The defect edges were debeveled with a #15 scalpel blade.  Given the location of the defect, shape of the defect, the fact the defect involved a full thickness cartilage defect a cartilage graft was deemed most appropriate.  An appropriate donor site was identified, cleansed, and anesthetized. The cartilage graft was then harvested and transferred to the recipient site, oriented appropriately and then sutured into place.  The secondary defect was then repaired using a primary closure.
Cheek-To-Nose Interpolation Flap Text: A decision was made to reconstruct the defect utilizing an interpolation axial flap and a staged reconstruction.  A telfa template was made of the defect.  This telfa template was then used to outline the Cheek-To-Nose Interpolation flap.  The donor area for the pedicle flap was then injected with anesthesia.  The flap was excised through the skin and subcutaneous tissue down to the layer of the underlying musculature.  The interpolation flap was carefully excised within this deep plane to maintain its blood supply.  The edges of the donor site were undermined.   The donor site was closed in a primary fashion.  The pedicle was then rotated into position and sutured.  Once the tube was sutured into place, adequate blood supply was confirmed with blanching and refill.  The pedicle was then wrapped with xeroform gauze and dressed appropriately with a telfa and gauze bandage to ensure continued blood supply and protect the attached pedicle.
Consent 3/Introductory Paragraph: I gave the patient a chance to ask questions they had about the procedure.  Following this I explained the Mohs procedure and consent was obtained. The risks, benefits and alternatives to therapy were discussed in detail. Specifically, the risks of infection, scarring, bleeding, prolonged wound healing, incomplete removal, allergy to anesthesia, nerve injury and recurrence were addressed. Prior to the procedure, the treatment site was clearly identified and confirmed by the patient. All components of Universal Protocol/PAUSE Rule completed.
Consent Type: Consent 1 (Standard)
Trilobed Flap Text: The defect edges were debeveled with a #15 scalpel blade.  Given the location of the defect and the proximity to free margins a trilobed flap was deemed most appropriate.  Using a sterile surgical marker, an appropriate trilobed flap drawn around the defect.    The area thus outlined was incised deep to adipose tissue with a #15 scalpel blade.  The skin margins were undermined to an appropriate distance in all directions utilizing iris scissors.
S Plasty Text: Given the location and shape of the defect, and the orientation of relaxed skin tension lines, an S-plasty was deemed most appropriate for repair.  Using a sterile surgical marker, the appropriate outline of the S-plasty was drawn, incorporating the defect and placing the expected incisions within the relaxed skin tension lines where possible.  The area thus outlined was incised deep to adipose tissue with a #15 scalpel blade.  The skin margins were undermined to an appropriate distance in all directions utilizing iris scissors. The skin flaps were advanced over the defect.  The opposing margins were then approximated with interrupted buried subcutaneous sutures.
Chonodrocutaneous Helical Advancement Flap Text: The defect edges were debeveled with a #15 scalpel blade.  Given the location of the defect and the proximity to free margins a chondrocutaneous helical advancement flap was deemed most appropriate.  Using a sterile surgical marker, the appropriate advancement flap was drawn incorporating the defect and placing the expected incisions within the relaxed skin tension lines where possible.    The area thus outlined was incised deep to adipose tissue with a #15 scalpel blade.  The skin margins were undermined to an appropriate distance in all directions utilizing iris scissors.
Keystone Flap Text: The defect edges were debeveled with a #15 scalpel blade.  Given the location of the defect, shape of the defect a keystone flap was deemed most appropriate.  Using a sterile surgical marker, an appropriate keystone flap was drawn incorporating the defect, outlining the appropriate donor tissue and placing the expected incisions within the relaxed skin tension lines where possible. The area thus outlined was incised deep to adipose tissue with a #15 scalpel blade.  The skin margins were undermined to an appropriate distance in all directions around the primary defect and laterally outward around the flap utilizing iris scissors.
Suture Removal: 7 days
Area H Indication Text: Tumors in this location are included in Area H (eyelids, eyebrows, nose, lips, chin, ear, pre-auricular, post-auricular, temple, genitalia, hands, feet, ankles and areola).  Tissue conservation is critical in these anatomic locations.
Purse String (Simple) Text: Given the location of the defect and the characteristics of the surrounding skin a purse string closure was deemed most appropriate.  Undermining was performed circumfirentially around the surgical defect.  A purse string suture was then placed and tightened.
Interpolation Flap Text: A decision was made to reconstruct the defect utilizing an interpolation axial flap and a staged reconstruction.  A telfa template was made of the defect.  This telfa template was then used to outline the interpolation flap.  The donor area for the pedicle flap was then injected with anesthesia.  The flap was excised through the skin and subcutaneous tissue down to the layer of the underlying musculature.  The interpolation flap was carefully excised within this deep plane to maintain its blood supply.  The edges of the donor site were undermined.   The donor site was closed in a primary fashion.  The pedicle was then rotated into position and sutured.  Once the tube was sutured into place, adequate blood supply was confirmed with blanching and refill.  The pedicle was then wrapped with xeroform gauze and dressed appropriately with a telfa and gauze bandage to ensure continued blood supply and protect the attached pedicle.
Suturegard Retention Suture: 0-0 Nylon
Mohs Method Verbiage: An incision at a 45 degree angle following the standard Mohs approach was done and the specimen was harvested as a microscopic controlled layer.
Bilateral Helical Rim Advancement Flap Text: The defect edges were debeveled with a #15 blade scalpel.  Given the location of the defect and the proximity to free margins (helical rim) a bilateral helical rim advancement flap was deemed most appropriate.  Using a sterile surgical marker, the appropriate advancement flaps were drawn incorporating the defect and placing the expected incisions between the helical rim and antihelix where possible.  The area thus outlined was incised through and through with a #15 scalpel blade.  With a skin hook and iris scissors, the flaps were gently and sharply undermined and freed up.
Undermining Location (Optional): in the superficial subcutaneous fat
Graft Donor Site Bandage (Optional-Leave Blank If You Don't Want In Note): Aquaplast was fitted to the graft site and sewn into place. A pressure bandage were applied to the donor site and over the aquaplast bolster.
Home Suture Removal Text: Patient was provided instructions on removing sutures and will remove their sutures at home.  If they have any questions or difficulties they will call the office.
Number Of Stages: 1
Helical Rim Text: The closure involved the helical rim.
Cheiloplasty (Complex) Text: A decision was made to reconstruct the defect with a  cheiloplasty.  The defect was undermined extensively.  Additional obicularis oris muscle was excised with a 15 blade scalpel.  The defect was converted into a full thickness wedge to facilite a better cosmetic result.  Small vessels were then tied off with 5-0 monocyrl. The obicularis oris, superficial fascia, adipose and dermis were then reapproximated.  After the deeper layers were approximated the epidermis was reapproximated with particular care given to realign the vermilion border.
Dorsal Nasal Flap Text: The defect edges were debeveled with a #15 scalpel blade.  Given the location of the defect and the proximity to free margins a dorsal nasal flap was deemed most appropriate.  Using a sterile surgical marker, an appropriate dorsal nasal flap was drawn around the defect.    The area thus outlined was incised deep to adipose tissue with a #15 scalpel blade.  The skin margins were undermined to an appropriate distance in all directions utilizing iris scissors.
Eye Protection Verbiage: Before proceeding with the stage, a plastic scleral shield was inserted. The globe was anesthetized with a few drops of 1% lidocaine with 1:100,000 epinephrine. Then, an appropriate sized scleral shield was chosen and coated with lacrilube ointment. The shield was gently inserted and left in place for the duration of each stage. After the stage was completed, the shield was gently removed.
Bilobed Flap Text: The defect edges were debeveled with a #15 scalpel blade.  Given the location of the defect and the proximity to free margins a bilobe flap was deemed most appropriate.  Using a sterile surgical marker, an appropriate bilobe flap drawn around the defect.    The area thus outlined was incised deep to adipose tissue with a #15 scalpel blade.  The skin margins were undermined to an appropriate distance in all directions utilizing iris scissors.
Closure 3 Information: This tab is for additional flaps and grafts above and beyond our usual structured repairs.  Please note if you enter information here it will not currently bill and you will need to add the billing information manually.
Xenograft Text: The defect edges were debeveled with a #15 scalpel blade.  Given the location of the defect, shape of the defect and the proximity to free margins a xenograft was deemed most appropriate.  The graft was then trimmed to fit the size of the defect.  The graft was then placed in the primary defect and oriented appropriately.
O-T Advancement Flap Text: The defect edges were debeveled with a #15 scalpel blade.  Given the location of the defect, shape of the defect and the proximity to free margins an O-T advancement flap was deemed most appropriate.  Using a sterile surgical marker, an appropriate advancement flap was drawn incorporating the defect and placing the expected incisions within the relaxed skin tension lines where possible.    The area thus outlined was incised deep to adipose tissue with a #15 scalpel blade.  The skin margins were undermined to an appropriate distance in all directions utilizing iris scissors.
Epidermal Closure: running cuticular
Hemostasis: Electrocautery
Suturegard Body: The suture ends were repeatedly re-tightened and re-clamped to achieve the desired tissue expansion.
Retention Suture Text: Retention sutures were placed to support the closure and prevent dehiscence.
V-Y Flap Text: The defect edges were debeveled with a #15 scalpel blade.  Given the location of the defect, shape of the defect and the proximity to free margins a V-Y flap was deemed most appropriate.  Using a sterile surgical marker, an appropriate advancement flap was drawn incorporating the defect and placing the expected incisions within the relaxed skin tension lines where possible.    The area thus outlined was incised deep to adipose tissue with a #15 scalpel blade.  The skin margins were undermined to an appropriate distance in all directions utilizing iris scissors.
Same Histology In Subsequent Stages Text: The pattern and morphology of the tumor is as described in the first stage.
Complex Repair And Graft Additional Text (Will Appearing After The Standard Complex Repair Text): The complex repair was not sufficient to completely close the primary defect. The remaining additional defect was repaired with the graft mentioned below.
Rhomboid Transposition Flap Text: The defect edges were debeveled with a #15 scalpel blade.  Given the location of the defect and the proximity to free margins a rhomboid transposition flap was deemed most appropriate.  Using a sterile surgical marker, an appropriate rhomboid flap was drawn incorporating the defect.    The area thus outlined was incised deep to adipose tissue with a #15 scalpel blade.  The skin margins were undermined to an appropriate distance in all directions utilizing iris scissors.
Banner Transposition Flap Text: The defect edges were debeveled with a #15 scalpel blade.  Given the location of the defect and the proximity to free margins a Banner transposition flap was deemed most appropriate.  Using a sterile surgical marker, an appropriate flap drawn around the defect. The area thus outlined was incised deep to adipose tissue with a #15 scalpel blade.  The skin margins were undermined to an appropriate distance in all directions utilizing iris scissors.
Composite Graft Text: The defect edges were debeveled with a #15 scalpel blade.  Given the location of the defect, shape of the defect, the proximity to free margins and the fact the defect was full thickness a composite graft was deemed most appropriate.  The defect was outline and then transferred to the donor site.  A full thickness graft was then excised from the donor site. The graft was then placed in the primary defect, oriented appropriately and then sutured into place.  The secondary defect was then repaired using a primary closure.
Advancement-Rotation Flap Text: The defect edges were debeveled with a #15 scalpel blade.  Given the location of the defect, shape of the defect and the proximity to free margins an advancement-rotation flap was deemed most appropriate.  Using a sterile surgical marker, an appropriate flap was drawn incorporating the defect and placing the expected incisions within the relaxed skin tension lines where possible. The area thus outlined was incised deep to adipose tissue with a #15 scalpel blade.  The skin margins were undermined to an appropriate distance in all directions utilizing iris scissors.
Rhombic Flap Text: The defect edges were debeveled with a #15 scalpel blade.  Given the location of the defect and the proximity to free margins a rhombic flap was deemed most appropriate.  Using a sterile surgical marker, an appropriate rhombic flap was drawn incorporating the defect.    The area thus outlined was incised deep to adipose tissue with a #15 scalpel blade.  The skin margins were undermined to an appropriate distance in all directions utilizing iris scissors.
Localized Dermabrasion With Wire Brush Text: The patient was draped in routine manner.  Localized dermabrasion using 3 x 17 mm wire brush was performed in routine manner to papillary dermis. This spot dermabrasion is being performed to complete skin cancer reconstruction. It also will eliminate the other sun damaged precancerous cells that are known to be part of the regional effect of a lifetime's worth of sun exposure. This localized dermabrasion is therapeutic and should not be considered cosmetic in any regard.
Z Plasty Text: The lesion was extirpated to the level of the fat with a #15 scalpel blade.  Given the location of the defect, shape of the defect and the proximity to free margins a Z-plasty was deemed most appropriate for repair.  Using a sterile surgical marker, the appropriate transposition arms of the Z-plasty were drawn incorporating the defect and placing the expected incisions within the relaxed skin tension lines where possible.    The area thus outlined was incised deep to adipose tissue with a #15 scalpel blade.  The skin margins were undermined to an appropriate distance in all directions utilizing iris scissors.  The opposing transposition arms were then transposed into place in opposite direction and anchored with interrupted buried subcutaneous sutures.
Consent (Near Eyelid Margin)/Introductory Paragraph: The rationale for Mohs was explained to the patient and consent was obtained. The risks, benefits and alternatives to therapy were discussed in detail. Specifically, the risks of ectropion or eyelid deformity, infection, scarring, bleeding, prolonged wound healing, incomplete removal, allergy to anesthesia, nerve injury and recurrence were addressed. Prior to the procedure, the treatment site was clearly identified and confirmed by the patient. All components of Universal Protocol/PAUSE Rule completed.
Advancement Flap (Double) Text: The defect edges were debeveled with a #15 scalpel blade.  Given the location of the defect and the proximity to free margins a double advancement flap was deemed most appropriate.  Using a sterile surgical marker, the appropriate advancement flaps were drawn incorporating the defect and placing the expected incisions within the relaxed skin tension lines where possible.    The area thus outlined was incised deep to adipose tissue with a #15 scalpel blade.  The skin margins were undermined to an appropriate distance in all directions utilizing iris scissors.
Helical Rim Advancement Flap Text: The defect edges were debeveled with a #15 blade scalpel.  Given the location of the defect and the proximity to free margins (helical rim) a double helical rim advancement flap was deemed most appropriate.  Using a sterile surgical marker, the appropriate advancement flaps were drawn incorporating the defect and placing the expected incisions between the helical rim and antihelix where possible.  The area thus outlined was incised through and through with a #15 scalpel blade.  With a skin hook and iris scissors, the flaps were gently and sharply undermined and freed up.
Epidermal Autograft Text: The defect edges were debeveled with a #15 scalpel blade.  Given the location of the defect, shape of the defect and the proximity to free margins an epidermal autograft was deemed most appropriate.  Using a sterile surgical marker, the primary defect shape was transferred to the donor site. The epidermal graft was then harvested.  The skin graft was then placed in the primary defect and oriented appropriately.
Bcc Infiltrative Histology Text: There were numerous aggregates of basaloid cells demonstrating an infiltrative pattern.
Area M Indication Text: Tumors in this location are included in Area M (cheek, forehead, scalp, neck, jawline and pretibial skin).  Mohs surgery is indicated for tumors in these anatomic locations.
Burow's Advancement Flap Text: The defect edges were debeveled with a #15 scalpel blade.  Given the location of the defect and the proximity to free margins a Burow's advancement flap was deemed most appropriate.  Using a sterile surgical marker, the appropriate advancement flap was drawn incorporating the defect and placing the expected incisions within the relaxed skin tension lines where possible.    The area thus outlined was incised deep to adipose tissue with a #15 scalpel blade.  The skin margins were undermined to an appropriate distance in all directions utilizing iris scissors.
Full Thickness Lip Wedge Repair (Flap) Text: Given the location of the defect and the proximity to free margins a full thickness wedge repair was deemed most appropriate.  Using a sterile surgical marker, the appropriate repair was drawn incorporating the defect and placing the expected incisions perpendicular to the vermilion border.  The vermilion border was also meticulously outlined to ensure appropriate reapproximation during the repair.  The area thus outlined was incised through and through with a #15 scalpel blade.  The muscularis and dermis were reaproximated with deep sutures following hemostasis. Care was taken to realign the vermilion border before proceeding with the superficial closure.  Once the vermilion was realigned the superfical and mucosal closure was finished.
Repair Anesthesia Method: local infiltration
Undermining Type: Entire Wound
Tumor Debulked?: curette
Wound Check: 6 weeks
Hemigard Postcare Instructions: The HEMIGARD strips are to remain completely dry for at least 5-7 days.
Burow's Graft Text: The defect edges were debeveled with a #15 scalpel blade.  Given the location of the defect, shape of the defect, the proximity to free margins and the presence of a standing cone deformity a Burow's skin graft was deemed most appropriate. The standing cone was removed and this tissue was then trimmed to the shape of the primary defect. The adipose tissue was also removed until only dermis and epidermis were left.  The skin margins of the secondary defect were undermined to an appropriate distance in all directions utilizing iris scissors.  The secondary defect was closed with interrupted buried subcutaneous sutures.  The skin edges were then re-apposed with running  sutures.  The skin graft was then placed in the primary defect and oriented appropriately.
Brow Lift Text: A midfrontal incision was made medially to the defect to allow access to the tissues just superior to the left eyebrow. Following careful dissection inferiorly in a supraperiosteal plane to the level of the left eyebrow, several 3-0 monocryl sutures were used to resuspend the eyebrow orbicularis oculi muscular unit to the superior frontal bone periosteum. This resulted in an appropriate reapproximation of static eyebrow symmetry and correction of the left brow ptosis.
Nasal Turnover Hinge Flap Text: The defect edges were debeveled with a #15 scalpel blade.  Given the size, depth, location of the defect and the defect being full thickness a nasal turnover hinge flap was deemed most appropriate.  Using a sterile surgical marker, an appropriate hinge flap was drawn incorporating the defect. The area thus outlined was incised with a #15 scalpel blade. The flap was designed to recreate the nasal mucosal lining and the alar rim. The skin margins were undermined to an appropriate distance in all directions utilizing iris scissors.
Zygomaticofacial Flap Text: Given the location of the defect, shape of the defect and the proximity to free margins a zygomaticofacial flap was deemed most appropriate for repair.  Using a sterile surgical marker, the appropriate flap was drawn incorporating the defect and placing the expected incisions within the relaxed skin tension lines where possible. The area thus outlined was incised deep to adipose tissue with a #15 scalpel blade with preservation of a vascular pedicle.  The skin margins were undermined to an appropriate distance in all directions utilizing iris scissors.  The flap was then placed into the defect and anchored with interrupted buried subcutaneous sutures.
Orbicularis Oris Muscle Flap Text: The defect edges were debeveled with a #15 scalpel blade.  Given that the defect affected the competency of the oral sphincter an orbicularis oris muscle flap was deemed most appropriate to restore this competency and normal muscle function.  Using a sterile surgical marker, an appropriate flap was drawn incorporating the defect. The area thus outlined was incised with a #15 scalpel blade.
Nasalis-Muscle-Based Myocutaneous Island Pedicle Flap Text: Using a #15 blade, an incision was made around the donor flap to the level of the nasalis muscle. Wide lateral undermining was then performed in both the subcutaneous plane above the nasalis muscle, and in a submuscular plane just above periosteum. This allowed the formation of a free nasalis muscle axial pedicle (based on the angular artery) which was still attached to the actual cutaneous flap, increasing its mobility and vascular viability. Hemostasis was obtained with pinpoint electrocoagulation. The flap was mobilized into position and the pivotal anchor points positioned and stabilized with buried interrupted sutures. Subcutaneous and dermal tissues were closed in a multilayered fashion with sutures. Tissue redundancies were excised, and the epidermal edges were apposed without significant tension and sutured with sutures.
Staging Info: By selecting yes to the question above you will include information on AJCC 8 tumor staging in your Mohs note. Information on tumor staging will be automatically added for SCCs on the head and neck. AJCC 8 includes tumor size, tumor depth, perineural involvement and bone invasion.
Hemigard Intro: Due to skin fragility and wound tension, it was decided to use HEMIGARD adhesive retention suture devices to permit a linear closure. The skin was cleaned and dried for a 6cm distance away from the wound. Excessive hair, if present, was removed to allow for adhesion.
Peng Advancement Flap Text: The defect edges were debeveled with a #15 scalpel blade.  Given the location of the defect, shape of the defect and the proximity to free margins a Peng advancement flap was deemed most appropriate.  Using a sterile surgical marker, an appropriate advancement flap was drawn incorporating the defect and placing the expected incisions within the relaxed skin tension lines where possible. The area thus outlined was incised deep to adipose tissue with a #15 scalpel blade.  The skin margins were undermined to an appropriate distance in all directions utilizing iris scissors.

## 2021-07-15 DIAGNOSIS — I10 ESSENTIAL HYPERTENSION: ICD-10-CM

## 2021-07-19 RX ORDER — HYDROCHLOROTHIAZIDE 25 MG/1
TABLET ORAL
Qty: 90 TABLET | Refills: 0 | Status: SHIPPED | OUTPATIENT
Start: 2021-07-19 | End: 2021-08-04

## 2021-07-19 RX ORDER — VERAPAMIL HYDROCHLORIDE 120 MG/1
CAPSULE, EXTENDED RELEASE ORAL
Qty: 90 CAPSULE | Refills: 0 | Status: SHIPPED | OUTPATIENT
Start: 2021-07-19 | End: 2021-08-04

## 2021-07-23 ENCOUNTER — EH NON-PROVIDER (OUTPATIENT)
Dept: OCCUPATIONAL MEDICINE | Facility: CLINIC | Age: 61
End: 2021-07-23

## 2021-07-23 DIAGNOSIS — Z02.89 ENCOUNTER FOR OCCUPATIONAL HEALTH EXAMINATION INVOLVING RESPIRATOR: ICD-10-CM

## 2021-07-23 PROCEDURE — 94375 RESPIRATORY FLOW VOLUME LOOP: CPT | Performed by: NURSE PRACTITIONER

## 2021-07-29 ENCOUNTER — OFFICE VISIT (OUTPATIENT)
Dept: DERMATOLOGY | Facility: IMAGING CENTER | Age: 61
End: 2021-07-29
Payer: COMMERCIAL

## 2021-07-29 VITALS
HEIGHT: 64 IN | DIASTOLIC BLOOD PRESSURE: 72 MMHG | WEIGHT: 184 LBS | SYSTOLIC BLOOD PRESSURE: 108 MMHG | TEMPERATURE: 97.7 F | BODY MASS INDEX: 31.41 KG/M2

## 2021-07-29 DIAGNOSIS — L72.3 SEBACEOUS CYST: ICD-10-CM

## 2021-07-29 PROCEDURE — 12031 INTMD RPR S/A/T/EXT 2.5 CM/<: CPT | Performed by: DERMATOLOGY

## 2021-07-29 PROCEDURE — 11401 EXC TR-EXT B9+MARG 0.6-1 CM: CPT | Performed by: DERMATOLOGY

## 2021-07-29 RX ORDER — TEMAZEPAM 15 MG/1
CAPSULE ORAL
COMMUNITY
Start: 2021-06-28 | End: 2021-08-04

## 2021-07-29 ASSESSMENT — FIBROSIS 4 INDEX: FIB4 SCORE: 0.67

## 2021-07-29 NOTE — PROGRESS NOTES
"BENIGN EXCISION PROCEDURE NOTE:    Risks, benefits and alternatives of procedure, including, but not limited to scar/poor cosmetic outcome, bleeding, pain, infection, nerve damage, recurrence of lesion, failed surgery, and need for further surgery, were discussed and written informed consent obtained. Verbal time out completed.     Allergies reviewed: Yes  Pacemaker/defibrillator: No  Artificial joints: No  Antibiotics given: No  Blood thinners/anticoagulants: No    Pre-op diagnosis: cyst  Post-op diagnosis: Same  Site:suprapubic/groin  Pre-op size: 0.6cm    /72   Temp 36.5 °C (97.7 °F)   Ht 1.626 m (5' 4\")   Wt 83.5 kg (184 lb)     Procedure: Area of surgery was prepped with alcohol, marked with a sterile marking pen. Anesthesia with 1% lidocaine with epinephrine administered with 30 gauge needle. The area was again cleaned with chlorhexidine swab. With sterile technique, an 8mm punch instrument was used to incise over the lesion to the level of the subcutaneous fat. Dissection was completed with iris/tissue scissors, and the mass was removed. Hemostasis was achieved with pressure.  Specimen was placed into biopsy container and sent to pathology by staff.    Intermediate closure note:  4.0 monocryl buried vertical mattress sutures were placed to close dead space. 4.0 prolene superficial interrupted sutures were placed to approximate wound edges.  Vaseline applied to wound with bandage. Patient tolerated procedure well and there were no complications, blood loss was minimal.     Final wound size: 0.8cm    Bandage was placed with vaseline, telfa, gauze and tape. Wound care was discussed with the patient, and written instructions were provided. Patient to return to clinic in 10-14 days for suture removal. Patient to call us if any problems or concerns with the procedure site arise prior to scheduled appointment.    Chyna Fajardo MD    "

## 2021-08-03 RX ORDER — ESCITALOPRAM OXALATE 10 MG/1
TABLET ORAL
Qty: 90 TABLET | Refills: 1 | Status: SHIPPED | OUTPATIENT
Start: 2021-08-03 | End: 2022-02-22 | Stop reason: SDUPTHER

## 2021-08-11 ENCOUNTER — OFFICE VISIT (OUTPATIENT)
Dept: URGENT CARE | Facility: PHYSICIAN GROUP | Age: 61
End: 2021-08-11
Payer: COMMERCIAL

## 2021-08-11 VITALS
DIASTOLIC BLOOD PRESSURE: 60 MMHG | HEIGHT: 64 IN | HEART RATE: 93 BPM | SYSTOLIC BLOOD PRESSURE: 120 MMHG | OXYGEN SATURATION: 97 % | TEMPERATURE: 97.7 F | RESPIRATION RATE: 16 BRPM | BODY MASS INDEX: 31.41 KG/M2 | WEIGHT: 184 LBS

## 2021-08-11 DIAGNOSIS — Z48.02 VISIT FOR SUTURE REMOVAL: ICD-10-CM

## 2021-08-11 PROCEDURE — 99212 OFFICE O/P EST SF 10 MIN: CPT | Performed by: PHYSICIAN ASSISTANT

## 2021-08-11 ASSESSMENT — ENCOUNTER SYMPTOMS: FEVER: 0

## 2021-08-11 ASSESSMENT — FIBROSIS 4 INDEX: FIB4 SCORE: 0.67

## 2021-08-11 NOTE — PROGRESS NOTES
Subjective:      Harmony Monroe is a 61 y.o. female who presents with Suture / Staple Removal (located in the pubic area, suture removal, x14 days healing )        This is a new problem.   The patient presents to clinic for a suture removal.  The patient states the sutures were placed x2 weeks ago at dermatology following a cyst removal.  The patient states the incision has been healing well without complications.  She reports no associated pain/tenderness, swelling, redness, or discharge/drainage.  She also reports no associated fever.  The patient states she has been keeping the incision clean and dry, as well as applying Vaseline to the area.    Suture / Staple Removal  Treated in ED: x 2 weeks ago (at dermatology) Treatments tried: The patient has been keeping the sutures clean and dry, as well as applying vaseline to the sutures. Maximum temperature: No fever. There has been no drainage from the wound. There is no redness present. There is no swelling present. There is no pain present.     PMH:  has a past medical history of Allergy, Arthritis, ASTHMA, Basal cell carcinoma of left medial cheek (11/30/2015), Basal cell carcinoma of left medial cheek (11/30/2015), Depression (1/7/2010), High cholesterol, History of tobacco use disorder (1/7/2010), Hypertension, Pneumonia (1990,2001), and Post-menopausal (8/26/2014).  MEDS:   Current Outpatient Medications:   •  hydroCHLOROthiazide (HYDRODIURIL) 25 MG Tab, TAKE 1 TABLET BY MOUTH EVERY DAY, Disp: 90 tablet, Rfl: 3  •  verapamil ER (CALAN SR) 120 MG CAPSULE SR 24 HR, TAKE 1 CAPSULE BY MOUTH EVERY DAY, Disp: 90 capsule, Rfl: 3  •  temazepam (RESTORIL) 15 MG Cap, TAKE 1 CAPSULE BY MOUTH AT BEDTIME AS NEEDED FOR SLEEP FOR UP TO 30 DAYS., Disp: 30 capsule, Rfl: 2  •  simvastatin (ZOCOR) 40 MG Tab, TAKE 1 TABLET BY MOUTH EVERY DAY IN THE EVENING, Disp: 90 tablet, Rfl: 0  •  escitalopram (LEXAPRO) 10 MG Tab, TAKE 1 TABLET BY MOUTH EVERY DAY, Disp: 90 tablet, Rfl:  "1  •  albuterol (VENTOLIN OR PROVENTIL) 108 (90 BASE) MCG/ACT Aero Soln, albuterol sulfate HFA 90 mcg/actuation aerosol inhaler  INHALE 2 PUFFS BY MOUTH EVERY 6 HOURS AS NEEDED FOR SHORTNESS OF BREATH, Disp: , Rfl:   •  diazePAM (VALIUM) 2 MG Tab, Take 1 tablet by mouth every 6 hours as needed for Anxiety for up to 30 days., Disp: 10 tablet, Rfl: 0  •  ondansetron (ZOFRAN) 4 MG Tab tablet, Take 1 tablet by mouth every four hours as needed for Nausea/Vomiting for up to 30 days., Disp: 30 tablet, Rfl: 0  •  fluticasone-salmeterol (ADVAIR) 100-50 MCG/DOSE AEROSOL POWDER, BREATH ACTIVATED, Inhale 1 Puff every 12 hours., Disp: 60 Each, Rfl: 11  •  albuterol 108 (90 Base) MCG/ACT Aero Soln inhalation aerosol, Inhale 2 Puffs every 6 hours as needed for Shortness of Breath., Disp: 3 Each, Rfl: 3  •  ipratropium-albuterol (DUONEB) 0.5-2.5 (3) MG/3ML nebulizer solution, 3 mL by Nebulization route 4 times a day., Disp: 30 Bullet, Rfl: 5  •  Cholecalciferol (VITAMIN D3) 125 MCG (5000 UT) Cap, Take 5,000 Units by mouth every day., Disp: , Rfl:   •  diphenhydrAMINE (BENADRYL) 25 MG Tab, Take 25 mg by mouth at bedtime as needed (ALLERGIES)., Disp: , Rfl:   •  Multiple Vitamins-Minerals (MULTIVITAMIN ADULT PO), Take 1 Tab by mouth every day., Disp: , Rfl:   •  estradiol (ESTRACE) 1 MG TABS, Take 1 mg by mouth every day. From gyn, Disp: , Rfl:   ALLERGIES:   Allergies   Allergen Reactions   • Bee Anaphylaxis   • Levaquin Anaphylaxis and Unspecified     Myalgias arthralgias    • Tape Hives     Redness; itching \"paper tape ok\"   • Moxifloxacin Hcl In Nacl Swelling   • Polysporin [Bacitracin-Polymyxin B]      rash   • Promethazine      Twitching feeling     SURGHX:   Past Surgical History:   Procedure Laterality Date   • KNEE ARTHROSCOPY Left 2/5/2018    Procedure: KNEE ARTHROSCOPY;  Surgeon: Andrews Castro M.D.;  Location: SURGERY [facility];  Service: Orthopedics   • MEDIAL MENISCECTOMY Left 2/5/2018    Procedure: MEDIAL " "MENISCECTOMY - PARTIAL;  Surgeon: Andrews Castro M.D.;  Location: [facility];  Service: Orthopedics   • KNEE ARTHROSCOPY Right 3/23/2017    Procedure: KNEE ARTHROSCOPY;  Surgeon: Andrews Castro M.D.;  Location: [facility];  Service:    • MEDIAL MENISCECTOMY  3/23/2017    Procedure: MEDIAL and lateral  MENISCECTOMY - PARTIAL;  Surgeon: Andrews Castro M.D.;  Location: [facility];  Service:    • CHONDROPLASTY  3/23/2017    Procedure: CHONDROPLASTY -  PATELLAR;  Surgeon: Andrews Castro M.D.;  Location: [facility];  Service:    • ABDOMINAL HYSTERECTOMY TOTAL  11/2005   • TONSILLECTOMY AND ADENOIDECTOMY  1980   • NASAL SEPTAL RECONSTRUCTION  1980   • CYST EXCISION  1974    Anterior Left Foot     SOCHX:  reports that she has been smoking cigarettes. She started smoking about 23 years ago. She has a 9.50 pack-year smoking history. She has never used smokeless tobacco. She reports that she does not drink alcohol and does not use drugs.  FH: Family history was reviewed, no pertinent findings to report    Review of Systems   Constitutional: Negative for fever.   Skin:        + sutures to pubis          Objective:     /60 (BP Location: Right arm, Patient Position: Sitting, BP Cuff Size: Adult)   Pulse 93   Temp 36.5 °C (97.7 °F) (Temporal)   Resp 16   Ht 1.626 m (5' 4\")   Wt 83.5 kg (184 lb)   SpO2 97%   BMI 31.58 kg/m²      Physical Exam  Constitutional:       General: She is not in acute distress.     Appearance: Normal appearance. She is well-developed. She is not ill-appearing.   HENT:      Head: Normocephalic and atraumatic.      Right Ear: External ear normal.      Left Ear: External ear normal.   Eyes:      Extraocular Movements: Extraocular movements intact.      Conjunctiva/sclera: Conjunctivae normal.   Cardiovascular:      Rate and Rhythm: Normal rate.   Pulmonary:      Effort: Pulmonary effort is normal.   Musculoskeletal:         General: " Normal range of motion.      Cervical back: Normal range of motion and neck supple.   Skin:     General: Skin is warm and dry.             Comments:   Well-healed incision to the pubis region with 3 sutures in place.  No tenderness to palpation.  No swelling.  No surrounding erythema.  No increased warmth.  No discharge/drainage.   Neurological:      Mental Status: She is alert and oriented to person, place, and time.            Progress:  Suture Removal:  Successfully removed 3 sutures from the incision.  No wound dehiscence.  No active bleeding.  The patient tolerated the procedure well.     Assessment/Plan:          1. Visit for suture removal    Differential diagnoses, supportive care, and indications for immediate follow-up discussed with patient.   Instructed to return to clinic or nearest emergency department for any change in condition, further concerns, or worsening of symptoms.    OTC Tylenol or Motrin for fever/discomfort.  Follow-up with PCP  Return to clinic or go to the ED if symptoms worsen or fail to improve, or if patient should develop worsening/increasing/persistent pain/tenderness to the injured area, swelling, bruising, redness or warmth to the injured area, discharge/drainage from the wound, decreased range of motion, fever/chills, secondary signs of infection, and/or any concerning symptoms.    Discussed plan with the patient, and she agrees to the above.     I personally reviewed prior external notes and test results pertinent to today's visit.  I have independently reviewed and interpreted all diagnostics ordered during this urgent care visit.     Time spent evaluating this patient was at least 30 minutes and includes preparing for visit, obtaining history, exam and evaluation, ordering labs/tests/procedures/medications, independent interpretation, and counseling/education.    Please note that this dictation was created using voice recognition software. I have made every reasonable attempt  to correct obvious errors, but I expect that there may be errors of grammar and possibly content that I did not discover before finalizing the note.     This note was electronically signed by Bernadine Akers PA-C

## 2021-09-01 ENCOUNTER — OFFICE VISIT (OUTPATIENT)
Dept: MEDICAL GROUP | Facility: PHYSICIAN GROUP | Age: 61
End: 2021-09-01
Payer: COMMERCIAL

## 2021-09-01 VITALS
TEMPERATURE: 98 F | HEART RATE: 103 BPM | SYSTOLIC BLOOD PRESSURE: 124 MMHG | HEIGHT: 64 IN | DIASTOLIC BLOOD PRESSURE: 80 MMHG | WEIGHT: 187 LBS | OXYGEN SATURATION: 96 % | BODY MASS INDEX: 31.92 KG/M2

## 2021-09-01 DIAGNOSIS — R61 NIGHT SWEATS: ICD-10-CM

## 2021-09-01 DIAGNOSIS — Z12.11 SCREENING FOR COLORECTAL CANCER: ICD-10-CM

## 2021-09-01 DIAGNOSIS — Z12.12 SCREENING FOR COLORECTAL CANCER: ICD-10-CM

## 2021-09-01 DIAGNOSIS — M65.341 TRIGGER RING FINGER OF RIGHT HAND: ICD-10-CM

## 2021-09-01 PROCEDURE — 99214 OFFICE O/P EST MOD 30 MIN: CPT | Performed by: FAMILY MEDICINE

## 2021-09-01 ASSESSMENT — FIBROSIS 4 INDEX: FIB4 SCORE: 0.67

## 2021-09-01 NOTE — PROGRESS NOTES
CC: Hand pain    HISTORY OF THE PRESENT ILLNESS: Patient is a 61 y.o. female.     Patient is here today with primary concern for right hand pain.    Allergies: Bee, Levaquin, Tape, Moxifloxacin hcl in nacl, Polysporin [bacitracin-polymyxin b], and Promethazine    Current Outpatient Medications Ordered in Epic   Medication Sig Dispense Refill   • hydroCHLOROthiazide (HYDRODIURIL) 25 MG Tab TAKE 1 TABLET BY MOUTH EVERY DAY 90 tablet 3   • verapamil ER (CALAN SR) 120 MG CAPSULE SR 24 HR TAKE 1 CAPSULE BY MOUTH EVERY DAY 90 capsule 3   • temazepam (RESTORIL) 15 MG Cap TAKE 1 CAPSULE BY MOUTH AT BEDTIME AS NEEDED FOR SLEEP FOR UP TO 30 DAYS. 30 capsule 2   • simvastatin (ZOCOR) 40 MG Tab TAKE 1 TABLET BY MOUTH EVERY DAY IN THE EVENING 90 tablet 0   • escitalopram (LEXAPRO) 10 MG Tab TAKE 1 TABLET BY MOUTH EVERY DAY 90 tablet 1   • albuterol (VENTOLIN OR PROVENTIL) 108 (90 BASE) MCG/ACT Aero Soln albuterol sulfate HFA 90 mcg/actuation aerosol inhaler   INHALE 2 PUFFS BY MOUTH EVERY 6 HOURS AS NEEDED FOR SHORTNESS OF BREATH     • fluticasone-salmeterol (ADVAIR) 100-50 MCG/DOSE AEROSOL POWDER, BREATH ACTIVATED Inhale 1 Puff every 12 hours. 60 Each 11   • albuterol 108 (90 Base) MCG/ACT Aero Soln inhalation aerosol Inhale 2 Puffs every 6 hours as needed for Shortness of Breath. 3 Each 3   • ipratropium-albuterol (DUONEB) 0.5-2.5 (3) MG/3ML nebulizer solution 3 mL by Nebulization route 4 times a day. 30 Bullet 5   • estradiol (ESTRACE) 1 MG TABS Take 1 mg by mouth every day. From gyn       No current Epic-ordered facility-administered medications on file.       Past Medical History:   Diagnosis Date   • Allergy    • Arthritis     osteoarthritis; right knee   • ASTHMA     1/29/2018 not an issue, currently no medications needed   • Basal cell carcinoma of left medial cheek 11/30/2015   • Basal cell carcinoma of left medial cheek 11/30/2015   • Depression 1/7/2010   • High cholesterol    • History of tobacco use disorder  2010   • Hypertension    • Pneumonia    • Post-menopausal 2014       Past Surgical History:   Procedure Laterality Date   • KNEE ARTHROSCOPY Left 2018    Procedure: KNEE ARTHROSCOPY;  Surgeon: Andrews Castro M.D.;  Location: [facility];  Service: Orthopedics   • MEDIAL MENISCECTOMY Left 2018    Procedure: MEDIAL MENISCECTOMY - PARTIAL;  Surgeon: Andrews Castro M.D.;  Location: [facility];  Service: Orthopedics   • KNEE ARTHROSCOPY Right 3/23/2017    Procedure: KNEE ARTHROSCOPY;  Surgeon: Andrews Castro M.D.;  Location: [facility];  Service:    • MEDIAL MENISCECTOMY  3/23/2017    Procedure: MEDIAL and lateral  MENISCECTOMY - PARTIAL;  Surgeon: Andrews Castro M.D.;  Location: [facility];  Service:    • CHONDROPLASTY  3/23/2017    Procedure: CHONDROPLASTY -  PATELLAR;  Surgeon: Andrews Castro M.D.;  Location: [facility];  Service:    • ABDOMINAL HYSTERECTOMY TOTAL  2005   • TONSILLECTOMY AND ADENOIDECTOMY     • NASAL SEPTAL RECONSTRUCTION     • CYST EXCISION      Anterior Left Foot       Social History     Tobacco Use   • Smoking status: Current Every Day Smoker     Packs/day: 0.50     Years: 19.00     Pack years: 9.50     Types: Cigarettes     Start date: 1997     Last attempt to quit: 2/15/2020     Years since quittin.5   • Smokeless tobacco: Never Used   • Tobacco comment: 1 pack / week   Vaping Use   • Vaping Use: Never used   Substance Use Topics   • Alcohol use: No     Alcohol/week: 0.0 oz   • Drug use: No       Social History     Social History Narrative   • Not on file       Family History   Problem Relation Age of Onset   • Hyperlipidemia Mother    • Arthritis Mother    • Heart Disease Father    • Diabetes Sister    • Cancer Neg Hx    • Hypertension Neg Hx    • Stroke Neg Hx          Labs: Labs reviewed from 2020.    Exam: /80 (BP Location: Right arm, Patient Position:  "Sitting, BP Cuff Size: Adult)   Pulse (!) 103   Temp 36.7 °C (98 °F) (Temporal)   Ht 1.626 m (5' 4\")   Wt 84.8 kg (187 lb)   SpO2 96%  Body mass index is 32.1 kg/m².    General: Well appearing, NAD  Skin: Warm and dry.  No obvious lesions.  Musculoskeletal: Montez triggering of right fourth digit noted.  Tenderness to palpation noted along the MCP joint area.  Psych: Normal mood and affect. Alert and oriented. Judgment and insight is normal.    Please note that this dictation was created using voice recognition software. I have made every reasonable attempt to correct obvious errors, but I expect that there are errors of grammar and possibly content that I did not discover before finalizing the note.      Assessment/Plan  Harmony was seen today for arthritis.    Diagnoses and all orders for this visit:    Night sweats  Patient notes this to be a chronic problem, though recently flared up again.  She is status post total hysterectomy and oophorectomy remotely and reports she never went through any menopausal symptoms.  She already has CBC, CMP and TSH ordered from prior.  She plans to complete these labs soon.    Trigger ring finger of right hand  She has very obvious triggering of the fourth digit on her right hand with associated pain.  She has tried consistent use of anti-inflammatory dosage of NSAIDs.  She is also tried bracing.  At this point she is likely going to need corticosteroid injection, next step would be possible trigger finger release if that did not help.  We will go ahead and refer to hand surgery at this time.  -     REFERRAL TO HAND SURGERY    Screening for colorectal cancer  -     REFERRAL TO GI FOR COLONOSCOPY      Follow-up in 3 to 6 months for routine care or sooner if needed.    Crissy Tang, DO  [facility]  "

## 2021-09-02 ENCOUNTER — APPOINTMENT (RX ONLY)
Dept: URBAN - METROPOLITAN AREA CLINIC 36 | Facility: CLINIC | Age: 61
Setting detail: DERMATOLOGY
End: 2021-09-02

## 2021-09-02 PROBLEM — C44.319 BASAL CELL CARCINOMA OF SKIN OF OTHER PARTS OF FACE: Status: ACTIVE | Noted: 2021-09-02

## 2021-09-02 PROCEDURE — 17312 MOHS ADDL STAGE: CPT

## 2021-09-02 PROCEDURE — ? PRESCRIPTION

## 2021-09-02 PROCEDURE — 15240 FTH/GFT F/C/C/M/N/AX/G/H/F20: CPT

## 2021-09-02 PROCEDURE — 13132 CMPLX RPR F/C/C/M/N/AX/G/H/F: CPT | Mod: 59

## 2021-09-02 PROCEDURE — 17311 MOHS 1 STAGE H/N/HF/G: CPT

## 2021-09-02 PROCEDURE — ? MOHS SURGERY

## 2021-09-02 RX ORDER — DOXYCYCLINE 100 MG/1
CAPSULE ORAL
Qty: 20 | Refills: 0 | Status: ERX | COMMUNITY
Start: 2021-09-02

## 2021-09-02 RX ADMIN — DOXYCYCLINE: 100 CAPSULE ORAL at 00:00

## 2021-09-02 NOTE — PROCEDURE: MOHS SURGERY
Special Stains Stage 3 - Results: Base On Clearance Noted Above
Consent (Temporal Branch)/Introductory Paragraph: The rationale for Mohs was explained to the patient and consent was obtained. The risks, benefits and alternatives to therapy were discussed in detail. Specifically, the risks of damage to the temporal branch of the facial nerve, infection, scarring, bleeding, prolonged wound healing, incomplete removal, allergy to anesthesia, and recurrence were addressed. Prior to the procedure, the treatment site was clearly identified and confirmed by the patient. All components of Universal Protocol/PAUSE Rule completed.
Quadrants Reporting?: 0
Quadrant Reporting?: no
Mercedes Flap Text: The defect edges were debeveled with a #15 scalpel blade.  Given the location of the defect, shape of the defect and the proximity to free margins a Mercedes flap was deemed most appropriate.  Using a sterile surgical marker, an appropriate advancement flap was drawn incorporating the defect and placing the expected incisions within the relaxed skin tension lines where possible. The area thus outlined was incised deep to adipose tissue with a #15 scalpel blade.  The skin margins were undermined to an appropriate distance in all directions utilizing iris scissors.
Referred To Oculoplastics For Closure Text (Leave Blank If You Do Not Want): After obtaining clear surgical margins the patient was sent to oculoplastics for surgical repair.  The patient understands they will receive post-surgical care and follow-up from the referring physician's office.
Provider Procedure Text (E): After obtaining clear surgical margins the defect was repaired by another provider.
Simple / Intermediate / Complex Repair - Final Wound Length In Cm: 5.2
V-Y Plasty Text: The defect edges were debeveled with a #15 scalpel blade.  Given the location of the defect, shape of the defect and the proximity to free margins an V-Y advancement flap was deemed most appropriate.  Using a sterile surgical marker, an appropriate advancement flap was drawn incorporating the defect and placing the expected incisions within the relaxed skin tension lines where possible.    The area thus outlined was incised deep to adipose tissue with a #15 scalpel blade.  The skin margins were undermined to an appropriate distance in all directions utilizing iris scissors.
Integrate Histology Into Note?: Yes
Crescentic Complex Repair Preamble Text (Leave Blank If You Do Not Want): Extensive wide undermining was performed.
Rotation Flap Text: The defect edges were debeveled with a #15 scalpel blade.  Given the location of the defect, shape of the defect and the proximity to free margins a rotation flap was deemed most appropriate.  Using a sterile surgical marker, an appropriate rotation flap was drawn incorporating the defect and placing the expected incisions within the relaxed skin tension lines where possible.    The area thus outlined was incised deep to adipose tissue with a #15 scalpel blade.  The skin margins were undermined to an appropriate distance in all directions utilizing iris scissors.
Bilobed Flap Text: The defect edges were debeveled with a #15 scalpel blade.  Given the location of the defect and the proximity to free margins a bilobe flap was deemed most appropriate.  Using a sterile surgical marker, an appropriate bilobe flap drawn around the defect.    The area thus outlined was incised deep to adipose tissue with a #15 scalpel blade.  The skin margins were undermined to an appropriate distance in all directions utilizing iris scissors.
Advancement-Rotation Flap Text: The defect edges were debeveled with a #15 scalpel blade.  Given the location of the defect, shape of the defect and the proximity to free margins an advancement-rotation flap was deemed most appropriate.  Using a sterile surgical marker, an appropriate flap was drawn incorporating the defect and placing the expected incisions within the relaxed skin tension lines where possible. The area thus outlined was incised deep to adipose tissue with a #15 scalpel blade.  The skin margins were undermined to an appropriate distance in all directions utilizing iris scissors.
Area L Indication Text: Tumors in this location are included in Area L (trunk and extremities).  Mohs surgery is indicated for larger tumors, or tumors with aggressive histologic features, in these anatomic locations.
Undermining Location (Optional): in the superficial subcutaneous fat
Zygomaticofacial Flap Text: Given the location of the defect, shape of the defect and the proximity to free margins a zygomaticofacial flap was deemed most appropriate for repair.  Using a sterile surgical marker, the appropriate flap was drawn incorporating the defect and placing the expected incisions within the relaxed skin tension lines where possible. The area thus outlined was incised deep to adipose tissue with a #15 scalpel blade with preservation of a vascular pedicle.  The skin margins were undermined to an appropriate distance in all directions utilizing iris scissors.  The flap was then placed into the defect and anchored with interrupted buried subcutaneous sutures.
Bcc Infiltrative Histology Text: There were numerous aggregates of basaloid cells demonstrating an infiltrative pattern.
Date Of Previous Biopsy (Optional): 12-
Stage 12: Additional Anesthesia Type: 1% lidocaine with epinephrine
Hemigard Postcare Instructions: The HEMIGARD strips are to remain completely dry for at least 5-7 days.
Plastic Surgeon Procedure Text (B): After obtaining clear surgical margins the patient was sent to plastics for surgical repair.  The patient understands they will receive post-surgical care and follow-up from the referring physician's office.
Advancement Flap (Double) Text: The defect edges were debeveled with a #15 scalpel blade.  Given the location of the defect and the proximity to free margins a double advancement flap was deemed most appropriate.  Using a sterile surgical marker, the appropriate advancement flaps were drawn incorporating the defect and placing the expected incisions within the relaxed skin tension lines where possible.    The area thus outlined was incised deep to adipose tissue with a #15 scalpel blade.  The skin margins were undermined to an appropriate distance in all directions utilizing iris scissors.
S Plasty Text: Given the location and shape of the defect, and the orientation of relaxed skin tension lines, an S-plasty was deemed most appropriate for repair.  Using a sterile surgical marker, the appropriate outline of the S-plasty was drawn, incorporating the defect and placing the expected incisions within the relaxed skin tension lines where possible.  The area thus outlined was incised deep to adipose tissue with a #15 scalpel blade.  The skin margins were undermined to an appropriate distance in all directions utilizing iris scissors. The skin flaps were advanced over the defect.  The opposing margins were then approximated with interrupted buried subcutaneous sutures.
Wound Check: 6 weeks
A-T Advancement Flap Text: The defect edges were debeveled with a #15 scalpel blade.  Given the location of the defect, shape of the defect and the proximity to free margins an A-T advancement flap was deemed most appropriate.  Using a sterile surgical marker, an appropriate advancement flap was drawn incorporating the defect and placing the expected incisions within the relaxed skin tension lines where possible.    The area thus outlined was incised deep to adipose tissue with a #15 scalpel blade.  The skin margins were undermined to an appropriate distance in all directions utilizing iris scissors.
Closure 3 Information: This tab is for additional flaps and grafts above and beyond our usual structured repairs.  Please note if you enter information here it will not currently bill and you will need to add the billing information manually.
Area M Indication Text: Tumors in this location are included in Area M (cheek, forehead, scalp, neck, jawline and pretibial skin).  Mohs surgery is indicated for tumors in these anatomic locations.
Island Pedicle Flap Text: The defect edges were debeveled with a #15 scalpel blade.  Given the location of the defect, shape of the defect and the proximity to free margins an island pedicle advancement flap was deemed most appropriate.  Using a sterile surgical marker, an appropriate advancement flap was drawn incorporating the defect, outlining the appropriate donor tissue and placing the expected incisions within the relaxed skin tension lines where possible.    The area thus outlined was incised deep to adipose tissue with a #15 scalpel blade.  The skin margins were undermined to an appropriate distance in all directions around the primary defect and laterally outward around the island pedicle utilizing iris scissors.  There was minimal undermining beneath the pedicle flap.
Undermining Type: Entire Wound
Dorsal Nasal Flap Text: The defect edges were debeveled with a #15 scalpel blade.  Given the location of the defect and the proximity to free margins a dorsal nasal flap was deemed most appropriate.  Using a sterile surgical marker, an appropriate dorsal nasal flap was drawn around the defect.    The area thus outlined was incised deep to adipose tissue with a #15 scalpel blade.  The skin margins were undermined to an appropriate distance in all directions utilizing iris scissors.
Stage 3: Number Of Blocks?: 1
Post-Care Instructions: I reviewed with the patient in detail post-care instructions. Patient is not to engage in any heavy lifting, exercise, or swimming for the next 14 days. Should the patient develop any fevers, chills, bleeding, severe pain patient will contact the office immediately.
Repair Hemostasis (Optional): Pinpoint electrocautery
Tumor Debulked?: curette
Peng Advancement Flap Text: The defect edges were debeveled with a #15 scalpel blade.  Given the location of the defect, shape of the defect and the proximity to free margins a Peng advancement flap was deemed most appropriate.  Using a sterile surgical marker, an appropriate advancement flap was drawn incorporating the defect and placing the expected incisions within the relaxed skin tension lines where possible. The area thus outlined was incised deep to adipose tissue with a #15 scalpel blade.  The skin margins were undermined to an appropriate distance in all directions utilizing iris scissors.
Number Of Stages: 3
Suture Removal: 11 days
Double Island Pedicle Flap Text: The defect edges were debeveled with a #15 scalpel blade.  Given the location of the defect, shape of the defect and the proximity to free margins a double island pedicle advancement flap was deemed most appropriate.  Using a sterile surgical marker, an appropriate advancement flap was drawn incorporating the defect, outlining the appropriate donor tissue and placing the expected incisions within the relaxed skin tension lines where possible.    The area thus outlined was incised deep to adipose tissue with a #15 scalpel blade.  The skin margins were undermined to an appropriate distance in all directions around the primary defect and laterally outward around the island pedicle utilizing iris scissors.  There was minimal undermining beneath the pedicle flap.
Information: Selecting Yes will display possible errors in your note based on the variables you have selected. This validation is only offered as a suggestion for you. PLEASE NOTE THAT THE VALIDATION TEXT WILL BE REMOVED WHEN YOU FINALIZE YOUR NOTE. IF YOU WANT TO FAX A PRELIMINARY NOTE YOU WILL NEED TO TOGGLE THIS TO 'NO' IF YOU DO NOT WANT IT IN YOUR FAXED NOTE.
Mohs Method Verbiage: An incision at a 45 degree angle following the standard Mohs approach was done and the specimen was harvested as a microscopic controlled layer.
Transposition Flap Text: The defect edges were debeveled with a #15 scalpel blade.  Given the location of the defect and the proximity to free margins a transposition flap was deemed most appropriate.  Using a sterile surgical marker, an appropriate transposition flap was drawn incorporating the defect.    The area thus outlined was incised deep to adipose tissue with a #15 scalpel blade.  The skin margins were undermined to an appropriate distance in all directions utilizing iris scissors.
Home Suture Removal Text: Patient was provided instructions on removing sutures and will remove their sutures at home.  If they have any questions or difficulties they will call the office.
Secondary Intention Text (Leave Blank If You Do Not Want): The defect will heal with secondary intention.
Ftsg Text: The defect edges were debeveled with a #15 scalpel blade.  Given the location of the defect, shape of the defect and the proximity to free margins a full thickness skin graft was deemed most appropriate.  Using a sterile surgical marker, the primary defect shape was transferred to the donor site. The area thus outlined was incised deep to adipose tissue with a #15 scalpel blade.  The harvested graft was then trimmed of adipose tissue until only dermis and epidermis was left.  The skin margins of the secondary defect were undermined to an appropriate distance in all directions utilizing iris scissors.  The secondary defect was closed with interrupted buried subcutaneous sutures.  The skin edges were then re-apposed with running  sutures.  The skin graft was then placed in the primary defect and oriented appropriately.
Full Thickness Lip Wedge Repair (Flap) Text: Given the location of the defect and the proximity to free margins a full thickness wedge repair was deemed most appropriate.  Using a sterile surgical marker, the appropriate repair was drawn incorporating the defect and placing the expected incisions perpendicular to the vermilion border.  The vermilion border was also meticulously outlined to ensure appropriate reapproximation during the repair.  The area thus outlined was incised through and through with a #15 scalpel blade.  The muscularis and dermis were reaproximated with deep sutures following hemostasis. Care was taken to realign the vermilion border before proceeding with the superficial closure.  Once the vermilion was realigned the superfical and mucosal closure was finished.
Star Wedge Flap Text: The defect edges were debeveled with a #15 scalpel blade.  Given the location of the defect, shape of the defect and the proximity to free margins a star wedge flap was deemed most appropriate.  Using a sterile surgical marker, an appropriate rotation flap was drawn incorporating the defect and placing the expected incisions within the relaxed skin tension lines where possible. The area thus outlined was incised deep to adipose tissue with a #15 scalpel blade.  The skin margins were undermined to an appropriate distance in all directions utilizing iris scissors.
M-Plasty Intermediate Repair Preamble Text (Leave Blank If You Do Not Want): Undermining was performed with blunt dissection.
Mid-Level Procedure Text (A): After obtaining clear surgical margins the patient was sent to a mid-level provider for surgical repair.  The patient understands they will receive post-surgical care and follow-up from the mid-level provider.
Purse String (Intermediate) Text: Given the location of the defect and the characteristics of the surrounding skin a purse string intermediate closure was deemed most appropriate.  Undermining was performed circumfirentially around the surgical defect.  A purse string suture was then placed and tightened.
Complex Repair And Graft Additional Text (Will Appearing After The Standard Complex Repair Text): The complex repair was not sufficient to completely close the primary defect. The remaining additional defect was repaired with the graft mentioned below.
H Plasty Text: Given the location of the defect, shape of the defect and the proximity to free margins a H-plasty was deemed most appropriate for repair.  Using a sterile surgical marker, the appropriate advancement arms of the H-plasty were drawn incorporating the defect and placing the expected incisions within the relaxed skin tension lines where possible. The area thus outlined was incised deep to adipose tissue with a #15 scalpel blade. The skin margins were undermined to an appropriate distance in all directions utilizing iris scissors.  The opposing advancement arms were then advanced into place in opposite direction and anchored with interrupted buried subcutaneous sutures.
Otolaryngologist Procedure Text (D): After obtaining clear surgical margins the patient was sent to otolaryngology for surgical repair.  The patient understands they will receive post-surgical care and follow-up from the referring physician's office.
Hemigard Retention Suture: 0-0 Nylon
Epidermal Closure Graft Donor Site (Optional): running
Asc Procedure Text (B): After obtaining clear surgical margins the patient was sent to an ASC for surgical repair.  The patient understands they will receive post-surgical care and follow-up from the ASC physician.
Mohs Histo Method Verbiage: Each section was then chromacoded and processed in the Mohs lab using the Mohs protocol and submitted for frozen section.
Composite Graft Text: The defect edges were debeveled with a #15 scalpel blade.  Given the location of the defect, shape of the defect, the proximity to free margins and the fact the defect was full thickness a composite graft was deemed most appropriate.  The defect was outline and then transferred to the donor site.  A full thickness graft was then excised from the donor site. The graft was then placed in the primary defect, oriented appropriately and then sutured into place.  The secondary defect was then repaired using a primary closure.
Nostril Rim Text: The closure involved the nostril rim.
Is There Documentation In The Chart Showing Discussion Of Changes With Another Physician?: Please Select the Appropriate Response
Debridement Text: The wound edges were debrided prior to proceeding with the closure to facilitate wound healing.
V-Y Flap Text: The defect edges were debeveled with a #15 scalpel blade.  Given the location of the defect, shape of the defect and the proximity to free margins a V-Y flap was deemed most appropriate.  Using a sterile surgical marker, an appropriate advancement flap was drawn incorporating the defect and placing the expected incisions within the relaxed skin tension lines where possible.    The area thus outlined was incised deep to adipose tissue with a #15 scalpel blade.  The skin margins were undermined to an appropriate distance in all directions utilizing iris scissors.
Cheiloplasty (Complex) Text: A decision was made to reconstruct the defect with a  cheiloplasty.  The defect was undermined extensively.  Additional obicularis oris muscle was excised with a 15 blade scalpel.  The defect was converted into a full thickness wedge to facilite a better cosmetic result.  Small vessels were then tied off with 5-0 monocyrl. The obicularis oris, superficial fascia, adipose and dermis were then reapproximated.  After the deeper layers were approximated the epidermis was reapproximated with particular care given to realign the vermilion border.
Dressing: pressure dressing with telfa
Location Indication Override (Is Already Calculated Based On Selected Body Location): Area M
Surgical Defect Width In Cm (Optional): 1.6
Trilobed Flap Text: The defect edges were debeveled with a #15 scalpel blade.  Given the location of the defect and the proximity to free margins a trilobed flap was deemed most appropriate.  Using a sterile surgical marker, an appropriate trilobed flap drawn around the defect.    The area thus outlined was incised deep to adipose tissue with a #15 scalpel blade.  The skin margins were undermined to an appropriate distance in all directions utilizing iris scissors.
Additional Graft Donor Site: left nasal sidewall
No Residual Tumor Seen Histology Text: There were no malignant cells seen in the sections examined.
Biopsy Photograph Reviewed: No (no photograph available)
Double O-Z Flap Text: The defect edges were debeveled with a #15 scalpel blade.  Given the location of the defect, shape of the defect and the proximity to free margins a Double O-Z flap was deemed most appropriate.  Using a sterile surgical marker, an appropriate transposition flap was drawn incorporating the defect and placing the expected incisions within the relaxed skin tension lines where possible. The area thus outlined was incised deep to adipose tissue with a #15 scalpel blade.  The skin margins were undermined to an appropriate distance in all directions utilizing iris scissors.
Surgical Defect Width In Cm (Optional): 1.2
Consent (Near Eyelid Margin)/Introductory Paragraph: The rationale for Mohs was explained to the patient and consent was obtained. The risks, benefits and alternatives to therapy were discussed in detail. Specifically, the risks of ectropion or eyelid deformity, infection, scarring, bleeding, prolonged wound healing, incomplete removal, allergy to anesthesia, nerve injury and recurrence were addressed. Prior to the procedure, the treatment site was clearly identified and confirmed by the patient. All components of Universal Protocol/PAUSE Rule completed.
Double O-Z Plasty Text: The defect edges were debeveled with a #15 scalpel blade.  Given the location of the defect, shape of the defect and the proximity to free margins a Double O-Z plasty (double transposition flap) was deemed most appropriate.  Using a sterile surgical marker, the appropriate transposition flaps were drawn incorporating the defect and placing the expected incisions within the relaxed skin tension lines where possible. The area thus outlined was incised deep to adipose tissue with a #15 scalpel blade.  The skin margins were undermined to an appropriate distance in all directions utilizing iris scissors.  Hemostasis was achieved with electrocautery.  The flaps were then transposed into place, one clockwise and the other counterclockwise, and anchored with interrupted buried subcutaneous sutures.
Graft Donor Site Epidermal Sutures (Optional): 5-0 Surgipro
Mucosal Advancement Flap Text: Given the location of the defect, shape of the defect and the proximity to free margins a mucosal advancement flap was deemed most appropriate. Incisions were made with a 15 blade scalpel in the appropriate fashion along the cutaneous vermilion border and the mucosal lip. The remaining actinically damaged mucosal tissue was excised.  The mucosal advancement flap was then elevated to the gingival sulcus with care taken to preserve the neurovascular structures and advanced into the primary defect. Care was taken to ensure that precise realignment of the vermilion border was achieved.
Posterior Auricular Interpolation Flap Text: A decision was made to reconstruct the defect utilizing an interpolation axial flap and a staged reconstruction.  A telfa template was made of the defect.  This telfa template was then used to outline the posterior auricular interpolation flap.  The donor area for the pedicle flap was then injected with anesthesia.  The flap was excised through the skin and subcutaneous tissue down to the layer of the underlying musculature.  The pedicle flap was carefully excised within this deep plane to maintain its blood supply.  The edges of the donor site were undermined.   The donor site was closed in a primary fashion.  The pedicle was then rotated into position and sutured.  Once the tube was sutured into place, adequate blood supply was confirmed with blanching and refill.  The pedicle was then wrapped with xeroform gauze and dressed appropriately with a telfa and gauze bandage to ensure continued blood supply and protect the attached pedicle.
Consent Type: Consent 1 (Standard)
Inflammation Suggestive Of Cancer Camouflage Histology Text: There was a dense lymphocytic infiltrate which prevented adequate histologic evaluation of adjacent structures.
Island Pedicle Flap With Canthal Suspension Text: The defect edges were debeveled with a #15 scalpel blade.  Given the location of the defect, shape of the defect and the proximity to free margins an island pedicle advancement flap was deemed most appropriate.  Using a sterile surgical marker, an appropriate advancement flap was drawn incorporating the defect, outlining the appropriate donor tissue and placing the expected incisions within the relaxed skin tension lines where possible. The area thus outlined was incised deep to adipose tissue with a #15 scalpel blade.  The skin margins were undermined to an appropriate distance in all directions around the primary defect and laterally outward around the island pedicle utilizing iris scissors.  There was minimal undermining beneath the pedicle flap. A suspension suture was placed in the canthal tendon to prevent tension and prevent ectropion.
Consent (Lip)/Introductory Paragraph: The rationale for Mohs was explained to the patient and consent was obtained. The risks, benefits and alternatives to therapy were discussed in detail. Specifically, the risks of lip deformity, changes in the oral aperture, infection, scarring, bleeding, prolonged wound healing, incomplete removal, allergy to anesthesia, nerve injury and recurrence were addressed. Prior to the procedure, the treatment site was clearly identified and confirmed by the patient. All components of Universal Protocol/PAUSE Rule completed.
Primary Defect Width In Cm (Final Defect Size - Required For Flaps/Grafts): 2
Repair Type: Complex Repair and Graft
Partial Purse String (Intermediate) Text: Given the location of the defect and the characteristics of the surrounding skin an intermediate purse string closure was deemed most appropriate.  Undermining was performed circumfirentially around the surgical defect.  A purse string suture was then placed and tightened. Wound tension only allowed a partial closure of the circular defect.
Melolabial Transposition Flap Text: The defect edges were debeveled with a #15 scalpel blade.  Given the location of the defect and the proximity to free margins a melolabial flap was deemed most appropriate.  Using a sterile surgical marker, an appropriate melolabial transposition flap was drawn incorporating the defect.    The area thus outlined was incised deep to adipose tissue with a #15 scalpel blade.  The skin margins were undermined to an appropriate distance in all directions utilizing iris scissors.
Consent (Spinal Accessory)/Introductory Paragraph: The rationale for Mohs was explained to the patient and consent was obtained. The risks, benefits and alternatives to therapy were discussed in detail. Specifically, the risks of damage to the spinal accessory nerve, infection, scarring, bleeding, prolonged wound healing, incomplete removal, allergy to anesthesia, and recurrence were addressed. Prior to the procedure, the treatment site was clearly identified and confirmed by the patient. All components of Universal Protocol/PAUSE Rule completed.
Hatchet Flap Text: The defect edges were debeveled with a #15 scalpel blade.  Given the location of the defect, shape of the defect and the proximity to free margins a hatchet flap was deemed most appropriate.  Using a sterile surgical marker, an appropriate hatchet flap was drawn incorporating the defect and placing the expected incisions within the relaxed skin tension lines where possible.    The area thus outlined was incised deep to adipose tissue with a #15 scalpel blade.  The skin margins were undermined to an appropriate distance in all directions utilizing iris scissors.
Helical Rim Advancement Flap Text: The defect edges were debeveled with a #15 blade scalpel.  Given the location of the defect and the proximity to free margins (helical rim) a double helical rim advancement flap was deemed most appropriate.  Using a sterile surgical marker, the appropriate advancement flaps were drawn incorporating the defect and placing the expected incisions between the helical rim and antihelix where possible.  The area thus outlined was incised through and through with a #15 scalpel blade.  With a skin hook and iris scissors, the flaps were gently and sharply undermined and freed up.
Helical Rim Text: The closure involved the helical rim.
Primary Defect Length In Cm (Final Defect Size - Required For Flaps/Grafts): 2.5
Body Location Override (Optional - Billing Will Still Be Based On Selected Body Map Location If Applicable): left cheek
Mart-1 - Negative Histology Text: MART-1 staining demonstrates a normal density and pattern of melanocytes along the dermal-epidermal junction. The surgical margins are negative for tumor cells.
Unna Boot Text: An Unna boot was placed to help immobilize the limb and facilitate more rapid healing.
Closure 2 Information: This tab is for additional flaps and grafts, including complex repair and grafts and complex repair and flaps. You can also specify a different location for the additional defect, if the location is the same you do not need to select a new one. We will insert the automated text for the repair you select below just as we do for solitary flaps and grafts. Please note that at this time if you select a location with a different insurance zone you will need to override the ICD10 and CPT if appropriate.
O-L Flap Text: The defect edges were debeveled with a #15 scalpel blade.  Given the location of the defect, shape of the defect and the proximity to free margins an O-L flap was deemed most appropriate.  Using a sterile surgical marker, an appropriate advancement flap was drawn incorporating the defect and placing the expected incisions within the relaxed skin tension lines where possible.    The area thus outlined was incised deep to adipose tissue with a #15 scalpel blade.  The skin margins were undermined to an appropriate distance in all directions utilizing iris scissors.
Repair Anesthesia Method: local infiltration
Graft Donor Site Bandage (Optional-Leave Blank If You Don't Want In Note): Aquaplast was fitted to the graft site and sewn into place. A pressure bandage were applied to the donor site and over the aquaplast bolster.
Ear Wedge Repair Text: A wedge excision was completed by carrying down an excision through the full thickness of the ear and cartilage with an inward facing Burow's triangle. The wound was then closed in a layered fashion.
Surgical Defect Width In Cm (Optional): 2.0
Consent 3/Introductory Paragraph: I gave the patient a chance to ask questions they had about the procedure.  Following this I explained the Mohs procedure and consent was obtained. The risks, benefits and alternatives to therapy were discussed in detail. Specifically, the risks of infection, scarring, bleeding, prolonged wound healing, incomplete removal, allergy to anesthesia, nerve injury and recurrence were addressed. Prior to the procedure, the treatment site was clearly identified and confirmed by the patient. All components of Universal Protocol/PAUSE Rule completed.
Chonodrocutaneous Helical Advancement Flap Text: The defect edges were debeveled with a #15 scalpel blade.  Given the location of the defect and the proximity to free margins a chondrocutaneous helical advancement flap was deemed most appropriate.  Using a sterile surgical marker, the appropriate advancement flap was drawn incorporating the defect and placing the expected incisions within the relaxed skin tension lines where possible.    The area thus outlined was incised deep to adipose tissue with a #15 scalpel blade.  The skin margins were undermined to an appropriate distance in all directions utilizing iris scissors.
Wound Care (No Sutures): Petrolatum
Mastoid Interpolation Flap Text: A decision was made to reconstruct the defect utilizing an interpolation axial flap and a staged reconstruction.  A telfa template was made of the defect.  This telfa template was then used to outline the mastoid interpolation flap.  The donor area for the pedicle flap was then injected with anesthesia.  The flap was excised through the skin and subcutaneous tissue down to the layer of the underlying musculature.  The pedicle flap was carefully excised within this deep plane to maintain its blood supply.  The edges of the donor site were undermined.   The donor site was closed in a primary fashion.  The pedicle was then rotated into position and sutured.  Once the tube was sutured into place, adequate blood supply was confirmed with blanching and refill.  The pedicle was then wrapped with xeroform gauze and dressed appropriately with a telfa and gauze bandage to ensure continued blood supply and protect the attached pedicle.
Interpolation Flap Text: A decision was made to reconstruct the defect utilizing an interpolation axial flap and a staged reconstruction.  A telfa template was made of the defect.  This telfa template was then used to outline the interpolation flap.  The donor area for the pedicle flap was then injected with anesthesia.  The flap was excised through the skin and subcutaneous tissue down to the layer of the underlying musculature.  The interpolation flap was carefully excised within this deep plane to maintain its blood supply.  The edges of the donor site were undermined.   The donor site was closed in a primary fashion.  The pedicle was then rotated into position and sutured.  Once the tube was sutured into place, adequate blood supply was confirmed with blanching and refill.  The pedicle was then wrapped with xeroform gauze and dressed appropriately with a telfa and gauze bandage to ensure continued blood supply and protect the attached pedicle.
Bilateral Helical Rim Advancement Flap Text: The defect edges were debeveled with a #15 blade scalpel.  Given the location of the defect and the proximity to free margins (helical rim) a bilateral helical rim advancement flap was deemed most appropriate.  Using a sterile surgical marker, the appropriate advancement flaps were drawn incorporating the defect and placing the expected incisions between the helical rim and antihelix where possible.  The area thus outlined was incised through and through with a #15 scalpel blade.  With a skin hook and iris scissors, the flaps were gently and sharply undermined and freed up.
Where Do You Want The Question To Include Opioid Counseling Located?: Case Summary Tab
W Plasty Text: The lesion was extirpated to the level of the fat with a #15 scalpel blade.  Given the location of the defect, shape of the defect and the proximity to free margins a W-plasty was deemed most appropriate for repair.  Using a sterile surgical marker, the appropriate transposition arms of the W-plasty were drawn incorporating the defect and placing the expected incisions within the relaxed skin tension lines where possible.    The area thus outlined was incised deep to adipose tissue with a #15 scalpel blade.  The skin margins were undermined to an appropriate distance in all directions utilizing iris scissors.  The opposing transposition arms were then transposed into place in opposite direction and anchored with interrupted buried subcutaneous sutures.
Tumor Depth: Less than 6mm from granular layer and no invasion beyond the subcutaneous fat
Z Plasty Text: The lesion was extirpated to the level of the fat with a #15 scalpel blade.  Given the location of the defect, shape of the defect and the proximity to free margins a Z-plasty was deemed most appropriate for repair.  Using a sterile surgical marker, the appropriate transposition arms of the Z-plasty were drawn incorporating the defect and placing the expected incisions within the relaxed skin tension lines where possible.    The area thus outlined was incised deep to adipose tissue with a #15 scalpel blade.  The skin margins were undermined to an appropriate distance in all directions utilizing iris scissors.  The opposing transposition arms were then transposed into place in opposite direction and anchored with interrupted buried subcutaneous sutures.
Bcc Histology Text: There were numerous aggregates of basaloid cells.
Wound Care: Vaseline
Banner Transposition Flap Text: The defect edges were debeveled with a #15 scalpel blade.  Given the location of the defect and the proximity to free margins a Banner transposition flap was deemed most appropriate.  Using a sterile surgical marker, an appropriate flap drawn around the defect. The area thus outlined was incised deep to adipose tissue with a #15 scalpel blade.  The skin margins were undermined to an appropriate distance in all directions utilizing iris scissors.
Rhombic Flap Text: The defect edges were debeveled with a #15 scalpel blade.  Given the location of the defect and the proximity to free margins a rhombic flap was deemed most appropriate.  Using a sterile surgical marker, an appropriate rhombic flap was drawn incorporating the defect.    The area thus outlined was incised deep to adipose tissue with a #15 scalpel blade.  The skin margins were undermined to an appropriate distance in all directions utilizing iris scissors.
Localized Dermabrasion With Wire Brush Text: The patient was draped in routine manner.  Localized dermabrasion using 3 x 17 mm wire brush was performed in routine manner to papillary dermis. This spot dermabrasion is being performed to complete skin cancer reconstruction. It also will eliminate the other sun damaged precancerous cells that are known to be part of the regional effect of a lifetime's worth of sun exposure. This localized dermabrasion is therapeutic and should not be considered cosmetic in any regard.
Area H Indication Text: Tumors in this location are included in Area H (eyelids, eyebrows, nose, lips, chin, ear, pre-auricular, post-auricular, temple, genitalia, hands, feet, ankles and areola).  Tissue conservation is critical in these anatomic locations.
Bi-Rhombic Flap Text: The defect edges were debeveled with a #15 scalpel blade.  Given the location of the defect and the proximity to free margins a bi-rhombic flap was deemed most appropriate.  Using a sterile surgical marker, an appropriate rhombic flap was drawn incorporating the defect. The area thus outlined was incised deep to adipose tissue with a #15 scalpel blade.  The skin margins were undermined to an appropriate distance in all directions utilizing iris scissors.
Previous Accession (Optional): outside path
Consent (Ear)/Introductory Paragraph: The rationale for Mohs was explained to the patient and consent was obtained. The risks, benefits and alternatives to therapy were discussed in detail. Specifically, the risks of ear deformity, infection, scarring, bleeding, prolonged wound healing, incomplete removal, allergy to anesthesia, nerve injury and recurrence were addressed. Prior to the procedure, the treatment site was clearly identified and confirmed by the patient. All components of Universal Protocol/PAUSE Rule completed.
Partial Purse String (Simple) Text: Given the location of the defect and the characteristics of the surrounding skin a simple purse string closure was deemed most appropriate.  Undermining was performed circumfirentially around the surgical defect.  A purse string suture was then placed and tightened. Wound tension only allowed a partial closure of the circular defect.
Melolabial Interpolation Flap Text: A decision was made to reconstruct the defect utilizing an interpolation axial flap and a staged reconstruction.  A telfa template was made of the defect.  This telfa template was then used to outline the melolabial interpolation flap.  The donor area for the pedicle flap was then injected with anesthesia.  The flap was excised through the skin and subcutaneous tissue down to the layer of the underlying musculature.  The pedicle flap was carefully excised within this deep plane to maintain its blood supply.  The edges of the donor site were undermined.   The donor site was closed in a primary fashion.  The pedicle was then rotated into position and sutured.  Once the tube was sutured into place, adequate blood supply was confirmed with blanching and refill.  The pedicle was then wrapped with xeroform gauze and dressed appropriately with a telfa and gauze bandage to ensure continued blood supply and protect the attached pedicle.
Medical Necessity Statement: Based on my medical judgement, Mohs surgery is the most appropriate treatment for this cancer compared to other treatments.
Surgeon Performing Repair (Optional): Cecy Peña MD
Mohs Case Number: [id number]
Tissue Cultured Epidermal Autograft Text: The defect edges were debeveled with a #15 scalpel blade.  Given the location of the defect, shape of the defect and the proximity to free margins a tissue cultured epidermal autograft was deemed most appropriate.  The graft was then trimmed to fit the size of the defect.  The graft was then placed in the primary defect and oriented appropriately.
Epidermal Closure: running cuticular
Alar Island Pedicle Flap Text: The defect edges were debeveled with a #15 scalpel blade.  Given the location of the defect, shape of the defect and the proximity to the alar rim an island pedicle advancement flap was deemed most appropriate.  Using a sterile surgical marker, an appropriate advancement flap was drawn incorporating the defect, outlining the appropriate donor tissue and placing the expected incisions within the nasal ala running parallel to the alar rim. The area thus outlined was incised with a #15 scalpel blade.  The skin margins were undermined minimally to an appropriate distance in all directions around the primary defect and laterally outward around the island pedicle utilizing iris scissors.  There was minimal undermining beneath the pedicle flap.
Nasal Turnover Hinge Flap Text: The defect edges were debeveled with a #15 scalpel blade.  Given the size, depth, location of the defect and the defect being full thickness a nasal turnover hinge flap was deemed most appropriate.  Using a sterile surgical marker, an appropriate hinge flap was drawn incorporating the defect. The area thus outlined was incised with a #15 scalpel blade. The flap was designed to recreate the nasal mucosal lining and the alar rim. The skin margins were undermined to an appropriate distance in all directions utilizing iris scissors.
Muscle Hinge Flap Text: The defect edges were debeveled with a #15 scalpel blade.  Given the size, depth and location of the defect and the proximity to free margins a muscle hinge flap was deemed most appropriate.  Using a sterile surgical marker, an appropriate hinge flap was drawn incorporating the defect. The area thus outlined was incised with a #15 scalpel blade.  The skin margins were undermined to an appropriate distance in all directions utilizing iris scissors.
Additional Deep Sutures: 4-0 Polysorb
O-T Plasty Text: The defect edges were debeveled with a #15 scalpel blade.  Given the location of the defect, shape of the defect and the proximity to free margins an O-T plasty was deemed most appropriate.  Using a sterile surgical marker, an appropriate O-T plasty was drawn incorporating the defect and placing the expected incisions within the relaxed skin tension lines where possible.    The area thus outlined was incised deep to adipose tissue with a #15 scalpel blade.  The skin margins were undermined to an appropriate distance in all directions utilizing iris scissors.
Mart-1 - Positive Histology Text: MART-1 staining demonstrates areas of higher density and clustering of melanocytes with Pagetoid spread upwards within the epidermis. The surgical margins are positive for tumor cells.
Retention Suture Bite Size: 1 mm
Subsequent Stages Histo Method Verbiage: Using a similar technique to that described above, a thin layer of tissue was removed from all areas where tumor was visible on the previous stage.  The tissue was again oriented, mapped, dyed, and processed as above.
Postop Diagnosis: same
Staging Info: By selecting yes to the question above you will include information on AJCC 8 tumor staging in your Mohs note. Information on tumor staging will be automatically added for SCCs on the head and neck. AJCC 8 includes tumor size, tumor depth, perineural involvement and bone invasion.
Orbicularis Oris Muscle Flap Text: The defect edges were debeveled with a #15 scalpel blade.  Given that the defect affected the competency of the oral sphincter an orbicularis oris muscle flap was deemed most appropriate to restore this competency and normal muscle function.  Using a sterile surgical marker, an appropriate flap was drawn incorporating the defect. The area thus outlined was incised with a #15 scalpel blade.
Manual Repair Warning Statement: We plan on removing the manually selected variable below in favor of our much easier automatic structured text blocks found in the previous tab. We decided to do this to help make the flow better and give you the full power of structured data. Manual selection is never going to be ideal in our platform and I would encourage you to avoid using manual selection from this point on, especially since I will be sunsetting this feature. It is important that you do one of two things with the customized text below. First, you can save all of the text in a word file so you can have it for future reference. Second, transfer the text to the appropriate area in the Library tab. Lastly, if there is a flap or graft type which we do not have you need to let us know right away so I can add it in before the variable is hidden. No need to panic, we plan to give you roughly 6 months to make the change.
Additional Graft Type: Full Thickness Skin Graft
Suturegard Body: The suture ends were repeatedly re-tightened and re-clamped to achieve the desired tissue expansion.
Spiral Flap Text: The defect edges were debeveled with a #15 scalpel blade.  Given the location of the defect, shape of the defect and the proximity to free margins a spiral flap was deemed most appropriate.  Using a sterile surgical marker, an appropriate rotation flap was drawn incorporating the defect and placing the expected incisions within the relaxed skin tension lines where possible. The area thus outlined was incised deep to adipose tissue with a #15 scalpel blade.  The skin margins were undermined to an appropriate distance in all directions utilizing iris scissors.
Additional Primary Defect Width (In Cm): 1.5
Same Histology In Subsequent Stages Text: The pattern and morphology of the tumor is as described in the first stage.
Brow Lift Text: A midfrontal incision was made medially to the defect to allow access to the tissues just superior to the left eyebrow. Following careful dissection inferiorly in a supraperiosteal plane to the level of the left eyebrow, several 3-0 monocryl sutures were used to resuspend the eyebrow orbicularis oculi muscular unit to the superior frontal bone periosteum. This resulted in an appropriate reapproximation of static eyebrow symmetry and correction of the left brow ptosis.
Estimated Blood Loss (Cc): minimal
Consent 2/Introductory Paragraph: Mohs surgery was explained to the patient and consent was obtained. The risks, benefits and alternatives to therapy were discussed in detail. Specifically, the risks of infection, scarring, bleeding, prolonged wound healing, incomplete removal, allergy to anesthesia, nerve injury and recurrence were addressed. Prior to the procedure, the treatment site was clearly identified and confirmed by the patient. All components of Universal Protocol/PAUSE Rule completed.
O-T Advancement Flap Text: The defect edges were debeveled with a #15 scalpel blade.  Given the location of the defect, shape of the defect and the proximity to free margins an O-T advancement flap was deemed most appropriate.  Using a sterile surgical marker, an appropriate advancement flap was drawn incorporating the defect and placing the expected incisions within the relaxed skin tension lines where possible.    The area thus outlined was incised deep to adipose tissue with a #15 scalpel blade.  The skin margins were undermined to an appropriate distance in all directions utilizing iris scissors.
Complex Repair And Flap Additional Text (Will Appearing After The Standard Complex Repair Text): The complex repair was not sufficient to completely close the primary defect. The remaining additional defect was repaired with the flap mentioned below.
Eye Protection Verbiage: Before proceeding with the stage, a plastic scleral shield was inserted. The globe was anesthetized with a few drops of 1% lidocaine with 1:100,000 epinephrine. Then, an appropriate sized scleral shield was chosen and coated with lacrilube ointment. The shield was gently inserted and left in place for the duration of each stage. After the stage was completed, the shield was gently removed.
Cheek-To-Nose Interpolation Flap Text: A decision was made to reconstruct the defect utilizing an interpolation axial flap and a staged reconstruction.  A telfa template was made of the defect.  This telfa template was then used to outline the Cheek-To-Nose Interpolation flap.  The donor area for the pedicle flap was then injected with anesthesia.  The flap was excised through the skin and subcutaneous tissue down to the layer of the underlying musculature.  The interpolation flap was carefully excised within this deep plane to maintain its blood supply.  The edges of the donor site were undermined.   The donor site was closed in a primary fashion.  The pedicle was then rotated into position and sutured.  Once the tube was sutured into place, adequate blood supply was confirmed with blanching and refill.  The pedicle was then wrapped with xeroform gauze and dressed appropriately with a telfa and gauze bandage to ensure continued blood supply and protect the attached pedicle.
Consent 1/Introductory Paragraph: The rationale for Mohs was explained to the patient and consent was obtained. The risks, benefits and alternatives to therapy were discussed in detail. Specifically, the risks of infection, scarring, bleeding, prolonged wound healing, incomplete removal, allergy to anesthesia, nerve injury and recurrence were addressed. Prior to the procedure, the treatment site was clearly identified and confirmed by the patient. All components of Universal Protocol/PAUSE Rule completed.
O-Z Flap Text: The defect edges were debeveled with a #15 scalpel blade.  Given the location of the defect, shape of the defect and the proximity to free margins an O-Z flap was deemed most appropriate.  Using a sterile surgical marker, an appropriate transposition flap was drawn incorporating the defect and placing the expected incisions within the relaxed skin tension lines where possible. The area thus outlined was incised deep to adipose tissue with a #15 scalpel blade.  The skin margins were undermined to an appropriate distance in all directions utilizing iris scissors.
Deep Sutures: 5-0 Maxon
Cheek Interpolation Flap Text: A decision was made to reconstruct the defect utilizing an interpolation axial flap and a staged reconstruction.  A telfa template was made of the defect.  This telfa template was then used to outline the Cheek Interpolation flap.  The donor area for the pedicle flap was then injected with anesthesia.  The flap was excised through the skin and subcutaneous tissue down to the layer of the underlying musculature.  The interpolation flap was carefully excised within this deep plane to maintain its blood supply.  The edges of the donor site were undermined.   The donor site was closed in a primary fashion.  The pedicle was then rotated into position and sutured.  Once the tube was sutured into place, adequate blood supply was confirmed with blanching and refill.  The pedicle was then wrapped with xeroform gauze and dressed appropriately with a telfa and gauze bandage to ensure continued blood supply and protect the attached pedicle.
Surgeon/Pathologist Verbiage (Will Incorporate Name Of Surgeon From Intro If Not Blank): operated in two distinct and integrated capacities as the surgeon and pathologist.
Pain Refusal Text: I offered to prescribe pain medication but the patient refused to take this medication.
Referring Physician (Optional): Nataly Adam
Hemigard Intro: Due to skin fragility and wound tension, it was decided to use HEMIGARD adhesive retention suture devices to permit a linear closure. The skin was cleaned and dried for a 6cm distance away from the wound. Excessive hair, if present, was removed to allow for adhesion.
Surgical Defect Length In Cm (Optional): 2.2
Dermal Autograft Text: The defect edges were debeveled with a #15 scalpel blade.  Given the location of the defect, shape of the defect and the proximity to free margins a dermal autograft was deemed most appropriate.  Using a sterile surgical marker, the primary defect shape was transferred to the donor site. The area thus outlined was incised deep to adipose tissue with a #15 scalpel blade.  The harvested graft was then trimmed of adipose and epidermal tissue until only dermis was left.  The skin graft was then placed in the primary defect and oriented appropriately.
Paramedian Forehead Flap Text: A decision was made to reconstruct the defect utilizing an interpolation axial flap and a staged reconstruction.  A telfa template was made of the defect.  This telfa template was then used to outline the paramedian forehead pedicle flap.  The donor area for the pedicle flap was then injected with anesthesia.  The flap was excised through the skin and subcutaneous tissue down to the layer of the underlying musculature.  The pedicle flap was carefully excised within this deep plane to maintain its blood supply.  The edges of the donor site were undermined.   The donor site was closed in a primary fashion.  The pedicle was then rotated into position and sutured.  Once the tube was sutured into place, adequate blood supply was confirmed with blanching and refill.  The pedicle was then wrapped with xeroform gauze and dressed appropriately with a telfa and gauze bandage to ensure continued blood supply and protect the attached pedicle.
Tarsorrhaphy Text: A tarsorrhaphy was performed using Frost sutures.
Skin Substitute Text: The defect edges were debeveled with a #15 scalpel blade.  Given the location of the defect, shape of the defect and the proximity to free margins a skin substitute graft was deemed most appropriate.  The graft material was trimmed to fit the size of the defect. The graft was then placed in the primary defect and oriented appropriately.
Graft Cartilage Fenestration Text: The cartilage was fenestrated with a 2mm punch biopsy to help facilitate graft survival and healing.
Cartilage Graft Text: The defect edges were debeveled with a #15 scalpel blade.  Given the location of the defect, shape of the defect, the fact the defect involved a full thickness cartilage defect a cartilage graft was deemed most appropriate.  An appropriate donor site was identified, cleansed, and anesthetized. The cartilage graft was then harvested and transferred to the recipient site, oriented appropriately and then sutured into place.  The secondary defect was then repaired using a primary closure.
Hemostasis: Electrocautery
Consent (Marginal Mandibular)/Introductory Paragraph: The rationale for Mohs was explained to the patient and consent was obtained. The risks, benefits and alternatives to therapy were discussed in detail. Specifically, the risks of damage to the marginal mandibular branch of the facial nerve, infection, scarring, bleeding, prolonged wound healing, incomplete removal, allergy to anesthesia, and recurrence were addressed. Prior to the procedure, the treatment site was clearly identified and confirmed by the patient. All components of Universal Protocol/PAUSE Rule completed.
Crescentic Advancement Flap Text: The defect edges were debeveled with a #15 scalpel blade.  Given the location of the defect and the proximity to free margins a crescentic advancement flap was deemed most appropriate.  Using a sterile surgical marker, the appropriate advancement flap was drawn incorporating the defect and placing the expected incisions within the relaxed skin tension lines where possible.    The area thus outlined was incised deep to adipose tissue with a #15 scalpel blade.  The skin margins were undermined to an appropriate distance in all directions utilizing iris scissors.
Burow's Advancement Flap Text: The defect edges were debeveled with a #15 scalpel blade.  Given the location of the defect and the proximity to free margins a Burow's advancement flap was deemed most appropriate.  Using a sterile surgical marker, the appropriate advancement flap was drawn incorporating the defect and placing the expected incisions within the relaxed skin tension lines where possible.    The area thus outlined was incised deep to adipose tissue with a #15 scalpel blade.  The skin margins were undermined to an appropriate distance in all directions utilizing iris scissors.
Purse String (Simple) Text: Given the location of the defect and the characteristics of the surrounding skin a purse string closure was deemed most appropriate.  Undermining was performed circumfirentially around the surgical defect.  A purse string suture was then placed and tightened.
Consent (Scalp)/Introductory Paragraph: The rationale for Mohs was explained to the patient and consent was obtained. The risks, benefits and alternatives to therapy were discussed in detail. Specifically, the risks of changes in hair growth pattern secondary to repair, infection, scarring, bleeding, prolonged wound healing, incomplete removal, allergy to anesthesia, nerve injury and recurrence were addressed. Prior to the procedure, the treatment site was clearly identified and confirmed by the patient. All components of Universal Protocol/PAUSE Rule completed.
Suturegard Intro: Intraoperative tissue expansion was performed, utilizing the SUTUREGARD device, in order to reduce wound tension.
Graft Donor Site Dermal Sutures (Optional): 5-0 Polysorb
Ear Star Wedge Flap Text: The defect edges were debeveled with a #15 blade scalpel.  Given the location of the defect and the proximity to free margins (helical rim) an ear star wedge flap was deemed most appropriate.  Using a sterile surgical marker, the appropriate flap was drawn incorporating the defect and placing the expected incisions between the helical rim and antihelix where possible.  The area thus outlined was incised through and through with a #15 scalpel blade.
Island Pedicle Flap-Requiring Vessel Identification Text: The defect edges were debeveled with a #15 scalpel blade.  Given the location of the defect, shape of the defect and the proximity to free margins an island pedicle advancement flap was deemed most appropriate.  Using a sterile surgical marker, an appropriate advancement flap was drawn, based on the axial vessel mentioned above, incorporating the defect, outlining the appropriate donor tissue and placing the expected incisions within the relaxed skin tension lines where possible.    The area thus outlined was incised deep to adipose tissue with a #15 scalpel blade.  The skin margins were undermined to an appropriate distance in all directions around the primary defect and laterally outward around the island pedicle utilizing iris scissors.  There was minimal undermining beneath the pedicle flap.
Burow's Graft Text: The defect edges were debeveled with a #15 scalpel blade.  Given the location of the defect, shape of the defect, the proximity to free margins and the presence of a standing cone deformity a Burow's skin graft was deemed most appropriate. The standing cone was removed and this tissue was then trimmed to the shape of the primary defect. The adipose tissue was also removed until only dermis and epidermis were left.  The skin margins of the secondary defect were undermined to an appropriate distance in all directions utilizing iris scissors.  The secondary defect was closed with interrupted buried subcutaneous sutures.  The skin edges were then re-apposed with running  sutures.  The skin graft was then placed in the primary defect and oriented appropriately.
Advancement Flap (Single) Text: The defect edges were debeveled with a #15 scalpel blade.  Given the location of the defect and the proximity to free margins a single advancement flap was deemed most appropriate.  Using a sterile surgical marker, an appropriate advancement flap was drawn incorporating the defect and placing the expected incisions within the relaxed skin tension lines where possible.    The area thus outlined was incised deep to adipose tissue with a #15 scalpel blade.  The skin margins were undermined to an appropriate distance in all directions utilizing iris scissors.
Surgical Defect Length In Cm (Optional): 1.7
Additional Anesthesia Volume In Cc: 6
No Repair - Repaired With Adjacent Surgical Defect Text (Leave Blank If You Do Not Want): After obtaining clear surgical margins the defect was repaired concurrently with another surgical defect which was in close approximation.
Split-Thickness Skin Graft Text: The defect edges were debeveled with a #15 scalpel blade.  Given the location of the defect, shape of the defect and the proximity to free margins a split thickness skin graft was deemed most appropriate.  Using a sterile surgical marker, the primary defect shape was transferred to the donor site. The split thickness graft was then harvested.  The skin graft was then placed in the primary defect and oriented appropriately.
Rhomboid Transposition Flap Text: The defect edges were debeveled with a #15 scalpel blade.  Given the location of the defect and the proximity to free margins a rhomboid transposition flap was deemed most appropriate.  Using a sterile surgical marker, an appropriate rhomboid flap was drawn incorporating the defect.    The area thus outlined was incised deep to adipose tissue with a #15 scalpel blade.  The skin margins were undermined to an appropriate distance in all directions utilizing iris scissors.
Mohs Rapid Report Verbiage: The area of clinically evident tumor was marked with skin marking ink and appropriately hatched.  The initial incision was made following the Mohs approach through the skin.  The specimen was taken to the lab, divided into the necessary number of pieces, chromacoded and processed according to the Mohs protocol.  This was repeated in successive stages until a tumor free defect was achieved.
Retention Suture Text: Retention sutures were placed to support the closure and prevent dehiscence.
Nasalis-Muscle-Based Myocutaneous Island Pedicle Flap Text: Using a #15 blade, an incision was made around the donor flap to the level of the nasalis muscle. Wide lateral undermining was then performed in both the subcutaneous plane above the nasalis muscle, and in a submuscular plane just above periosteum. This allowed the formation of a free nasalis muscle axial pedicle (based on the angular artery) which was still attached to the actual cutaneous flap, increasing its mobility and vascular viability. Hemostasis was obtained with pinpoint electrocoagulation. The flap was mobilized into position and the pivotal anchor points positioned and stabilized with buried interrupted sutures. Subcutaneous and dermal tissues were closed in a multilayered fashion with sutures. Tissue redundancies were excised, and the epidermal edges were apposed without significant tension and sutured with sutures.
Non-Graft Cartilage Fenestration Text: The cartilage was fenestrated with a 2mm punch biopsy to help facilitate healing.
Epidermal Autograft Text: The defect edges were debeveled with a #15 scalpel blade.  Given the location of the defect, shape of the defect and the proximity to free margins an epidermal autograft was deemed most appropriate.  Using a sterile surgical marker, the primary defect shape was transferred to the donor site. The epidermal graft was then harvested.  The skin graft was then placed in the primary defect and oriented appropriately.
Anesthesia Volume In Cc: 5
Mauc Instructions: By selecting yes to the question below the MAUC number will be added into the note.  This will be calculated automatically based on the diagnosis chosen, the size entered, the body zone selected (H,M,L) and the specific indications you chose. You will also have the option to override the Mohs AUC if you disagree with the automatically calculated number and this option is found in the Case Summary tab.
Keystone Flap Text: The defect edges were debeveled with a #15 scalpel blade.  Given the location of the defect, shape of the defect a keystone flap was deemed most appropriate.  Using a sterile surgical marker, an appropriate keystone flap was drawn incorporating the defect, outlining the appropriate donor tissue and placing the expected incisions within the relaxed skin tension lines where possible. The area thus outlined was incised deep to adipose tissue with a #15 scalpel blade.  The skin margins were undermined to an appropriate distance in all directions around the primary defect and laterally outward around the flap utilizing iris scissors.
Cheiloplasty (Less Than 50%) Text: A decision was made to reconstruct the defect with a  cheiloplasty.  The defect was undermined extensively.  Additional obicularis oris muscle was excised with a 15 blade scalpel.  The defect was converted into a full thickness wedge, of less than 50% of the vertical height of the lip, to facilite a better cosmetic result.  Small vessels were then tied off with 5-0 monocyrl. The obicularis oris, superficial fascia, adipose and dermis were then reapproximated.  After the deeper layers were approximated the epidermis was reapproximated with particular care given to realign the vermilion border.
Donor Site Anesthesia Type: same as repair anesthesia
O-Z Plasty Text: The defect edges were debeveled with a #15 scalpel blade.  Given the location of the defect, shape of the defect and the proximity to free margins an O-Z plasty (double transposition flap) was deemed most appropriate.  Using a sterile surgical marker, the appropriate transposition flaps were drawn incorporating the defect and placing the expected incisions within the relaxed skin tension lines where possible.    The area thus outlined was incised deep to adipose tissue with a #15 scalpel blade.  The skin margins were undermined to an appropriate distance in all directions utilizing iris scissors.  Hemostasis was achieved with electrocautery.  The flaps were then transposed into place, one clockwise and the other counterclockwise, and anchored with interrupted buried subcutaneous sutures.
Xenograft Text: The defect edges were debeveled with a #15 scalpel blade.  Given the location of the defect, shape of the defect and the proximity to free margins a xenograft was deemed most appropriate.  The graft was then trimmed to fit the size of the defect.  The graft was then placed in the primary defect and oriented appropriately.
Modified Advancement Flap Text: The defect edges were debeveled with a #15 scalpel blade.  Given the location of the defect, shape of the defect and the proximity to free margins a modified advancement flap was deemed most appropriate.  Using a sterile surgical marker, an appropriate advancement flap was drawn incorporating the defect and placing the expected incisions within the relaxed skin tension lines where possible.    The area thus outlined was incised deep to adipose tissue with a #15 scalpel blade.  The skin margins were undermined to an appropriate distance in all directions utilizing iris scissors.
Detail Level: Detailed
Consent (Nose)/Introductory Paragraph: The rationale for Mohs was explained to the patient and consent was obtained. The risks, benefits and alternatives to therapy were discussed in detail. Specifically, the risks of nasal deformity, changes in the flow of air through the nose, infection, scarring, bleeding, prolonged wound healing, incomplete removal, allergy to anesthesia, nerve injury and recurrence were addressed. Prior to the procedure, the treatment site was clearly identified and confirmed by the patient. All components of Universal Protocol/PAUSE Rule completed.
Alternatives Discussed Intro (Do Not Add Period): I discussed alternative treatments to Mohs surgery and specifically discussed the risks and benefits of
Vermilion Border Text: The closure involved the vermilion border.
Bilobed Transposition Flap Text: The defect edges were debeveled with a #15 scalpel blade.  Given the location of the defect and the proximity to free margins a bilobed transposition flap was deemed most appropriate.  Using a sterile surgical marker, an appropriate bilobe flap drawn around the defect.    The area thus outlined was incised deep to adipose tissue with a #15 scalpel blade.  The skin margins were undermined to an appropriate distance in all directions utilizing iris scissors.
Staged Advancement Flap Text: The defect edges were debeveled with a #15 scalpel blade.  Given the location of the defect, shape of the defect and the proximity to free margins a staged advancement flap was deemed most appropriate.  Using a sterile surgical marker, an appropriate advancement flap was drawn incorporating the defect and placing the expected incisions within the relaxed skin tension lines where possible. The area thus outlined was incised deep to adipose tissue with a #15 scalpel blade.  The skin margins were undermined to an appropriate distance in all directions utilizing iris scissors.
Mustarde Flap Text: The defect edges were debeveled with a #15 scalpel blade.  Given the size, depth and location of the defect and the proximity to free margins a Mustarde flap was deemed most appropriate.  Using a sterile surgical marker, an appropriate flap was drawn incorporating the defect. The area thus outlined was incised with a #15 scalpel blade.  The skin margins were undermined to an appropriate distance in all directions utilizing iris scissors.

## 2021-09-13 ENCOUNTER — APPOINTMENT (RX ONLY)
Dept: URBAN - METROPOLITAN AREA CLINIC 36 | Facility: CLINIC | Age: 61
Setting detail: DERMATOLOGY
End: 2021-09-13

## 2021-09-13 DIAGNOSIS — Z48.02 ENCOUNTER FOR REMOVAL OF SUTURES: ICD-10-CM

## 2021-09-13 PROCEDURE — ? SUTURE REMOVAL (GLOBAL PERIOD)

## 2021-09-13 PROCEDURE — 99024 POSTOP FOLLOW-UP VISIT: CPT

## 2021-09-13 ASSESSMENT — LOCATION ZONE DERM: LOCATION ZONE: FACE

## 2021-09-13 ASSESSMENT — LOCATION SIMPLE DESCRIPTION DERM: LOCATION SIMPLE: LEFT CHEEK

## 2021-09-13 ASSESSMENT — LOCATION DETAILED DESCRIPTION DERM: LOCATION DETAILED: LEFT MEDIAL MALAR CHEEK

## 2021-09-13 NOTE — PROCEDURE: SUTURE REMOVAL (GLOBAL PERIOD)
Detail Level: Detailed
Add 79949 Cpt? (Important Note: In 2017 The Use Of 49516 Is Being Tracked By Cms To Determine Future Global Period Reimbursement For Global Periods): yes

## 2021-09-29 ENCOUNTER — APPOINTMENT (RX ONLY)
Dept: URBAN - METROPOLITAN AREA CLINIC 36 | Facility: CLINIC | Age: 61
Setting detail: DERMATOLOGY
End: 2021-09-29

## 2021-09-29 DIAGNOSIS — Z48.817 ENCOUNTER FOR SURGICAL AFTERCARE FOLLOWING SURGERY ON THE SKIN AND SUBCUTANEOUS TISSUE: ICD-10-CM

## 2021-09-29 PROCEDURE — ? POST-OP WOUND CHECK

## 2021-09-29 PROCEDURE — ? LASER RESURFACING

## 2021-09-29 ASSESSMENT — LOCATION SIMPLE DESCRIPTION DERM: LOCATION SIMPLE: LEFT CHEEK

## 2021-09-29 ASSESSMENT — LOCATION ZONE DERM: LOCATION ZONE: FACE

## 2021-09-29 ASSESSMENT — LOCATION DETAILED DESCRIPTION DERM: LOCATION DETAILED: LEFT MEDIAL MALAR CHEEK

## 2021-09-29 NOTE — PROCEDURE: LASER RESURFACING
Wavelength: 10,600nm
Power (Younger): 40
Laser Type: CO2Re laser (Deep)
Pulse Duration (Usec): 0
Detail Level: Detailed
External Cooling Fan Speed: 5
Consent: Written consent obtained, risks reviewed including but not limited to crusting, scabbing, blistering, scarring, darker or lighter pigmentary change, incomplete improvement of dyschromia, wrinkles, prolonged erythema and facial swelling, infection and bleeding.
Energy(Mj): 70
Number Of Passes: 4
Post-Care Instructions: I reviewed with the patient in detail post-care instructions. Patient should avoid sun until area fully healed. Pt should apply vaseline to treated areas, and remove crusts gently with water-vinegar soaks.

## 2021-09-29 NOTE — PROCEDURE: POST-OP WOUND CHECK
Add 03580 Cpt? (Important Note: In 2017 The Use Of 45982 Is Being Tracked By Cms To Determine Future Global Period Reimbursement For Global Periods): no
Detail Level: Detailed
Wound Evaluated By: Susi Underwood RN

## 2021-10-01 ENCOUNTER — EH NON-PROVIDER (OUTPATIENT)
Dept: OCCUPATIONAL MEDICINE | Facility: CLINIC | Age: 61
End: 2021-10-01

## 2021-10-01 DIAGNOSIS — Z23 NEED FOR VACCINATION: ICD-10-CM

## 2021-10-01 PROCEDURE — 90686 IIV4 VACC NO PRSV 0.5 ML IM: CPT | Performed by: NURSE PRACTITIONER

## 2021-10-26 DIAGNOSIS — E78.5 HYPERLIPIDEMIA, UNSPECIFIED HYPERLIPIDEMIA TYPE: ICD-10-CM

## 2021-10-27 RX ORDER — SIMVASTATIN 40 MG
TABLET ORAL
Qty: 90 TABLET | Refills: 3 | Status: SHIPPED | OUTPATIENT
Start: 2021-10-27 | End: 2022-01-21

## 2021-11-21 ENCOUNTER — OFFICE VISIT (OUTPATIENT)
Dept: URGENT CARE | Facility: PHYSICIAN GROUP | Age: 61
End: 2021-11-21
Payer: COMMERCIAL

## 2021-11-21 VITALS
TEMPERATURE: 97.4 F | DIASTOLIC BLOOD PRESSURE: 68 MMHG | WEIGHT: 184 LBS | BODY MASS INDEX: 31.41 KG/M2 | OXYGEN SATURATION: 98 % | SYSTOLIC BLOOD PRESSURE: 128 MMHG | HEART RATE: 89 BPM | HEIGHT: 64 IN | RESPIRATION RATE: 16 BRPM

## 2021-11-21 DIAGNOSIS — R11.0 NAUSEA: ICD-10-CM

## 2021-11-21 DIAGNOSIS — R68.83 CHILLS: ICD-10-CM

## 2021-11-21 DIAGNOSIS — K30 UPSET STOMACH: ICD-10-CM

## 2021-11-21 PROCEDURE — 99213 OFFICE O/P EST LOW 20 MIN: CPT | Mod: CS | Performed by: NURSE PRACTITIONER

## 2021-11-21 RX ORDER — DOXYCYCLINE 100 MG/1
CAPSULE ORAL
COMMUNITY
End: 2022-01-03

## 2021-11-21 RX ORDER — DOXYCYCLINE 100 MG/1
CAPSULE ORAL
COMMUNITY
Start: 2021-09-02 | End: 2022-01-03

## 2021-11-21 RX ORDER — SULFAMETHOXAZOLE AND TRIMETHOPRIM 800; 160 MG/1; MG/1
TABLET ORAL
COMMUNITY
End: 2022-01-03

## 2021-11-21 RX ORDER — FLUCONAZOLE 100 MG/1
TABLET ORAL
COMMUNITY
End: 2022-01-21

## 2021-11-21 RX ORDER — ONDANSETRON 4 MG/1
TABLET, FILM COATED ORAL
COMMUNITY
End: 2022-02-24

## 2021-11-21 RX ORDER — AMOXICILLIN AND CLAVULANATE POTASSIUM 875; 125 MG/1; MG/1
TABLET, FILM COATED ORAL
COMMUNITY
End: 2022-01-03

## 2021-11-21 RX ORDER — DIAZEPAM 2 MG/1
TABLET ORAL
COMMUNITY
End: 2022-01-03

## 2021-11-21 ASSESSMENT — FIBROSIS 4 INDEX: FIB4 SCORE: 0.67

## 2021-11-21 ASSESSMENT — ENCOUNTER SYMPTOMS
CHILLS: 1
NAUSEA: 1

## 2021-11-22 ENCOUNTER — HOSPITAL ENCOUNTER (OUTPATIENT)
Facility: MEDICAL CENTER | Age: 61
End: 2021-11-22
Attending: NURSE PRACTITIONER
Payer: COMMERCIAL

## 2021-11-22 DIAGNOSIS — K30 UPSET STOMACH: ICD-10-CM

## 2021-11-22 DIAGNOSIS — R68.83 CHILLS: ICD-10-CM

## 2021-11-22 DIAGNOSIS — R11.0 NAUSEA: ICD-10-CM

## 2021-11-22 LAB — COVID ORDER STATUS COVID19: NORMAL

## 2021-11-22 PROCEDURE — U0003 INFECTIOUS AGENT DETECTION BY NUCLEIC ACID (DNA OR RNA); SEVERE ACUTE RESPIRATORY SYNDROME CORONAVIRUS 2 (SARS-COV-2) (CORONAVIRUS DISEASE [COVID-19]), AMPLIFIED PROBE TECHNIQUE, MAKING USE OF HIGH THROUGHPUT TECHNOLOGIES AS DESCRIBED BY CMS-2020-01-R: HCPCS

## 2021-11-22 PROCEDURE — U0005 INFEC AGEN DETEC AMPLI PROBE: HCPCS

## 2021-11-23 LAB
SARS-COV-2 RNA RESP QL NAA+PROBE: NOTDETECTED
SPECIMEN SOURCE: NORMAL

## 2022-01-03 ENCOUNTER — HOSPITAL ENCOUNTER (OUTPATIENT)
Facility: MEDICAL CENTER | Age: 62
End: 2022-01-03
Attending: NURSE PRACTITIONER
Payer: COMMERCIAL

## 2022-01-03 ENCOUNTER — OFFICE VISIT (OUTPATIENT)
Dept: URGENT CARE | Facility: PHYSICIAN GROUP | Age: 62
End: 2022-01-03
Payer: COMMERCIAL

## 2022-01-03 VITALS
TEMPERATURE: 96.1 F | BODY MASS INDEX: 32.85 KG/M2 | HEART RATE: 93 BPM | OXYGEN SATURATION: 96 % | HEIGHT: 61 IN | DIASTOLIC BLOOD PRESSURE: 78 MMHG | WEIGHT: 174 LBS | RESPIRATION RATE: 18 BRPM | SYSTOLIC BLOOD PRESSURE: 130 MMHG

## 2022-01-03 DIAGNOSIS — R05.9 COUGH: ICD-10-CM

## 2022-01-03 DIAGNOSIS — R06.2 WHEEZING: ICD-10-CM

## 2022-01-03 DIAGNOSIS — J06.9 VIRAL UPPER RESPIRATORY TRACT INFECTION: Primary | ICD-10-CM

## 2022-01-03 DIAGNOSIS — Z76.0 MEDICATION REFILL: ICD-10-CM

## 2022-01-03 PROCEDURE — 99214 OFFICE O/P EST MOD 30 MIN: CPT | Performed by: NURSE PRACTITIONER

## 2022-01-03 PROCEDURE — U0003 INFECTIOUS AGENT DETECTION BY NUCLEIC ACID (DNA OR RNA); SEVERE ACUTE RESPIRATORY SYNDROME CORONAVIRUS 2 (SARS-COV-2) (CORONAVIRUS DISEASE [COVID-19]), AMPLIFIED PROBE TECHNIQUE, MAKING USE OF HIGH THROUGHPUT TECHNOLOGIES AS DESCRIBED BY CMS-2020-01-R: HCPCS

## 2022-01-03 PROCEDURE — U0005 INFEC AGEN DETEC AMPLI PROBE: HCPCS

## 2022-01-03 RX ORDER — IPRATROPIUM BROMIDE AND ALBUTEROL SULFATE 2.5; .5 MG/3ML; MG/3ML
3 SOLUTION RESPIRATORY (INHALATION) 4 TIMES DAILY PRN
Qty: 30 EACH | Refills: 0 | Status: SHIPPED | OUTPATIENT
Start: 2022-01-03 | End: 2022-01-21

## 2022-01-03 ASSESSMENT — ENCOUNTER SYMPTOMS
SINUS PAIN: 1
DIZZINESS: 0
SORE THROAT: 1
NAUSEA: 0
FEVER: 0
HEADACHES: 0
CHILLS: 1
COUGH: 1

## 2022-01-03 ASSESSMENT — FIBROSIS 4 INDEX: FIB4 SCORE: 0.67

## 2022-01-03 NOTE — PROGRESS NOTES
"Harmony Monroe is a 61 y.o. female who presents for Cough (sore throat, sinus problem)      HPI this new problem.  Harmony is a 61-year-old female presents with cough, sore throat and sinus discomfort.  She had subjective chills and fever a few days ago.  Her cough is different from her \"smoker's cough\".  She does not feel well.  She believes her symptoms started about 4 days ago with a slow onset.  She is here today for Covid test.  She tried to call employee health/screening line at [employer] to obtain a Covid test but no one has called her back so she presented to urgent care for testing.  She has been able to manage her symptoms well with over-the-counter medicines, rest and fluids.  Requesting refill on DuoNeb inh - she only has a few remaining and no refills at pharmacy. Has been giving herself more treatments in the past few days with good relief of sx. Last tx yesterday .     Review of Systems   Constitutional: Positive for chills and malaise/fatigue. Negative for fever.   HENT: Positive for sinus pain and sore throat. Negative for ear pain.    Respiratory: Positive for cough.    Cardiovascular: Negative for chest pain.   Gastrointestinal: Negative for nausea.   Neurological: Negative for dizziness and headaches.   Endo/Heme/Allergies: Negative for environmental allergies.       Allergies:       Allergies   Allergen Reactions   • Bee Anaphylaxis   • Levaquin Anaphylaxis and Unspecified     Myalgias arthralgias    • Tape Hives     Redness; itching \"paper tape ok\"   • Moxifloxacin Hcl In Nacl Swelling   • Polysporin [Bacitracin-Polymyxin B]      rash   • Promethazine      Twitching feeling       PMSFS Hx:  Past Medical History:   Diagnosis Date   • Allergy    • Arthritis     osteoarthritis; right knee   • ASTHMA     1/29/2018 not an issue, currently no medications needed   • Basal cell carcinoma of left medial cheek 11/30/2015   • Basal cell carcinoma of left medial cheek 11/30/2015   • Depression 1/7/2010 "   • High cholesterol    • History of tobacco use disorder 2010   • Hypertension    • Pneumonia ,   • Post-menopausal 2014     Past Surgical History:   Procedure Laterality Date   • KNEE ARTHROSCOPY Left 2018    Procedure: KNEE ARTHROSCOPY;  Surgeon: Andrews Castro M.D.;  Location: [facility];  Service: Orthopedics   • MENISCECTOMY, KNEE, MEDIAL Left 2018    Procedure: MEDIAL MENISCECTOMY - PARTIAL;  Surgeon: Andrews Castro M.D.;  Location: [facility];  Service: Orthopedics   • KNEE ARTHROSCOPY Right 3/23/2017    Procedure: KNEE ARTHROSCOPY;  Surgeon: Andrews Castro M.D.;  Location: [facility];  Service:    • MENISCECTOMY, KNEE, MEDIAL  3/23/2017    Procedure: MEDIAL and lateral  MENISCECTOMY - PARTIAL;  Surgeon: Andrews Castro M.D.;  Location: [facility];  Service:    • CHONDROPLASTY  3/23/2017    Procedure: CHONDROPLASTY -  PATELLAR;  Surgeon: Andrews Castro M.D.;  Location: [facility];  Service:    • ABDOMINAL HYSTERECTOMY TOTAL  2005   • TONSILLECTOMY AND ADENOIDECTOMY     • NASAL SEPTAL RECONSTRUCTION     • CYST EXCISION      Anterior Left Foot     Family History   Problem Relation Age of Onset   • Hyperlipidemia Mother    • Arthritis Mother    • Heart Disease Father    • Diabetes Sister    • Cancer Neg Hx    • Hypertension Neg Hx    • Stroke Neg Hx      Social History     Tobacco Use   • Smoking status: Current Every Day Smoker     Packs/day: 0.50     Years: 19.00     Pack years: 9.50     Types: Cigarettes     Start date: 1997     Last attempt to quit: 2/15/2020     Years since quittin.8   • Smokeless tobacco: Never Used   • Tobacco comment: 1 pack / week   Substance Use Topics   • Alcohol use: No     Alcohol/week: 0.0 oz       Problems:   Patient Active Problem List   Diagnosis   • Asthma in adult   • Primary insomnia   • History of tobacco use disorder   • Bee sting allergy   •  Postmenopausal HRT (hormone replacement therapy)   • Essential hypertension   • Actinic keratosis   • Obesity (BMI 30.0-34.9)   • Dyspnea   • Trigger finger, left middle finger   • Joint stiffness   • Bilateral hand swelling   • Fatigue   • Hyperlipidemia   • Vitamin D deficiency   • Snoring   • Elevated hemoglobin (HCC)   • BCC (basal cell carcinoma), face       Medications:   Current Outpatient Medications on File Prior to Visit   Medication Sig Dispense Refill   • fluconazole (DIFLUCAN) 100 MG Tab fluconazole 100 mg tablet   TAKE 1 TABLET BY MOUTH EVERY DAY FOR 7 DAYS     • ondansetron (ZOFRAN) 4 MG Tab tablet ondansetron HCl 4 mg tablet   TAKE 1 TABLET BY MOUTH EVERY FOUR HOURS AS NEEDED FOR NAUSEA/VOMITING FOR UP TO 30 DAYS.     • simvastatin (ZOCOR) 40 MG Tab TAKE 1 TABLET BY MOUTH EVERY DAY IN THE EVENING (DUE FOR LABS) 90 Tablet 3   • hydroCHLOROthiazide (HYDRODIURIL) 25 MG Tab TAKE 1 TABLET BY MOUTH EVERY DAY 90 tablet 3   • verapamil ER (CALAN SR) 120 MG CAPSULE SR 24 HR TAKE 1 CAPSULE BY MOUTH EVERY DAY 90 capsule 3   • escitalopram (LEXAPRO) 10 MG Tab TAKE 1 TABLET BY MOUTH EVERY DAY 90 tablet 1   • albuterol (VENTOLIN OR PROVENTIL) 108 (90 BASE) MCG/ACT Aero Soln albuterol sulfate HFA 90 mcg/actuation aerosol inhaler   INHALE 2 PUFFS BY MOUTH EVERY 6 HOURS AS NEEDED FOR SHORTNESS OF BREATH     • fluticasone-salmeterol (ADVAIR) 100-50 MCG/DOSE AEROSOL POWDER, BREATH ACTIVATED Inhale 1 Puff every 12 hours. 60 Each 11   • albuterol 108 (90 Base) MCG/ACT Aero Soln inhalation aerosol Inhale 2 Puffs every 6 hours as needed for Shortness of Breath. 3 Each 3   • ipratropium-albuterol (DUONEB) 0.5-2.5 (3) MG/3ML nebulizer solution 3 mL by Nebulization route 4 times a day. 30 Bullet 5   • estradiol (ESTRACE) 1 MG TABS Take 1 mg by mouth every day. From gyn       No current facility-administered medications on file prior to visit.          Objective:     /78   Pulse 93   Temp (!) 35.6 °C (96.1 °F)  "(Temporal)   Resp 18   Ht 1.549 m (5' 1\")   Wt 78.9 kg (174 lb)   SpO2 96%   BMI 32.88 kg/m²     Physical Exam  Vitals and nursing note reviewed.   Constitutional:       General: She is not in acute distress.     Appearance: Normal appearance. She is well-developed. She is not ill-appearing or toxic-appearing.   HENT:      Right Ear: Hearing normal.      Left Ear: Hearing normal.      Mouth/Throat:      Lips: Pink.      Pharynx: Uvula midline.      Tonsils: No tonsillar abscesses.   Neck:      Trachea: Trachea normal.   Cardiovascular:      Rate and Rhythm: Normal rate and regular rhythm.      Chest Wall: PMI is not displaced.      Pulses: Normal pulses.      Heart sounds: Normal heart sounds.   Pulmonary:      Effort: Pulmonary effort is normal. No accessory muscle usage or respiratory distress.      Breath sounds: Normal breath sounds and air entry. No decreased breath sounds, wheezing, rhonchi or rales.   Chest:   Breasts:      Right: No supraclavicular adenopathy.      Left: No supraclavicular adenopathy.       Abdominal:      Palpations: Abdomen is soft.   Musculoskeletal:      Cervical back: Full passive range of motion without pain, normal range of motion and neck supple.   Lymphadenopathy:      Cervical: No cervical adenopathy.      Upper Body:      Right upper body: No supraclavicular adenopathy.      Left upper body: No supraclavicular adenopathy.   Skin:     General: Skin is warm and dry.   Neurological:      Mental Status: She is alert.   Psychiatric:         Mood and Affect: Mood normal.         Speech: Speech normal.         Behavior: Behavior normal.         Thought Content: Thought content normal.         Assessment /Associated Orders:      1. Viral upper respiratory tract infection  SARS-CoV-2 PCR (24 hour In-House): Collect NP swab in VTM   2. Cough  ipratropium-albuterol (DUONEB) 0.5-2.5 (3) MG/3ML nebulizer solution    SARS-CoV-2 PCR (24 hour In-House): Collect NP swab in VTM   3. Wheezing  " ipratropium-albuterol (DUONEB) 0.5-2.5 (3) MG/3ML nebulizer solution    SARS-CoV-2 PCR (24 hour In-House): Collect NP swab in [facility]       Medical Decision Making:    Pt is clinically stable at today's acute urgent care visit.  No acute distress noted. Appropriate for outpatient management at this time.   Acute problem today with uncertain prognosis.     Educated in proper administration of medication(s) ordered today including safety, possible SE, risks, benefits, rationale and alternatives to therapy.     Keep well hydrated    covid pcr pending   Quarantine per cdc guidelines      Advised to follow-up with the primary care provider for recheck, reevaluation, and consideration of further management if necessary.   Discussed management options (risks,benefits, and alternatives to treatment). Expressed understanding and the treatment plan was agreed upon. Questions were encouraged and answered   Return to urgent care prn if new or worsening sx or if there is no improvement in condition prn.    Educated in Red flags and indications to immediately call 911 or present to the Emergency Department.     I personally reviewed prior external notes and test results pertinent to today's visit.  I have independently reviewed and interpreted all diagnostics ordered during this urgent care acute visit.   Time spent evaluating this patient was at least 30 minutes and includes preparing for visit, counseling/education, exam and evaluation, obtaining history, independent interpretation, ordering lab/test/procedures,medication management and documentation.Time does not include separately billable procedures noted .

## 2022-01-04 DIAGNOSIS — R05.9 COUGH: ICD-10-CM

## 2022-01-04 DIAGNOSIS — R06.2 WHEEZING: ICD-10-CM

## 2022-01-04 DIAGNOSIS — J06.9 VIRAL UPPER RESPIRATORY TRACT INFECTION: ICD-10-CM

## 2022-01-04 LAB
COVID ORDER STATUS COVID19: NORMAL
SARS-COV-2 RNA RESP QL NAA+PROBE: DETECTED
SPECIMEN SOURCE: ABNORMAL

## 2022-01-05 ENCOUNTER — PATIENT MESSAGE (OUTPATIENT)
Dept: MEDICAL GROUP | Facility: PHYSICIAN GROUP | Age: 62
End: 2022-01-05

## 2022-01-21 ENCOUNTER — OFFICE VISIT (OUTPATIENT)
Dept: MEDICAL GROUP | Facility: PHYSICIAN GROUP | Age: 62
End: 2022-01-21
Payer: COMMERCIAL

## 2022-01-21 VITALS
TEMPERATURE: 98.7 F | SYSTOLIC BLOOD PRESSURE: 126 MMHG | WEIGHT: 189 LBS | OXYGEN SATURATION: 96 % | HEART RATE: 94 BPM | DIASTOLIC BLOOD PRESSURE: 72 MMHG | HEIGHT: 62 IN | BODY MASS INDEX: 34.78 KG/M2

## 2022-01-21 DIAGNOSIS — Z13.1 ENCOUNTER FOR SCREENING FOR DIABETES MELLITUS: ICD-10-CM

## 2022-01-21 DIAGNOSIS — I10 ESSENTIAL HYPERTENSION: ICD-10-CM

## 2022-01-21 DIAGNOSIS — Z83.3 FAMILY HISTORY OF DIABETES MELLITUS: ICD-10-CM

## 2022-01-21 DIAGNOSIS — F51.01 PRIMARY INSOMNIA: ICD-10-CM

## 2022-01-21 DIAGNOSIS — E78.5 HYPERLIPIDEMIA, UNSPECIFIED HYPERLIPIDEMIA TYPE: ICD-10-CM

## 2022-01-21 PROCEDURE — 99214 OFFICE O/P EST MOD 30 MIN: CPT | Performed by: NURSE PRACTITIONER

## 2022-01-21 RX ORDER — VERAPAMIL HYDROCHLORIDE 120 MG/1
120 CAPSULE, EXTENDED RELEASE ORAL
Qty: 90 CAPSULE | Refills: 3 | Status: SHIPPED | OUTPATIENT
Start: 2022-01-21 | End: 2023-02-14

## 2022-01-21 RX ORDER — TEMAZEPAM 15 MG/1
CAPSULE ORAL
COMMUNITY
Start: 2021-12-30 | End: 2022-10-21

## 2022-01-21 RX ORDER — TEMAZEPAM 15 MG/1
15-30 CAPSULE ORAL NIGHTLY PRN
Qty: 100 CAPSULE | Refills: 0 | Status: SHIPPED | OUTPATIENT
Start: 2022-01-21 | End: 2022-04-20

## 2022-01-21 RX ORDER — SIMVASTATIN 40 MG
40 TABLET ORAL NIGHTLY
Qty: 90 TABLET | Refills: 3 | Status: SHIPPED | OUTPATIENT
Start: 2022-01-21 | End: 2023-07-17 | Stop reason: SDUPTHER

## 2022-01-21 ASSESSMENT — FIBROSIS 4 INDEX: FIB4 SCORE: 0.68

## 2022-01-21 ASSESSMENT — PATIENT HEALTH QUESTIONNAIRE - PHQ9: CLINICAL INTERPRETATION OF PHQ2 SCORE: 0

## 2022-01-21 NOTE — PROGRESS NOTES
CC: establish care                                                                                                                                 HPI:   Harmony presents today with the following.    Problem   Essential Hypertension   Primary Insomnia       Current Outpatient Medications   Medication Sig Dispense Refill   • temazepam (RESTORIL) 15 MG Cap      • verapamil ER (CALAN SR) 120 MG CAPSULE SR 24 HR Take 1 Capsule by mouth every day. 90 Capsule 3   • simvastatin (ZOCOR) 40 MG Tab Take 1 Tablet by mouth every evening. 90 Tablet 3   • temazepam (RESTORIL) 15 MG Cap Take 1-2 Capsules by mouth at bedtime as needed for Sleep for up to 90 days. 100 Capsule 0   • ondansetron (ZOFRAN) 4 MG Tab tablet ondansetron HCl 4 mg tablet   TAKE 1 TABLET BY MOUTH EVERY FOUR HOURS AS NEEDED FOR NAUSEA/VOMITING FOR UP TO 30 DAYS.     • hydroCHLOROthiazide (HYDRODIURIL) 25 MG Tab TAKE 1 TABLET BY MOUTH EVERY DAY 90 tablet 3   • escitalopram (LEXAPRO) 10 MG Tab TAKE 1 TABLET BY MOUTH EVERY DAY 90 tablet 1   • fluticasone-salmeterol (ADVAIR) 100-50 MCG/DOSE AEROSOL POWDER, BREATH ACTIVATED Inhale 1 Puff every 12 hours. 60 Each 11   • albuterol 108 (90 Base) MCG/ACT Aero Soln inhalation aerosol Inhale 2 Puffs every 6 hours as needed for Shortness of Breath. 3 Each 3   • ipratropium-albuterol (DUONEB) 0.5-2.5 (3) MG/3ML nebulizer solution 3 mL by Nebulization route 4 times a day. 30 Bullet 5   • estradiol (ESTRACE) 1 MG TABS Take 1 mg by mouth every day. From gyn       No current facility-administered medications for this visit.       Allergies as of 01/21/2022 - Reviewed 01/21/2022   Allergen Reaction Noted   • Bee Anaphylaxis 03/21/2017   • Levaquin Anaphylaxis and Unspecified 10/26/2014   • Tape Hives 09/01/2016   • Moxifloxacin hcl in nacl Swelling 02/17/2011   • Polysporin [bacitracin-polymyxin b]  12/31/2020   • Promethazine  09/01/2016        ROS:  All systems negative expect as addressed in assessment and plan.     BP  "126/72 (BP Location: Right arm, Patient Position: Sitting, BP Cuff Size: Large adult)   Pulse 94   Temp 37.1 °C (98.7 °F) (Temporal)   Ht 1.562 m (5' 1.5\")   Wt 85.7 kg (189 lb)   SpO2 96%   BMI 35.13 kg/m²     Physical Exam:  Gen:         Alert and oriented, No apparent distress.  Neck:        No Lymphadenopathy.   Lungs:     Clear to auscultation bilaterally. No wheezes, rales, or rhonchi.   CV:          Regular rate and rhythm. No murmurs, rubs or gallops.         Ext:          No clubbing, cyanosis, or peripheral edema.  Skin:  All visible skin intact without lesions.       Assessment and Plan:  62 y.o. female with the following issues.    1. Essential hypertension  verapamil ER (CALAN SR) 120 MG CAPSULE SR 24 HR   2. Hyperlipidemia, unspecified hyperlipidemia type  simvastatin (ZOCOR) 40 MG Tab   3. Primary insomnia  temazepam (RESTORIL) 15 MG Cap   4. Encounter for screening for diabetes mellitus  HEMOGLOBIN A1C   5. Family history of diabetes mellitus  HEMOGLOBIN A1C        Primary insomnia  This is a chronic issue. Patient has had difficulty sleeping since she was a child. She would only sleep 3-4 hours a night. Since she has been on temazepam she has been able to sleep well and feels rested.     Obtained and reviewed patient utilization report from [facility] pharmacy database on 1/21/2022 11:44 AM  prior to writing prescription for controlled substance II, III or IV per [place] bill . Based on assessment of the report,my physical exam if necessary and the patient's health problem, the prescription is medically necessary.     Will continue temazepam. She has recently had COVID and had to increase her dose to help her sleep. Will allow for early refill and provide with a few extra tablets.     Essential hypertension  This is a chronic issue. This is stable.    Continue medications.     Return for as needed or yearly.    I have placed the below orders and discussed them with an approved delegating " provider.  The MA is performing the below orders under the direction of Dr. Manriquez.    Please note that this dictation was created using voice recognition software. I have worked with consultants from the vendor as well as technical experts from [facility] to optimize the interface. I have made every reasonable attempt to correct obvious errors, but I expect that there are errors of grammar and possibly content that I did not discover before finalizing the note.

## 2022-01-21 NOTE — ASSESSMENT & PLAN NOTE
This is a chronic issue. Patient has had difficulty sleeping since she was a child. She would only sleep 3-4 hours a night. Since she has been on temazepam she has been able to sleep well and feels rested.     Obtained and reviewed patient utilization report from [facility] pharmacy database on 1/21/2022 11:44 AM  prior to writing prescription for controlled substance II, III or IV per [place] bill . Based on assessment of the report,my physical exam if necessary and the patient's health problem, the prescription is medically necessary.     Will continue temazepam. She has recently had COVID and had to increase her dose to help her sleep. Will allow for early refill and provide with a few extra tablets.

## 2022-02-03 ENCOUNTER — OFFICE VISIT (OUTPATIENT)
Dept: URGENT CARE | Facility: PHYSICIAN GROUP | Age: 62
End: 2022-02-03
Payer: COMMERCIAL

## 2022-02-03 VITALS
TEMPERATURE: 97.5 F | OXYGEN SATURATION: 97 % | HEART RATE: 84 BPM | DIASTOLIC BLOOD PRESSURE: 72 MMHG | RESPIRATION RATE: 14 BRPM | SYSTOLIC BLOOD PRESSURE: 114 MMHG

## 2022-02-03 DIAGNOSIS — M79.89 SOFT TISSUE MASS: ICD-10-CM

## 2022-02-03 PROCEDURE — 99213 OFFICE O/P EST LOW 20 MIN: CPT | Performed by: PHYSICIAN ASSISTANT

## 2022-02-03 RX ORDER — SULFAMETHOXAZOLE AND TRIMETHOPRIM 800; 160 MG/1; MG/1
1 TABLET ORAL 2 TIMES DAILY
Qty: 28 TABLET | Refills: 0 | Status: SHIPPED | OUTPATIENT
Start: 2022-02-03 | End: 2022-02-17

## 2022-02-03 ASSESSMENT — ENCOUNTER SYMPTOMS
MYALGIAS: 0
FEVER: 0

## 2022-02-04 NOTE — PROGRESS NOTES
"Subjective     Harmony Monroe is a 62 y.o. female who presents with Abscess (Possible abscess left abdominal area)            Pt is a 61 y/o female who presents to  with concern of left-sided abdominal mass.  Patient noticed this approximately 4 to 5 days ago.  Patient became more concerned as she does have prior history of same of which she needed approximately 3 weeks of antibiotic therapy and took several weeks to get an with dermatology of which at that time the lesion had somewhat resolved.  Patient reports other lesion was to the lower abdomen of which this does feel very similar in nature.  She does report \"soreness \"with deep palpation or if her pants rub on the area however noticed more the slight increased warmth upon touch originally.  Patient denies any drainage or significant growth. Denies ill symptoms of fevers, bodyaches.   Similar lesion investigated by dermatology was later diagnosed as infected sebaceous cyst.  No known history of MRSA.  Patient did do well on previous Bactrim along with needing outpatient ultrasound.        Other  This is a new problem. The current episode started in the past 7 days. The problem occurs constantly. The problem has been unchanged. Pertinent negatives include no fever or myalgias. Exacerbated by: Pressure over the area.  She has tried nothing for the symptoms.       Review of Systems   Constitutional: Negative for fever.   Gastrointestinal:        Left sided abd. Skin lesion.    Musculoskeletal: Negative for myalgias.   All other systems reviewed and are negative.             Objective     /72   Pulse 84   Temp 36.4 °C (97.5 °F) (Temporal)   Resp 14   SpO2 97%    PMH:  has a past medical history of Allergy, Arthritis, ASTHMA, Basal cell carcinoma of left medial cheek (11/30/2015), Basal cell carcinoma of left medial cheek (11/30/2015), Depression (1/7/2010), High cholesterol, History of tobacco use disorder (1/7/2010), Hypertension, Pneumonia " "(1990,2001), and Post-menopausal (8/26/2014).  MEDS: Reviewed .   ALLERGIES:   Allergies   Allergen Reactions   • Bee Anaphylaxis   • Levaquin Anaphylaxis and Unspecified     Myalgias arthralgias    • Tape Hives     Redness; itching \"paper tape ok\"   • Moxifloxacin Hcl In Nacl Swelling   • Polysporin [Bacitracin-Polymyxin B]      rash   • Promethazine      Twitching feeling     SURGHX:   Past Surgical History:   Procedure Laterality Date   • KNEE ARTHROSCOPY Left 2/5/2018    Procedure: KNEE ARTHROSCOPY;  Surgeon: Andrews Castro M.D.;  Location: [facility];  Service: Orthopedics   • MENISCECTOMY, KNEE, MEDIAL Left 2/5/2018    Procedure: MEDIAL MENISCECTOMY - PARTIAL;  Surgeon: Andrews Castro M.D.;  Location: [facility];  Service: Orthopedics   • KNEE ARTHROSCOPY Right 3/23/2017    Procedure: KNEE ARTHROSCOPY;  Surgeon: Andrews Castro M.D.;  Location: [facility];  Service:    • MENISCECTOMY, KNEE, MEDIAL  3/23/2017    Procedure: MEDIAL and lateral  MENISCECTOMY - PARTIAL;  Surgeon: Andrews Castro M.D.;  Location: [facility];  Service:    • CHONDROPLASTY  3/23/2017    Procedure: CHONDROPLASTY -  PATELLAR;  Surgeon: Andrews Castro M.D.;  Location: [facility];  Service:    • ABDOMINAL HYSTERECTOMY TOTAL  11/2005   • TONSILLECTOMY AND ADENOIDECTOMY  1980   • NASAL SEPTAL RECONSTRUCTION  1980   • CYST EXCISION  1974    Anterior Left Foot     SOCHX:  reports that she has been smoking cigarettes. She started smoking about 24 years ago. She has a 4.75 pack-year smoking history. She has never used smokeless tobacco. She reports that she does not drink alcohol and does not use drugs.  FH: Family history was reviewed, no pertinent findings to report    Physical Exam  Vitals reviewed.   Constitutional:       General: She is not in acute distress.     Appearance: She is well-developed.   HENT:      Head: Normocephalic and atraumatic.   Eyes:      " Conjunctiva/sclera: Conjunctivae normal.      Pupils: Pupils are equal, round, and reactive to light.   Neck:      Trachea: No tracheal deviation.   Cardiovascular:      Rate and Rhythm: Normal rate.   Pulmonary:      Effort: Pulmonary effort is normal.   Musculoskeletal:         General: Normal range of motion.      Cervical back: Normal range of motion and neck supple.   Skin:     General: Skin is warm.      Findings: No rash.      Comments: Left sided flank/lower abd- 2 cm superficial lesion without raising as the skin.  Discernible edges.  Mild increased warmth area is dry.  Lesion is papular-appears to be in the soft tissue. Mild tenderness.    Neurological:      Mental Status: She is alert and oriented to person, place, and time.      Coordination: Coordination normal.   Psychiatric:         Behavior: Behavior normal.         Thought Content: Thought content normal.         Judgment: Judgment normal.                             Assessment & Plan        1. Soft tissue mass  - US-ABDOMEN LTD (SOFT TISSUE); Future  - Referral to Dermatology  - sulfamethoxazole-trimethoprim (BACTRIM DS) 800-160 MG tablet; Take 1 Tablet by mouth 2 times a day for 14 days.  Dispense: 28 Tablet; Refill: 0          No evidence of abscess formation suspect cystic structure at this time.  Area is mildly tender with increased warmth-will start the patient on Bactrim if patient was on prolonged antibiotics previously we will start her on such for 2 weeks.  No fluctuance indicating current I&D however signs and symptoms were discussed with the patient.  Patient readily admits that dermatology was well prepared after ultrasound was ordered for confirmation.  Will conduct that now at this time and will make referral to dermatology as patient may need lesion fully excised.  Appropriate PPE worn at all times by provider.   Pt. Had face mask on throughout entirety of the visit other than oropharyngeal examination today.    DDX, Supportive care,  and indications for immediate follow-up discussed.    The patient and/or guardian demonstrated a good understanding and agreed with the treatment plan.    Please note that this dictation was created using voice recognition software. I have made every reasonable attempt to correct obvious errors, but I expect that there are errors of grammar and possibly content that I did not discover before finalizing the note.

## 2022-02-10 ENCOUNTER — APPOINTMENT (RX ONLY)
Dept: URBAN - METROPOLITAN AREA CLINIC 6 | Facility: CLINIC | Age: 62
Setting detail: DERMATOLOGY
End: 2022-02-10

## 2022-02-10 DIAGNOSIS — L72.8 OTHER FOLLICULAR CYSTS OF THE SKIN AND SUBCUTANEOUS TISSUE: ICD-10-CM | Status: INADEQUATELY CONTROLLED

## 2022-02-10 PROBLEM — D48.5 NEOPLASM OF UNCERTAIN BEHAVIOR OF SKIN: Status: ACTIVE | Noted: 2022-02-10

## 2022-02-10 PROCEDURE — ? PRESCRIPTION

## 2022-02-10 PROCEDURE — 99213 OFFICE O/P EST LOW 20 MIN: CPT

## 2022-02-10 PROCEDURE — ? COUNSELING

## 2022-02-10 PROCEDURE — ? DEFER

## 2022-02-10 RX ORDER — DOXYCYCLINE HYCLATE 100 MG/1
1 CAPSULE, GELATIN COATED ORAL BID
Qty: 60 | Refills: 0 | Status: ERX | COMMUNITY
Start: 2022-02-10

## 2022-02-10 RX ADMIN — DOXYCYCLINE HYCLATE 1: 100 CAPSULE, GELATIN COATED ORAL at 00:00

## 2022-02-10 ASSESSMENT — LOCATION SIMPLE DESCRIPTION DERM: LOCATION SIMPLE: ABDOMEN

## 2022-02-10 ASSESSMENT — LOCATION ZONE DERM: LOCATION ZONE: TRUNK

## 2022-02-10 ASSESSMENT — LOCATION DETAILED DESCRIPTION DERM: LOCATION DETAILED: LEFT LATERAL ABDOMEN

## 2022-02-10 NOTE — HPI: SKIN LESION
Is This A New Presentation, Or A Follow-Up?: Skin Lesion
What Type Of Note Output Would You Prefer (Optional)?: Standard Output
How Severe Is Your Skin Lesion?: mild
Has Your Skin Lesion Been Treated?: not been treated
Additional History: Patient was seen at urgent care one week ago and was put on Bactrim.

## 2022-02-10 NOTE — PROCEDURE: DEFER
Detail Level: Detailed
Introduction Text (Please End With A Colon): Surgery was deferred today as the lesion was inflamed and burst this morning.  prescribe doxy 100mg BId x10 days

## 2022-02-11 ENCOUNTER — APPOINTMENT (OUTPATIENT)
Dept: RADIOLOGY | Facility: MEDICAL CENTER | Age: 62
End: 2022-02-11
Attending: PHYSICIAN ASSISTANT
Payer: COMMERCIAL

## 2022-02-24 ENCOUNTER — OFFICE VISIT (OUTPATIENT)
Dept: URGENT CARE | Facility: PHYSICIAN GROUP | Age: 62
End: 2022-02-24
Payer: COMMERCIAL

## 2022-02-24 VITALS
HEIGHT: 62 IN | HEART RATE: 74 BPM | SYSTOLIC BLOOD PRESSURE: 102 MMHG | BODY MASS INDEX: 34.78 KG/M2 | TEMPERATURE: 98.1 F | WEIGHT: 189 LBS | DIASTOLIC BLOOD PRESSURE: 60 MMHG | RESPIRATION RATE: 14 BRPM | OXYGEN SATURATION: 94 %

## 2022-02-24 DIAGNOSIS — M79.89 SOFT TISSUE MASS: ICD-10-CM

## 2022-02-24 DIAGNOSIS — R11.0 NAUSEA: ICD-10-CM

## 2022-02-24 PROCEDURE — 99213 OFFICE O/P EST LOW 20 MIN: CPT | Performed by: NURSE PRACTITIONER

## 2022-02-24 RX ORDER — ONDANSETRON 4 MG/1
4 TABLET, FILM COATED ORAL EVERY 4 HOURS PRN
Qty: 20 TABLET | Refills: 0 | Status: SHIPPED | OUTPATIENT
Start: 2022-02-24 | End: 2022-05-25 | Stop reason: SDUPTHER

## 2022-02-24 RX ORDER — SULFAMETHOXAZOLE AND TRIMETHOPRIM 800; 160 MG/1; MG/1
1 TABLET ORAL 2 TIMES DAILY
Qty: 14 TABLET | Refills: 0 | Status: SHIPPED | OUTPATIENT
Start: 2022-02-24 | End: 2022-03-03

## 2022-02-24 ASSESSMENT — ENCOUNTER SYMPTOMS
NAUSEA: 1
PSYCHIATRIC NEGATIVE: 1
EYES NEGATIVE: 1
NEUROLOGICAL NEGATIVE: 1
CARDIOVASCULAR NEGATIVE: 1
ROS SKIN COMMENTS: AS PER HPI
CONSTITUTIONAL NEGATIVE: 1
RESPIRATORY NEGATIVE: 1
MUSCULOSKELETAL NEGATIVE: 1

## 2022-02-24 ASSESSMENT — FIBROSIS 4 INDEX: FIB4 SCORE: 0.68

## 2022-02-24 NOTE — PROGRESS NOTES
Subjective:   Harmony Monroe is a 62 y.o. female who presents for Abscess (Abscess x 6 weeks)       HPI  Pt presents for evaluation of a new problem, reports having abscess involving her left flank over the past 6 weeks.  Patient was seen previously in urgent care on 2/3, and prescribed Bactrim and referral to dermatology.  Patient was able to follow-up with dermatology and changed her antibiotic to doxycycline.  Unfortunately, patient that she has been nauseated with vomiting since taking doxycycline.  Both dermatology and patient have reached out to her insurance company to obtain approval for excision of mass, however has not had any return phone calls.  Patient would like to change her antibiotic as well as obtain some Zofran due to nausea.  Patient denies fever, chills, constitutional symptoms.    Review of Systems   Constitutional: Negative.    HENT: Negative.    Eyes: Negative.    Respiratory: Negative.    Cardiovascular: Negative.    Gastrointestinal: Positive for nausea.   Genitourinary: Negative.    Musculoskeletal: Negative.    Skin:        As per HPI   Neurological: Negative.    Psychiatric/Behavioral: Negative.    All other systems reviewed and are negative.      MEDS:   Current Outpatient Medications:   •  escitalopram (LEXAPRO) 10 MG Tab, Take 1 Tablet by mouth every day., Disp: 90 Tablet, Rfl: 1  •  temazepam (RESTORIL) 15 MG Cap, , Disp: , Rfl:   •  verapamil ER (CALAN SR) 120 MG CAPSULE SR 24 HR, Take 1 Capsule by mouth every day., Disp: 90 Capsule, Rfl: 3  •  simvastatin (ZOCOR) 40 MG Tab, Take 1 Tablet by mouth every evening., Disp: 90 Tablet, Rfl: 3  •  ondansetron (ZOFRAN) 4 MG Tab tablet, ondansetron HCl 4 mg tablet  TAKE 1 TABLET BY MOUTH EVERY FOUR HOURS AS NEEDED FOR NAUSEA/VOMITING FOR UP TO 30 DAYS., Disp: , Rfl:   •  hydroCHLOROthiazide (HYDRODIURIL) 25 MG Tab, TAKE 1 TABLET BY MOUTH EVERY DAY, Disp: 90 tablet, Rfl: 3  •  fluticasone-salmeterol (ADVAIR) 100-50 MCG/DOSE AEROSOL  "POWDER, BREATH ACTIVATED, Inhale 1 Puff every 12 hours., Disp: 60 Each, Rfl: 11  •  albuterol 108 (90 Base) MCG/ACT Aero Soln inhalation aerosol, Inhale 2 Puffs every 6 hours as needed for Shortness of Breath., Disp: 3 Each, Rfl: 3  •  ipratropium-albuterol (DUONEB) 0.5-2.5 (3) MG/3ML nebulizer solution, 3 mL by Nebulization route 4 times a day., Disp: 30 Bullet, Rfl: 5  •  estradiol (ESTRACE) 1 MG TABS, Take 1 mg by mouth every day. From gyn, Disp: , Rfl:   •  temazepam (RESTORIL) 15 MG Cap, Take 1-2 Capsules by mouth at bedtime as needed for Sleep for up to 90 days., Disp: 100 Capsule, Rfl: 0  ALLERGIES:   Allergies   Allergen Reactions   • Bee Anaphylaxis   • Levaquin Anaphylaxis and Unspecified     Myalgias arthralgias    • Tape Hives     Redness; itching \"paper tape ok\"   • Moxifloxacin Hcl In Nacl Swelling   • Polysporin [Bacitracin-Polymyxin B]      rash   • Promethazine      Twitching feeling       Patient's PMH, SocHx, SurgHx, FamHx, Drug allergies and medications were reviewed.     Objective:   /60   Pulse 74   Temp 36.7 °C (98.1 °F) (Temporal)   Resp 14   Ht 1.562 m (5' 1.5\")   Wt 85.7 kg (189 lb)   SpO2 94%   BMI 35.13 kg/m²     Physical Exam  Vitals and nursing note reviewed.   Constitutional:       General: She is awake.      Appearance: Normal appearance. She is well-developed.   HENT:      Head: Normocephalic and atraumatic.      Right Ear: External ear normal.      Left Ear: External ear normal.      Nose: Nose normal.      Mouth/Throat:      Mouth: Mucous membranes are moist.      Pharynx: Oropharynx is clear.   Eyes:      Extraocular Movements: Extraocular movements intact.      Conjunctiva/sclera: Conjunctivae normal.   Cardiovascular:      Rate and Rhythm: Normal rate and regular rhythm.   Pulmonary:      Effort: Pulmonary effort is normal.      Breath sounds: Normal breath sounds.   Abdominal:          Comments: 3cm x 3cm mildly erythematic, solid mass as diagrammed.  No active " bleeding, drainage, or direct fluctuance.   Musculoskeletal:         General: Normal range of motion.      Cervical back: Normal range of motion and neck supple.   Skin:     General: Skin is warm and dry.   Neurological:      Mental Status: She is alert and oriented to person, place, and time.   Psychiatric:         Mood and Affect: Mood normal.         Behavior: Behavior normal.         Thought Content: Thought content normal.         Assessment/Plan:   Assessment    1. Soft tissue mass  - sulfamethoxazole-trimethoprim (BACTRIM DS) 800-160 MG tablet; Take 1 Tablet by mouth 2 times a day for 7 days.  Dispense: 14 Tablet; Refill: 0    2. Nausea  - ondansetron (ZOFRAN) 4 MG Tab tablet; Take 1 Tablet by mouth every four hours as needed for Nausea/Vomiting.  Dispense: 20 Tablet; Refill: 0    Vital signs stable at today's acute urgent care visit.  Begin medications as listed. Discussed management options (risks, benefits, and alternatives to treatment).     She will follow up with dermatology as well as her insurance company for approval to have excision of this soft tissue mass.  Return to urgent care with any worsening symptoms or if there is no improvement in their current condition.  All questions were encouraged and answered to the patient's satisfaction and understanding, and they agree to the plan of care.     I personally reviewed prior external notes and test results pertinent to today's visit.  I have independently reviewed and interpreted all diagnostics ordered during this urgent care acute visit. Time spent evaluating this patient was a minimum of 30 minutes and includes preparing for visit, counseling/education, exam, evaluation, obtaining history, and ordering lab/test/procedures.      Please note that this dictation was created using voice recognition software. I have made a reasonable attempt to correct obvious errors, but I expect that there are errors of grammar and possibly content that I did not  discover before finalizing the note.

## 2022-03-18 ENCOUNTER — OFFICE VISIT (OUTPATIENT)
Dept: SPORTS MEDICINE | Facility: CLINIC | Age: 62
End: 2022-03-18
Payer: COMMERCIAL

## 2022-03-18 VITALS
DIASTOLIC BLOOD PRESSURE: 78 MMHG | RESPIRATION RATE: 18 BRPM | SYSTOLIC BLOOD PRESSURE: 122 MMHG | BODY MASS INDEX: 34.78 KG/M2 | HEART RATE: 95 BPM | TEMPERATURE: 98.3 F | OXYGEN SATURATION: 97 % | WEIGHT: 189 LBS | HEIGHT: 62 IN

## 2022-03-18 DIAGNOSIS — M65.351 TRIGGER FINGER, RIGHT LITTLE FINGER: ICD-10-CM

## 2022-03-18 DIAGNOSIS — M65.341 TRIGGER FINGER, RIGHT RING FINGER: ICD-10-CM

## 2022-03-18 PROCEDURE — 20550 NJX 1 TENDON SHEATH/LIGAMENT: CPT | Mod: 59,F9 | Performed by: FAMILY MEDICINE

## 2022-03-18 RX ORDER — TRIAMCINOLONE ACETONIDE 40 MG/ML
20 INJECTION, SUSPENSION INTRA-ARTICULAR; INTRAMUSCULAR ONCE
Status: COMPLETED | OUTPATIENT
Start: 2022-03-18 | End: 2022-03-18

## 2022-03-18 RX ADMIN — TRIAMCINOLONE ACETONIDE 20 MG: 40 INJECTION, SUSPENSION INTRA-ARTICULAR; INTRAMUSCULAR at 11:34

## 2022-03-18 RX ADMIN — TRIAMCINOLONE ACETONIDE 20 MG: 40 INJECTION, SUSPENSION INTRA-ARTICULAR; INTRAMUSCULAR at 11:33

## 2022-03-18 ASSESSMENT — ENCOUNTER SYMPTOMS
FEVER: 0
VOMITING: 0
NAUSEA: 0
DIZZINESS: 0
CHILLS: 0
SHORTNESS OF BREATH: 0

## 2022-03-18 ASSESSMENT — FIBROSIS 4 INDEX: FIB4 SCORE: 0.68

## 2022-03-18 NOTE — PROGRESS NOTES
Chief Complaint   Patient presents with   • Hand Pain     No referral, R hand pain 4th and 5th digits        Subjective     Self-referred for evaluation of RIGHT hand fourth and fifth finger triggering and pain  January 2021, 4th and 5th finger  Sept 2021 had an injection which helped   She underwent corticosteroid injection with Dr. Rios for both the fourth and fifth finger on the RIGHT hand   That helped until recently  No specific trauma, she was using her mouse and felt the snap/triggering which reexacerbated her symptoms  MORNING stiffness and triggering  No specific swelling  Not currently taking any medication for pain    Works as a radiology technician at [employer]    Review of Systems   Constitutional: Negative for chills and fever.   Respiratory: Negative for shortness of breath.    Cardiovascular: Negative for chest pain.   Gastrointestinal: Negative for nausea and vomiting.   Neurological: Negative for dizziness.     PMH:  has a past medical history of Allergy, Arthritis, ASTHMA, Basal cell carcinoma of left medial cheek (11/30/2015), Basal cell carcinoma of left medial cheek (11/30/2015), Depression (1/7/2010), High cholesterol, History of tobacco use disorder (1/7/2010), Hypertension, Pneumonia (1990,2001), and Post-menopausal (8/26/2014).  MEDS:   Current Outpatient Medications:   •  ondansetron (ZOFRAN) 4 MG Tab tablet, Take 1 Tablet by mouth every four hours as needed for Nausea/Vomiting., Disp: 20 Tablet, Rfl: 0  •  escitalopram (LEXAPRO) 10 MG Tab, Take 1 Tablet by mouth every day., Disp: 90 Tablet, Rfl: 1  •  temazepam (RESTORIL) 15 MG Cap, , Disp: , Rfl:   •  verapamil ER (CALAN SR) 120 MG CAPSULE SR 24 HR, Take 1 Capsule by mouth every day., Disp: 90 Capsule, Rfl: 3  •  simvastatin (ZOCOR) 40 MG Tab, Take 1 Tablet by mouth every evening., Disp: 90 Tablet, Rfl: 3  •  temazepam (RESTORIL) 15 MG Cap, Take 1-2 Capsules by mouth at bedtime as needed for Sleep for up to 90 days., Disp:  "100 Capsule, Rfl: 0  •  hydroCHLOROthiazide (HYDRODIURIL) 25 MG Tab, TAKE 1 TABLET BY MOUTH EVERY DAY, Disp: 90 tablet, Rfl: 3  •  fluticasone-salmeterol (ADVAIR) 100-50 MCG/DOSE AEROSOL POWDER, BREATH ACTIVATED, Inhale 1 Puff every 12 hours., Disp: 60 Each, Rfl: 11  •  albuterol 108 (90 Base) MCG/ACT Aero Soln inhalation aerosol, Inhale 2 Puffs every 6 hours as needed for Shortness of Breath., Disp: 3 Each, Rfl: 3  •  ipratropium-albuterol (DUONEB) 0.5-2.5 (3) MG/3ML nebulizer solution, 3 mL by Nebulization route 4 times a day., Disp: 30 Bullet, Rfl: 5  •  estradiol (ESTRACE) 1 MG TABS, Take 1 mg by mouth every day. From gyn, Disp: , Rfl:   ALLERGIES:   Allergies   Allergen Reactions   • Bee Anaphylaxis   • Levaquin Anaphylaxis and Unspecified     Myalgias arthralgias    • Tape Hives     Redness; itching \"paper tape ok\"   • Moxifloxacin Hcl In Nacl Swelling   • Polysporin [Bacitracin-Polymyxin B]      rash   • Promethazine      Twitching feeling     SURGHX:   Past Surgical History:   Procedure Laterality Date   • KNEE ARTHROSCOPY Left 2/5/2018    Procedure: KNEE ARTHROSCOPY;  Surgeon: Andrews Castro M.D.;  Location: [facility];  Service: Orthopedics   • MENISCECTOMY, KNEE, MEDIAL Left 2/5/2018    Procedure: MEDIAL MENISCECTOMY - PARTIAL;  Surgeon: Andrews Castro M.D.;  Location: [facility];  Service: Orthopedics   • KNEE ARTHROSCOPY Right 3/23/2017    Procedure: KNEE ARTHROSCOPY;  Surgeon: Andrews Castro M.D.;  Location: [facility];  Service:    • MENISCECTOMY, KNEE, MEDIAL  3/23/2017    Procedure: MEDIAL and lateral  MENISCECTOMY - PARTIAL;  Surgeon: Andrews Castro M.D.;  Location: [facility];  Service:    • CHONDROPLASTY  3/23/2017    Procedure: CHONDROPLASTY -  PATELLAR;  Surgeon: Andrews Castro M.D.;  Location: [facility];  Service:    • ABDOMINAL HYSTERECTOMY TOTAL  11/2005   • TONSILLECTOMY AND ADENOIDECTOMY  1980   • NASAL SEPTAL " "RECONSTRUCTION  1980   • CYST EXCISION  1974    Anterior Left Foot     SOCHX:  reports that she has been smoking cigarettes. She started smoking about 24 years ago. She has a 4.75 pack-year smoking history. She has never used smokeless tobacco. She reports that she does not drink alcohol and does not use drugs.  FH: Family history was reviewed, no pertinent findings to report    Objective   /78 (BP Location: Left arm, Patient Position: Sitting, BP Cuff Size: Large adult)   Pulse 95   Temp 36.8 °C (98.3 °F) (Temporal)   Resp 18   Ht 1.562 m (5' 1.5\")   Wt 85.7 kg (189 lb)   SpO2 97%   BMI 35.13 kg/m²     Hand exam    NAD  Alert and oriented    BILATERAL hand exam  NO swelling  NO deformity  Range of motion of all MCP, DIP and PIP joints NORMAL  POSITIVE triggering of the RIGHT fourth and fifth fingers with POSITIVE tenderness at the A1 pulley  Pinky opposition NORMAL  Grind test is NEGATIVE  Collateral ligament testing is NORMAL    1. Trigger finger, right ring finger  triamcinolone acetonide (KENALOG-40) injection 20 mg   2. Trigger finger, right little finger  triamcinolone acetonide (KENALOG-40) injection 20 mg     January 2021, 4th and 5th finger  Sept 2021 had an injection which helped   She underwent corticosteroid injection with Dr. Rios for both the fourth and fifth finger on the RIGHT hand   That helped until recently    RIGHT ring and little finger A1 pulley corticosteroid injections performed in the office TODAY (March 18, 2022)    Patient already had 1 injection by Dr. Rios.  This will be her second injection of the RIGHT fourth and fifth trigger fingers.  Advised that if symptoms recur she may need to see Dr. Rios again for consideration of trigger finger release    "

## 2022-03-18 NOTE — PROCEDURES
PROCEDURE NOTE:  right HAND corticosteroid injection  Risks and benefits discussed  Informed consent obtained  Hand prepped in sterile fashion utilizing  20 mg of Kenalog and 0.5 cc of 2% lidocaine injected into the ring finger A1 pulley  Vapocoolant spray was utilized  Patient tolerated the procedure well  Postprocedure care and red flags discussed          PROCEDURE NOTE:  right HAND corticosteroid injection  Risks and benefits discussed  Informed consent obtained  Hand prepped in sterile fashion utilizing  20 mg of Kenalog and 0.5 cc of 2% lidocaine injected into the little finger A1 pulley  Vapocoolant spray was utilized  Patient tolerated the procedure well  Postprocedure care and red flags discussed

## 2022-03-23 ENCOUNTER — APPOINTMENT (RX ONLY)
Dept: URBAN - METROPOLITAN AREA CLINIC 6 | Facility: CLINIC | Age: 62
Setting detail: DERMATOLOGY
End: 2022-03-23

## 2022-03-23 DIAGNOSIS — L72.8 OTHER FOLLICULAR CYSTS OF THE SKIN AND SUBCUTANEOUS TISSUE: ICD-10-CM

## 2022-03-23 PROBLEM — D48.5 NEOPLASM OF UNCERTAIN BEHAVIOR OF SKIN: Status: ACTIVE | Noted: 2022-03-23

## 2022-03-23 PROCEDURE — 11402 EXC TR-EXT B9+MARG 1.1-2 CM: CPT

## 2022-03-23 PROCEDURE — 12032 INTMD RPR S/A/T/EXT 2.6-7.5: CPT

## 2022-03-23 PROCEDURE — ? EXCISION

## 2022-03-23 ASSESSMENT — LOCATION ZONE DERM: LOCATION ZONE: TRUNK

## 2022-03-23 ASSESSMENT — LOCATION DETAILED DESCRIPTION DERM: LOCATION DETAILED: LEFT LATERAL ABDOMEN

## 2022-03-23 ASSESSMENT — LOCATION SIMPLE DESCRIPTION DERM: LOCATION SIMPLE: ABDOMEN

## 2022-03-23 NOTE — PROCEDURE: EXCISION
Medical Necessity Information: It is in your best interest to select a reason for this procedure from the list below. All of these items fulfill various CMS LCD requirements except lesion extends to a margin.
Include Z78.9 (Other Specified Conditions Influencing Health Status) As An Associated Diagnosis?: No
Medical Necessity Clause: This procedure was medically necessary because the lesion that was treated was:
Lab: [id number]
Lab Facility: 
Surgeon (Optional): Wilmer Cm MD
Size Of Lesion In Cm: 1
Size Of Margin In Cm: 0.2
Anesthesia Volume In Cc: 0
Excision Method: Slit
Saucerization Depth: dermis and superficial adipose tissue
Repair Type: Intermediate
Suturegard Retention Suture: 2-0 Nylon
Retention Suture Bite Size: 3 mm
Length To Time In Minutes Device Was In Place: 10
Intermediate / Complex Repair - Final Wound Length In Cm: 5
Undermining Type: Entire Wound
Debridement Text: The wound edges were debrided prior to proceeding with the closure to facilitate wound healing.
Helical Rim Text: The closure involved the helical rim.
Vermilion Border Text: The closure involved the vermilion border.
Nostril Rim Text: The closure involved the nostril rim.
Retention Suture Text: Retention sutures were placed to support the closure and prevent dehiscence.
Suture Removal: 14 days
Epidermal Closure Graft Donor Site (Optional): simple interrupted
Graft Donor Site Bandage (Optional-Leave Blank If You Don't Want In Note): Steri-strips and a pressure bandage were applied to the donor site.
Detail Level: Detailed
Excision Depth: adipose tissue
Scalpel Size: 15 blade
Anesthesia Type: 1% lidocaine with epinephrine and a 1:10 solution of 8.4% sodium bicarbonate
Additional Anesthesia Volume In Cc: 6
Hemostasis: Electrocautery
Estimated Blood Loss (Cc): minimal
Deep Sutures: 3-0 Polysorb
Epidermal Sutures: 3-0 Surgipro
Epidermal Closure: running and interrupted
Wound Care: Petrolatum
Dressing: pressure dressing
Suturegard Intro: Intraoperative tissue expansion was performed, utilizing the SUTUREGARD device, in order to reduce wound tension.
Suturegard Body: The suture ends were repeatedly re-tightened and re-clamped to achieve the desired tissue expansion.
Hemigard Intro: Due to skin fragility and wound tension, it was decided to use HEMIGARD adhesive retention suture devices to permit a linear closure. The skin was cleaned and dried for a 6cm distance away from the wound. Excessive hair, if present, was removed to allow for adhesion.
Hemigard Postcare Instructions: The HEMIGARD strips are to remain completely dry for at least 5-7 days.
Complex Repair Preamble Text (Leave Blank If You Do Not Want): Extensive wide undermining was performed.
Intermediate Repair Preamble Text (Leave Blank If You Do Not Want): Undermining was performed with blunt dissection.
Fusiform Excision Additional Text (Leave Blank If You Do Not Want): The margin was drawn around the clinically apparent lesion.  A fusiform shape was then drawn on the skin incorporating the lesion and margins.  Incisions were then made along these lines to the appropriate tissue plane and the lesion was extirpated.
Eliptical Excision Additional Text (Leave Blank If You Do Not Want): The margin was drawn around the clinically apparent lesion.  An elliptical shape was then drawn on the skin incorporating the lesion and margins.  Incisions were then made along these lines to the appropriate tissue plane and the lesion was extirpated.
Saucerization Excision Additional Text (Leave Blank If You Do Not Want): The margin was drawn around the clinically apparent lesion.  Incisions were then made along these lines, in a tangential fashion, to the appropriate tissue plane and the lesion was extirpated.
Slit Excision Additional Text (Leave Blank If You Do Not Want): A linear line was drawn on the skin overlying the lesion. An incision was made slowly until the lesion was visualized.  Once visualized, the lesion was removed with blunt dissection.
Excisional Biopsy Additional Text (Leave Blank If You Do Not Want): The margin was drawn around the clinically apparent lesion. An elliptical shape was then drawn on the skin incorporating the lesion and margins.  Incisions were then made along these lines to the appropriate tissue plane and the lesion was extirpated.
Perilesional Excision Additional Text (Leave Blank If You Do Not Want): The margin was drawn around the clinically apparent lesion. Incisions were then made along these lines to the appropriate tissue plane and the lesion was extirpated.
Repair Performed By Another Provider Text (Leave Blank If You Do Not Want): After the tissue was excised the defect was repaired by another provider.
No Repair - Repaired With Adjacent Surgical Defect Text (Leave Blank If You Do Not Want): After the excision the defect was repaired concurrently with another surgical defect which was in close approximation.
Adjacent Tissue Transfer Text: The defect edges were debeveled with a #15 scalpel blade.  Given the location of the defect and the proximity to free margins an adjacent tissue transfer was deemed most appropriate.  Using a sterile surgical marker, an appropriate flap was drawn incorporating the defect and placing the expected incisions within the relaxed skin tension lines where possible.    The area thus outlined was incised deep to adipose tissue with a #15 scalpel blade.  The skin margins were undermined to an appropriate distance in all directions utilizing iris scissors.
Advancement Flap (Single) Text: The defect edges were debeveled with a #15 scalpel blade.  Given the location of the defect and the proximity to free margins a single advancement flap was deemed most appropriate.  Using a sterile surgical marker, an appropriate advancement flap was drawn incorporating the defect and placing the expected incisions within the relaxed skin tension lines where possible.    The area thus outlined was incised deep to adipose tissue with a #15 scalpel blade.  The skin margins were undermined to an appropriate distance in all directions utilizing iris scissors.
Advancement Flap (Double) Text: The defect edges were debeveled with a #15 scalpel blade.  Given the location of the defect and the proximity to free margins a double advancement flap was deemed most appropriate.  Using a sterile surgical marker, the appropriate advancement flaps were drawn incorporating the defect and placing the expected incisions within the relaxed skin tension lines where possible.    The area thus outlined was incised deep to adipose tissue with a #15 scalpel blade.  The skin margins were undermined to an appropriate distance in all directions utilizing iris scissors.
Burow's Advancement Flap Text: The defect edges were debeveled with a #15 scalpel blade.  Given the location of the defect and the proximity to free margins a Burow's advancement flap was deemed most appropriate.  Using a sterile surgical marker, the appropriate advancement flap was drawn incorporating the defect and placing the expected incisions within the relaxed skin tension lines where possible.    The area thus outlined was incised deep to adipose tissue with a #15 scalpel blade.  The skin margins were undermined to an appropriate distance in all directions utilizing iris scissors.
Chonodrocutaneous Helical Advancement Flap Text: The defect edges were debeveled with a #15 scalpel blade.  Given the location of the defect and the proximity to free margins a chondrocutaneous helical advancement flap was deemed most appropriate.  Using a sterile surgical marker, the appropriate advancement flap was drawn incorporating the defect and placing the expected incisions within the relaxed skin tension lines where possible.    The area thus outlined was incised deep to adipose tissue with a #15 scalpel blade.  The skin margins were undermined to an appropriate distance in all directions utilizing iris scissors.
Crescentic Advancement Flap Text: The defect edges were debeveled with a #15 scalpel blade.  Given the location of the defect and the proximity to free margins a crescentic advancement flap was deemed most appropriate.  Using a sterile surgical marker, the appropriate advancement flap was drawn incorporating the defect and placing the expected incisions within the relaxed skin tension lines where possible.    The area thus outlined was incised deep to adipose tissue with a #15 scalpel blade.  The skin margins were undermined to an appropriate distance in all directions utilizing iris scissors.
A-T Advancement Flap Text: The defect edges were debeveled with a #15 scalpel blade.  Given the location of the defect, shape of the defect and the proximity to free margins an A-T advancement flap was deemed most appropriate.  Using a sterile surgical marker, an appropriate advancement flap was drawn incorporating the defect and placing the expected incisions within the relaxed skin tension lines where possible.    The area thus outlined was incised deep to adipose tissue with a #15 scalpel blade.  The skin margins were undermined to an appropriate distance in all directions utilizing iris scissors.
O-T Advancement Flap Text: The defect edges were debeveled with a #15 scalpel blade.  Given the location of the defect, shape of the defect and the proximity to free margins an O-T advancement flap was deemed most appropriate.  Using a sterile surgical marker, an appropriate advancement flap was drawn incorporating the defect and placing the expected incisions within the relaxed skin tension lines where possible.    The area thus outlined was incised deep to adipose tissue with a #15 scalpel blade.  The skin margins were undermined to an appropriate distance in all directions utilizing iris scissors.
O-L Flap Text: The defect edges were debeveled with a #15 scalpel blade.  Given the location of the defect, shape of the defect and the proximity to free margins an O-L flap was deemed most appropriate.  Using a sterile surgical marker, an appropriate advancement flap was drawn incorporating the defect and placing the expected incisions within the relaxed skin tension lines where possible.    The area thus outlined was incised deep to adipose tissue with a #15 scalpel blade.  The skin margins were undermined to an appropriate distance in all directions utilizing iris scissors.
O-Z Flap Text: The defect edges were debeveled with a #15 scalpel blade.  Given the location of the defect, shape of the defect and the proximity to free margins an O-Z flap was deemed most appropriate.  Using a sterile surgical marker, an appropriate transposition flap was drawn incorporating the defect and placing the expected incisions within the relaxed skin tension lines where possible. The area thus outlined was incised deep to adipose tissue with a #15 scalpel blade.  The skin margins were undermined to an appropriate distance in all directions utilizing iris scissors.
Double O-Z Flap Text: The defect edges were debeveled with a #15 scalpel blade.  Given the location of the defect, shape of the defect and the proximity to free margins a Double O-Z flap was deemed most appropriate.  Using a sterile surgical marker, an appropriate transposition flap was drawn incorporating the defect and placing the expected incisions within the relaxed skin tension lines where possible. The area thus outlined was incised deep to adipose tissue with a #15 scalpel blade.  The skin margins were undermined to an appropriate distance in all directions utilizing iris scissors.
V-Y Flap Text: The defect edges were debeveled with a #15 scalpel blade.  Given the location of the defect, shape of the defect and the proximity to free margins a V-Y flap was deemed most appropriate.  Using a sterile surgical marker, an appropriate advancement flap was drawn incorporating the defect and placing the expected incisions within the relaxed skin tension lines where possible.    The area thus outlined was incised deep to adipose tissue with a #15 scalpel blade.  The skin margins were undermined to an appropriate distance in all directions utilizing iris scissors.
Mercedes Flap Text: The defect edges were debeveled with a #15 scalpel blade.  Given the location of the defect, shape of the defect and the proximity to free margins a Mercedes flap was deemed most appropriate.  Using a sterile surgical marker, an appropriate advancement flap was drawn incorporating the defect and placing the expected incisions within the relaxed skin tension lines where possible. The area thus outlined was incised deep to adipose tissue with a #15 scalpel blade.  The skin margins were undermined to an appropriate distance in all directions utilizing iris scissors.
Modified Advancement Flap Text: The defect edges were debeveled with a #15 scalpel blade.  Given the location of the defect, shape of the defect and the proximity to free margins a modified advancement flap was deemed most appropriate.  Using a sterile surgical marker, an appropriate advancement flap was drawn incorporating the defect and placing the expected incisions within the relaxed skin tension lines where possible.    The area thus outlined was incised deep to adipose tissue with a #15 scalpel blade.  The skin margins were undermined to an appropriate distance in all directions utilizing iris scissors.
Mucosal Advancement Flap Text: Given the location of the defect, shape of the defect and the proximity to free margins a mucosal advancement flap was deemed most appropriate. Incisions were made with a 15 blade scalpel in the appropriate fashion along the cutaneous vermillion border and the mucosal lip. The remaining actinically damaged mucosal tissue was excised.  The mucosal advancement flap was then elevated to the gingival sulcus with care taken to preserve the neurovascular structures and advanced into the primary defect. Care was taken to ensure that precise realignment of the vermilion border was achieved.
Hatchet Flap Text: The defect edges were debeveled with a #15 scalpel blade.  Given the location of the defect, shape of the defect and the proximity to free margins a hatchet flap was deemed most appropriate.  Using a sterile surgical marker, an appropriate hatchet flap was drawn incorporating the defect and placing the expected incisions within the relaxed skin tension lines where possible.    The area thus outlined was incised deep to adipose tissue with a #15 scalpel blade.  The skin margins were undermined to an appropriate distance in all directions utilizing iris scissors.
Rotation Flap Text: The defect edges were debeveled with a #15 scalpel blade.  Given the location of the defect, shape of the defect and the proximity to free margins a rotation flap was deemed most appropriate.  Using a sterile surgical marker, an appropriate rotation flap was drawn incorporating the defect and placing the expected incisions within the relaxed skin tension lines where possible.    The area thus outlined was incised deep to adipose tissue with a #15 scalpel blade.  The skin margins were undermined to an appropriate distance in all directions utilizing iris scissors.
Spiral Flap Text: The defect edges were debeveled with a #15 scalpel blade.  Given the location of the defect, shape of the defect and the proximity to free margins a spiral flap was deemed most appropriate.  Using a sterile surgical marker, an appropriate rotation flap was drawn incorporating the defect and placing the expected incisions within the relaxed skin tension lines where possible. The area thus outlined was incised deep to adipose tissue with a #15 scalpel blade.  The skin margins were undermined to an appropriate distance in all directions utilizing iris scissors.
Staged Advancement Flap Text: The defect edges were debeveled with a #15 scalpel blade.  Given the location of the defect, shape of the defect and the proximity to free margins a staged advancement flap was deemed most appropriate.  Using a sterile surgical marker, an appropriate advancement flap was drawn incorporating the defect and placing the expected incisions within the relaxed skin tension lines where possible. The area thus outlined was incised deep to adipose tissue with a #15 scalpel blade.  The skin margins were undermined to an appropriate distance in all directions utilizing iris scissors.
Star Wedge Flap Text: The defect edges were debeveled with a #15 scalpel blade.  Given the location of the defect, shape of the defect and the proximity to free margins a star wedge flap was deemed most appropriate.  Using a sterile surgical marker, an appropriate rotation flap was drawn incorporating the defect and placing the expected incisions within the relaxed skin tension lines where possible. The area thus outlined was incised deep to adipose tissue with a #15 scalpel blade.  The skin margins were undermined to an appropriate distance in all directions utilizing iris scissors.
Transposition Flap Text: The defect edges were debeveled with a #15 scalpel blade.  Given the location of the defect and the proximity to free margins a transposition flap was deemed most appropriate.  Using a sterile surgical marker, an appropriate transposition flap was drawn incorporating the defect.    The area thus outlined was incised deep to adipose tissue with a #15 scalpel blade.  The skin margins were undermined to an appropriate distance in all directions utilizing iris scissors.
Muscle Hinge Flap Text: The defect edges were debeveled with a #15 scalpel blade.  Given the size, depth and location of the defect and the proximity to free margins a muscle hinge flap was deemed most appropriate.  Using a sterile surgical marker, an appropriate hinge flap was drawn incorporating the defect. The area thus outlined was incised with a #15 scalpel blade.  The skin margins were undermined to an appropriate distance in all directions utilizing iris scissors.
Mustarde Flap Text: The defect edges were debeveled with a #15 scalpel blade.  Given the size, depth and location of the defect and the proximity to free margins a Mustarde flap was deemed most appropriate.  Using a sterile surgical marker, an appropriate flap was drawn incorporating the defect. The area thus outlined was incised with a #15 scalpel blade.  The skin margins were undermined to an appropriate distance in all directions utilizing iris scissors.
Nasal Turnover Hinge Flap Text: The defect edges were debeveled with a #15 scalpel blade.  Given the size, depth, location of the defect and the defect being full thickness a nasal turnover hinge flap was deemed most appropriate.  Using a sterile surgical marker, an appropriate hinge flap was drawn incorporating the defect. The area thus outlined was incised with a #15 scalpel blade. The flap was designed to recreate the nasal mucosal lining and the alar rim. The skin margins were undermined to an appropriate distance in all directions utilizing iris scissors.
Nasalis-Muscle-Based Myocutaneous Island Pedicle Flap Text: Using a #15 blade, an incision was made around the donor flap to the level of the nasalis muscle. Wide lateral undermining was then performed in both the subcutaneous plane above the nasalis muscle, and in a submuscular plane just above periosteum. This allowed the formation of a free nasalis muscle axial pedicle (based on the angular artery) which was still attached to the actual cutaneous flap, increasing its mobility and vascular viability. Hemostasis was obtained with pinpoint electrocoagulation. The flap was mobilized into position and the pivotal anchor points positioned and stabilized with buried interrupted sutures. Subcutaneous and dermal tissues were closed in a multilayered fashion with sutures. Tissue redundancies were excised, and the epidermal edges were apposed without significant tension and sutured with sutures.
Orbicularis Oris Muscle Flap Text: The defect edges were debeveled with a #15 scalpel blade.  Given that the defect affected the competency of the oral sphincter an orbicularis oris muscle flap was deemed most appropriate to restore this competency and normal muscle function.  Using a sterile surgical marker, an appropriate flap was drawn incorporating the defect. The area thus outlined was incised with a #15 scalpel blade.
Melolabial Transposition Flap Text: The defect edges were debeveled with a #15 scalpel blade.  Given the location of the defect and the proximity to free margins a melolabial flap was deemed most appropriate.  Using a sterile surgical marker, an appropriate melolabial transposition flap was drawn incorporating the defect.    The area thus outlined was incised deep to adipose tissue with a #15 scalpel blade.  The skin margins were undermined to an appropriate distance in all directions utilizing iris scissors.
Rhombic Flap Text: The defect edges were debeveled with a #15 scalpel blade.  Given the location of the defect and the proximity to free margins a rhombic flap was deemed most appropriate.  Using a sterile surgical marker, an appropriate rhombic flap was drawn incorporating the defect.    The area thus outlined was incised deep to adipose tissue with a #15 scalpel blade.  The skin margins were undermined to an appropriate distance in all directions utilizing iris scissors.
Rhomboid Transposition Flap Text: The defect edges were debeveled with a #15 scalpel blade.  Given the location of the defect and the proximity to free margins a rhomboid transposition flap was deemed most appropriate.  Using a sterile surgical marker, an appropriate rhomboid flap was drawn incorporating the defect.    The area thus outlined was incised deep to adipose tissue with a #15 scalpel blade.  The skin margins were undermined to an appropriate distance in all directions utilizing iris scissors.
Bi-Rhombic Flap Text: The defect edges were debeveled with a #15 scalpel blade.  Given the location of the defect and the proximity to free margins a bi-rhombic flap was deemed most appropriate.  Using a sterile surgical marker, an appropriate rhombic flap was drawn incorporating the defect. The area thus outlined was incised deep to adipose tissue with a #15 scalpel blade.  The skin margins were undermined to an appropriate distance in all directions utilizing iris scissors.
Helical Rim Advancement Flap Text: The defect edges were debeveled with a #15 blade scalpel.  Given the location of the defect and the proximity to free margins (helical rim) a double helical rim advancement flap was deemed most appropriate.  Using a sterile surgical marker, the appropriate advancement flaps were drawn incorporating the defect and placing the expected incisions between the helical rim and antihelix where possible.  The area thus outlined was incised through and through with a #15 scalpel blade.  With a skin hook and iris scissors, the flaps were gently and sharply undermined and freed up.
Bilateral Helical Rim Advancement Flap Text: The defect edges were debeveled with a #15 blade scalpel.  Given the location of the defect and the proximity to free margins (helical rim) a bilateral helical rim advancement flap was deemed most appropriate.  Using a sterile surgical marker, the appropriate advancement flaps were drawn incorporating the defect and placing the expected incisions between the helical rim and antihelix where possible.  The area thus outlined was incised through and through with a #15 scalpel blade.  With a skin hook and iris scissors, the flaps were gently and sharply undermined and freed up.
Ear Star Wedge Flap Text: The defect edges were debeveled with a #15 blade scalpel.  Given the location of the defect and the proximity to free margins (helical rim) an ear star wedge flap was deemed most appropriate.  Using a sterile surgical marker, the appropriate flap was drawn incorporating the defect and placing the expected incisions between the helical rim and antihelix where possible.  The area thus outlined was incised through and through with a #15 scalpel blade.
Banner Transposition Flap Text: The defect edges were debeveled with a #15 scalpel blade.  Given the location of the defect and the proximity to free margins a Banner transposition flap was deemed most appropriate.  Using a sterile surgical marker, an appropriate flap drawn around the defect. The area thus outlined was incised deep to adipose tissue with a #15 scalpel blade.  The skin margins were undermined to an appropriate distance in all directions utilizing iris scissors.
Bilobed Flap Text: The defect edges were debeveled with a #15 scalpel blade.  Given the location of the defect and the proximity to free margins a bilobe flap was deemed most appropriate.  Using a sterile surgical marker, an appropriate bilobe flap drawn around the defect.    The area thus outlined was incised deep to adipose tissue with a #15 scalpel blade.  The skin margins were undermined to an appropriate distance in all directions utilizing iris scissors.
Bilobed Transposition Flap Text: The defect edges were debeveled with a #15 scalpel blade.  Given the location of the defect and the proximity to free margins a bilobed transposition flap was deemed most appropriate.  Using a sterile surgical marker, an appropriate bilobe flap drawn around the defect.    The area thus outlined was incised deep to adipose tissue with a #15 scalpel blade.  The skin margins were undermined to an appropriate distance in all directions utilizing iris scissors.
Trilobed Flap Text: The defect edges were debeveled with a #15 scalpel blade.  Given the location of the defect and the proximity to free margins a trilobed flap was deemed most appropriate.  Using a sterile surgical marker, an appropriate trilobed flap drawn around the defect.    The area thus outlined was incised deep to adipose tissue with a #15 scalpel blade.  The skin margins were undermined to an appropriate distance in all directions utilizing iris scissors.
Dorsal Nasal Flap Text: The defect edges were debeveled with a #15 scalpel blade.  Given the location of the defect and the proximity to free margins a dorsal nasal flap was deemed most appropriate.  Using a sterile surgical marker, an appropriate dorsal nasal flap was drawn around the defect.    The area thus outlined was incised deep to adipose tissue with a #15 scalpel blade.  The skin margins were undermined to an appropriate distance in all directions utilizing iris scissors.
Island Pedicle Flap Text: The defect edges were debeveled with a #15 scalpel blade.  Given the location of the defect, shape of the defect and the proximity to free margins an island pedicle advancement flap was deemed most appropriate.  Using a sterile surgical marker, an appropriate advancement flap was drawn incorporating the defect, outlining the appropriate donor tissue and placing the expected incisions within the relaxed skin tension lines where possible.    The area thus outlined was incised deep to adipose tissue with a #15 scalpel blade.  The skin margins were undermined to an appropriate distance in all directions around the primary defect and laterally outward around the island pedicle utilizing iris scissors.  There was minimal undermining beneath the pedicle flap.
Island Pedicle Flap With Canthal Suspension Text: The defect edges were debeveled with a #15 scalpel blade.  Given the location of the defect, shape of the defect and the proximity to free margins an island pedicle advancement flap was deemed most appropriate.  Using a sterile surgical marker, an appropriate advancement flap was drawn incorporating the defect, outlining the appropriate donor tissue and placing the expected incisions within the relaxed skin tension lines where possible. The area thus outlined was incised deep to adipose tissue with a #15 scalpel blade.  The skin margins were undermined to an appropriate distance in all directions around the primary defect and laterally outward around the island pedicle utilizing iris scissors.  There was minimal undermining beneath the pedicle flap. A suspension suture was placed in the canthal tendon to prevent tension and prevent ectropion.
Alar Island Pedicle Flap Text: The defect edges were debeveled with a #15 scalpel blade.  Given the location of the defect, shape of the defect and the proximity to the alar rim an island pedicle advancement flap was deemed most appropriate.  Using a sterile surgical marker, an appropriate advancement flap was drawn incorporating the defect, outlining the appropriate donor tissue and placing the expected incisions within the nasal ala running parallel to the alar rim. The area thus outlined was incised with a #15 scalpel blade.  The skin margins were undermined minimally to an appropriate distance in all directions around the primary defect and laterally outward around the island pedicle utilizing iris scissors.  There was minimal undermining beneath the pedicle flap.
Double Island Pedicle Flap Text: The defect edges were debeveled with a #15 scalpel blade.  Given the location of the defect, shape of the defect and the proximity to free margins a double island pedicle advancement flap was deemed most appropriate.  Using a sterile surgical marker, an appropriate advancement flap was drawn incorporating the defect, outlining the appropriate donor tissue and placing the expected incisions within the relaxed skin tension lines where possible.    The area thus outlined was incised deep to adipose tissue with a #15 scalpel blade.  The skin margins were undermined to an appropriate distance in all directions around the primary defect and laterally outward around the island pedicle utilizing iris scissors.  There was minimal undermining beneath the pedicle flap.
Island Pedicle Flap-Requiring Vessel Identification Text: The defect edges were debeveled with a #15 scalpel blade.  Given the location of the defect, shape of the defect and the proximity to free margins an island pedicle advancement flap was deemed most appropriate.  Using a sterile surgical marker, an appropriate advancement flap was drawn, based on the axial vessel mentioned above, incorporating the defect, outlining the appropriate donor tissue and placing the expected incisions within the relaxed skin tension lines where possible.    The area thus outlined was incised deep to adipose tissue with a #15 scalpel blade.  The skin margins were undermined to an appropriate distance in all directions around the primary defect and laterally outward around the island pedicle utilizing iris scissors.  There was minimal undermining beneath the pedicle flap.
Keystone Flap Text: The defect edges were debeveled with a #15 scalpel blade.  Given the location of the defect, shape of the defect a keystone flap was deemed most appropriate.  Using a sterile surgical marker, an appropriate keystone flap was drawn incorporating the defect, outlining the appropriate donor tissue and placing the expected incisions within the relaxed skin tension lines where possible. The area thus outlined was incised deep to adipose tissue with a #15 scalpel blade.  The skin margins were undermined to an appropriate distance in all directions around the primary defect and laterally outward around the flap utilizing iris scissors.
O-T Plasty Text: The defect edges were debeveled with a #15 scalpel blade.  Given the location of the defect, shape of the defect and the proximity to free margins an O-T plasty was deemed most appropriate.  Using a sterile surgical marker, an appropriate O-T plasty was drawn incorporating the defect and placing the expected incisions within the relaxed skin tension lines where possible.    The area thus outlined was incised deep to adipose tissue with a #15 scalpel blade.  The skin margins were undermined to an appropriate distance in all directions utilizing iris scissors.
O-Z Plasty Text: The defect edges were debeveled with a #15 scalpel blade.  Given the location of the defect, shape of the defect and the proximity to free margins an O-Z plasty (double transposition flap) was deemed most appropriate.  Using a sterile surgical marker, the appropriate transposition flaps were drawn incorporating the defect and placing the expected incisions within the relaxed skin tension lines where possible.    The area thus outlined was incised deep to adipose tissue with a #15 scalpel blade.  The skin margins were undermined to an appropriate distance in all directions utilizing iris scissors.  Hemostasis was achieved with electrocautery.  The flaps were then transposed into place, one clockwise and the other counterclockwise, and anchored with interrupted buried subcutaneous sutures.
Double O-Z Plasty Text: The defect edges were debeveled with a #15 scalpel blade.  Given the location of the defect, shape of the defect and the proximity to free margins a Double O-Z plasty (double transposition flap) was deemed most appropriate.  Using a sterile surgical marker, the appropriate transposition flaps were drawn incorporating the defect and placing the expected incisions within the relaxed skin tension lines where possible. The area thus outlined was incised deep to adipose tissue with a #15 scalpel blade.  The skin margins were undermined to an appropriate distance in all directions utilizing iris scissors.  Hemostasis was achieved with electrocautery.  The flaps were then transposed into place, one clockwise and the other counterclockwise, and anchored with interrupted buried subcutaneous sutures.
V-Y Plasty Text: The defect edges were debeveled with a #15 scalpel blade.  Given the location of the defect, shape of the defect and the proximity to free margins an V-Y advancement flap was deemed most appropriate.  Using a sterile surgical marker, an appropriate advancement flap was drawn incorporating the defect and placing the expected incisions within the relaxed skin tension lines where possible.    The area thus outlined was incised deep to adipose tissue with a #15 scalpel blade.  The skin margins were undermined to an appropriate distance in all directions utilizing iris scissors.
H Plasty Text: Given the location of the defect, shape of the defect and the proximity to free margins a H-plasty was deemed most appropriate for repair.  Using a sterile surgical marker, the appropriate advancement arms of the H-plasty were drawn incorporating the defect and placing the expected incisions within the relaxed skin tension lines where possible. The area thus outlined was incised deep to adipose tissue with a #15 scalpel blade. The skin margins were undermined to an appropriate distance in all directions utilizing iris scissors.  The opposing advancement arms were then advanced into place in opposite direction and anchored with interrupted buried subcutaneous sutures.
W Plasty Text: The lesion was extirpated to the level of the fat with a #15 scalpel blade.  Given the location of the defect, shape of the defect and the proximity to free margins a W-plasty was deemed most appropriate for repair.  Using a sterile surgical marker, the appropriate transposition arms of the W-plasty were drawn incorporating the defect and placing the expected incisions within the relaxed skin tension lines where possible.    The area thus outlined was incised deep to adipose tissue with a #15 scalpel blade.  The skin margins were undermined to an appropriate distance in all directions utilizing iris scissors.  The opposing transposition arms were then transposed into place in opposite direction and anchored with interrupted buried subcutaneous sutures.
Z Plasty Text: The lesion was extirpated to the level of the fat with a #15 scalpel blade.  Given the location of the defect, shape of the defect and the proximity to free margins a Z-plasty was deemed most appropriate for repair.  Using a sterile surgical marker, the appropriate transposition arms of the Z-plasty were drawn incorporating the defect and placing the expected incisions within the relaxed skin tension lines where possible.    The area thus outlined was incised deep to adipose tissue with a #15 scalpel blade.  The skin margins were undermined to an appropriate distance in all directions utilizing iris scissors.  The opposing transposition arms were then transposed into place in opposite direction and anchored with interrupted buried subcutaneous sutures.
Zygomaticofacial Flap Text: Given the location of the defect, shape of the defect and the proximity to free margins a zygomaticofacial flap was deemed most appropriate for repair.  Using a sterile surgical marker, the appropriate flap was drawn incorporating the defect and placing the expected incisions within the relaxed skin tension lines where possible. The area thus outlined was incised deep to adipose tissue with a #15 scalpel blade with preservation of a vascular pedicle.  The skin margins were undermined to an appropriate distance in all directions utilizing iris scissors.  The flap was then placed into the defect and anchored with interrupted buried subcutaneous sutures.
Cheek Interpolation Flap Text: A decision was made to reconstruct the defect utilizing an interpolation axial flap and a staged reconstruction.  A telfa template was made of the defect.  This telfa template was then used to outline the Cheek Interpolation flap.  The donor area for the pedicle flap was then injected with anesthesia.  The flap was excised through the skin and subcutaneous tissue down to the layer of the underlying musculature.  The interpolation flap was carefully excised within this deep plane to maintain its blood supply.  The edges of the donor site were undermined.   The donor site was closed in a primary fashion.  The pedicle was then rotated into position and sutured.  Once the tube was sutured into place, adequate blood supply was confirmed with blanching and refill.  The pedicle was then wrapped with xeroform gauze and dressed appropriately with a telfa and gauze bandage to ensure continued blood supply and protect the attached pedicle.
Cheek-To-Nose Interpolation Flap Text: A decision was made to reconstruct the defect utilizing an interpolation axial flap and a staged reconstruction.  A telfa template was made of the defect.  This telfa template was then used to outline the Cheek-To-Nose Interpolation flap.  The donor area for the pedicle flap was then injected with anesthesia.  The flap was excised through the skin and subcutaneous tissue down to the layer of the underlying musculature.  The interpolation flap was carefully excised within this deep plane to maintain its blood supply.  The edges of the donor site were undermined.   The donor site was closed in a primary fashion.  The pedicle was then rotated into position and sutured.  Once the tube was sutured into place, adequate blood supply was confirmed with blanching and refill.  The pedicle was then wrapped with xeroform gauze and dressed appropriately with a telfa and gauze bandage to ensure continued blood supply and protect the attached pedicle.
Interpolation Flap Text: A decision was made to reconstruct the defect utilizing an interpolation axial flap and a staged reconstruction.  A telfa template was made of the defect.  This telfa template was then used to outline the interpolation flap.  The donor area for the pedicle flap was then injected with anesthesia.  The flap was excised through the skin and subcutaneous tissue down to the layer of the underlying musculature.  The interpolation flap was carefully excised within this deep plane to maintain its blood supply.  The edges of the donor site were undermined.   The donor site was closed in a primary fashion.  The pedicle was then rotated into position and sutured.  Once the tube was sutured into place, adequate blood supply was confirmed with blanching and refill.  The pedicle was then wrapped with xeroform gauze and dressed appropriately with a telfa and gauze bandage to ensure continued blood supply and protect the attached pedicle.
Melolabial Interpolation Flap Text: A decision was made to reconstruct the defect utilizing an interpolation axial flap and a staged reconstruction.  A telfa template was made of the defect.  This telfa template was then used to outline the melolabial interpolation flap.  The donor area for the pedicle flap was then injected with anesthesia.  The flap was excised through the skin and subcutaneous tissue down to the layer of the underlying musculature.  The pedicle flap was carefully excised within this deep plane to maintain its blood supply.  The edges of the donor site were undermined.   The donor site was closed in a primary fashion.  The pedicle was then rotated into position and sutured.  Once the tube was sutured into place, adequate blood supply was confirmed with blanching and refill.  The pedicle was then wrapped with xeroform gauze and dressed appropriately with a telfa and gauze bandage to ensure continued blood supply and protect the attached pedicle.
Mastoid Interpolation Flap Text: A decision was made to reconstruct the defect utilizing an interpolation axial flap and a staged reconstruction.  A telfa template was made of the defect.  This telfa template was then used to outline the mastoid interpolation flap.  The donor area for the pedicle flap was then injected with anesthesia.  The flap was excised through the skin and subcutaneous tissue down to the layer of the underlying musculature.  The pedicle flap was carefully excised within this deep plane to maintain its blood supply.  The edges of the donor site were undermined.   The donor site was closed in a primary fashion.  The pedicle was then rotated into position and sutured.  Once the tube was sutured into place, adequate blood supply was confirmed with blanching and refill.  The pedicle was then wrapped with xeroform gauze and dressed appropriately with a telfa and gauze bandage to ensure continued blood supply and protect the attached pedicle.
Posterior Auricular Interpolation Flap Text: A decision was made to reconstruct the defect utilizing an interpolation axial flap and a staged reconstruction.  A telfa template was made of the defect.  This telfa template was then used to outline the posterior auricular interpolation flap.  The donor area for the pedicle flap was then injected with anesthesia.  The flap was excised through the skin and subcutaneous tissue down to the layer of the underlying musculature.  The pedicle flap was carefully excised within this deep plane to maintain its blood supply.  The edges of the donor site were undermined.   The donor site was closed in a primary fashion.  The pedicle was then rotated into position and sutured.  Once the tube was sutured into place, adequate blood supply was confirmed with blanching and refill.  The pedicle was then wrapped with xeroform gauze and dressed appropriately with a telfa and gauze bandage to ensure continued blood supply and protect the attached pedicle.
Paramedian Forehead Flap Text: A decision was made to reconstruct the defect utilizing an interpolation axial flap and a staged reconstruction.  A telfa template was made of the defect.  This telfa template was then used to outline the paramedian forehead pedicle flap.  The donor area for the pedicle flap was then injected with anesthesia.  The flap was excised through the skin and subcutaneous tissue down to the layer of the underlying musculature.  The pedicle flap was carefully excised within this deep plane to maintain its blood supply.  The edges of the donor site were undermined.   The donor site was closed in a primary fashion.  The pedicle was then rotated into position and sutured.  Once the tube was sutured into place, adequate blood supply was confirmed with blanching and refill.  The pedicle was then wrapped with xeroform gauze and dressed appropriately with a telfa and gauze bandage to ensure continued blood supply and protect the attached pedicle.
Lip Wedge Excision Repair Text: Given the location of the defect and the proximity to free margins a full thickness wedge repair was deemed most appropriate.  Using a sterile surgical marker, the appropriate repair was drawn incorporating the defect and placing the expected incisions perpendicular to the vermilion border.  The vermilion border was also meticulously outlined to ensure appropriate reapproximation during the repair.  The area thus outlined was incised through and through with a #15 scalpel blade.  The muscularis and dermis were reaproximated with deep sutures following hemostasis. Care was taken to realign the vermilion border before proceeding with the superficial closure.  Once the vermilion was realigned the superfical and mucosal closure was finished.
Ftsg Text: The defect edges were debeveled with a #15 scalpel blade.  Given the location of the defect, shape of the defect and the proximity to free margins a full thickness skin graft was deemed most appropriate.  Using a sterile surgical marker, the primary defect shape was transferred to the donor site. The area thus outlined was incised deep to adipose tissue with a #15 scalpel blade.  The harvested graft was then trimmed of adipose tissue until only dermis and epidermis was left.  The skin margins of the secondary defect were undermined to an appropriate distance in all directions utilizing iris scissors.  The secondary defect was closed with interrupted buried subcutaneous sutures.  The skin edges were then re-apposed with running  sutures.  The skin graft was then placed in the primary defect and oriented appropriately.
Split-Thickness Skin Graft Text: The defect edges were debeveled with a #15 scalpel blade.  Given the location of the defect, shape of the defect and the proximity to free margins a split thickness skin graft was deemed most appropriate.  Using a sterile surgical marker, the primary defect shape was transferred to the donor site. The split thickness graft was then harvested.  The skin graft was then placed in the primary defect and oriented appropriately.
Burow's Graft Text: The defect edges were debeveled with a #15 scalpel blade.  Given the location of the defect, shape of the defect, the proximity to free margins and the presence of a standing cone deformity a Burow's skin graft was deemed most appropriate. The standing cone was removed and this tissue was then trimmed to the shape of the primary defect. The adipose tissue was also removed until only dermis and epidermis were left.  The skin margins of the secondary defect were undermined to an appropriate distance in all directions utilizing iris scissors.  The secondary defect was closed with interrupted buried subcutaneous sutures.  The skin edges were then re-apposed with running  sutures.  The skin graft was then placed in the primary defect and oriented appropriately.
Cartilage Graft Text: The defect edges were debeveled with a #15 scalpel blade.  Given the location of the defect, shape of the defect, the fact the defect involved a full thickness cartilage defect a cartilage graft was deemed most appropriate.  An appropriate donor site was identified, cleansed, and anesthetized. The cartilage graft was then harvested and transferred to the recipient site, oriented appropriately and then sutured into place.  The secondary defect was then repaired using a primary closure.
Composite Graft Text: The defect edges were debeveled with a #15 scalpel blade.  Given the location of the defect, shape of the defect, the proximity to free margins and the fact the defect was full thickness a composite graft was deemed most appropriate.  The defect was outline and then transferred to the donor site.  A full thickness graft was then excised from the donor site. The graft was then placed in the primary defect, oriented appropriately and then sutured into place.  The secondary defect was then repaired using a primary closure.
Epidermal Autograft Text: The defect edges were debeveled with a #15 scalpel blade.  Given the location of the defect, shape of the defect and the proximity to free margins an epidermal autograft was deemed most appropriate.  Using a sterile surgical marker, the primary defect shape was transferred to the donor site. The epidermal graft was then harvested.  The skin graft was then placed in the primary defect and oriented appropriately.
Dermal Autograft Text: The defect edges were debeveled with a #15 scalpel blade.  Given the location of the defect, shape of the defect and the proximity to free margins a dermal autograft was deemed most appropriate.  Using a sterile surgical marker, the primary defect shape was transferred to the donor site. The area thus outlined was incised deep to adipose tissue with a #15 scalpel blade.  The harvested graft was then trimmed of adipose and epidermal tissue until only dermis was left.  The skin graft was then placed in the primary defect and oriented appropriately.
Skin Substitute Text: The defect edges were debeveled with a #15 scalpel blade.  Given the location of the defect, shape of the defect and the proximity to free margins a skin substitute graft was deemed most appropriate.  The graft material was trimmed to fit the size of the defect. The graft was then placed in the primary defect and oriented appropriately.
Tissue Cultured Epidermal Autograft Text: The defect edges were debeveled with a #15 scalpel blade.  Given the location of the defect, shape of the defect and the proximity to free margins a tissue cultured epidermal autograft was deemed most appropriate.  The graft was then trimmed to fit the size of the defect.  The graft was then placed in the primary defect and oriented appropriately.
Xenograft Text: The defect edges were debeveled with a #15 scalpel blade.  Given the location of the defect, shape of the defect and the proximity to free margins a xenograft was deemed most appropriate.  The graft was then trimmed to fit the size of the defect.  The graft was then placed in the primary defect and oriented appropriately.
Purse String (Intermediate) Text: Given the location of the defect and the characteristics of the surrounding skin a purse string intermediate closure was deemed most appropriate.  Undermining was performed circumferentially around the surgical defect.  A purse string suture was then placed and tightened.
Purse String (Simple) Text: Given the location of the defect and the characteristics of the surrounding skin a purse string simple closure was deemed most appropriate.  Undermining was performed circumferentially around the surgical defect.  A purse string suture was then placed and tightened.
Complex Repair And Single Advancement Flap Text: The defect edges were debeveled with a #15 scalpel blade.  The primary defect was closed partially with a complex linear closure.  Given the location of the remaining defect, shape of the defect and the proximity to free margins a single advancement flap was deemed most appropriate for complete closure of the defect.  Using a sterile surgical marker, an appropriate advancement flap was drawn incorporating the defect and placing the expected incisions within the relaxed skin tension lines where possible.    The area thus outlined was incised deep to adipose tissue with a #15 scalpel blade.  The skin margins were undermined to an appropriate distance in all directions utilizing iris scissors.
Complex Repair And Double Advancement Flap Text: The defect edges were debeveled with a #15 scalpel blade.  The primary defect was closed partially with a complex linear closure.  Given the location of the remaining defect, shape of the defect and the proximity to free margins a double advancement flap was deemed most appropriate for complete closure of the defect.  Using a sterile surgical marker, an appropriate advancement flap was drawn incorporating the defect and placing the expected incisions within the relaxed skin tension lines where possible.    The area thus outlined was incised deep to adipose tissue with a #15 scalpel blade.  The skin margins were undermined to an appropriate distance in all directions utilizing iris scissors.
Complex Repair And Modified Advancement Flap Text: The defect edges were debeveled with a #15 scalpel blade.  The primary defect was closed partially with a complex linear closure.  Given the location of the remaining defect, shape of the defect and the proximity to free margins a modified advancement flap was deemed most appropriate for complete closure of the defect.  Using a sterile surgical marker, an appropriate advancement flap was drawn incorporating the defect and placing the expected incisions within the relaxed skin tension lines where possible.    The area thus outlined was incised deep to adipose tissue with a #15 scalpel blade.  The skin margins were undermined to an appropriate distance in all directions utilizing iris scissors.
Complex Repair And A-T Advancement Flap Text: The defect edges were debeveled with a #15 scalpel blade.  The primary defect was closed partially with a complex linear closure.  Given the location of the remaining defect, shape of the defect and the proximity to free margins an A-T advancement flap was deemed most appropriate for complete closure of the defect.  Using a sterile surgical marker, an appropriate advancement flap was drawn incorporating the defect and placing the expected incisions within the relaxed skin tension lines where possible.    The area thus outlined was incised deep to adipose tissue with a #15 scalpel blade.  The skin margins were undermined to an appropriate distance in all directions utilizing iris scissors.
Complex Repair And O-T Advancement Flap Text: The defect edges were debeveled with a #15 scalpel blade.  The primary defect was closed partially with a complex linear closure.  Given the location of the remaining defect, shape of the defect and the proximity to free margins an O-T advancement flap was deemed most appropriate for complete closure of the defect.  Using a sterile surgical marker, an appropriate advancement flap was drawn incorporating the defect and placing the expected incisions within the relaxed skin tension lines where possible.    The area thus outlined was incised deep to adipose tissue with a #15 scalpel blade.  The skin margins were undermined to an appropriate distance in all directions utilizing iris scissors.
Complex Repair And O-L Flap Text: The defect edges were debeveled with a #15 scalpel blade.  The primary defect was closed partially with a complex linear closure.  Given the location of the remaining defect, shape of the defect and the proximity to free margins an O-L flap was deemed most appropriate for complete closure of the defect.  Using a sterile surgical marker, an appropriate flap was drawn incorporating the defect and placing the expected incisions within the relaxed skin tension lines where possible.    The area thus outlined was incised deep to adipose tissue with a #15 scalpel blade.  The skin margins were undermined to an appropriate distance in all directions utilizing iris scissors.
Complex Repair And Bilobe Flap Text: The defect edges were debeveled with a #15 scalpel blade.  The primary defect was closed partially with a complex linear closure.  Given the location of the remaining defect, shape of the defect and the proximity to free margins a bilobe flap was deemed most appropriate for complete closure of the defect.  Using a sterile surgical marker, an appropriate advancement flap was drawn incorporating the defect and placing the expected incisions within the relaxed skin tension lines where possible.    The area thus outlined was incised deep to adipose tissue with a #15 scalpel blade.  The skin margins were undermined to an appropriate distance in all directions utilizing iris scissors.
Complex Repair And Melolabial Flap Text: The defect edges were debeveled with a #15 scalpel blade.  The primary defect was closed partially with a complex linear closure.  Given the location of the remaining defect, shape of the defect and the proximity to free margins a melolabial flap was deemed most appropriate for complete closure of the defect.  Using a sterile surgical marker, an appropriate advancement flap was drawn incorporating the defect and placing the expected incisions within the relaxed skin tension lines where possible.    The area thus outlined was incised deep to adipose tissue with a #15 scalpel blade.  The skin margins were undermined to an appropriate distance in all directions utilizing iris scissors.
Complex Repair And Rotation Flap Text: The defect edges were debeveled with a #15 scalpel blade.  The primary defect was closed partially with a complex linear closure.  Given the location of the remaining defect, shape of the defect and the proximity to free margins a rotation flap was deemed most appropriate for complete closure of the defect.  Using a sterile surgical marker, an appropriate advancement flap was drawn incorporating the defect and placing the expected incisions within the relaxed skin tension lines where possible.    The area thus outlined was incised deep to adipose tissue with a #15 scalpel blade.  The skin margins were undermined to an appropriate distance in all directions utilizing iris scissors.
Complex Repair And Rhombic Flap Text: The defect edges were debeveled with a #15 scalpel blade.  The primary defect was closed partially with a complex linear closure.  Given the location of the remaining defect, shape of the defect and the proximity to free margins a rhombic flap was deemed most appropriate for complete closure of the defect.  Using a sterile surgical marker, an appropriate advancement flap was drawn incorporating the defect and placing the expected incisions within the relaxed skin tension lines where possible.    The area thus outlined was incised deep to adipose tissue with a #15 scalpel blade.  The skin margins were undermined to an appropriate distance in all directions utilizing iris scissors.
Complex Repair And Transposition Flap Text: The defect edges were debeveled with a #15 scalpel blade.  The primary defect was closed partially with a complex linear closure.  Given the location of the remaining defect, shape of the defect and the proximity to free margins a transposition flap was deemed most appropriate for complete closure of the defect.  Using a sterile surgical marker, an appropriate advancement flap was drawn incorporating the defect and placing the expected incisions within the relaxed skin tension lines where possible.    The area thus outlined was incised deep to adipose tissue with a #15 scalpel blade.  The skin margins were undermined to an appropriate distance in all directions utilizing iris scissors.
Complex Repair And V-Y Plasty Text: The defect edges were debeveled with a #15 scalpel blade.  The primary defect was closed partially with a complex linear closure.  Given the location of the remaining defect, shape of the defect and the proximity to free margins a V-Y plasty was deemed most appropriate for complete closure of the defect.  Using a sterile surgical marker, an appropriate advancement flap was drawn incorporating the defect and placing the expected incisions within the relaxed skin tension lines where possible.    The area thus outlined was incised deep to adipose tissue with a #15 scalpel blade.  The skin margins were undermined to an appropriate distance in all directions utilizing iris scissors.
Complex Repair And M Plasty Text: The defect edges were debeveled with a #15 scalpel blade.  The primary defect was closed partially with a complex linear closure.  Given the location of the remaining defect, shape of the defect and the proximity to free margins an M plasty was deemed most appropriate for complete closure of the defect.  Using a sterile surgical marker, an appropriate advancement flap was drawn incorporating the defect and placing the expected incisions within the relaxed skin tension lines where possible.    The area thus outlined was incised deep to adipose tissue with a #15 scalpel blade.  The skin margins were undermined to an appropriate distance in all directions utilizing iris scissors.
Complex Repair And Double M Plasty Text: The defect edges were debeveled with a #15 scalpel blade.  The primary defect was closed partially with a complex linear closure.  Given the location of the remaining defect, shape of the defect and the proximity to free margins a double M plasty was deemed most appropriate for complete closure of the defect.  Using a sterile surgical marker, an appropriate advancement flap was drawn incorporating the defect and placing the expected incisions within the relaxed skin tension lines where possible.    The area thus outlined was incised deep to adipose tissue with a #15 scalpel blade.  The skin margins were undermined to an appropriate distance in all directions utilizing iris scissors.
Complex Repair And W Plasty Text: The defect edges were debeveled with a #15 scalpel blade.  The primary defect was closed partially with a complex linear closure.  Given the location of the remaining defect, shape of the defect and the proximity to free margins a W plasty was deemed most appropriate for complete closure of the defect.  Using a sterile surgical marker, an appropriate advancement flap was drawn incorporating the defect and placing the expected incisions within the relaxed skin tension lines where possible.    The area thus outlined was incised deep to adipose tissue with a #15 scalpel blade.  The skin margins were undermined to an appropriate distance in all directions utilizing iris scissors.
Complex Repair And Z Plasty Text: The defect edges were debeveled with a #15 scalpel blade.  The primary defect was closed partially with a complex linear closure.  Given the location of the remaining defect, shape of the defect and the proximity to free margins a Z plasty was deemed most appropriate for complete closure of the defect.  Using a sterile surgical marker, an appropriate advancement flap was drawn incorporating the defect and placing the expected incisions within the relaxed skin tension lines where possible.    The area thus outlined was incised deep to adipose tissue with a #15 scalpel blade.  The skin margins were undermined to an appropriate distance in all directions utilizing iris scissors.
Complex Repair And Dorsal Nasal Flap Text: The defect edges were debeveled with a #15 scalpel blade.  The primary defect was closed partially with a complex linear closure.  Given the location of the remaining defect, shape of the defect and the proximity to free margins a dorsal nasal flap was deemed most appropriate for complete closure of the defect.  Using a sterile surgical marker, an appropriate flap was drawn incorporating the defect and placing the expected incisions within the relaxed skin tension lines where possible.    The area thus outlined was incised deep to adipose tissue with a #15 scalpel blade.  The skin margins were undermined to an appropriate distance in all directions utilizing iris scissors.
Complex Repair And Ftsg Text: The defect edges were debeveled with a #15 scalpel blade.  The primary defect was closed partially with a complex linear closure.  Given the location of the defect, shape of the defect and the proximity to free margins a full thickness skin graft was deemed most appropriate to repair the remaining defect.  The graft was trimmed to fit the size of the remaining defect.  The graft was then placed in the primary defect, oriented appropriately, and sutured into place.
Complex Repair And Burow's Graft Text: The defect edges were debeveled with a #15 scalpel blade.  The primary defect was closed partially with a complex linear closure.  Given the location of the defect, shape of the defect, the proximity to free margins and the presence of a standing cone deformity a Burow's graft was deemed most appropriate to repair the remaining defect.  The graft was trimmed to fit the size of the remaining defect.  The graft was then placed in the primary defect, oriented appropriately, and sutured into place.
Complex Repair And Split-Thickness Skin Graft Text: The defect edges were debeveled with a #15 scalpel blade.  The primary defect was closed partially with a complex linear closure.  Given the location of the defect, shape of the defect and the proximity to free margins a split thickness skin graft was deemed most appropriate to repair the remaining defect.  The graft was trimmed to fit the size of the remaining defect.  The graft was then placed in the primary defect, oriented appropriately, and sutured into place.
Complex Repair And Epidermal Autograft Text: The defect edges were debeveled with a #15 scalpel blade.  The primary defect was closed partially with a complex linear closure.  Given the location of the defect, shape of the defect and the proximity to free margins an epidermal autograft was deemed most appropriate to repair the remaining defect.  The graft was trimmed to fit the size of the remaining defect.  The graft was then placed in the primary defect, oriented appropriately, and sutured into place.
Complex Repair And Dermal Autograft Text: The defect edges were debeveled with a #15 scalpel blade.  The primary defect was closed partially with a complex linear closure.  Given the location of the defect, shape of the defect and the proximity to free margins an dermal autograft was deemed most appropriate to repair the remaining defect.  The graft was trimmed to fit the size of the remaining defect.  The graft was then placed in the primary defect, oriented appropriately, and sutured into place.
Complex Repair And Tissue Cultured Epidermal Autograft Text: The defect edges were debeveled with a #15 scalpel blade.  The primary defect was closed partially with a complex linear closure.  Given the location of the defect, shape of the defect and the proximity to free margins an tissue cultured epidermal autograft was deemed most appropriate to repair the remaining defect.  The graft was trimmed to fit the size of the remaining defect.  The graft was then placed in the primary defect, oriented appropriately, and sutured into place.
Complex Repair And Xenograft Text: The defect edges were debeveled with a #15 scalpel blade.  The primary defect was closed partially with a complex linear closure.  Given the location of the defect, shape of the defect and the proximity to free margins a xenograft was deemed most appropriate to repair the remaining defect.  The graft was trimmed to fit the size of the remaining defect.  The graft was then placed in the primary defect, oriented appropriately, and sutured into place.
Complex Repair And Skin Substitute Graft Text: The defect edges were debeveled with a #15 scalpel blade.  The primary defect was closed partially with a complex linear closure.  Given the location of the remaining defect, shape of the defect and the proximity to free margins a skin substitute graft was deemed most appropriate to repair the remaining defect.  The graft was trimmed to fit the size of the remaining defect.  The graft was then placed in the primary defect, oriented appropriately, and sutured into place.
Consent was obtained from the patient. The risks and benefits to therapy were discussed in detail. Specifically, the risks of infection, scarring, bleeding, prolonged wound healing, incomplete removal, allergy to anesthesia, nerve injury and recurrence were addressed. Prior to the procedure, the treatment site was clearly identified and confirmed by the patient. All components of Universal Protocol/PAUSE Rule completed.
Render Post-Care Instructions In Note?: yes
Post-Care Instructions: I reviewed with the patient in detail post-care instructions. Patient is not to engage in any heavy lifting, exercise, or swimming for the next 14 days. Should the patient develop any fevers, chills, bleeding, severe pain patient will contact the office immediately.
Home Suture Removal Text: Patient was provided a home suture removal kit and will remove their sutures at home.  If they have any questions or difficulties they will call the office.
Where Do You Want The Question To Include Opioid Counseling Located?: Case Summary Tab
Billing Type: Third-Party Bill
Information: Selecting Yes will display possible errors in your note based on the variables you have selected. This validation is only offered as a suggestion for you. PLEASE NOTE THAT THE VALIDATION TEXT WILL BE REMOVED WHEN YOU FINALIZE YOUR NOTE. IF YOU WANT TO FAX A PRELIMINARY NOTE YOU WILL NEED TO TOGGLE THIS TO 'NO' IF YOU DO NOT WANT IT IN YOUR FAXED NOTE.

## 2022-04-07 ENCOUNTER — APPOINTMENT (RX ONLY)
Dept: URBAN - METROPOLITAN AREA CLINIC 6 | Facility: CLINIC | Age: 62
Setting detail: DERMATOLOGY
End: 2022-04-07

## 2022-04-07 DIAGNOSIS — Z48.02 ENCOUNTER FOR REMOVAL OF SUTURES: ICD-10-CM

## 2022-04-07 PROCEDURE — ? SUTURE REMOVAL (GLOBAL PERIOD)

## 2022-04-07 ASSESSMENT — LOCATION SIMPLE DESCRIPTION DERM: LOCATION SIMPLE: ABDOMEN

## 2022-04-07 ASSESSMENT — LOCATION ZONE DERM: LOCATION ZONE: TRUNK

## 2022-04-07 ASSESSMENT — LOCATION DETAILED DESCRIPTION DERM: LOCATION DETAILED: LEFT LATERAL ABDOMEN

## 2022-04-07 NOTE — PROCEDURE: SUTURE REMOVAL (GLOBAL PERIOD)
Detail Level: Detailed
Add 35825 Cpt? (Important Note: In 2017 The Use Of 04625 Is Being Tracked By Cms To Determine Future Global Period Reimbursement For Global Periods): no

## 2022-04-11 ENCOUNTER — HOSPITAL ENCOUNTER (OUTPATIENT)
Facility: MEDICAL CENTER | Age: 62
End: 2022-04-11
Payer: COMMERCIAL

## 2022-04-17 ENCOUNTER — PATIENT MESSAGE (OUTPATIENT)
Dept: SPORTS MEDICINE | Facility: CLINIC | Age: 62
End: 2022-04-17
Payer: COMMERCIAL

## 2022-04-18 DIAGNOSIS — M65.341 TRIGGER FINGER, RIGHT RING FINGER: ICD-10-CM

## 2022-04-18 DIAGNOSIS — M65.351 TRIGGER FINGER, RIGHT LITTLE FINGER: ICD-10-CM

## 2022-04-19 NOTE — PROGRESS NOTES
1. Trigger finger, right little finger  Referral to Orthopedics   2. Trigger finger, right ring finger  Referral to Orthopedics     Recurrence of trigger finger  Referral to orthopedics for consideration of trigger finger release

## 2022-04-21 ENCOUNTER — HOSPITAL ENCOUNTER (OUTPATIENT)
Dept: LAB | Facility: MEDICAL CENTER | Age: 62
End: 2022-04-21
Attending: FAMILY MEDICINE
Payer: COMMERCIAL

## 2022-04-21 ENCOUNTER — HOSPITAL ENCOUNTER (OUTPATIENT)
Dept: LAB | Facility: MEDICAL CENTER | Age: 62
End: 2022-04-21
Attending: NURSE PRACTITIONER
Payer: COMMERCIAL

## 2022-04-21 DIAGNOSIS — I10 ESSENTIAL HYPERTENSION: ICD-10-CM

## 2022-04-21 DIAGNOSIS — Z83.3 FAMILY HISTORY OF DIABETES MELLITUS: ICD-10-CM

## 2022-04-21 DIAGNOSIS — E78.5 HYPERLIPIDEMIA, UNSPECIFIED HYPERLIPIDEMIA TYPE: ICD-10-CM

## 2022-04-21 DIAGNOSIS — R61 NIGHT SWEATS: ICD-10-CM

## 2022-04-21 DIAGNOSIS — Z13.1 ENCOUNTER FOR SCREENING FOR DIABETES MELLITUS: ICD-10-CM

## 2022-04-21 DIAGNOSIS — E55.9 VITAMIN D DEFICIENCY: ICD-10-CM

## 2022-04-21 LAB
25(OH)D3 SERPL-MCNC: 71 NG/ML (ref 30–100)
ALBUMIN SERPL BCP-MCNC: 4.4 G/DL (ref 3.2–4.9)
ALBUMIN/GLOB SERPL: 2.3 G/DL
ALP SERPL-CCNC: 69 U/L (ref 30–99)
ALT SERPL-CCNC: 23 U/L (ref 2–50)
ANION GAP SERPL CALC-SCNC: 9 MMOL/L (ref 7–16)
AST SERPL-CCNC: 21 U/L (ref 12–45)
BASOPHILS # BLD AUTO: 0.6 % (ref 0–1.8)
BASOPHILS # BLD: 0.09 K/UL (ref 0–0.12)
BILIRUB SERPL-MCNC: 0.7 MG/DL (ref 0.1–1.5)
BUN SERPL-MCNC: 13 MG/DL (ref 8–22)
CALCIUM SERPL-MCNC: 9.6 MG/DL (ref 8.5–10.5)
CHLORIDE SERPL-SCNC: 105 MMOL/L (ref 96–112)
CHOLEST SERPL-MCNC: 226 MG/DL (ref 100–199)
CO2 SERPL-SCNC: 27 MMOL/L (ref 20–33)
CREAT SERPL-MCNC: 0.7 MG/DL (ref 0.5–1.4)
EOSINOPHIL # BLD AUTO: 0.41 K/UL (ref 0–0.51)
EOSINOPHIL NFR BLD: 2.9 % (ref 0–6.9)
ERYTHROCYTE [DISTWIDTH] IN BLOOD BY AUTOMATED COUNT: 43 FL (ref 35.9–50)
EST. AVERAGE GLUCOSE BLD GHB EST-MCNC: 114 MG/DL
GFR SERPLBLD CREATININE-BSD FMLA CKD-EPI: 98 ML/MIN/1.73 M 2
GLOBULIN SER CALC-MCNC: 1.9 G/DL (ref 1.9–3.5)
GLUCOSE SERPL-MCNC: 90 MG/DL (ref 65–99)
HBA1C MFR BLD: 5.6 % (ref 4–5.6)
HCT VFR BLD AUTO: 50.6 % (ref 37–47)
HDLC SERPL-MCNC: 61 MG/DL
HGB BLD-MCNC: 16.9 G/DL (ref 12–16)
IMM GRANULOCYTES # BLD AUTO: 0.07 K/UL (ref 0–0.11)
IMM GRANULOCYTES NFR BLD AUTO: 0.5 % (ref 0–0.9)
LDLC SERPL CALC-MCNC: 139 MG/DL
LYMPHOCYTES # BLD AUTO: 4.91 K/UL (ref 1–4.8)
LYMPHOCYTES NFR BLD: 34.6 % (ref 22–41)
MCH RBC QN AUTO: 30 PG (ref 27–33)
MCHC RBC AUTO-ENTMCNC: 33.4 G/DL (ref 33.6–35)
MCV RBC AUTO: 89.9 FL (ref 81.4–97.8)
MONOCYTES # BLD AUTO: 0.84 K/UL (ref 0–0.85)
MONOCYTES NFR BLD AUTO: 5.9 % (ref 0–13.4)
NEUTROPHILS # BLD AUTO: 7.89 K/UL (ref 2–7.15)
NEUTROPHILS NFR BLD: 55.5 % (ref 44–72)
NRBC # BLD AUTO: 0 K/UL
NRBC BLD-RTO: 0 /100 WBC
PLATELET # BLD AUTO: 357 K/UL (ref 164–446)
PMV BLD AUTO: 9.6 FL (ref 9–12.9)
POTASSIUM SERPL-SCNC: 4.1 MMOL/L (ref 3.6–5.5)
PROT SERPL-MCNC: 6.3 G/DL (ref 6–8.2)
RBC # BLD AUTO: 5.63 M/UL (ref 4.2–5.4)
SODIUM SERPL-SCNC: 141 MMOL/L (ref 135–145)
T4 FREE SERPL-MCNC: 1.26 NG/DL (ref 0.93–1.7)
TRIGL SERPL-MCNC: 131 MG/DL (ref 0–149)
TSH SERPL DL<=0.005 MIU/L-ACNC: 4.3 UIU/ML (ref 0.38–5.33)
WBC # BLD AUTO: 14.2 K/UL (ref 4.8–10.8)

## 2022-04-21 PROCEDURE — 80061 LIPID PANEL: CPT

## 2022-04-21 PROCEDURE — 82306 VITAMIN D 25 HYDROXY: CPT

## 2022-04-21 PROCEDURE — 85025 COMPLETE CBC W/AUTO DIFF WBC: CPT

## 2022-04-21 PROCEDURE — 36415 COLL VENOUS BLD VENIPUNCTURE: CPT

## 2022-04-21 PROCEDURE — 84443 ASSAY THYROID STIM HORMONE: CPT

## 2022-04-21 PROCEDURE — 80053 COMPREHEN METABOLIC PANEL: CPT

## 2022-04-21 PROCEDURE — 83036 HEMOGLOBIN GLYCOSYLATED A1C: CPT

## 2022-04-21 PROCEDURE — 84439 ASSAY OF FREE THYROXINE: CPT

## 2022-04-22 ENCOUNTER — PATIENT MESSAGE (OUTPATIENT)
Dept: MEDICAL GROUP | Facility: PHYSICIAN GROUP | Age: 62
End: 2022-04-22
Payer: COMMERCIAL

## 2022-05-25 ENCOUNTER — OFFICE VISIT (OUTPATIENT)
Dept: MEDICAL GROUP | Facility: PHYSICIAN GROUP | Age: 62
End: 2022-05-25
Payer: COMMERCIAL

## 2022-05-25 VITALS
HEART RATE: 114 BPM | TEMPERATURE: 98.8 F | BODY MASS INDEX: 35.5 KG/M2 | WEIGHT: 188 LBS | DIASTOLIC BLOOD PRESSURE: 86 MMHG | HEIGHT: 61 IN | OXYGEN SATURATION: 96 % | SYSTOLIC BLOOD PRESSURE: 140 MMHG

## 2022-05-25 DIAGNOSIS — I10 ESSENTIAL HYPERTENSION: ICD-10-CM

## 2022-05-25 DIAGNOSIS — F40.243 FEAR OF FLYING: ICD-10-CM

## 2022-05-25 DIAGNOSIS — Z91.030 BEE STING ALLERGY: ICD-10-CM

## 2022-05-25 DIAGNOSIS — R11.0 NAUSEA: ICD-10-CM

## 2022-05-25 DIAGNOSIS — F41.9 ANXIETY: ICD-10-CM

## 2022-05-25 DIAGNOSIS — F51.01 PRIMARY INSOMNIA: ICD-10-CM

## 2022-05-25 DIAGNOSIS — J45.20 MILD INTERMITTENT ASTHMA IN ADULT WITHOUT COMPLICATION: ICD-10-CM

## 2022-05-25 PROCEDURE — 99214 OFFICE O/P EST MOD 30 MIN: CPT | Performed by: NURSE PRACTITIONER

## 2022-05-25 RX ORDER — ONDANSETRON 4 MG/1
4 TABLET, FILM COATED ORAL EVERY 4 HOURS PRN
Qty: 20 TABLET | Refills: 1 | Status: SHIPPED | OUTPATIENT
Start: 2022-05-25 | End: 2022-08-23

## 2022-05-25 RX ORDER — EPINEPHRINE 0.3 MG/.3ML
INJECTION SUBCUTANEOUS
Qty: 1 EACH | Refills: 1 | Status: SHIPPED | OUTPATIENT
Start: 2022-05-25

## 2022-05-25 RX ORDER — FLUTICASONE PROPIONATE AND SALMETEROL 100; 50 UG/1; UG/1
1 POWDER RESPIRATORY (INHALATION) 2 TIMES DAILY
Qty: 1 EACH | Refills: 11 | Status: SHIPPED | OUTPATIENT
Start: 2022-05-25 | End: 2023-11-09

## 2022-05-25 RX ORDER — HYDROCHLOROTHIAZIDE 25 MG/1
25 TABLET ORAL
Qty: 90 TABLET | Refills: 3 | Status: SHIPPED | OUTPATIENT
Start: 2022-05-25 | End: 2023-05-25

## 2022-05-25 RX ORDER — ALBUTEROL SULFATE 90 UG/1
2 AEROSOL, METERED RESPIRATORY (INHALATION) EVERY 6 HOURS PRN
Qty: 3 EACH | Refills: 3 | Status: SHIPPED | OUTPATIENT
Start: 2022-05-25

## 2022-05-25 RX ORDER — ESCITALOPRAM OXALATE 10 MG/1
10 TABLET ORAL
Qty: 90 TABLET | Refills: 3 | Status: SHIPPED | OUTPATIENT
Start: 2022-05-25 | End: 2023-05-25

## 2022-05-25 RX ORDER — TEMAZEPAM 15 MG/1
15 CAPSULE ORAL NIGHTLY PRN
Qty: 100 CAPSULE | Refills: 0 | Status: SHIPPED | OUTPATIENT
Start: 2022-07-20 | End: 2022-10-19

## 2022-05-25 RX ORDER — DIAZEPAM 5 MG/1
5 TABLET ORAL EVERY 6 HOURS PRN
Qty: 15 TABLET | Refills: 0 | Status: SHIPPED | OUTPATIENT
Start: 2022-05-25 | End: 2022-06-24

## 2022-05-25 ASSESSMENT — FIBROSIS 4 INDEX: FIB4 SCORE: 0.76

## 2022-05-25 NOTE — PROGRESS NOTES
CC: follow up for insomnia and hypertension                                                                                                                                    HPI:   Harmony presents today with the following.    Problem   Fear of Flying   Bee Sting Allergy   Primary Insomnia       Current Outpatient Medications   Medication Sig Dispense Refill   • escitalopram (LEXAPRO) 10 MG Tab Take 1 Tablet by mouth every day. 90 Tablet 3   • fluticasone-salmeterol (ADVAIR) 100-50 MCG/ACT AEROSOL POWDER, BREATH ACTIVATED Inhale 1 Puff 2 times a day. 1 Each 11   • hydroCHLOROthiazide (HYDRODIURIL) 25 MG Tab Take 1 Tablet by mouth every day. 90 Tablet 3   • ondansetron (ZOFRAN) 4 MG Tab tablet Take 1 Tablet by mouth every four hours as needed for Nausea/Vomiting for up to 90 days. 20 Tablet 1   • EPINEPHrine (EPIPEN) 0.3 MG/0.3ML Solution Auto-injector solution for injection Inject 0.3 mL into the thigh one time for 1 dose. 1 Each 1   • albuterol 108 (90 Base) MCG/ACT Aero Soln inhalation aerosol Inhale 2 Puffs every 6 hours as needed for Shortness of Breath. 3 Each 3   • diazePAM (VALIUM) 5 MG Tab Take 1 Tablet by mouth every 6 hours as needed for Anxiety (Patient will be traveling soon and has anxiety with flying and long car rides) for up to 30 days. 15 Tablet 0   • [START ON 7/20/2022] temazepam (RESTORIL) 15 MG Cap Take 1 Capsule by mouth at bedtime as needed for Sleep for up to 90 days. 100 Capsule 0   • temazepam (RESTORIL) 15 MG Cap      • verapamil ER (CALAN SR) 120 MG CAPSULE SR 24 HR Take 1 Capsule by mouth every day. 90 Capsule 3   • simvastatin (ZOCOR) 40 MG Tab Take 1 Tablet by mouth every evening. 90 Tablet 3   • ipratropium-albuterol (DUONEB) 0.5-2.5 (3) MG/3ML nebulizer solution 3 mL by Nebulization route 4 times a day. 30 Bullet 5   • estradiol (ESTRACE) 1 MG TABS Take 1 mg by mouth every day. From gyn       No current facility-administered medications for this visit.       Allergies as of  "2022 - Reviewed 2022   Allergen Reaction Noted   • Bee Anaphylaxis 2017   • Levaquin Anaphylaxis and Unspecified 10/26/2014   • Tape Hives 2016   • Moxifloxacin hcl in nacl Swelling 2011   • Polysporin [bacitracin-polymyxin b]  2020   • Promethazine  2016        ROS:  All systems negative expect as addressed in assessment and plan.     BP (!) 140/86 (BP Location: Right arm, Patient Position: Sitting, BP Cuff Size: Adult)   Pulse (!) 114   Temp 37.1 °C (98.8 °F) (Temporal)   Ht 1.549 m (5' 1\")   Wt 85.3 kg (188 lb)   SpO2 96%   BMI 35.52 kg/m²     Physical Exam:  Gen:         Alert and oriented, No apparent distress.  Neck:        No Lymphadenopathy.   Lungs:     Clear to auscultation bilaterally. No wheezes, rales, or rhonchi.   CV:          Regular rate and rhythm. No murmurs, rubs or gallops.         Ext:          No clubbing, cyanosis, or peripheral edema.  Skin:  All visible skin intact without lesions.       Assessment and Plan:  62 y.o. female with the following issues.    1. Essential hypertension  hydroCHLOROthiazide (HYDRODIURIL) 25 MG Tab   2. Nausea  ondansetron (ZOFRAN) 4 MG Tab tablet   3. Mild intermittent asthma in adult without complication  albuterol 108 (90 Base) MCG/ACT Aero Soln inhalation aerosol   4. Anxiety  diazePAM (VALIUM) 5 MG Tab   5. Fear of flying  diazePAM (VALIUM) 5 MG Tab   6. Primary insomnia  temazepam (RESTORIL) 15 MG Cap   7. Bee sting allergy          Bee sting allergy  Patient has anaphylaxis to bees.     Will provide with EPIPEN as hers is .     Primary insomnia  This is a chronic stable condition. Patient take temazepam nightly to help her sleep. She has tried many other alternatives that were not effective.     Will refills patients tempazepam.     Obtained and reviewed patient utilization report from [facility] pharmacy database on 2022 8:31 PM  prior to writing prescription for controlled substance II, III or IV " per [place] bill . Based on assessment of the report,my physical exam if necessary and the patient's health problem, the prescription is medically necessary.       Fear of flying  This is a chornic condition. Patient is traveling next month to visit family. She reports that she has severe anxiety with flying and long car rides.     Will provide patient valium to help with her travel anxiety.     Obtained and reviewed patient utilization report from [facility] pharmacy database on 5/28/2022 8:32 PM  prior to writing prescription for controlled substance II, III or IV per [place] bill . Based on assessment of the report,my physical exam if necessary and the patient's health problem, the prescription is medically necessary.       No follow-ups on file.    I have placed the below orders and discussed them with an approved delegating provider.  The MA is performing the below orders under the direction of Dr. Bustos.    Please note that this dictation was created using voice recognition software. I have worked with consultants from the vendor as well as technical experts from [facility] to optimize the interface. I have made every reasonable attempt to correct obvious errors, but I expect that there are errors of grammar and possibly content that I did not discover before finalizing the note.

## 2022-05-28 PROBLEM — F40.243 FEAR OF FLYING: Status: ACTIVE | Noted: 2022-05-28

## 2022-05-29 NOTE — ASSESSMENT & PLAN NOTE
This is a chronic stable condition. Patient take temazepam nightly to help her sleep. She has tried many other alternatives that were not effective.     Will refills patients tempazepam.     Obtained and reviewed patient utilization report from [facility] pharmacy database on 5/28/2022 8:31 PM  prior to writing prescription for controlled substance II, III or IV per [place] bill . Based on assessment of the report,my physical exam if necessary and the patient's health problem, the prescription is medically necessary.

## 2022-05-29 NOTE — ASSESSMENT & PLAN NOTE
This is a chornic condition. Patient is traveling next month to visit family. She reports that she has severe anxiety with flying and long car rides.     Will provide patient valium to help with her travel anxiety.     Obtained and reviewed patient utilization report from [facility] pharmacy database on 5/28/2022 8:32 PM  prior to writing prescription for controlled substance II, III or IV per [place] bill . Based on assessment of the report,my physical exam if necessary and the patient's health problem, the prescription is medically necessary.

## 2022-10-04 ENCOUNTER — APPOINTMENT (OUTPATIENT)
Dept: RADIOLOGY | Facility: IMAGING CENTER | Age: 62
End: 2022-10-04
Attending: PHYSICIAN ASSISTANT
Payer: COMMERCIAL

## 2022-10-04 ENCOUNTER — OFFICE VISIT (OUTPATIENT)
Dept: URGENT CARE | Facility: CLINIC | Age: 62
End: 2022-10-04
Payer: COMMERCIAL

## 2022-10-04 VITALS
SYSTOLIC BLOOD PRESSURE: 136 MMHG | HEIGHT: 62 IN | TEMPERATURE: 97.8 F | DIASTOLIC BLOOD PRESSURE: 86 MMHG | WEIGHT: 188 LBS | HEART RATE: 92 BPM | OXYGEN SATURATION: 96 % | RESPIRATION RATE: 16 BRPM | BODY MASS INDEX: 34.6 KG/M2

## 2022-10-04 DIAGNOSIS — Z87.898 HISTORY OF NAUSEA: ICD-10-CM

## 2022-10-04 DIAGNOSIS — R07.81 RIB PAIN ON RIGHT SIDE: ICD-10-CM

## 2022-10-04 DIAGNOSIS — R05.9 COUGH, UNSPECIFIED TYPE: ICD-10-CM

## 2022-10-04 PROCEDURE — 71101 X-RAY EXAM UNILAT RIBS/CHEST: CPT | Mod: TC,RT | Performed by: PHYSICIAN ASSISTANT

## 2022-10-04 PROCEDURE — 99214 OFFICE O/P EST MOD 30 MIN: CPT | Performed by: PHYSICIAN ASSISTANT

## 2022-10-04 RX ORDER — OXYCODONE AND ACETAMINOPHEN 10; 325 MG/1; MG/1
1-2 TABLET ORAL EVERY 6 HOURS PRN
Qty: 12 TABLET | Refills: 0 | Status: SHIPPED | OUTPATIENT
Start: 2022-10-04 | End: 2022-10-04

## 2022-10-04 RX ORDER — OXYCODONE AND ACETAMINOPHEN 10; 325 MG/1; MG/1
.5-1 TABLET ORAL EVERY 6 HOURS PRN
Qty: 10 TABLET | Refills: 0 | Status: SHIPPED | OUTPATIENT
Start: 2022-10-04 | End: 2022-10-07

## 2022-10-04 RX ORDER — ONDANSETRON 4 MG/1
4 TABLET, FILM COATED ORAL EVERY 4 HOURS PRN
Qty: 10 TABLET | Refills: 0 | Status: SHIPPED | OUTPATIENT
Start: 2022-10-04 | End: 2023-03-06

## 2022-10-04 RX ORDER — PREDNISONE 20 MG/1
40 TABLET ORAL DAILY
Qty: 10 TABLET | Refills: 0 | Status: SHIPPED | OUTPATIENT
Start: 2022-10-04 | End: 2022-10-09

## 2022-10-04 ASSESSMENT — FIBROSIS 4 INDEX: FIB4 SCORE: 0.76

## 2022-10-04 NOTE — LETTER
October 4, 2022    To Whom It May Concern:         This is confirmation that Harmony Sushila Mabel attended her scheduled appointment with Trey Rock P.A.-C. on 10/04/22. Please excuse her from work on 10/4-10/6/22.         If you have any questions please do not hesitate to call me at the phone number listed below.    Sincerely,          Trey Rock P.A.-C.  [phone]

## 2022-10-04 NOTE — PROGRESS NOTES
Subjective:   Harmony Monroe is a 62 y.o. female who presents for Rib Injury (X 4 days, Rib injury from coughing)        Patient presents for evaluation of right posterior lateral rib pain that began 4 days ago.  Patient has been experiencing frequent severe coughing that may be related to a mild asthma exacerbation.  Following a severe coughing episode over the weekend patient developed acute onset of right rib pain.  Pain is worse with movement and direct pressure.  No shortness of breath, wheezing, difficulty breathing, abdominal pain, nausea, vomiting, fevers, chills.  Patient states that she had a similar coughing episode several years ago and fractured 2 ribs on the left side as a result.  Current symptoms feel similar.  Patient has been taking large doses of ibuprofen without symptomatic relief and subsequently developed GI upset as a result of the NSAIDs.  She is only sleeping 1 to 3 hours nightly as result of poorly controlled pain.    Review of Systems   Constitutional:  Positive for malaise/fatigue. Negative for chills and fever.   Respiratory:  Positive for cough. Negative for wheezing.    Cardiovascular:  Negative for chest pain.   Gastrointestinal:  Negative for abdominal pain, nausea and vomiting.   Musculoskeletal:  Positive for myalgias.     PMH:  has a past medical history of Allergy, Arthritis, ASTHMA, Basal cell carcinoma of left medial cheek (11/30/2015), Basal cell carcinoma of left medial cheek (11/30/2015), Depression (1/7/2010), High cholesterol, History of tobacco use disorder (1/7/2010), Hypertension, Pneumonia (1990,2001), and Post-menopausal (8/26/2014).  MEDS:   Current Outpatient Medications:     predniSONE (DELTASONE) 20 MG Tab, Take 2 Tablets by mouth every day for 5 days., Disp: 10 Tablet, Rfl: 0    ondansetron (ZOFRAN) 4 MG Tab tablet, Take 1 Tablet by mouth every four hours as needed for Nausea/Vomiting., Disp: 10 Tablet, Rfl: 0    oxyCODONE-acetaminophen (PERCOCET-10)  " MG Tab, Take 0.5-1 Tablets by mouth every 6 hours as needed for Severe Pain for up to 3 days., Disp: 10 Tablet, Rfl: 0    escitalopram (LEXAPRO) 10 MG Tab, Take 1 Tablet by mouth every day., Disp: 90 Tablet, Rfl: 3    fluticasone-salmeterol (ADVAIR) 100-50 MCG/ACT AEROSOL POWDER, BREATH ACTIVATED, Inhale 1 Puff 2 times a day., Disp: 1 Each, Rfl: 11    hydroCHLOROthiazide (HYDRODIURIL) 25 MG Tab, Take 1 Tablet by mouth every day., Disp: 90 Tablet, Rfl: 3    EPINEPHrine (EPIPEN) 0.3 MG/0.3ML Solution Auto-injector solution for injection, Inject 0.3 mL into the thigh one time for 1 dose., Disp: 1 Each, Rfl: 1    albuterol 108 (90 Base) MCG/ACT Aero Soln inhalation aerosol, Inhale 2 Puffs every 6 hours as needed for Shortness of Breath., Disp: 3 Each, Rfl: 3    temazepam (RESTORIL) 15 MG Cap, , Disp: , Rfl:     verapamil ER (CALAN SR) 120 MG CAPSULE SR 24 HR, Take 1 Capsule by mouth every day., Disp: 90 Capsule, Rfl: 3    simvastatin (ZOCOR) 40 MG Tab, Take 1 Tablet by mouth every evening., Disp: 90 Tablet, Rfl: 3    ipratropium-albuterol (DUONEB) 0.5-2.5 (3) MG/3ML nebulizer solution, 3 mL by Nebulization route 4 times a day., Disp: 30 Bullet, Rfl: 5    estradiol (ESTRACE) 1 MG TABS, Take 1 mg by mouth every day. From gyn, Disp: , Rfl:     temazepam (RESTORIL) 15 MG Cap, Take 1 Capsule by mouth at bedtime as needed for Sleep for up to 90 days., Disp: 100 Capsule, Rfl: 0  ALLERGIES:   Allergies   Allergen Reactions    Bee Anaphylaxis    Levaquin Anaphylaxis and Unspecified     Myalgias arthralgias     Tape Hives     Redness; itching \"paper tape ok\"    Moxifloxacin Hcl In Nacl Swelling    Polysporin [Bacitracin-Polymyxin B]      rash    Promethazine      Twitching feeling     SURGHX:   Past Surgical History:   Procedure Laterality Date    KNEE ARTHROSCOPY Left 2/5/2018    Procedure: KNEE ARTHROSCOPY;  Surgeon: Andrews Castro M.D.;  Location: SURGERY [facility];  Service: Orthopedics    MENISCECTOMY, KNEE, " "MEDIAL Left 2/5/2018    Procedure: MEDIAL MENISCECTOMY - PARTIAL;  Surgeon: Andrews Castro M.D.;  Location: SURGERY [facility];  Service: Orthopedics    KNEE ARTHROSCOPY Right 3/23/2017    Procedure: KNEE ARTHROSCOPY;  Surgeon: Andrews Castro M.D.;  Location: [facility];  Service:     MENISCECTOMY, KNEE, MEDIAL  3/23/2017    Procedure: MEDIAL and lateral  MENISCECTOMY - PARTIAL;  Surgeon: Andrews Castro M.D.;  Location: SURGERY [facility];  Service:     CHONDROPLASTY  3/23/2017    Procedure: CHONDROPLASTY -  PATELLAR;  Surgeon: Andrews Castro M.D.;  Location: [facility];  Service:     ABDOMINAL HYSTERECTOMY TOTAL  11/2005    TONSILLECTOMY AND ADENOIDECTOMY  1980    NASAL SEPTAL RECONSTRUCTION  1980    CYST EXCISION  1974    Anterior Left Foot     SOCHX:  reports that she has been smoking cigarettes. She started smoking about 25 years ago. She has a 4.75 pack-year smoking history. She has never used smokeless tobacco. She reports that she does not drink alcohol and does not use drugs.  FH: Family history was reviewed, no pertinent findings to report   Objective:   /86 (BP Location: Left arm, Patient Position: Sitting, BP Cuff Size: Large adult)   Pulse 92   Temp 36.6 °C (97.8 °F) (Temporal)   Resp 16   Ht 1.562 m (5' 1.5\")   Wt 85.3 kg (188 lb)   SpO2 96%   BMI 34.95 kg/m²   Physical Exam  Vitals reviewed.   Constitutional:       Appearance: Normal appearance. She is well-developed. She is not toxic-appearing.      Comments: Pt appears uncomfortable   HENT:      Head: Normocephalic and atraumatic.      Right Ear: External ear normal.      Left Ear: External ear normal.      Nose: Nose normal.   Cardiovascular:      Rate and Rhythm: Normal rate and regular rhythm.      Heart sounds: Normal heart sounds, S1 normal and S2 normal.   Pulmonary:      Effort: Pulmonary effort is normal. No respiratory distress.      Breath sounds: Normal breath sounds. No stridor. No " decreased breath sounds, wheezing, rhonchi or rales.   Abdominal:      Comments: Abdomen is soft and nontender to palpation.   Musculoskeletal:      Comments: Chest rises and falls evenly.  Skin of the right chest wall is intact and atraumatic.  No gross deformities.  Focally tender to palpation over mid posterolateral ribs and intercostal muscles.  No palpable step-offs or deformities.   Skin:     General: Skin is dry.   Neurological:      Comments: Alert and oriented.    Psychiatric:         Speech: Speech normal.         Behavior: Behavior normal.       Imaging:  COMPARISON: Chest x-ray dated 4/5/2020     FINDINGS:  No fractures or acute bony changes are noted.  There is no evidence of a hemo or pneumothorax.     IMPRESSION:     Normal rib series.    Assessment/Plan:   1. Rib pain on right side  - WP-HATL-AJEIUNACUZ (WITH 1-VIEW CXR) RIGHT; Future  - predniSONE (DELTASONE) 20 MG Tab; Take 2 Tablets by mouth every day for 5 days.  Dispense: 10 Tablet; Refill: 0  - oxyCODONE-acetaminophen (PERCOCET-10)  MG Tab; Take 0.5-1 Tablets by mouth every 6 hours as needed for Severe Pain for up to 3 days.  Dispense: 10 Tablet; Refill: 0    2. bronchospasm  - predniSONE (DELTASONE) 20 MG Tab; Take 2 Tablets by mouth every day for 5 days.  Dispense: 10 Tablet; Refill: 0    3. History of nausea  - ondansetron (ZOFRAN) 4 MG Tab tablet; Take 1 Tablet by mouth every four hours as needed for Nausea/Vomiting.  Dispense: 10 Tablet; Refill: 0    Other orders  - Consent for Opiate Prescription    Fortunately imaging is negative for acute bony injury.  Patient has good understanding of the diagnostic limitations of radiological rib imaging, however, I suspect that symptoms may be muscular in origin.    I feel the patient would benefit from more effective analgesia as well as treatment of bronchospasm to prevent symptom exacerbation.  Patient started on a burst of prednisone to treat bronchospasm.  Patient has been on this  medication before and tolerates this.  Continue inhalers as previously prescribed.  As NSAIDs have been ineffective in controlling patient's pain and she is experiencing adverse effects of these medications she was prescribed a small amount of narcotic analgesia for the next 2 to 3 days for both pain control and to promote rest.  PDMP reviewed and patient does not demonstrate increased risk for abuse.    Rest and avoid aggravating movements.  May also benefit from both ice and heat.  Recommend that patient remain home from work for the next 2 to 3 days.  If symptoms fail to improve in the next 48 hours, new symptoms develop, symptoms worsen recommend reevaluation.    Differential diagnosis, natural history, supportive care, and indications for immediate follow-up discussed.

## 2022-10-05 ASSESSMENT — ENCOUNTER SYMPTOMS
CHILLS: 0
VOMITING: 0
NAUSEA: 0
MYALGIAS: 1
WHEEZING: 0
COUGH: 1
FEVER: 0
ABDOMINAL PAIN: 0

## 2022-10-14 ENCOUNTER — OFFICE VISIT (OUTPATIENT)
Dept: MEDICAL GROUP | Facility: PHYSICIAN GROUP | Age: 62
End: 2022-10-14
Payer: COMMERCIAL

## 2022-10-14 ENCOUNTER — APPOINTMENT (OUTPATIENT)
Dept: RADIOLOGY | Facility: IMAGING CENTER | Age: 62
End: 2022-10-14
Payer: COMMERCIAL

## 2022-10-14 VITALS
HEIGHT: 61 IN | TEMPERATURE: 98 F | SYSTOLIC BLOOD PRESSURE: 146 MMHG | OXYGEN SATURATION: 97 % | BODY MASS INDEX: 36.63 KG/M2 | DIASTOLIC BLOOD PRESSURE: 98 MMHG | WEIGHT: 194 LBS | HEART RATE: 91 BPM

## 2022-10-14 DIAGNOSIS — Z12.11 SCREENING FOR COLORECTAL CANCER: ICD-10-CM

## 2022-10-14 DIAGNOSIS — Z12.12 SCREENING FOR COLORECTAL CANCER: ICD-10-CM

## 2022-10-14 DIAGNOSIS — I10 ESSENTIAL HYPERTENSION: ICD-10-CM

## 2022-10-14 DIAGNOSIS — K59.03 DRUG-INDUCED CONSTIPATION: ICD-10-CM

## 2022-10-14 DIAGNOSIS — Z12.31 ENCOUNTER FOR SCREENING MAMMOGRAM FOR BREAST CANCER: ICD-10-CM

## 2022-10-14 DIAGNOSIS — Z23 NEED FOR VACCINATION: ICD-10-CM

## 2022-10-14 PROBLEM — K59.00 CONSTIPATION: Status: ACTIVE | Noted: 2022-10-14

## 2022-10-14 PROCEDURE — 90471 IMMUNIZATION ADMIN: CPT

## 2022-10-14 PROCEDURE — 90686 IIV4 VACC NO PRSV 0.5 ML IM: CPT

## 2022-10-14 PROCEDURE — 99213 OFFICE O/P EST LOW 20 MIN: CPT | Mod: 25

## 2022-10-14 PROCEDURE — 74019 RADEX ABDOMEN 2 VIEWS: CPT | Mod: TC

## 2022-10-14 RX ORDER — POLYETHYLENE GLYCOL 3350 17 G/17G
17 POWDER, FOR SOLUTION ORAL DAILY
COMMUNITY

## 2022-10-14 RX ORDER — DOCUSATE SODIUM 100 MG/1
100 CAPSULE, LIQUID FILLED ORAL 2 TIMES DAILY
COMMUNITY
End: 2022-10-21

## 2022-10-14 ASSESSMENT — ENCOUNTER SYMPTOMS
NAUSEA: 1
FEVER: 0
PALPITATIONS: 0
HEADACHES: 1
ABDOMINAL PAIN: 1
SHORTNESS OF BREATH: 0
COUGH: 0
CONSTIPATION: 1
CHILLS: 0

## 2022-10-14 ASSESSMENT — FIBROSIS 4 INDEX: FIB4 SCORE: 0.76

## 2022-10-14 NOTE — ASSESSMENT & PLAN NOTE
Chronic, unstable today. Has headache.   Taking verapamil er 120 mg and hctz 25 mg for this, with generally good blood pressure control. Discussed lifestyle recommendations to lower blood pressure, encouraged home log, return if blood pressure remains elevated

## 2022-10-14 NOTE — PROGRESS NOTES
"Subjective:     CC: Pt presents today complaining of constipation after recent rib injury and 5 doses of opioid pain medication. Reports difficulty passing stool for 11 days.     HPI:   Harmony presents today with    Problem   Constipation   Essential Hypertension     ROS:  Review of Systems   Constitutional:  Negative for chills and fever.   Respiratory:  Negative for cough and shortness of breath.    Cardiovascular:  Negative for chest pain (right lateral rib pain) and palpitations.   Gastrointestinal:  Positive for abdominal pain, constipation and nausea.   Neurological:  Positive for headaches.   All other systems reviewed and are negative.    Objective:     Exam:  BP (!) 146/98 (BP Location: Left arm, Patient Position: Sitting, BP Cuff Size: Small adult)   Pulse 91   Temp 36.7 °C (98 °F) (Temporal)   Ht 1.549 m (5' 1\")   Wt 88 kg (194 lb)   SpO2 97%   BMI 36.66 kg/m²  Body mass index is 36.66 kg/m².    Physical Exam  Vitals reviewed.   Constitutional:       General: She is in acute distress.   HENT:      Head: Normocephalic and atraumatic.   Eyes:      Extraocular Movements: Extraocular movements intact.      Conjunctiva/sclera: Conjunctivae normal.      Pupils: Pupils are equal, round, and reactive to light.   Cardiovascular:      Rate and Rhythm: Normal rate and regular rhythm.      Pulses: Normal pulses.      Heart sounds: Normal heart sounds. No murmur heard.  Pulmonary:      Effort: Pulmonary effort is normal. No respiratory distress.      Breath sounds: Normal breath sounds.   Abdominal:      General: Bowel sounds are normal.      Palpations: Abdomen is soft. There is no mass.      Tenderness: There is no abdominal tenderness.   Skin:     General: Skin is warm and dry.      Capillary Refill: Capillary refill takes less than 2 seconds.   Neurological:      General: No focal deficit present.      Mental Status: She is alert and oriented to person, place, and time.      Cranial Nerves: No cranial nerve " deficit.      Sensory: No sensory deficit.      Motor: No weakness.   Psychiatric:         Mood and Affect: Mood normal.         Behavior: Behavior normal.       Assessment & Plan:     62 y.o. female with the following -     Problem List Items Addressed This Visit       Essential hypertension     Chronic, unstable today. Has headache.   Taking verapamil er 120 mg and hctz 25 mg for this, with generally good blood pressure control. Discussed lifestyle recommendations to lower blood pressure, encouraged home log, return if blood pressure remains elevated         Constipation     Acute, worsening. Recently had orthopedic injury, to right lateral ribs, took 5 doses of pain medication >1 week ago, has had ongoing constipation since that time. Has passed gas, has passed small stools that are round and hard. Has taken miralax, colace for this without success. Has also Increased fiber, and fluid intake.     Has been 11 days since last bowel movement.     Bowel sounds active, normal in all quadrants. No masses or tenderness to palpation  Recommend increased ambulation, avoiding triggers including pain medication, fleets enema x2, milk of magnesia, senna, increasing water and dietary fiber intake. Discussed imagery techniques as well as abdominal/bowel massage.   ER or Return if symptoms do not improve/worsen         Relevant Orders    [id number] VIEWS (Completed)     Other Visit Diagnoses       Screening for colorectal cancer        Relevant Orders    Referral to GI for Colonoscopy    Encounter for screening mammogram for breast cancer        Relevant Orders    MA-SCREENING MAMMO BILAT W/TOMOSYNTHESIS W/CAD    Need for vaccination        Relevant Orders    INFLUENZA VACCINE QUAD INJ (PF) (Completed)          I have placed the below orders and discussed them with an approved delegating provider.  The MA is performing the below orders under the direction of Dr. Bustos.    I spent a total of 27 minutes with record review,  exam, communication with the patient, communication with other providers, and documentation of this encounter.    Return if symptoms worsen or fail to improve.    Please note that this dictation was created using voice recognition software. I have made every reasonable attempt to correct obvious errors, but I expect that there are errors of grammar and possibly content that I did not discover before finalizing the note.

## 2022-10-14 NOTE — ASSESSMENT & PLAN NOTE
Acute, worsening. Recently had orthopedic injury, to right lateral ribs, took 5 doses of pain medication >1 week ago, has had ongoing constipation since that time. Has passed gas, has passed small stools that are round and hard. Has taken miralax, colace for this without success. Has also Increased fiber, and fluid intake.     Has been 11 days since last bowel movement.     Bowel sounds active, normal in all quadrants. No masses or tenderness to palpation  Recommend increased ambulation, avoiding triggers including pain medication, fleets enema x2, milk of magnesia, senna, increasing water and dietary fiber intake. Discussed imagery techniques as well as abdominal/bowel massage.   ER or Return if symptoms do not improve/worsen

## 2022-10-15 ENCOUNTER — APPOINTMENT (OUTPATIENT)
Dept: RADIOLOGY | Facility: MEDICAL CENTER | Age: 62
End: 2022-10-15
Attending: EMERGENCY MEDICINE
Payer: COMMERCIAL

## 2022-10-15 ENCOUNTER — HOSPITAL ENCOUNTER (EMERGENCY)
Facility: MEDICAL CENTER | Age: 62
End: 2022-10-15
Attending: EMERGENCY MEDICINE
Payer: COMMERCIAL

## 2022-10-15 VITALS
DIASTOLIC BLOOD PRESSURE: 94 MMHG | BODY MASS INDEX: 34.89 KG/M2 | RESPIRATION RATE: 16 BRPM | SYSTOLIC BLOOD PRESSURE: 154 MMHG | HEART RATE: 76 BPM | OXYGEN SATURATION: 98 % | WEIGHT: 189.6 LBS | TEMPERATURE: 97.6 F | HEIGHT: 62 IN

## 2022-10-15 DIAGNOSIS — K59.00 CONSTIPATION, UNSPECIFIED CONSTIPATION TYPE: ICD-10-CM

## 2022-10-15 LAB
ANION GAP SERPL CALC-SCNC: 14 MMOL/L (ref 7–16)
BASOPHILS # BLD AUTO: 0.4 % (ref 0–1.8)
BASOPHILS # BLD: 0.05 K/UL (ref 0–0.12)
BUN SERPL-MCNC: 13 MG/DL (ref 8–22)
CALCIUM SERPL-MCNC: 9.5 MG/DL (ref 8.4–10.2)
CHLORIDE SERPL-SCNC: 98 MMOL/L (ref 96–112)
CO2 SERPL-SCNC: 22 MMOL/L (ref 20–33)
CREAT SERPL-MCNC: 0.76 MG/DL (ref 0.5–1.4)
EKG IMPRESSION: NORMAL
EOSINOPHIL # BLD AUTO: 0.11 K/UL (ref 0–0.51)
EOSINOPHIL NFR BLD: 0.9 % (ref 0–6.9)
ERYTHROCYTE [DISTWIDTH] IN BLOOD BY AUTOMATED COUNT: 40.2 FL (ref 35.9–50)
GFR SERPLBLD CREATININE-BSD FMLA CKD-EPI: 88 ML/MIN/1.73 M 2
GLUCOSE SERPL-MCNC: 132 MG/DL (ref 65–99)
HCT VFR BLD AUTO: 49.8 % (ref 37–47)
HGB BLD-MCNC: 17.2 G/DL (ref 12–16)
IMM GRANULOCYTES # BLD AUTO: 0.04 K/UL (ref 0–0.11)
IMM GRANULOCYTES NFR BLD AUTO: 0.3 % (ref 0–0.9)
LYMPHOCYTES # BLD AUTO: 1.86 K/UL (ref 1–4.8)
LYMPHOCYTES NFR BLD: 16 % (ref 22–41)
MCH RBC QN AUTO: 29.9 PG (ref 27–33)
MCHC RBC AUTO-ENTMCNC: 34.5 G/DL (ref 33.6–35)
MCV RBC AUTO: 86.6 FL (ref 81.4–97.8)
MONOCYTES # BLD AUTO: 0.67 K/UL (ref 0–0.85)
MONOCYTES NFR BLD AUTO: 5.7 % (ref 0–13.4)
NEUTROPHILS # BLD AUTO: 8.93 K/UL (ref 2–7.15)
NEUTROPHILS NFR BLD: 76.7 % (ref 44–72)
NRBC # BLD AUTO: 0 K/UL
NRBC BLD-RTO: 0 /100 WBC
PLATELET # BLD AUTO: 341 K/UL (ref 164–446)
PMV BLD AUTO: 9.5 FL (ref 9–12.9)
POTASSIUM SERPL-SCNC: 3.9 MMOL/L (ref 3.6–5.5)
RBC # BLD AUTO: 5.75 M/UL (ref 4.2–5.4)
SARS-COV-2 RNA RESP QL NAA+PROBE: NOTDETECTED
SODIUM SERPL-SCNC: 134 MMOL/L (ref 135–145)
SPECIMEN SOURCE: NORMAL
WBC # BLD AUTO: 11.7 K/UL (ref 4.8–10.8)

## 2022-10-15 PROCEDURE — 96374 THER/PROPH/DIAG INJ IV PUSH: CPT

## 2022-10-15 PROCEDURE — 99284 EMERGENCY DEPT VISIT MOD MDM: CPT

## 2022-10-15 PROCEDURE — 700111 HCHG RX REV CODE 636 W/ 250 OVERRIDE (IP): Performed by: EMERGENCY MEDICINE

## 2022-10-15 PROCEDURE — 70450 CT HEAD/BRAIN W/O DYE: CPT

## 2022-10-15 PROCEDURE — 85025 COMPLETE CBC W/AUTO DIFF WBC: CPT

## 2022-10-15 PROCEDURE — 74022 RADEX COMPL AQT ABD SERIES: CPT

## 2022-10-15 PROCEDURE — U0003 INFECTIOUS AGENT DETECTION BY NUCLEIC ACID (DNA OR RNA); SEVERE ACUTE RESPIRATORY SYNDROME CORONAVIRUS 2 (SARS-COV-2) (CORONAVIRUS DISEASE [COVID-19]), AMPLIFIED PROBE TECHNIQUE, MAKING USE OF HIGH THROUGHPUT TECHNOLOGIES AS DESCRIBED BY CMS-2020-01-R: HCPCS

## 2022-10-15 PROCEDURE — 93005 ELECTROCARDIOGRAM TRACING: CPT | Performed by: EMERGENCY MEDICINE

## 2022-10-15 PROCEDURE — 80048 BASIC METABOLIC PNL TOTAL CA: CPT

## 2022-10-15 PROCEDURE — U0005 INFEC AGEN DETEC AMPLI PROBE: HCPCS

## 2022-10-15 PROCEDURE — 36415 COLL VENOUS BLD VENIPUNCTURE: CPT

## 2022-10-15 RX ORDER — KETOROLAC TROMETHAMINE 30 MG/ML
15 INJECTION, SOLUTION INTRAMUSCULAR; INTRAVENOUS ONCE
Status: COMPLETED | OUTPATIENT
Start: 2022-10-15 | End: 2022-10-15

## 2022-10-15 RX ADMIN — KETOROLAC TROMETHAMINE 15 MG: 30 INJECTION, SOLUTION INTRAMUSCULAR; INTRAVENOUS at 10:24

## 2022-10-15 ASSESSMENT — FIBROSIS 4 INDEX: FIB4 SCORE: 0.76

## 2022-10-15 NOTE — ED PROVIDER NOTES
ED Provider Note    CHIEF COMPLAINT  Chief Complaint   Patient presents with    HTN (Uncontrolled)       HPI  Harmony Monroe is a 62 y.o. female who presents because of high blood pressure.  Patient had a rib injury about 2 weeks ago for which she took NSAIDs and a few oxycodone tablets..  12 days ago she became constipated and has not had a significant bowel movement since then.  She did 9 days of once daily MiraLAX.  She switched to Colace once daily for the last 3 days.  Yesterday was seen by primary provider who added milk of magnesia and fleets enema to treatment.  Despite lack of bowel movement she has not had any abdominal pain.  She does feel bloated.  This morning she was nauseous and vomited 1 time.  No previous nausea or vomiting.  She has not had dysuria.  She has had some chills.  No fever.  No cough, congestion, runny nose.  Denies chest pain or shortness of breath.  She does have an associated headache.  This started about 3 days ago has been constant since then.  Feels better now compared to last night currently a 5/10.  Believes Tylenol helped.  In addition of this her blood pressure has been elevated.  Was seen by primary provider yesterday.  Had blood pressure readings in the 130s over 100s.  Was advised to continue her home medications and keep a log.    REVIEW OF SYSTEMS  As per HPI, otherwise a 10 point review of systems is negative    PAST MEDICAL HISTORY  Past Medical History:   Diagnosis Date    Allergy     Arthritis     osteoarthritis; right knee    ASTHMA     1/29/2018 not an issue, currently no medications needed    Basal cell carcinoma of left medial cheek 11/30/2015    Basal cell carcinoma of left medial cheek 11/30/2015    Depression 1/7/2010    High cholesterol     History of tobacco use disorder 1/7/2010    Hypertension     Pneumonia 1990,2001    Post-menopausal 8/26/2014       SOCIAL HISTORY  Social History     Tobacco Use    Smoking status: Every Day     Packs/day: 0.25      "Years: 19.00     Pack years: 4.75     Types: Cigarettes     Start date: 1997     Last attempt to quit: 2/15/2020     Years since quittin.6    Smokeless tobacco: Never   Vaping Use    Vaping Use: Never used   Substance Use Topics    Alcohol use: No     Alcohol/week: 0.0 oz    Drug use: No       SURGICAL HISTORY  Past Surgical History:   Procedure Laterality Date    KNEE ARTHROSCOPY Left 2018    Procedure: KNEE ARTHROSCOPY;  Surgeon: Andrews Castro M.D.;  Location: [facility];  Service: Orthopedics    MENISCECTOMY, KNEE, MEDIAL Left 2018    Procedure: MEDIAL MENISCECTOMY - PARTIAL;  Surgeon: Andrews Castro M.D.;  Location: [facility];  Service: Orthopedics    KNEE ARTHROSCOPY Right 3/23/2017    Procedure: KNEE ARTHROSCOPY;  Surgeon: Andrews Castro M.D.;  Location: [facility];  Service:     MENISCECTOMY, KNEE, MEDIAL  3/23/2017    Procedure: MEDIAL and lateral  MENISCECTOMY - PARTIAL;  Surgeon: Andrews Castro M.D.;  Location: [facility];  Service:     CHONDROPLASTY  3/23/2017    Procedure: CHONDROPLASTY -  PATELLAR;  Surgeon: Andrews Castro M.D.;  Location: [facility];  Service:     ABDOMINAL HYSTERECTOMY TOTAL  2005    TONSILLECTOMY AND ADENOIDECTOMY      NASAL SEPTAL RECONSTRUCTION      CYST EXCISION      Anterior Left Foot       CURRENT MEDICATIONS  Home Medications    **Home medications have not yet been reviewed for this encounter**         ALLERGIES  Allergies   Allergen Reactions    Bee Anaphylaxis    Levaquin Anaphylaxis and Unspecified     Myalgias arthralgias     Tape Hives     Redness; itching \"paper tape ok\"    Moxifloxacin Hcl In Nacl Swelling    Polysporin [Bacitracin-Polymyxin B]      rash    Promethazine      Twitching feeling       PHYSICAL EXAM  VITAL SIGNS: BP (!) 151/107   Pulse 88   Temp 36.4 °C (97.6 °F) (Temporal)   Resp 18   Ht 1.575 m (5' 2\")   Wt 86 kg (189 lb 9.5 oz)   SpO2 98%   " BMI 34.68 kg/m²    Constitutional: Awake and alert.  Appears anxious  HENT: Normal inspection  Eyes: Pulls equal round and reactive to light bilaterally  Neck: Grossly normal range of motion.  Cardiovascular: Normal heart rate, Normal rhythm.  Symmetric peripheral pulses.  A few premature ventricular contractions noted on cardiac monitor  Thorax & Lungs: No respiratory distress, No wheezing, No rales, No rhonchi, No chest tenderness.   Abdomen: Bowel sounds normal, soft, non-distended, nontender, no mass  Skin: No obvious rash.  Extremities: Well perfused  Neurologic: Awake alert oriented clear speech.  Moves all extremities symmetrically.      RADIOLOGY/PROCEDURES  DX-ABDOMEN COMPLETE WITH AP OR PA CXR   Final Result      1.  No evidence of obstruction or perforation.      2.  Moderate amount of stool throughout the colon consistent with constipation though appears decreased compared to yesterday.      3.  Left midlung atelectasis versus scarring.      CT-HEAD W/O   Final Result         1. Head CT without contrast within normal limits. No evidence of acute cerebral infarction, hemorrhage or mass lesion.              Imaging is interpreted by radiologist    Labs:  Results for orders placed or performed during the hospital encounter of 10/15/22   Basic Metabolic Panel   Result Value Ref Range    Sodium 134 (L) 135 - 145 mmol/L    Potassium 3.9 3.6 - 5.5 mmol/L    Chloride 98 96 - 112 mmol/L    Co2 22 20 - 33 mmol/L    Glucose 132 (H) 65 - 99 mg/dL    Bun 13 8 - 22 mg/dL    Creatinine 0.76 0.50 - 1.40 mg/dL    Calcium 9.5 8.4 - 10.2 mg/dL    Anion Gap 14.0 7.0 - 16.0   CBC WITH DIFFERENTIAL   Result Value Ref Range    WBC 11.7 (H) 4.8 - 10.8 K/uL    RBC 5.75 (H) 4.20 - 5.40 M/uL    Hemoglobin 17.2 (H) 12.0 - 16.0 g/dL    Hematocrit 49.8 (H) 37.0 - 47.0 %    MCV 86.6 81.4 - 97.8 fL    MCH 29.9 27.0 - 33.0 pg    MCHC 34.5 33.6 - 35.0 g/dL    RDW 40.2 35.9 - 50.0 fL    Platelet Count 341 164 - 446 K/uL    MPV 9.5 9.0 -  12.9 fL    Neutrophils-Polys 76.70 (H) 44.00 - 72.00 %    Lymphocytes 16.00 (L) 22.00 - 41.00 %    Monocytes 5.70 0.00 - 13.40 %    Eosinophils 0.90 0.00 - 6.90 %    Basophils 0.40 0.00 - 1.80 %    Immature Granulocytes 0.30 0.00 - 0.90 %    Nucleated RBC 0.00 /100 WBC    Neutrophils (Absolute) 8.93 (H) 2.00 - 7.15 K/uL    Lymphs (Absolute) 1.86 1.00 - 4.80 K/uL    Monos (Absolute) 0.67 0.00 - 0.85 K/uL    Eos (Absolute) 0.11 0.00 - 0.51 K/uL    Baso (Absolute) 0.05 0.00 - 0.12 K/uL    Immature Granulocytes (abs) 0.04 0.00 - 0.11 K/uL    NRBC (Absolute) 0.00 K/uL   SARS-CoV-2, PCR (24 Hour In-House) Collect NP swab in [facility]    Specimen: Respirate   Result Value Ref Range    SARS-CoV-2 Source NP Swab    ESTIMATED GFR   Result Value Ref Range    GFR (CKD-EPI) 88 >60 mL/min/1.73 m 2   EKG interpreted by me shows normal sinus rhythm.  Normal axis.  Normal intervals.  No acute ST changes.  Normal EKG    Medications   ketorolac (TORADOL) injection 15 mg (has no administration in time range)         COURSE & MEDICAL DECISION MAKING  Patient presents to the ER with several complaints.  She is anxious about her blood pressure which I suspect is secondary to symptoms she is experiencing rather than a primary problem.  We will watch blood pressure.  Obtain medical evaluation.    Blood pressure came down on recheck.  It is elevated but not pathologically so.  She will need to keep a log and may need blood pressure medication adjustment.  She does not have any evidence of endorgan injury.  Her EKG is normal.  CT scan of the head was obtained because of high blood pressure and headache.  Negative for hemorrhage.  Headache was improved with Toradol.  No clinical suggestion of subarachnoid hemorrhage or meningitis.  She may have a viral process and I sent a COVID screen.  [facility]  Patient is suffering from constipation.  Obtained x-ray.  She is constipated on the films but improved from  yesterday.  We will have her continue milk of magnesia and take MiraLAX with this.  Advised clear liquid diet for 2 days.  Plenty of fluids.    This point patient is stable for discharge.  Again we will keep blood pressure log.  She will return to the ER for any abdominal pain, vomiting, high fevers, any neurologic symptoms or concern.    FINAL IMPRESSION  1.  Hypertension  2.  Headache, suspected viral tension variant  3.  Constipation         This dictation was created using voice recognition software. The accuracy of the dictation is limited to the abilities of the software.  The nursing notes were reviewed and certain aspects of this information were incorporated into this note.      Electronically signed by: Richard Rosado M.D., 10/15/2022 9:41 AM

## 2022-10-15 NOTE — ED TRIAGE NOTES
"Presents with a hx of hypertension Verapamil, and HCTZ  (for the \"past 20 years\")She C/O escalating BP recurring for the past few days.  She denies any chest pain or associated dyspnea. Reports constipation for the past 10 days with medical interventions (MoM, Senokot, and fleets enemas without amelioration of symptomatology) after a scheduled PCP visit.  Chief Complaint   Patient presents with    HTN (Uncontrolled)   BP (!) 151/107   Pulse 100   Temp 36.4 °C (97.6 °F) (Temporal)   Resp 18   Ht 1.575 m (5' 2\")   Wt 86 kg (189 lb 9.5 oz)   SpO2 98%   BMI 34.68 kg/m²     Has this patient been vaccinated for COVID YES  If not, would they like to be vaccinated while in the ER if eligible?  N/A  Would the patient like to speak with the ERP about the possibility of receiving the COVID vaccine today before making a decision? N/A     "

## 2022-10-15 NOTE — LETTER
"10/17/2022             Harmonynadine Conti Mabel  [address] [facility] [address]        Dear Harmony (MR#[id number]),    This letter is to inform you that your COVID-19 test result is NEGATIVE.  This means that the virus that causes COVID-19 was not found in your sample.      SARS-CoV-2 Source   Date Value Ref Range Status   10/15/2022 NP Swab  Final     SARS-CoV-2 by PCR   Date Value Ref Range Status   10/15/2022 NotDetected  Final     Comment:     PATIENTS: Important information regarding your results and instructions can  be found at [email or web address]   \"After your  Covid-19 Test\"    [facility] providers: PLEASE REFER TO DE-ESCALATION AND RETESTING PROTOCOL  on [email or web address]    **The Roche SARS-CoV-2 RT-PCR test has been made available for use under the  Emergency Use Authorization (EUA) only.         Next steps:  - Stay home while you are feeling sick.  - Monitor your symptoms and contact your healthcare provider if you get worse.  - If you have questions, contact your healthcare provider    When can my home isolation end?  If you were tested because you had close contact (defined below) with someone with COVID-19. You should stay home for 5 days after your close contact with the person.    What counts as close contact?  - You were within 6 feet of someone who has COVID-19 for a total of 15 minutes or more  - You provided care at home to someone who is sick with COVID-19  - You had direct physical contact with the person (hugged or kissed them)  - You shared eating or drinking utensils  - They sneezed, coughed, or somehow got respiratory droplets on you    If you were tested because you were exposed to a household contact with COVID-19, you should still quarantine yourself for a period of 5 days. The 5-day quarantine period begins once your affected household contact is isolated. If you are unable to quarantine yourself from your affected household contact, the 5-day quarantine period " begins when the patient meets criteria to break isolation.    If you were not exposed to a household contact with COVID-19, you may still be contagious while experiencing symptoms, so you should not return to work or regular activities until 24 hours after symptoms fully improve. For example, if you feel back to normal on Tuesday, you should remain isolated until Wednesday.    Sincerely,  The [facility] Care Team

## 2022-10-15 NOTE — ED NOTES
Pt resting in stretcher with even, unlabored respirations. Plan of care reviewed, all questions answered. Denies additional needs at this time. Call light within reach.

## 2022-10-21 ENCOUNTER — OFFICE VISIT (OUTPATIENT)
Dept: MEDICAL GROUP | Facility: PHYSICIAN GROUP | Age: 62
End: 2022-10-21
Payer: COMMERCIAL

## 2022-10-21 VITALS
HEIGHT: 62 IN | BODY MASS INDEX: 35.7 KG/M2 | SYSTOLIC BLOOD PRESSURE: 126 MMHG | OXYGEN SATURATION: 97 % | WEIGHT: 194 LBS | HEART RATE: 80 BPM | DIASTOLIC BLOOD PRESSURE: 78 MMHG | TEMPERATURE: 97.3 F

## 2022-10-21 DIAGNOSIS — J01.10 ACUTE NON-RECURRENT FRONTAL SINUSITIS: ICD-10-CM

## 2022-10-21 DIAGNOSIS — F40.243 FEAR OF FLYING: ICD-10-CM

## 2022-10-21 PROCEDURE — 99214 OFFICE O/P EST MOD 30 MIN: CPT | Performed by: NURSE PRACTITIONER

## 2022-10-21 RX ORDER — AMOXICILLIN AND CLAVULANATE POTASSIUM 875; 125 MG/1; MG/1
1 TABLET, FILM COATED ORAL 2 TIMES DAILY
Qty: 14 TABLET | Refills: 0 | Status: SHIPPED | OUTPATIENT
Start: 2022-10-21 | End: 2022-10-28

## 2022-10-21 RX ORDER — DIAZEPAM 5 MG/1
5 TABLET ORAL EVERY 6 HOURS PRN
Qty: 10 TABLET | Refills: 0 | Status: SHIPPED | OUTPATIENT
Start: 2022-10-21 | End: 2022-10-26

## 2022-10-21 ASSESSMENT — FIBROSIS 4 INDEX: FIB4 SCORE: 0.8

## 2022-10-21 NOTE — PROGRESS NOTES
Sinusitis, Hypertension, and Medication Management (valium)                                                                                                                                       HPI:   Harmony presents today with the following.    Problem   Acute Non-Recurrent Frontal Sinusitis   Fear of Flying       Current Outpatient Medications   Medication Sig Dispense Refill    temazepam (RESTORIL) 15 MG Cap Take 1-2 Capsules by mouth at bedtime as needed for Sleep for up to 90 days. 100 Capsule 1    polyethylene glycol/lytes (MIRALAX) 17 g Pack Take 17 g by mouth every day.      ondansetron (ZOFRAN) 4 MG Tab tablet Take 1 Tablet by mouth every four hours as needed for Nausea/Vomiting. 10 Tablet 0    escitalopram (LEXAPRO) 10 MG Tab Take 1 Tablet by mouth every day. 90 Tablet 3    fluticasone-salmeterol (ADVAIR) 100-50 MCG/ACT AEROSOL POWDER, BREATH ACTIVATED Inhale 1 Puff 2 times a day. 1 Each 11    hydroCHLOROthiazide (HYDRODIURIL) 25 MG Tab Take 1 Tablet by mouth every day. 90 Tablet 3    EPINEPHrine (EPIPEN) 0.3 MG/0.3ML Solution Auto-injector solution for injection Inject 0.3 mL into the thigh one time for 1 dose. 1 Each 1    albuterol 108 (90 Base) MCG/ACT Aero Soln inhalation aerosol Inhale 2 Puffs every 6 hours as needed for Shortness of Breath. 3 Each 3    verapamil ER (CALAN SR) 120 MG CAPSULE SR 24 HR Take 1 Capsule by mouth every day. 90 Capsule 3    simvastatin (ZOCOR) 40 MG Tab Take 1 Tablet by mouth every evening. 90 Tablet 3    ipratropium-albuterol (DUONEB) 0.5-2.5 (3) MG/3ML nebulizer solution 3 mL by Nebulization route 4 times a day. 30 Bullet 5    estradiol (ESTRACE) 1 MG TABS Take 1 mg by mouth every day. From gyn       No current facility-administered medications for this visit.       Allergies as of 10/21/2022 - Reviewed 10/21/2022   Allergen Reaction Noted    Bee Anaphylaxis 03/21/2017    Levaquin Anaphylaxis and Unspecified 10/26/2014    Tape Hives 09/01/2016    Moxifloxacin hcl in  "nacl Swelling 02/17/2011    Polysporin [bacitracin-polymyxin b]  12/31/2020    Promethazine  09/01/2016        ROS:  All systems negative expect as addressed in assessment and plan.     /78 (BP Location: Right arm, Patient Position: Sitting, BP Cuff Size: Adult)   Pulse 80   Temp 36.3 °C (97.3 °F) (Temporal)   Ht 1.575 m (5' 2\")   Wt 88 kg (194 lb)   SpO2 97%   BMI 35.48 kg/m²     Physical Exam:    Physical Exam  Vitals reviewed.   Constitutional:       Appearance: Normal appearance.   HENT:      Head: Normocephalic and atraumatic.      Right Ear: Hearing, tympanic membrane, ear canal and external ear normal.      Left Ear: Hearing, tympanic membrane, ear canal and external ear normal.      Nose: Nasal tenderness, mucosal edema, congestion and rhinorrhea present.      Right Turbinates: Enlarged and swollen.      Left Turbinates: Enlarged and swollen.      Right Sinus: Maxillary sinus tenderness and frontal sinus tenderness present.      Left Sinus: Maxillary sinus tenderness and frontal sinus tenderness present.      Mouth/Throat:      Mouth: Mucous membranes are moist.      Pharynx: Posterior oropharyngeal erythema present. No oropharyngeal exudate.      Tonsils: No tonsillar exudate.   Eyes:      Extraocular Movements: Extraocular movements intact.      Conjunctiva/sclera: Conjunctivae normal.   Pulmonary:      Effort: Pulmonary effort is normal.   Musculoskeletal:         General: Normal range of motion.      Cervical back: Normal range of motion.   Skin:     General: Skin is warm and dry.   Neurological:      General: No focal deficit present.      Mental Status: She is alert and oriented to person, place, and time.   Psychiatric:         Mood and Affect: Mood normal.         Behavior: Behavior normal.         Thought Content: Thought content normal.         Assessment and Plan:  62 y.o. female with the following issues.    1. Acute non-recurrent frontal sinusitis        2. Fear of flying  diazePAM " (VALIUM) 5 MG Tab           Acute non-recurrent frontal sinusitis  This is a acute issue. Patient has had sinus congestion for several weeks due to allergies and a URI. For the last 2 weeks she has also a frontal headache and sinus pain. She reports low grade temp of 99 degrees. She has mild cough and copious nasal drainage.     Will provide with augmentin treat.     Fear of flying  Patient is flying to visit family next week. She has a fear of flying and has used valium in the past for this. She reports that this helps her get through the flight.     Obtained and reviewed patient utilization report from [facility] pharmacy database on 10/21/2022 10:08 AM  prior to writing prescription for controlled substance II, III or IV per [place] bill . Based on assessment of the report,my physical exam if necessary and the patient's health problem, the prescription is medically necessary.     Will provide patient with valium for her flights.       Return in about 3 months (around 1/21/2023) for follow up for insomnia.      I have placed the below orders and discussed them with an approved delegating provider.  The MA is performing the below orders under the direction of Dr. Akbar.    Please note that this dictation was created using voice recognition software. I have worked with consultants from the vendor as well as technical experts from [employer] to optimize the interface. I have made every reasonable attempt to correct obvious errors, but I expect that there are errors of grammar and possibly content that I did not discover before finalizing the note.

## 2022-10-21 NOTE — ASSESSMENT & PLAN NOTE
This is a acute issue. Patient has had sinus congestion for several weeks due to allergies and a URI. For the last 2 weeks she has also a frontal headache and sinus pain. She reports low grade temp of 99 degrees. She has mild cough and copious nasal drainage.     Will provide with augmentin treat.

## 2022-10-21 NOTE — ASSESSMENT & PLAN NOTE
Patient is flying to visit family next week. She has a fear of flying and has used valium in the past for this. She reports that this helps her get through the flight.     Obtained and reviewed patient utilization report from [facility] pharmacy database on 10/21/2022 10:08 AM  prior to writing prescription for controlled substance II, III or IV per [place] bill . Based on assessment of the report,my physical exam if necessary and the patient's health problem, the prescription is medically necessary.     Will provide patient with valium for her flights.

## 2022-12-06 ENCOUNTER — HOSPITAL ENCOUNTER (OUTPATIENT)
Facility: MEDICAL CENTER | Age: 62
End: 2022-12-06
Payer: COMMERCIAL

## 2022-12-06 LAB
SARS-COV-2 RNA RESP QL NAA+PROBE: NOTDETECTED
SPECIMEN SOURCE: NORMAL

## 2023-02-08 DIAGNOSIS — I10 ESSENTIAL HYPERTENSION: ICD-10-CM

## 2023-02-14 RX ORDER — VERAPAMIL HYDROCHLORIDE 120 MG/1
120 CAPSULE, EXTENDED RELEASE ORAL
Qty: 90 CAPSULE | Refills: 3 | Status: SHIPPED | OUTPATIENT
Start: 2023-02-14 | End: 2023-04-19 | Stop reason: SDUPTHER

## 2023-03-06 ENCOUNTER — OFFICE VISIT (OUTPATIENT)
Dept: MEDICAL GROUP | Facility: PHYSICIAN GROUP | Age: 63
End: 2023-03-06
Payer: COMMERCIAL

## 2023-03-06 VITALS
HEIGHT: 62 IN | HEART RATE: 87 BPM | BODY MASS INDEX: 35.7 KG/M2 | DIASTOLIC BLOOD PRESSURE: 74 MMHG | OXYGEN SATURATION: 97 % | WEIGHT: 194 LBS | TEMPERATURE: 97.9 F | SYSTOLIC BLOOD PRESSURE: 118 MMHG

## 2023-03-06 DIAGNOSIS — Z12.12 SCREENING FOR COLORECTAL CANCER: ICD-10-CM

## 2023-03-06 DIAGNOSIS — R05.1 ACUTE COUGH: ICD-10-CM

## 2023-03-06 DIAGNOSIS — Z12.31 ENCOUNTER FOR SCREENING MAMMOGRAM FOR BREAST CANCER: ICD-10-CM

## 2023-03-06 DIAGNOSIS — J06.9 ACUTE URI: ICD-10-CM

## 2023-03-06 DIAGNOSIS — R11.0 NAUSEA: ICD-10-CM

## 2023-03-06 DIAGNOSIS — Z12.11 SCREENING FOR COLORECTAL CANCER: ICD-10-CM

## 2023-03-06 DIAGNOSIS — J01.40 ACUTE NON-RECURRENT PANSINUSITIS: ICD-10-CM

## 2023-03-06 LAB — S PYO DNA SPEC NAA+PROBE: NOT DETECTED

## 2023-03-06 PROCEDURE — 87651 STREP A DNA AMP PROBE: CPT

## 2023-03-06 PROCEDURE — 99214 OFFICE O/P EST MOD 30 MIN: CPT

## 2023-03-06 RX ORDER — AMOXICILLIN AND CLAVULANATE POTASSIUM 875; 125 MG/1; MG/1
1 TABLET, FILM COATED ORAL 2 TIMES DAILY
Qty: 10 TABLET | Refills: 0 | Status: SHIPPED | OUTPATIENT
Start: 2023-03-06 | End: 2023-03-11

## 2023-03-06 RX ORDER — FLUTICASONE PROPIONATE 50 MCG
2 SPRAY, SUSPENSION (ML) NASAL 2 TIMES DAILY
Qty: 9.9 ML | Refills: 0 | Status: SHIPPED | OUTPATIENT
Start: 2023-03-06 | End: 2023-03-20

## 2023-03-06 RX ORDER — BENZONATATE 100 MG/1
100 CAPSULE ORAL 3 TIMES DAILY PRN
Qty: 60 CAPSULE | Refills: 0 | Status: SHIPPED | OUTPATIENT
Start: 2023-03-06 | End: 2023-11-09

## 2023-03-06 RX ORDER — ONDANSETRON 4 MG/1
4 TABLET, FILM COATED ORAL EVERY 4 HOURS PRN
Qty: 20 TABLET | Refills: 1 | Status: SHIPPED | OUTPATIENT
Start: 2023-03-06 | End: 2023-03-16

## 2023-03-06 RX ORDER — TEMAZEPAM 15 MG/1
CAPSULE ORAL
COMMUNITY
Start: 2023-01-20 | End: 2023-04-19 | Stop reason: SDUPTHER

## 2023-03-06 ASSESSMENT — FIBROSIS 4 INDEX: FIB4 SCORE: 0.81

## 2023-03-06 NOTE — PATIENT INSTRUCTIONS
Viral Symptom Relief for Adults  You have been diagnosed with a viral illness.  Antibiotics do not cure viral infections. [facility] is committed to treating your illness in the best way possible and that includes avoiding antibiotics when they are likely to do more harm than good. The treatments recommended below will help you feel better while your body’s own defenses are fighting the virus    General instructions:  Stay hydrated  Use a cool mist vaporizer to relieve congestion    Specific medications:   Cough   Suppressant: Dextromethorphan polistirex suspension 30 mg/5 mL (Delsym, Robitussin 12 Hr Cough)    Expectorant: Guaifenesin extended release 600 mg tablets  (Mucinex, Mucus Relief)       Headache; ear, throat, muscle, or joint pain; or fever   Alternate acetaminophen and ibuprofen every 4 hours      Sore throat   Menthol lozenges (Cepacol, Vicks VapoDrops)     Nasal congestion   Nasal Spray: Sodium chloride (saline) nasal spray (Ocean, Simply Saline)    By mouth: Phenylephrine 10 mg tablets (Sudafed PE)      Runny nose, itchy and watery eyes, and sneezing   None      Important Medication Instructions   Use medicines according to package instructions or as directed by your healthcare provider.    Stop the medications when symptoms improve.    Brand names are listed convenience; any brand may be utilized as long as it has the same active ingredient     Follow-up:   If not improved in 3 days, new symptoms occur, or you have other concerns please call or return for a recheck.    Sinusitis, Adult  Sinusitis is inflammation of your sinuses. Sinuses are hollow spaces in the bones around your face. Your sinuses are located:  Around your eyes.  In the middle of your forehead.  Behind your nose.  In your cheekbones.  Mucus normally drains out of your sinuses. When your nasal tissues become inflamed or swollen, mucus can become trapped or blocked. This allows bacteria, viruses, and fungi to grow, which leads to  infection. Most infections of the sinuses are caused by a virus.  Sinusitis can develop quickly. It can last for up to 4 weeks (acute) or for more than 12 weeks (chronic). Sinusitis often develops after a cold.  What are the causes?  This condition is caused by anything that creates swelling in the sinuses or stops mucus from draining. This includes:  Allergies.  Asthma.  Infection from bacteria or viruses.  Deformities or blockages in your nose or sinuses.  Abnormal growths in the nose (nasal polyps).  Pollutants, such as chemicals or irritants in the air.  Infection from fungi (rare).  What increases the risk?  You are more likely to develop this condition if you:  Have a weak body defense system (immune system).  Do a lot of swimming or diving.  Overuse nasal sprays.  Smoke.  What are the signs or symptoms?  The main symptoms of this condition are pain and a feeling of pressure around the affected sinuses. Other symptoms include:  Stuffy nose or congestion.  Thick drainage from your nose.  Swelling and warmth over the affected sinuses.  Headache.  Upper toothache.  A cough that may get worse at night.  Extra mucus that collects in the throat or the back of the nose (postnasal drip).  Decreased sense of smell and taste.  Fatigue.  A fever.  Sore throat.  Bad breath.  How is this diagnosed?  This condition is diagnosed based on:  Your symptoms.  Your medical history.  A physical exam.  Tests to find out if your condition is acute or chronic. This may include:  Checking your nose for nasal polyps.  Viewing your sinuses using a device that has a light (endoscope).  Testing for allergies or bacteria.  Imaging tests, such as an MRI or CT scan.  In rare cases, a bone biopsy may be done to rule out more serious types of fungal sinus disease.  How is this treated?  Treatment for sinusitis depends on the cause and whether your condition is chronic or acute.  If caused by a virus, your symptoms should go away on their own  within 10 days. You may be given medicines to relieve symptoms. They include:  Medicines that shrink swollen nasal passages (topical intranasal decongestants).  Medicines that treat allergies (antihistamines).  A spray that eases inflammation of the nostrils (topical intranasal corticosteroids).  Rinses that help get rid of thick mucus in your nose (nasal saline washes).  If caused by bacteria, your health care provider may recommend waiting to see if your symptoms improve. Most bacterial infections will get better without antibiotic medicine. You may be given antibiotics if you have:  A severe infection.  A weak immune system.  If caused by narrow nasal passages or nasal polyps, you may need to have surgery.  Follow these instructions at home:  Medicines  Take, use, or apply over-the-counter and prescription medicines only as told by your health care provider. These may include nasal sprays.  If you were prescribed an antibiotic medicine, take it as told by your health care provider. Do not stop taking the antibiotic even if you start to feel better.  Hydrate and humidify    Drink enough fluid to keep your urine pale yellow. Staying hydrated will help to thin your mucus.  Use a cool mist humidifier to keep the humidity level in your home above 50%.  Inhale steam for 10-15 minutes, 3-4 times a day, or as told by your health care provider. You can do this in the bathroom while a hot shower is running.  Limit your exposure to cool or dry air.  Rest  Rest as much as possible.  Sleep with your head raised (elevated).  Make sure you get enough sleep each night.  General instructions    Apply a warm, moist washcloth to your face 3-4 times a day or as told by your health care provider. This will help with discomfort.  Wash your hands often with soap and water to reduce your exposure to germs. If soap and water are not available, use hand .  Do not smoke. Avoid being around people who are smoking (secondhand  smoke).  Keep all follow-up visits as told by your health care provider. This is important.  Contact a health care provider if:  You have a fever.  Your symptoms get worse.  Your symptoms do not improve within 10 days.  Get help right away if:  You have a severe headache.  You have persistent vomiting.  You have severe pain or swelling around your face or eyes.  You have vision problems.  You develop confusion.  Your neck is stiff.  You have trouble breathing.  Summary  Sinusitis is soreness and inflammation of your sinuses. Sinuses are hollow spaces in the bones around your face.  This condition is caused by nasal tissues that become inflamed or swollen. The swelling traps or blocks the flow of mucus. This allows bacteria, viruses, and fungi to grow, which leads to infection.  If you were prescribed an antibiotic medicine, take it as told by your health care provider. Do not stop taking the antibiotic even if you start to feel better.  Keep all follow-up visits as told by your health care provider. This is important.  This information is not intended to replace advice given to you by your health care provider. Make sure you discuss any questions you have with your health care provider.  Document Released: 12/18/2006 Document Revised: 05/20/2019 Document Reviewed: 05/20/2019  [employer] Patient Education © 2020 [employer]

## 2023-03-06 NOTE — PROGRESS NOTES
Chief Complaint   Patient presents with    Pharyngitis     X 1 week, negative covid test 3/5     Otalgia    Cough        HPI: Patient is a 63 y.o. female complaining of 21 days of illness including: productive of clear, green sputum and frequent cough, ear pain, facial pain, fever, nasal congestion, sore throat. Home Covid-19 test negative.   Mucus is: thick.  Similarly ill exposures: no.  Treatments tried: OTC cold/flu medicine, advil  She  reports that she has been smoking cigarettes. She started smoking about 25 years ago. She has a 4.75 pack-year smoking history. She has never used smokeless tobacco..     ROS:  Positive for fever, cough, nausea, sinus pain, thick mucous secretions. Negative for changes in bowel movements or skin rash.      I reviewed the patient's medications, allergies and medical history:  Current Outpatient Medications   Medication Sig Dispense Refill    amoxicillin-clavulanate (AUGMENTIN) 875-125 MG Tab Take 1 Tablet by mouth 2 times a day for 5 days. 10 Tablet 0    fluticasone (FLONASE) 50 MCG/ACT nasal spray Administer 2 Sprays into affected nostril(S) 2 times a day for 14 days. 9.9 mL 0    benzonatate (TESSALON) 100 MG Cap Take 1 Capsule by mouth 3 times a day as needed for Cough. 60 Capsule 0    ondansetron (ZOFRAN) 4 MG Tab tablet Take 1 Tablet by mouth every four hours as needed for Nausea/Vomiting for up to 10 days. 20 Tablet 1    verapamil ER (CALAN SR) 120 MG CAPSULE SR 24 HR TAKE 1 CAPSULE BY MOUTH EVERY DAY 90 Capsule 3    polyethylene glycol/lytes (MIRALAX) 17 g Pack Take 17 g by mouth every day.      escitalopram (LEXAPRO) 10 MG Tab Take 1 Tablet by mouth every day. 90 Tablet 3    fluticasone-salmeterol (ADVAIR) 100-50 MCG/ACT AEROSOL POWDER, BREATH ACTIVATED Inhale 1 Puff 2 times a day. 1 Each 11    hydroCHLOROthiazide (HYDRODIURIL) 25 MG Tab Take 1 Tablet by mouth every day. 90 Tablet 3    EPINEPHrine (EPIPEN) 0.3 MG/0.3ML Solution Auto-injector solution for injection Inject  "0.3 mL into the thigh one time for 1 dose. 1 Each 1    albuterol 108 (90 Base) MCG/ACT Aero Soln inhalation aerosol Inhale 2 Puffs every 6 hours as needed for Shortness of Breath. 3 Each 3    simvastatin (ZOCOR) 40 MG Tab Take 1 Tablet by mouth every evening. 90 Tablet 3    ipratropium-albuterol (DUONEB) 0.5-2.5 (3) MG/3ML nebulizer solution 3 mL by Nebulization route 4 times a day. 30 Bullet 5    estradiol (ESTRACE) 1 MG TABS Take 1 mg by mouth every day. From gyn      temazepam (RESTORIL) 15 MG Cap TAKE 1-2 CAPSULES BY MOUTH AT BEDTIME AS NEEDED FOR SLEEP FOR UP TO 90 DAYS.       No current facility-administered medications for this visit.     Bee, Levaquin, Tape, Moxifloxacin hcl in nacl, Polysporin [bacitracin-polymyxin b], and Promethazine  Past Medical History:   Diagnosis Date    Allergy     Arthritis     osteoarthritis; right knee    ASTHMA     1/29/2018 not an issue, currently no medications needed    Basal cell carcinoma of left medial cheek 11/30/2015    Basal cell carcinoma of left medial cheek 11/30/2015    Depression 1/7/2010    High cholesterol     History of tobacco use disorder 1/7/2010    Hypertension     Pneumonia 1990,2001    Post-menopausal 8/26/2014        EXAM:  /74 (BP Location: Left arm, Patient Position: Sitting, BP Cuff Size: Small adult)   Pulse 87   Temp 36.6 °C (97.9 °F) (Temporal)   Ht 1.575 m (5' 2\")   Wt 88 kg (194 lb)   SpO2 97%   General: Alert, no conversational dyspnea or audible wheeze, non-toxic appearance.  Eyes: PERRL, conjunctiva slightly injected, no eye discharge.  Ears: Normal pinnae,TM's bulging bilaterally.  Nares: Patent with thick mucus.  Sinuses: tender over maxillary / frontal sinuses.  Throat: Erythematous injection without exudate.   Neck: Supple, with moderately enlarged cervical nodes.  Lungs: Clear to auscultation bilaterally, no wheeze, crackles or rhonchi.   Heart: Regular rate without murmur.  Skin: Warm and dry without rash.     ASSESSMENT:   1. " Acute cough    2. Acute non-recurrent pansinusitis    3. Acute URI    4. Nausea    5. Screening for colorectal cancer    6. Encounter for screening mammogram for breast cancer          PLAN:  1. Educated patient that majority of upper respiratory infections are viral and do not need antibiotics. As symptoms have been worsening over the last week, will treat with antibiotics.  2. Twice daily use of nasal saline rinse or Neti-Pot.  3. OTC anti-pyretics and decongestants as needed.  4. Follow-up in office or urgent care for worsening symptoms, difficulty breathing, lack of expected recovery, or should new symptoms or problems arise.    I have placed the below orders and discussed them with an approved delegating provider.  The MA is performing the below orders under the direction of Dr. Akbar.

## 2023-03-06 NOTE — LETTER
[facility]  [address]  [place] [id number]     March 6, 2023    Patient: Harmony Monroe   YOB: 1960   Date of Visit: 3/6/2023       To Whom It May Concern:    Harmony Monroe was seen and treated in our department on 3/6/2023. It is my recommendation she remain home from work for at least 24 hours after starting antibiotics for persistent symptoms.     Sincerely,           CLEO Villela.

## 2023-04-13 ENCOUNTER — HOSPITAL ENCOUNTER (OUTPATIENT)
Dept: RADIOLOGY | Facility: MEDICAL CENTER | Age: 63
End: 2023-04-13
Payer: COMMERCIAL

## 2023-04-14 ENCOUNTER — APPOINTMENT (OUTPATIENT)
Dept: RADIOLOGY | Facility: MEDICAL CENTER | Age: 63
End: 2023-04-14
Payer: COMMERCIAL

## 2023-04-18 DIAGNOSIS — I10 ESSENTIAL HYPERTENSION: ICD-10-CM

## 2023-04-18 RX ORDER — OLMESARTAN MEDOXOMIL 20 MG/1
20 TABLET ORAL DAILY
Qty: 30 TABLET | Refills: 1 | Status: SHIPPED | OUTPATIENT
Start: 2023-04-18 | End: 2023-05-11

## 2023-04-19 ENCOUNTER — OFFICE VISIT (OUTPATIENT)
Dept: MEDICAL GROUP | Facility: PHYSICIAN GROUP | Age: 63
End: 2023-04-19
Payer: COMMERCIAL

## 2023-04-19 VITALS
HEART RATE: 80 BPM | WEIGHT: 188 LBS | HEIGHT: 62 IN | DIASTOLIC BLOOD PRESSURE: 102 MMHG | SYSTOLIC BLOOD PRESSURE: 155 MMHG | BODY MASS INDEX: 34.6 KG/M2 | TEMPERATURE: 97.4 F | OXYGEN SATURATION: 99 %

## 2023-04-19 DIAGNOSIS — F51.01 PRIMARY INSOMNIA: ICD-10-CM

## 2023-04-19 DIAGNOSIS — E66.9 OBESITY (BMI 30.0-34.9): ICD-10-CM

## 2023-04-19 DIAGNOSIS — Z79.890 POSTMENOPAUSAL HRT (HORMONE REPLACEMENT THERAPY): ICD-10-CM

## 2023-04-19 DIAGNOSIS — I10 ESSENTIAL HYPERTENSION: ICD-10-CM

## 2023-04-19 DIAGNOSIS — F40.243 FEAR OF FLYING: ICD-10-CM

## 2023-04-19 DIAGNOSIS — E78.5 HYPERLIPIDEMIA, UNSPECIFIED HYPERLIPIDEMIA TYPE: ICD-10-CM

## 2023-04-19 PROBLEM — K59.00 CONSTIPATION: Status: RESOLVED | Noted: 2022-10-14 | Resolved: 2023-04-19

## 2023-04-19 PROBLEM — R06.00 DYSPNEA: Status: RESOLVED | Noted: 2018-03-21 | Resolved: 2023-04-19

## 2023-04-19 PROCEDURE — 99214 OFFICE O/P EST MOD 30 MIN: CPT

## 2023-04-19 RX ORDER — TEMAZEPAM 15 MG/1
15 CAPSULE ORAL NIGHTLY PRN
Qty: 90 CAPSULE | Refills: 0 | Status: SHIPPED | OUTPATIENT
Start: 2023-04-19 | End: 2023-07-21 | Stop reason: SDUPTHER

## 2023-04-19 RX ORDER — VERAPAMIL HYDROCHLORIDE 120 MG/1
120 CAPSULE, EXTENDED RELEASE ORAL 2 TIMES DAILY
Qty: 180 CAPSULE | Refills: 3 | Status: SHIPPED | OUTPATIENT
Start: 2023-04-19 | End: 2023-05-23 | Stop reason: SDUPTHER

## 2023-04-19 RX ORDER — DIAZEPAM 5 MG/1
5 TABLET ORAL EVERY 6 HOURS PRN
Qty: 30 TABLET | Refills: 0 | Status: SHIPPED | OUTPATIENT
Start: 2023-04-19 | End: 2023-11-09 | Stop reason: SDUPTHER

## 2023-04-19 RX ORDER — ESTRADIOL 1 MG/1
1 TABLET ORAL DAILY
Qty: 90 TABLET | Refills: 0 | Status: SHIPPED | OUTPATIENT
Start: 2023-04-19 | End: 2023-08-11

## 2023-04-19 ASSESSMENT — FIBROSIS 4 INDEX: FIB4 SCORE: 0.81

## 2023-04-19 ASSESSMENT — ENCOUNTER SYMPTOMS
HEADACHES: 1
PALPITATIONS: 0
DIZZINESS: 0
BLURRED VISION: 0

## 2023-04-19 ASSESSMENT — PATIENT HEALTH QUESTIONNAIRE - PHQ9: CLINICAL INTERPRETATION OF PHQ2 SCORE: 0

## 2023-04-19 NOTE — ASSESSMENT & PLAN NOTE
Chronic, unstable. Pt presents with elevated blood pressure, symptomatic with headache and tinnitus. Endorses increased life stress recently.  Started olmesartan 20 mg daily today  Increase verapamil ER to 120mg BID  Continue hctz 25 mg daily.   Continue to collect home readings  Goal <130/80  Endorses significant difference in right arm bp vs left arm bp  120-130/80 to right arm, left arm 160-180/  Consider PAD

## 2023-04-19 NOTE — ASSESSMENT & PLAN NOTE
Chronic, stable.  Continue Valium as needed for flight.    Obtained and reviewed patient utilization report from [facility] pharmacy database on 4/19/2023 3:05 PM  prior to writing prescription for controlled substance II, III or IV per [place] bill . Based on assessment of the report,my physical exam if necessary and the patient's health problem, the prescription is medically necessary.

## 2023-04-19 NOTE — PROGRESS NOTES
"Subjective:     CC: Diagnoses of Essential hypertension, Postmenopausal HRT (hormone replacement therapy), Fear of flying, Primary insomnia, Hyperlipidemia, unspecified hyperlipidemia type, and Obesity (BMI 30.0-34.9) were pertinent to this visit.    HPI:   Harmony presents today with    Problem   Fear of Flying   Essential Hypertension   Postmenopausal Hrt (Hormone Replacement Therapy)   Primary Insomnia   Constipation (Resolved)   Dyspnea (Resolved)       ROS:  Review of Systems   HENT:  Positive for tinnitus.    Eyes:  Negative for blurred vision.   Cardiovascular:  Negative for chest pain, palpitations and leg swelling.   Neurological:  Positive for headaches. Negative for dizziness.   All other systems reviewed and are negative.    Objective:     Exam:  BP (!) 155/102 (BP Location: Left arm, Patient Position: Sitting)   Pulse 80   Temp 36.3 °C (97.4 °F) (Temporal)   Ht 1.575 m (5' 2\")   Wt 85.3 kg (188 lb)   SpO2 99%   BMI 34.39 kg/m²  Body mass index is 34.39 kg/m².    Physical Exam  Vitals reviewed.   Constitutional:       Appearance: Normal appearance. She is obese.   HENT:      Head: Normocephalic and atraumatic.   Cardiovascular:      Rate and Rhythm: Normal rate and regular rhythm.      Pulses: Normal pulses.      Heart sounds: Normal heart sounds. No murmur heard.  Pulmonary:      Effort: Pulmonary effort is normal. No respiratory distress.   Skin:     General: Skin is warm and dry.      Capillary Refill: Capillary refill takes less than 2 seconds.   Neurological:      General: No focal deficit present.      Mental Status: She is alert and oriented to person, place, and time.   Psychiatric:         Mood and Affect: Mood normal.         Behavior: Behavior normal.     Assessment & Plan:     63 y.o. female with the following -     Problem List Items Addressed This Visit       Primary insomnia     Chronic, stable.  Continue temazepam 15 mg nightly.         Relevant Medications    temazepam (RESTORIL) " 15 MG Cap    Other Relevant Orders    Controlled Substance Treatment Agreement    URINE DRUG SCREEN    Postmenopausal HRT (hormone replacement therapy)     Chronic, stable. Continue estradiol for menopausal symptom management. Discussed possibility of stopping as she has been on this since age 45.         Relevant Medications    estradiol (ESTRACE) 1 MG Tab    Essential hypertension     Chronic, unstable. Pt presents with elevated blood pressure, symptomatic with headache and tinnitus. Endorses increased life stress recently.  Started olmesartan 20 mg daily today  Increase verapamil ER to 120mg BID  Continue hctz 25 mg daily.   Continue to collect home readings  Goal <130/80  Endorses significant difference in right arm bp vs left arm bp  120-130/80 to right arm, left arm 160-180/  Consider PAD         Relevant Medications    verapamil ER (CALAN SR) 120 MG CAPSULE SR 24 HR    Other Relevant Orders    Comp Metabolic Panel    Obesity (BMI 30.0-34.9)    Relevant Orders    HEMOGLOBIN A1C    Comp Metabolic Panel    Lipid Profile    Lipoprotein (a)    Hyperlipidemia    Relevant Medications    verapamil ER (CALAN SR) 120 MG CAPSULE SR 24 HR    Other Relevant Orders    Lipid Profile    Lipoprotein (a)    Fear of flying     Chronic, stable.  Continue Valium as needed for flight.    Obtained and reviewed patient utilization report from [facility] pharmacy database on 4/19/2023 3:05 PM  prior to writing prescription for controlled substance II, III or IV per [place] bill . Based on assessment of the report,my physical exam if necessary and the patient's health problem, the prescription is medically necessary.            Relevant Medications    diazePAM (VALIUM) 5 MG Tab    Other Relevant Orders    Controlled Substance Treatment Agreement    URINE DRUG SCREEN       Patient was educated in proper administration of medication(s) ordered today including safety, possible SE, risks, benefits, rationale and alternatives  to therapy.     Supportive care, differential diagnoses, and indications for immediate follow-up discussed with patient.      Pathogenesis of diagnosis discussed including typical length and natural progression.      Instructed to return to clinic or nearest emergency department for any change in condition, further concerns, or worsening of symptoms.    Patient states understanding of the plan of care and discharge instructions.    I have placed the below orders and discussed them with an approved delegating provider.  The MA is performing the below orders under the direction of Dr. Akbar.    I spent a total of 26 minutes with record review, exam, communication with the patient, communication with other providers, and documentation of this encounter.    Return in about 2 weeks (around 5/3/2023) for Blood Pressure.    Please note that this dictation was created using voice recognition software. I have made every reasonable attempt to correct obvious errors, but I expect that there are errors of grammar and possibly content that I did not discover before finalizing the note.

## 2023-04-19 NOTE — ASSESSMENT & PLAN NOTE
Chronic, stable. Continue estradiol for menopausal symptom management. Discussed possibility of stopping as she has been on this since age 45.

## 2023-05-11 DIAGNOSIS — I10 ESSENTIAL HYPERTENSION: ICD-10-CM

## 2023-05-11 PROCEDURE — 1126F AMNT PAIN NOTED NONE PRSNT: CPT

## 2023-05-11 RX ORDER — OLMESARTAN MEDOXOMIL 20 MG/1
20 TABLET ORAL DAILY
Qty: 30 TABLET | Refills: 1 | Status: SHIPPED | OUTPATIENT
Start: 2023-05-11 | End: 2023-05-18

## 2023-05-17 DIAGNOSIS — I10 ESSENTIAL HYPERTENSION: ICD-10-CM

## 2023-05-18 ENCOUNTER — EH NON-PROVIDER (OUTPATIENT)
Dept: OCCUPATIONAL MEDICINE | Facility: CLINIC | Age: 63
End: 2023-05-18

## 2023-05-18 DIAGNOSIS — Z02.89 ENCOUNTER FOR OCCUPATIONAL HEALTH ASSESSMENT: ICD-10-CM

## 2023-05-18 PROCEDURE — 94375 RESPIRATORY FLOW VOLUME LOOP: CPT | Performed by: NURSE PRACTITIONER

## 2023-05-18 RX ORDER — OLMESARTAN MEDOXOMIL 20 MG/1
20 TABLET ORAL DAILY
Qty: 90 TABLET | Refills: 1 | Status: SHIPPED | OUTPATIENT
Start: 2023-05-18 | End: 2023-05-25

## 2023-05-19 ENCOUNTER — HOSPITAL ENCOUNTER (OUTPATIENT)
Dept: LAB | Facility: MEDICAL CENTER | Age: 63
End: 2023-05-19
Payer: COMMERCIAL

## 2023-05-19 DIAGNOSIS — I10 ESSENTIAL HYPERTENSION: ICD-10-CM

## 2023-05-19 DIAGNOSIS — E78.5 HYPERLIPIDEMIA, UNSPECIFIED HYPERLIPIDEMIA TYPE: ICD-10-CM

## 2023-05-19 DIAGNOSIS — E66.9 OBESITY (BMI 30.0-34.9): ICD-10-CM

## 2023-05-19 LAB
ALBUMIN SERPL BCP-MCNC: 4.2 G/DL (ref 3.2–4.9)
ALBUMIN/GLOB SERPL: 1.8 G/DL
ALP SERPL-CCNC: 70 U/L (ref 30–99)
ALT SERPL-CCNC: 16 U/L (ref 2–50)
ANION GAP SERPL CALC-SCNC: 12 MMOL/L (ref 7–16)
AST SERPL-CCNC: 16 U/L (ref 12–45)
BILIRUB SERPL-MCNC: 0.4 MG/DL (ref 0.1–1.5)
BUN SERPL-MCNC: 17 MG/DL (ref 8–22)
CALCIUM ALBUM COR SERPL-MCNC: 9.4 MG/DL (ref 8.5–10.5)
CALCIUM SERPL-MCNC: 9.6 MG/DL (ref 8.5–10.5)
CHLORIDE SERPL-SCNC: 103 MMOL/L (ref 96–112)
CHOLEST SERPL-MCNC: 174 MG/DL (ref 100–199)
CO2 SERPL-SCNC: 26 MMOL/L (ref 20–33)
CREAT SERPL-MCNC: 0.76 MG/DL (ref 0.5–1.4)
EST. AVERAGE GLUCOSE BLD GHB EST-MCNC: 114 MG/DL
GFR SERPLBLD CREATININE-BSD FMLA CKD-EPI: 88 ML/MIN/1.73 M 2
GLOBULIN SER CALC-MCNC: 2.4 G/DL (ref 1.9–3.5)
GLUCOSE SERPL-MCNC: 116 MG/DL (ref 65–99)
HBA1C MFR BLD: 5.6 % (ref 4–5.6)
HDLC SERPL-MCNC: 53 MG/DL
LDLC SERPL CALC-MCNC: 97 MG/DL
POTASSIUM SERPL-SCNC: 4.1 MMOL/L (ref 3.6–5.5)
PROT SERPL-MCNC: 6.6 G/DL (ref 6–8.2)
SODIUM SERPL-SCNC: 141 MMOL/L (ref 135–145)
TRIGL SERPL-MCNC: 118 MG/DL (ref 0–149)

## 2023-05-19 PROCEDURE — 80061 LIPID PANEL: CPT

## 2023-05-19 PROCEDURE — 83036 HEMOGLOBIN GLYCOSYLATED A1C: CPT

## 2023-05-19 PROCEDURE — 36415 COLL VENOUS BLD VENIPUNCTURE: CPT

## 2023-05-19 PROCEDURE — 80053 COMPREHEN METABOLIC PANEL: CPT

## 2023-05-19 PROCEDURE — 83695 ASSAY OF LIPOPROTEIN(A): CPT

## 2023-05-22 LAB — LPA SERPL-MCNC: <6 MG/DL

## 2023-05-23 DIAGNOSIS — I10 ESSENTIAL HYPERTENSION: ICD-10-CM

## 2023-05-23 RX ORDER — VERAPAMIL HYDROCHLORIDE 120 MG/1
120 CAPSULE, EXTENDED RELEASE ORAL 2 TIMES DAILY
Qty: 180 CAPSULE | Refills: 0 | Status: SHIPPED
Start: 2023-05-23 | End: 2023-05-25

## 2023-05-25 ENCOUNTER — HOSPITAL ENCOUNTER (OUTPATIENT)
Dept: LAB | Facility: MEDICAL CENTER | Age: 63
End: 2023-05-25
Payer: COMMERCIAL

## 2023-05-25 ENCOUNTER — OFFICE VISIT (OUTPATIENT)
Dept: MEDICAL GROUP | Facility: PHYSICIAN GROUP | Age: 63
End: 2023-05-25
Payer: COMMERCIAL

## 2023-05-25 ENCOUNTER — APPOINTMENT (OUTPATIENT)
Dept: OCCUPATIONAL MEDICINE | Facility: CLINIC | Age: 63
End: 2023-05-25
Payer: COMMERCIAL

## 2023-05-25 VITALS
OXYGEN SATURATION: 97 % | SYSTOLIC BLOOD PRESSURE: 122 MMHG | HEIGHT: 62 IN | HEART RATE: 96 BPM | DIASTOLIC BLOOD PRESSURE: 78 MMHG | BODY MASS INDEX: 33.86 KG/M2 | WEIGHT: 184 LBS | TEMPERATURE: 97.6 F

## 2023-05-25 DIAGNOSIS — I10 ESSENTIAL HYPERTENSION: ICD-10-CM

## 2023-05-25 DIAGNOSIS — I49.3 PVC (PREMATURE VENTRICULAR CONTRACTION): ICD-10-CM

## 2023-05-25 DIAGNOSIS — R09.89 PULSE IRREGULARITY: ICD-10-CM

## 2023-05-25 DIAGNOSIS — E66.9 OBESITY (BMI 30-39.9): ICD-10-CM

## 2023-05-25 DIAGNOSIS — I49.9 IRREGULARLY IRREGULAR PULSE RHYTHM: ICD-10-CM

## 2023-05-25 DIAGNOSIS — F41.9 ANXIETY: ICD-10-CM

## 2023-05-25 DIAGNOSIS — Z23 NEED FOR VACCINATION: ICD-10-CM

## 2023-05-25 PROBLEM — E66.811 OBESITY (BMI 30.0-34.9): Status: RESOLVED | Noted: 2017-02-06 | Resolved: 2023-05-25

## 2023-05-25 LAB
ANION GAP SERPL CALC-SCNC: 14 MMOL/L (ref 7–16)
BUN SERPL-MCNC: 15 MG/DL (ref 8–22)
CALCIUM SERPL-MCNC: 10 MG/DL (ref 8.5–10.5)
CHLORIDE SERPL-SCNC: 105 MMOL/L (ref 96–112)
CO2 SERPL-SCNC: 23 MMOL/L (ref 20–33)
CREAT SERPL-MCNC: 0.8 MG/DL (ref 0.5–1.4)
D DIMER PPP IA.FEU-MCNC: 0.33 UG/ML (FEU) (ref 0–0.5)
ERYTHROCYTE [DISTWIDTH] IN BLOOD BY AUTOMATED COUNT: 43 FL (ref 35.9–50)
GFR SERPLBLD CREATININE-BSD FMLA CKD-EPI: 83 ML/MIN/1.73 M 2
GLUCOSE SERPL-MCNC: 99 MG/DL (ref 65–99)
HCT VFR BLD AUTO: 49.4 % (ref 37–47)
HGB BLD-MCNC: 16.1 G/DL (ref 12–16)
MCH RBC QN AUTO: 29.5 PG (ref 27–33)
MCHC RBC AUTO-ENTMCNC: 32.6 G/DL (ref 32.2–35.5)
MCV RBC AUTO: 90.5 FL (ref 81.4–97.8)
NT-PROBNP SERPL IA-MCNC: 88 PG/ML (ref 0–125)
PLATELET # BLD AUTO: 337 K/UL (ref 164–446)
PMV BLD AUTO: 10.2 FL (ref 9–12.9)
POTASSIUM SERPL-SCNC: 4.3 MMOL/L (ref 3.6–5.5)
RBC # BLD AUTO: 5.46 M/UL (ref 4.2–5.4)
SODIUM SERPL-SCNC: 142 MMOL/L (ref 135–145)
TSH SERPL DL<=0.005 MIU/L-ACNC: 1.72 UIU/ML (ref 0.38–5.33)
WBC # BLD AUTO: 9.2 K/UL (ref 4.8–10.8)

## 2023-05-25 PROCEDURE — 85379 FIBRIN DEGRADATION QUANT: CPT

## 2023-05-25 PROCEDURE — 90715 TDAP VACCINE 7 YRS/> IM: CPT

## 2023-05-25 PROCEDURE — 85027 COMPLETE CBC AUTOMATED: CPT

## 2023-05-25 PROCEDURE — 36415 COLL VENOUS BLD VENIPUNCTURE: CPT

## 2023-05-25 PROCEDURE — 80048 BASIC METABOLIC PNL TOTAL CA: CPT

## 2023-05-25 PROCEDURE — 93000 ELECTROCARDIOGRAM COMPLETE: CPT

## 2023-05-25 PROCEDURE — 99214 OFFICE O/P EST MOD 30 MIN: CPT | Mod: 25

## 2023-05-25 PROCEDURE — 84443 ASSAY THYROID STIM HORMONE: CPT

## 2023-05-25 PROCEDURE — 3078F DIAST BP <80 MM HG: CPT

## 2023-05-25 PROCEDURE — 90471 IMMUNIZATION ADMIN: CPT

## 2023-05-25 PROCEDURE — 3074F SYST BP LT 130 MM HG: CPT

## 2023-05-25 PROCEDURE — 83880 ASSAY OF NATRIURETIC PEPTIDE: CPT

## 2023-05-25 RX ORDER — SERTRALINE HYDROCHLORIDE 25 MG/1
25 TABLET, FILM COATED ORAL DAILY
Qty: 60 TABLET | Refills: 1 | Status: SHIPPED | OUTPATIENT
Start: 2023-05-25 | End: 2023-05-25

## 2023-05-25 RX ORDER — VERAPAMIL HYDROCHLORIDE 240 MG/1
240 TABLET, FILM COATED, EXTENDED RELEASE ORAL DAILY
Qty: 90 TABLET | Refills: 3 | Status: SHIPPED | OUTPATIENT
Start: 2023-05-25 | End: 2023-05-25

## 2023-05-25 RX ORDER — OLMESARTAN MEDOXOMIL AND HYDROCHLOROTHIAZIDE 40/25 40; 25 MG/1; MG/1
1 TABLET ORAL DAILY
Qty: 90 TABLET | Refills: 3 | Status: SHIPPED | OUTPATIENT
Start: 2023-05-25 | End: 2023-05-25

## 2023-05-25 RX ORDER — VERAPAMIL HYDROCHLORIDE 240 MG/1
240 TABLET, FILM COATED, EXTENDED RELEASE ORAL DAILY
Qty: 90 TABLET | Refills: 3 | Status: SHIPPED
Start: 2023-05-25 | End: 2023-11-09 | Stop reason: SINTOL

## 2023-05-25 RX ORDER — SERTRALINE HYDROCHLORIDE 25 MG/1
25 TABLET, FILM COATED ORAL DAILY
Qty: 60 TABLET | Refills: 1 | Status: SHIPPED | OUTPATIENT
Start: 2023-05-25 | End: 2023-07-24 | Stop reason: SDUPTHER

## 2023-05-25 RX ORDER — OLMESARTAN MEDOXOMIL AND HYDROCHLOROTHIAZIDE 40/25 40; 25 MG/1; MG/1
1 TABLET ORAL DAILY
Qty: 90 TABLET | Refills: 3 | Status: SHIPPED | OUTPATIENT
Start: 2023-05-25 | End: 2023-06-02

## 2023-05-25 ASSESSMENT — ENCOUNTER SYMPTOMS
HEADACHES: 1
NERVOUS/ANXIOUS: 1
SINUS PAIN: 1
PALPITATIONS: 0

## 2023-05-25 ASSESSMENT — ANXIETY QUESTIONNAIRES
5. BEING SO RESTLESS THAT IT IS HARD TO SIT STILL: NEARLY EVERY DAY
2. NOT BEING ABLE TO STOP OR CONTROL WORRYING: NEARLY EVERY DAY
6. BECOMING EASILY ANNOYED OR IRRITABLE: NEARLY EVERY DAY
4. TROUBLE RELAXING: NEARLY EVERY DAY
GAD7 TOTAL SCORE: 18
1. FEELING NERVOUS, ANXIOUS, OR ON EDGE: NEARLY EVERY DAY
3. WORRYING TOO MUCH ABOUT DIFFERENT THINGS: NEARLY EVERY DAY
7. FEELING AFRAID AS IF SOMETHING AWFUL MIGHT HAPPEN: NOT AT ALL
IF YOU CHECKED OFF ANY PROBLEMS ON THIS QUESTIONNAIRE, HOW DIFFICULT HAVE THESE PROBLEMS MADE IT FOR YOU TO DO YOUR WORK, TAKE CARE OF THINGS AT HOME, OR GET ALONG WITH OTHER PEOPLE: SOMEWHAT DIFFICULT

## 2023-05-25 ASSESSMENT — FIBROSIS 4 INDEX: FIB4 SCORE: 0.74

## 2023-05-25 NOTE — PROGRESS NOTES
"Subjective:     CC: Diagnoses of Irregularly irregular pulse rhythm, Essential hypertension, Anxiety, Need for vaccination, Obesity (BMI 30-39.9), Pulse irregularity, and PVC (premature ventricular contraction) were pertinent to this visit.    HPI:   Harmony presents today with    Problem   Anxiety   Obesity (Bmi 30-39.9)   Pulse Irregularity   Essential Hypertension   Obesity (Bmi 30.0-34.9) (Resolved)   History of Tobacco Use Disorder (Resolved)     ROS:  Review of Systems   HENT:  Positive for sinus pain.    Cardiovascular:  Negative for chest pain, palpitations and leg swelling.        Heart rate varied, mid 40-90s.    Neurological:  Positive for headaches.   Psychiatric/Behavioral:  The patient is nervous/anxious.        Objective:     Exam:  /78 (BP Location: Left arm, Patient Position: Sitting, BP Cuff Size: Small adult)   Pulse 96   Temp 36.4 °C (97.6 °F) (Temporal)   Ht 1.575 m (5' 2\")   Wt 83.5 kg (184 lb)   SpO2 97%   BMI 33.65 kg/m²  Body mass index is 33.65 kg/m².    Physical Exam  HENT:      Head: Normocephalic and atraumatic.   Cardiovascular:      Rate and Rhythm: Normal rate and regular rhythm.      Pulses: Normal pulses.      Heart sounds: Normal heart sounds. No murmur heard.     No gallop.   Pulmonary:      Effort: Pulmonary effort is normal. No respiratory distress.      Breath sounds: Normal breath sounds. No wheezing.   Abdominal:      General: Bowel sounds are normal.   Skin:     General: Skin is warm and dry.      Capillary Refill: Capillary refill takes less than 2 seconds.   Neurological:      General: No focal deficit present.      Mental Status: She is alert and oriented to person, place, and time.   Psychiatric:         Mood and Affect: Mood is anxious.         Assessment & Plan:     63 y.o. female with the following -     Problem List Items Addressed This Visit       Essential hypertension     Chronic, improving. Home bp readings 106-160/ over the past 4 weeks, with " majority of readings 130s/80s.  Currently taking olmesartan 20 mg daily, hctz 25 mg daily, verapamil 120 mg twice dialy (has been out of verapamil, due to pharmacy supply for 2 days)  Reports intermittent headaches- feels like sinus headache, occasional dizziness with reduction of heart rate. denies cp, swelling in legs, or sob.   Increase olmesartan to 40 mg daily, will provide combination pill with hctz 25 mg to reduce pill burden.   Continue verapamil 240 mg daily           Relevant Medications    olmesartan-hydrochlorothiazide (BENICAR HCT) 40-25 MG per tablet    verapamil ER (CALAN-SR) 240 MG Tab CR    Anxiety     Chronic, worsening. Noticing increased anxiety over the past 3 months.     SIMA-7 Questionnaire  Total: 18  15-21 Severe Anxiety    Will switch from lexapro to sertraline 25 mg, increase to 50 if tolerating after 2-3 days.              Relevant Medications    sertraline (ZOLOFT) 25 MG tablet    Obesity (BMI 30-39.9)    Relevant Orders    Patient identified as having weight management issue.  Appropriate orders and counseling given.    Pulse irregularity     Acute, unstable. Pt has been checking her pulse at home, noticing variation mid 40s-90s. Denies sob, cp, palpitations. Reports she has been out of her verapamil secondary to pharmacy supply for 2 days, but symptom has been going on prior to not taking her medicine. Reports increased anxiety over the past 3 months.  -ekg in clinic, will get labs  Discussed er precautions    EKG Interpretation   Ordered and interpreted by VIKKI Nascimento  Rhythm: normal sinus   Rate: 75 normal   Clinical Impression: multiple PVC               Relevant Orders    CBC WITHOUT DIFFERENTIAL    proBrain Natriuretic Peptide, NT    Basic Metabolic Panel     Other Visit Diagnoses       Irregularly irregular pulse rhythm        Relevant Orders    EKG - Clinic Performed    EC-ECHOCARDIOGRAM COMPLETE W/O CONT    Need for vaccination        Relevant Orders    Tdap Vaccine  =>8YO IM (Completed)    PVC (premature ventricular contraction)        Relevant Medications    olmesartan-hydrochlorothiazide (BENICAR HCT) 40-25 MG per tablet    verapamil ER (CALAN-SR) 240 MG Tab CR    Other Relevant Orders    CBC WITHOUT DIFFERENTIAL    proBrain Natriuretic Peptide, NT    Basic Metabolic Panel    TSH WITH REFLEX TO FT4    D-DIMER          Patient was educated in proper administration of medication(s) ordered today including safety, possible SE, risks, benefits, rationale and alternatives to therapy.   Supportive care, differential diagnoses, and indications for immediate follow-up discussed with patient.    Pathogenesis of diagnosis discussed including typical length and natural progression.    Instructed to return to clinic or nearest emergency department for any change in condition, further concerns, or worsening of symptoms.  Patient states understanding of the plan of care and discharge instructions.    Return in about 3 months (around 8/25/2023), or if symptoms worsen or fail to improve, for Blood Pressure.    Please note that this dictation was created using voice recognition software. I have made every reasonable attempt to correct obvious errors, but I expect that there are errors of grammar and possibly content that I did not discover before finalizing the note.

## 2023-05-25 NOTE — ASSESSMENT & PLAN NOTE
Chronic, worsening. Noticing increased anxiety over the past 3 months.     SIMA-7 Questionnaire  Total: 18  15-21 Severe Anxiety    Will switch from lexapro to sertraline 25 mg, increase to 50 if tolerating after 2-3 days.

## 2023-05-25 NOTE — ASSESSMENT & PLAN NOTE
Acute, unstable. Pt has been checking her pulse at home, noticing variation mid 40s-90s. Denies sob, cp, palpitations. Reports she has been out of her verapamil secondary to pharmacy supply for 2 days, but symptom has been going on prior to not taking her medicine. Reports increased anxiety over the past 3 months.  -ekg in clinic, will get labs  Discussed er precautions    EKG Interpretation   Ordered and interpreted by VIKKI Nascimento  Rhythm: normal sinus   Rate: 75 normal   Clinical Impression: multiple PVC

## 2023-05-25 NOTE — ASSESSMENT & PLAN NOTE
Chronic, improving. Home bp readings 106-160/ over the past 4 weeks, with majority of readings 130s/80s.  Currently taking olmesartan 20 mg daily, hctz 25 mg daily, verapamil 120 mg twice dialy (has been out of verapamil, due to pharmacy supply for 2 days)  Reports intermittent headaches- feels like sinus headache, occasional dizziness with reduction of heart rate. denies cp, swelling in legs, or sob.   Increase olmesartan to 40 mg daily, will provide combination pill with hctz 25 mg to reduce pill burden.   Continue verapamil 240 mg daily

## 2023-06-01 ENCOUNTER — APPOINTMENT (OUTPATIENT)
Dept: RADIOLOGY | Facility: MEDICAL CENTER | Age: 63
End: 2023-06-01
Payer: COMMERCIAL

## 2023-06-02 DIAGNOSIS — I10 ESSENTIAL HYPERTENSION: ICD-10-CM

## 2023-06-02 RX ORDER — HYDROCHLOROTHIAZIDE 25 MG/1
25 TABLET ORAL DAILY
Qty: 90 TABLET | Refills: 3 | Status: SHIPPED | OUTPATIENT
Start: 2023-06-02

## 2023-06-02 RX ORDER — OLMESARTAN MEDOXOMIL 40 MG/1
40 TABLET ORAL DAILY
Qty: 90 TABLET | Refills: 3 | Status: SHIPPED | OUTPATIENT
Start: 2023-06-02 | End: 2023-11-16

## 2023-06-21 ENCOUNTER — OFFICE VISIT (OUTPATIENT)
Dept: MEDICAL GROUP | Facility: PHYSICIAN GROUP | Age: 63
End: 2023-06-21
Payer: COMMERCIAL

## 2023-06-21 VITALS
WEIGHT: 184 LBS | DIASTOLIC BLOOD PRESSURE: 78 MMHG | OXYGEN SATURATION: 96 % | SYSTOLIC BLOOD PRESSURE: 120 MMHG | HEART RATE: 63 BPM | BODY MASS INDEX: 33.86 KG/M2 | HEIGHT: 62 IN | RESPIRATION RATE: 16 BRPM | TEMPERATURE: 97.4 F

## 2023-06-21 DIAGNOSIS — J45.20 MILD INTERMITTENT ASTHMA IN ADULT WITHOUT COMPLICATION: ICD-10-CM

## 2023-06-21 DIAGNOSIS — J20.9 ACUTE BRONCHITIS, UNSPECIFIED ORGANISM: ICD-10-CM

## 2023-06-21 DIAGNOSIS — R05.1 ACUTE COUGH: ICD-10-CM

## 2023-06-21 DIAGNOSIS — J01.01 ACUTE RECURRENT MAXILLARY SINUSITIS: ICD-10-CM

## 2023-06-21 DIAGNOSIS — J06.9 ACUTE URI: ICD-10-CM

## 2023-06-21 PROCEDURE — 3078F DIAST BP <80 MM HG: CPT

## 2023-06-21 PROCEDURE — 99214 OFFICE O/P EST MOD 30 MIN: CPT

## 2023-06-21 PROCEDURE — 3074F SYST BP LT 130 MM HG: CPT

## 2023-06-21 RX ORDER — AMOXICILLIN AND CLAVULANATE POTASSIUM 562.5; 437.5; 62.5 MG/1; MG/1; MG/1
2 TABLET, MULTILAYER, EXTENDED RELEASE ORAL 2 TIMES DAILY
Qty: 28 TABLET | Refills: 0 | Status: SHIPPED | OUTPATIENT
Start: 2023-06-21 | End: 2023-06-28

## 2023-06-21 RX ORDER — IPRATROPIUM BROMIDE AND ALBUTEROL SULFATE 2.5; .5 MG/3ML; MG/3ML
3 SOLUTION RESPIRATORY (INHALATION) 4 TIMES DAILY
Qty: 60 EACH | Refills: 1 | Status: SHIPPED | OUTPATIENT
Start: 2023-06-21

## 2023-06-21 ASSESSMENT — FIBROSIS 4 INDEX: FIB4 SCORE: 0.75

## 2023-06-21 NOTE — PROGRESS NOTES
Chief Complaint   Patient presents with    Sinus Problem     Sinus burning/yellow, cough, swollen glands, sore throat, x10 days     Sinusitis        HPI: Patient is a 63 y.o. female complaining of 10 days of illness including: productive of yellow sputum cough, facial pain, sore throat.   Mucus is: thick.  Similarly ill exposures: no.  Treatments tried: inhalers, cold medicine, decongestants  She  reports that she has been smoking cigarettes. She started smoking about 25 years ago. She has a 4.75 pack-year smoking history. She has never used smokeless tobacco..     ROS:  No fever, nausea, changes in bowel movements or skin rash. Positive for cough, congestion, mucous, short of breath, sinus pain, lymphadenopathy to anterior cervical     I reviewed the patient's medications, allergies and medical history:  Current Outpatient Medications   Medication Sig Dispense Refill    ipratropium-albuterol (DUONEB) 0.5-2.5 (3) MG/3ML nebulizer solution Take 3 mL by nebulization 4 times a day. 60 Each 1    amoxicillin-clavulanate XR (AUGMENTIN XR) 1000-62.5 MG Take 2 Tablets by mouth 2 times a day for 7 days. 28 Tablet 0    olmesartan (BENICAR) 40 MG Tab Take 1 Tablet by mouth every day. 90 Tablet 3    hydroCHLOROthiazide (HYDRODIURIL) 25 MG Tab Take 1 Tablet by mouth every day. 90 Tablet 3    sertraline (ZOLOFT) 25 MG tablet Take 1 Tablet by mouth every day. 60 Tablet 1    verapamil ER (CALAN-SR) 240 MG Tab CR Take 1 Tablet by mouth every day. 90 Tablet 3    estradiol (ESTRACE) 1 MG Tab Take 1 Tablet by mouth every day. From gyn 90 Tablet 0    temazepam (RESTORIL) 15 MG Cap Take 1 Capsule by mouth at bedtime as needed for Sleep for up to 90 days. 90 Capsule 0    benzonatate (TESSALON) 100 MG Cap Take 1 Capsule by mouth 3 times a day as needed for Cough. 60 Capsule 0    polyethylene glycol/lytes (MIRALAX) 17 g Pack Take 17 g by mouth every day.      fluticasone-salmeterol (ADVAIR) 100-50 MCG/ACT AEROSOL POWDER, BREATH ACTIVATED  "Inhale 1 Puff 2 times a day. 1 Each 11    EPINEPHrine (EPIPEN) 0.3 MG/0.3ML Solution Auto-injector solution for injection Inject 0.3 mL into the thigh one time for 1 dose. 1 Each 1    albuterol 108 (90 Base) MCG/ACT Aero Soln inhalation aerosol Inhale 2 Puffs every 6 hours as needed for Shortness of Breath. 3 Each 3    simvastatin (ZOCOR) 40 MG Tab Take 1 Tablet by mouth every evening. 90 Tablet 3     No current facility-administered medications for this visit.     Bee, Levaquin, Levofloxacin, Tape, Moxifloxacin hcl in nacl, Polysporin [bacitracin-polymyxin b], and Promethazine  Past Medical History:   Diagnosis Date    Allergy     Arthritis     osteoarthritis; right knee    ASTHMA     1/29/2018 not an issue, currently no medications needed    Basal cell carcinoma of left medial cheek 11/30/2015    Basal cell carcinoma of left medial cheek 11/30/2015    Constipation 10/14/2022    Depression 1/7/2010    Dyspnea 3/21/2018    High cholesterol     History of tobacco use disorder 1/7/2010    Hypertension     Pneumonia 1990,2001    Post-menopausal 8/26/2014        EXAM:  /78 (BP Location: Left arm, Patient Position: Sitting, BP Cuff Size: Adult)   Pulse 63   Temp 36.3 °C (97.4 °F) (Temporal)   Resp 16   Ht 1.575 m (5' 2\")   Wt 83.5 kg (184 lb)   SpO2 96%   General: Alert, no conversational dyspnea or audible wheeze, non-toxic appearance.  Eyes: PERRL, conjunctiva slightly injected, no eye discharge.  Ears: Normal pinnae,TM's normal bilaterally.  Nares: Patent with thick mucus.  Sinuses: tender over maxillary / frontal sinuses.  Throat: Erythematous injection without exudate.   Neck: Supple, with moderately enlarged cervical nodes.  Lungs: Wheezes and crackles throughout.    Heart: Regular rate without murmur.  Skin: Warm and dry without rash.     Problem List Items Addressed This Visit       Asthma in adult     Chronic, unstable.  Exacerbation with current URI.  Reports has used DuoNeb's in the past for URI " with asthma exacerbations which has been helpful, however they are .    Continue fluticasone-salmeterol 100-50 twice daily, DuoNeb 3 mL 4 times daily as needed.  Consider steroid if no improvement in the next 2 days.  ER precautions discussed         Relevant Medications    ipratropium-albuterol (DUONEB) 0.5-2.5 (3) MG/3ML nebulizer solution     Other Visit Diagnoses       Acute bronchitis, unspecified organism        Acute cough        Acute recurrent maxillary sinusitis        Relevant Medications    amoxicillin-clavulanate XR (AUGMENTIN XR) 1000-62.5 MG    Acute URI        Relevant Medications    amoxicillin-clavulanate XR (AUGMENTIN XR) 1000-62.5 MG             PLAN:  1. Educated patient that majority of upper respiratory infections are viral and do not need antibiotics. As symptoms have been worsening over the last week, will treat with antibiotics.  2. Twice daily use of nasal saline rinse or Neti-Pot.  3. OTC anti-pyretics and decongestants as needed.  4. Follow-up in office or urgent care for worsening symptoms, difficulty breathing, lack of expected recovery, or should new symptoms or problems arise.

## 2023-06-21 NOTE — ASSESSMENT & PLAN NOTE
Chronic, unstable.  Exacerbation with current URI.  Reports has used DuoNeb's in the past for URI with asthma exacerbations which has been helpful, however they are .    Continue fluticasone-salmeterol 100-50 twice daily, DuoNeb 3 mL 4 times daily as needed.  Consider steroid if no improvement in the next 2 days.  ER precautions discussed

## 2023-06-28 ENCOUNTER — OFFICE VISIT (OUTPATIENT)
Dept: DERMATOLOGY | Facility: IMAGING CENTER | Age: 63
End: 2023-06-28
Payer: COMMERCIAL

## 2023-06-28 DIAGNOSIS — L72.0 EPIDERMAL CYST: ICD-10-CM

## 2023-06-28 PROCEDURE — 99212 OFFICE O/P EST SF 10 MIN: CPT | Performed by: NURSE PRACTITIONER

## 2023-06-28 NOTE — PROGRESS NOTES
"DERMATOLOGY NOTE  FOLLOW UP VISIT       Chief complaint: Cyst     HPI:  cyst under left breast   Time present: 10 days   Painful lesion: Yes  Itching lesion: No  Enlarging lesion: No  Anything make it better or worse?no       History of skin cancer: Yes, Details:  BCC, 10/2015  History of blistering/severe sunburns:No  Family history of skin cancer:No - but brother with ocular melanoma, dx 03/2017  Family history of atypical moles:No      Allergies   Allergen Reactions    Bee Anaphylaxis    Levaquin Anaphylaxis and Unspecified     Myalgias arthralgias     Levofloxacin Unspecified    Tape Hives     Redness; itching \"paper tape ok\"    Moxifloxacin Hcl In Nacl Swelling    Polysporin [Bacitracin-Polymyxin B]      rash    Promethazine      Twitching feeling        MEDICATIONS:  Medications relevant to specialty reviewed.     REVIEW OF SYSTEMS:   Positive for skin (see HPI)  Negative for fevers and chills       EXAM:  There were no vitals taken for this visit.  Constitutional: Well-developed, well-nourished, and in no distress.     A focused skin exam was performed including the affected areas of the chest. Notable findings on exam today listed below and/or in assessment/plan.     Approx 1.5 to 2 cm epidermal cyst with visible punctum to base of R breast, no evidence of inflammation or abscess    IMPRESSION / PLAN:    1. Epidermal cyst  Because this is bothersome, tender and seems to be getting bigger, pt would like removed  Discussed supportive measures  PA form completed, given to PARs to process and schedule      Discussed risks, benefits, alternative treatments as well as common side effects associated with prescribed treatment, Patient verbalized understanding and agrees with plan regarding the above            Please note that this dictation was created using voice recognition software. I have made every reasonable attempt to correct obvious errors, but I expect that there are errors of grammar and possibly content " that I did not discover before finalizing the note.      Return to clinic in: Return for cyst removal in future. and as needed for any new or changing skin lesions.

## 2023-10-16 ENCOUNTER — HOSPITAL ENCOUNTER (OUTPATIENT)
Dept: CARDIOLOGY | Facility: MEDICAL CENTER | Age: 63
End: 2023-10-16
Payer: COMMERCIAL

## 2023-10-16 DIAGNOSIS — I49.9 IRREGULARLY IRREGULAR PULSE RHYTHM: ICD-10-CM

## 2023-10-16 LAB — LV EJECT FRACT  99904: 65

## 2023-10-16 PROCEDURE — 93306 TTE W/DOPPLER COMPLETE: CPT

## 2023-10-16 PROCEDURE — 93306 TTE W/DOPPLER COMPLETE: CPT | Mod: 26 | Performed by: INTERNAL MEDICINE

## 2023-10-24 DIAGNOSIS — F51.01 PRIMARY INSOMNIA: ICD-10-CM

## 2023-10-24 RX ORDER — TEMAZEPAM 15 MG/1
15 CAPSULE ORAL NIGHTLY PRN
Qty: 90 CAPSULE | Refills: 0 | Status: SHIPPED | OUTPATIENT
Start: 2023-10-24 | End: 2024-01-09 | Stop reason: SDUPTHER

## 2023-11-09 ENCOUNTER — OFFICE VISIT (OUTPATIENT)
Dept: MEDICAL GROUP | Facility: PHYSICIAN GROUP | Age: 63
End: 2023-11-09
Payer: COMMERCIAL

## 2023-11-09 VITALS
BODY MASS INDEX: 33.86 KG/M2 | HEART RATE: 93 BPM | OXYGEN SATURATION: 96 % | SYSTOLIC BLOOD PRESSURE: 132 MMHG | WEIGHT: 184 LBS | DIASTOLIC BLOOD PRESSURE: 90 MMHG | HEIGHT: 62 IN | TEMPERATURE: 98.2 F

## 2023-11-09 DIAGNOSIS — F17.211 TOBACCO DEPENDENCE DUE TO CIGARETTES, IN REMISSION: ICD-10-CM

## 2023-11-09 DIAGNOSIS — I10 ESSENTIAL HYPERTENSION: ICD-10-CM

## 2023-11-09 DIAGNOSIS — F40.243 FEAR OF FLYING: ICD-10-CM

## 2023-11-09 DIAGNOSIS — Z23 NEED FOR VACCINATION: ICD-10-CM

## 2023-11-09 DIAGNOSIS — F41.9 ANXIETY: ICD-10-CM

## 2023-11-09 PROCEDURE — 99214 OFFICE O/P EST MOD 30 MIN: CPT | Mod: 25

## 2023-11-09 PROCEDURE — 3075F SYST BP GE 130 - 139MM HG: CPT

## 2023-11-09 PROCEDURE — 3080F DIAST BP >= 90 MM HG: CPT

## 2023-11-09 RX ORDER — AMLODIPINE BESYLATE 5 MG/1
5 TABLET ORAL DAILY
Qty: 90 TABLET | Refills: 3 | Status: SHIPPED | OUTPATIENT
Start: 2023-11-09

## 2023-11-09 RX ORDER — DIAZEPAM 5 MG/1
5 TABLET ORAL EVERY 6 HOURS PRN
Qty: 30 TABLET | Refills: 0 | Status: SHIPPED | OUTPATIENT
Start: 2023-11-09 | End: 2024-01-25 | Stop reason: SDUPTHER

## 2023-11-09 ASSESSMENT — FIBROSIS 4 INDEX: FIB4 SCORE: 0.75

## 2023-11-09 ASSESSMENT — ENCOUNTER SYMPTOMS
DIZZINESS: 1
NERVOUS/ANXIOUS: 1
HEADACHES: 1

## 2023-11-09 NOTE — ASSESSMENT & PLAN NOTE
Chronic, unstable. Currently taking olmesartan 40 mg, hydrochlorothiazide 25 mg dialy. Bp in clinic 132/90. Stopped taking verapamil 10/2023 for headaches, light headedness, dizziness, confusion; blood pressures at this time were 108-130/70-80. Since stopping this medicaiton blood pressures have remained 118-124/70-80s without headaches.     Continue olmesartan 40 mg daily  Continue hctz 25 mg daily  Start amlodipine 5 mg dialy.

## 2023-11-09 NOTE — PROGRESS NOTES
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    HPI:   Harmony presents today with    Problem   Tobacco Dependence Due to Cigarettes, in Remission   Anxiety   Essential Hypertension     ROS:  Review of Systems   HENT:  Positive for tinnitus.    Neurological:  Positive for dizziness and headaches.   Psychiatric/Behavioral:  The patient is nervous/anxious.    All other systems reviewed and are negative.      Objective:     Exam:  BP (!) 132/90 (BP Location: Left arm, Patient Position: Sitting, BP Cuff Size: Large adult)   Pulse 93   Temp 36.8 °C (98.2 °F) (Temporal)   Ht 1.575 m (5' 2\")   Wt 83.5 kg (184 lb)   SpO2 96%   BMI 33.65 kg/m²  Body mass index is 33.65 kg/m².    Physical Exam  Vitals reviewed.   Constitutional:       General: She is not in acute distress.     Appearance: Normal appearance. She is obese. She is not ill-appearing.   HENT:      Head: Normocephalic and atraumatic.   Cardiovascular:      Rate and Rhythm: Normal rate and regular rhythm.      Pulses: Normal pulses.      Heart sounds: Normal heart sounds.   Pulmonary:      Effort: Pulmonary effort is normal. No respiratory distress.      Breath sounds: Normal breath sounds.   Skin:     General: Skin is warm and dry.      Findings: No rash.   Neurological:      General: No focal deficit present.      Mental Status: She is alert and oriented to person, place, and time.   Psychiatric:         Mood and Affect: Mood is anxious.         Behavior: Behavior normal.         Assessment & Plan:     63 y.o. female with the following -     Problem List Items Addressed This Visit       Essential hypertension     Chronic, unstable. Currently taking olmesartan 40 mg, hydrochlorothiazide 25 mg dialy. Bp in clinic 132/90. Stopped taking verapamil 10/2023 for headaches, light headedness, dizziness, confusion; blood " pressures at this time were 108-130/70-80. Since stopping this medicaiton blood pressures have remained 118-124/70-80s without headaches.     Continue olmesartan 40 mg daily  Continue hctz 25 mg daily  Start amlodipine 5 mg dialy.         Relevant Medications    amLODIPine (NORVASC) 5 MG Tab    Fear of flying    Relevant Medications    sertraline (ZOLOFT) 50 MG Tab    diazePAM (VALIUM) 5 MG Tab    Anxiety     Chronic, unstable. Reports intermittent anxiety. Feels like something has changed, like a trigger was flipped over the summer and having increased anxiety and panic attacks. Reports history of night terrors and panic attacks.   Continue sertraline, increase dose to 50 mg daily  Continue hydrochlorothiazide 25 mg   Continue valium as needed for air travel and panic attack  Consider beta blocker for situational anxiety.          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 Stopped smoking 5/2023.            Other Visit Diagnoses       Need for vaccination        Relevant Orders    INFLUENZA VACCINE QUAD INJ (PF)          Patient was educated in proper administration of medication(s) ordered today including safety, possible SE, risks, benefits, rationale and alternatives to therapy.   Supportive care, differential diagnoses, and indications for immediate follow-up discussed with patient.    Pathogenesis of diagnosis discussed including typical length and natural progression.    Instructed to return to clinic or nearest emergency department for any change in condition, further concerns, or worsening of symptoms.  Patient states understanding of the plan of care and discharge instructions.    Return if symptoms worsen or fail to improve.    Please note that this dictation was created using voice recognition software. I have made every reasonable attempt to correct obvious errors, but I expect that  there are errors of grammar and possibly content that I did not discover before finalizing the note.

## 2023-11-09 NOTE — ASSESSMENT & PLAN NOTE
Chronic, unstable. Reports intermittent anxiety. Feels like something has changed, like a trigger was flipped over the summer and having increased anxiety and panic attacks. Reports history of night terrors and panic attacks.   Continue sertraline, increase dose to 50 mg daily  Continue hydrochlorothiazide 25 mg   Continue valium as needed for air travel and panic attack  Consider beta blocker for situational anxiety.

## 2023-11-10 PROCEDURE — 90471 IMMUNIZATION ADMIN: CPT

## 2023-11-10 PROCEDURE — 90686 IIV4 VACC NO PRSV 0.5 ML IM: CPT

## 2023-11-16 ENCOUNTER — OFFICE VISIT (OUTPATIENT)
Dept: MEDICAL GROUP | Facility: PHYSICIAN GROUP | Age: 63
End: 2023-11-16
Payer: COMMERCIAL

## 2023-11-16 VITALS
HEART RATE: 64 BPM | WEIGHT: 186 LBS | TEMPERATURE: 98.5 F | SYSTOLIC BLOOD PRESSURE: 118 MMHG | DIASTOLIC BLOOD PRESSURE: 64 MMHG | BODY MASS INDEX: 34.23 KG/M2 | HEIGHT: 62 IN | OXYGEN SATURATION: 95 % | RESPIRATION RATE: 20 BRPM

## 2023-11-16 DIAGNOSIS — I10 ESSENTIAL HYPERTENSION: ICD-10-CM

## 2023-11-16 DIAGNOSIS — R09.89 PULSE IRREGULARITY: ICD-10-CM

## 2023-11-16 PROCEDURE — 99213 OFFICE O/P EST LOW 20 MIN: CPT

## 2023-11-16 PROCEDURE — 3074F SYST BP LT 130 MM HG: CPT

## 2023-11-16 PROCEDURE — 3078F DIAST BP <80 MM HG: CPT

## 2023-11-16 ASSESSMENT — FIBROSIS 4 INDEX: FIB4 SCORE: 0.75

## 2023-11-16 ASSESSMENT — ENCOUNTER SYMPTOMS
HEADACHES: 0
DIZZINESS: 0
PALPITATIONS: 0

## 2023-11-16 NOTE — ASSESSMENT & PLAN NOTE
Chronic, unstable.  Blood pressure in clinic today 118/64.  Noticing fluctuations at home and when she checks her blood pressure at work, reports 156/98 this week at highest.  Previously taking verapamil, stopped for lightheadedness, headaches, dizziness, confusion  Currently prescribed olmesartan 40 mg, hydrochlorothiazide 25 mg, amlodipine 5 mg.  Today she is not taking olmesartan, reports causing headache, face pressure, confusion, lightheadedness, leg weakness.   Reports she is feeling better today, did not take olmesartan today  Stop olmesartan  Started amlodipine 11/9/23, tolerating; continue this.  Continue HCTZ 25 mg daily  Will provide referral for cardiology for additional management as blood pressure is fluctuant.

## 2023-11-17 NOTE — PROGRESS NOTES
"Subjective:     CC: Diagnoses of Essential hypertension and Pulse irregularity were pertinent to this visit.    Patient presents today to follow-up for blood pressure.  She is feeling better today, did not take her olmesartan    HPI:   Harmony presents today with    Problem   Essential Hypertension     ROS:  Review of Systems   Cardiovascular:  Negative for chest pain and palpitations.   Neurological:  Negative for dizziness and headaches.   All other systems reviewed and are negative.      Objective:     Exam:  /64 (BP Location: Left arm, Patient Position: Sitting, BP Cuff Size: Adult)   Pulse 64   Temp 36.9 °C (98.5 °F) (Temporal)   Resp 20   Ht 1.575 m (5' 2\")   Wt 84.4 kg (186 lb)   SpO2 95%   BMI 34.02 kg/m²  Body mass index is 34.02 kg/m².    Physical Exam  Vitals reviewed.   Constitutional:       General: She is not in acute distress.     Appearance: Normal appearance. She is not ill-appearing.   HENT:      Head: Normocephalic and atraumatic.   Cardiovascular:      Rate and Rhythm: Normal rate.      Pulses: Normal pulses.   Pulmonary:      Effort: Pulmonary effort is normal. No respiratory distress.   Skin:     General: Skin is warm and dry.      Findings: No rash.   Neurological:      General: No focal deficit present.      Mental Status: She is alert and oriented to person, place, and time.   Psychiatric:         Mood and Affect: Mood normal.         Behavior: Behavior normal.       Assessment & Plan:     63 y.o. female with the following -     Problem List Items Addressed This Visit       Essential hypertension     Chronic, unstable.  Blood pressure in clinic today 118/64.  Noticing fluctuations at home and when she checks her blood pressure at work, reports 156/98 this week at highest.  Previously taking verapamil, stopped for lightheadedness, headaches, dizziness, confusion  Currently prescribed olmesartan 40 mg, hydrochlorothiazide 25 mg, amlodipine 5 mg.  Today she is not taking " olmesartan, reports causing headache, face pressure, confusion, lightheadedness, leg weakness.   Reports she is feeling better today, did not take olmesartan today  Stop olmesartan  Started amlodipine 11/9/23, tolerating; continue this.  Continue HCTZ 25 mg daily  Will provide referral for cardiology for additional management as blood pressure is fluctuant.           Relevant Orders    REFERRAL TO CARDIOLOGY    Pulse irregularity    Relevant Orders    REFERRAL TO CARDIOLOGY     Patient was educated in proper administration of medication(s) ordered today including safety, possible SE, risks, benefits, rationale and alternatives to therapy.   Supportive care, differential diagnoses, and indications for immediate follow-up discussed with patient.    Pathogenesis of diagnosis discussed including typical length and natural progression.    Instructed to return to clinic or nearest emergency department for any change in condition, further concerns, or worsening of symptoms.  Patient states understanding of the plan of care and discharge instructions.    Return in about 10 weeks (around 1/25/2024), or if symptoms worsen or fail to improve, for Blood Pressure, Controlled Substance(temazepam).    Please note that this dictation was created using voice recognition software. I have made every reasonable attempt to correct obvious errors, but I expect that there are errors of grammar and possibly content that I did not discover before finalizing the note.

## 2023-12-01 ENCOUNTER — TELEPHONE (OUTPATIENT)
Dept: HEALTH INFORMATION MANAGEMENT | Facility: OTHER | Age: 63
End: 2023-12-01
Payer: COMMERCIAL

## 2023-12-13 ENCOUNTER — APPOINTMENT (OUTPATIENT)
Dept: MEDICAL GROUP | Facility: PHYSICIAN GROUP | Age: 63
End: 2023-12-13
Payer: COMMERCIAL

## 2024-01-09 DIAGNOSIS — G47.19 EXCESSIVE DAYTIME SLEEPINESS: ICD-10-CM

## 2024-01-09 DIAGNOSIS — I10 ESSENTIAL HYPERTENSION: ICD-10-CM

## 2024-01-09 DIAGNOSIS — R06.83 SNORING: ICD-10-CM

## 2024-01-09 DIAGNOSIS — F51.01 PRIMARY INSOMNIA: ICD-10-CM

## 2024-01-09 RX ORDER — TRAZODONE HYDROCHLORIDE 50 MG/1
50 TABLET ORAL NIGHTLY
Qty: 30 TABLET | Refills: 3 | Status: SHIPPED | OUTPATIENT
Start: 2024-01-09 | End: 2024-03-26

## 2024-01-09 RX ORDER — TEMAZEPAM 15 MG/1
15 CAPSULE ORAL NIGHTLY PRN
Qty: 90 CAPSULE | Refills: 0 | Status: SHIPPED | OUTPATIENT
Start: 2024-01-09 | End: 2024-01-09

## 2024-01-09 RX ORDER — NEBIVOLOL 5 MG/1
5 TABLET ORAL DAILY
Qty: 30 TABLET | Refills: 1 | Status: SHIPPED | OUTPATIENT
Start: 2024-01-09 | End: 2024-01-25 | Stop reason: SDUPTHER

## 2024-01-09 RX ORDER — ZOLPIDEM TARTRATE 5 MG/1
TABLET ORAL
Qty: 2 TABLET | Refills: 0 | Status: SHIPPED | OUTPATIENT
Start: 2024-01-09 | End: 2024-01-10

## 2024-01-09 RX ORDER — TEMAZEPAM 15 MG/1
15 CAPSULE ORAL NIGHTLY PRN
Qty: 90 CAPSULE | Refills: 0 | Status: SHIPPED | OUTPATIENT
Start: 2024-01-09 | End: 2024-04-08

## 2024-01-09 NOTE — PATIENT INSTRUCTIONS
SLEEP STUDY INSTRUCTIONS    1. Our main concern is to provide the best test and evaluation of your sleep and your cooperation in following the guidelines is very necessary.    2. We have no facilities for family members or guests at the sleep center. Special arrangements will be made for children requiring overnight sleep studies.    3. Unless otherwise instructed, AVOID alcoholic beverages on the day of your sleep study.    4. DO NOT drink coffee or caffeine-containing beverages after 12:00 noon on the day of your sleep study.    5. There is NO smoking at the sleep center.    6. Try to maintain a usual daytime schedule prior to the study (avoid unusual physical activity or meals).    7. DO NOT take a nap on the day of your study.    8. This is an outpatient procedure (test); therefore, nursing services, medications, and meals ARE NOT provided. If you take medications, bring them with you and take them on the schedule you do at home.    9. Please fill your sleep aid prescription (Ambien or Lunesta) and bring to your sleep study. Even patients who normally have no problem going to sleep often need a sleep aid in this different environment.    10. We ask that you wear conventional sleep attire (pajamas or sweats) for the sleep study. We discourage patients from wearing only their underwear to bed. We recommend two-piece pajamas as the techs will need to apply sensors to your stomach.    11. Please shampoo your hair the day of the sleep study. Please DO NOT use any other hair or skin products before your arrival (e.g., mousse, gel, hair or body spray, perfume, body lotion etc.) NOTE: Women should not wear heavy makeup prior to arrival as some wires are taped to the face area.    12. The technician will be applying several small electrodes to the scalp, eye area, chin, chest, and legs, plus respiratory effort belts around the chest. Also, there will be a device placed directly under the nose. (THIS WILL NOT OBSTRUCT  YOUR BREATHING.) This is a painless procedure and the skin is not broken.    13. The test is generally completed in six to eight hours; We are usually done between 6 - 7 a.m., unless you are scheduled for a nap study. You may need to come back another night for a second study to complete your treatment plan.    14. Patients who are scheduled for an MSLT (nap study) will stay at the sleep center for the day following their nighttime study. You will be notified if a nap study was ordered for you at the time the night study is scheduled. Generally, patients having a nap study will leave the sleep center by 4 p.m.    15. You will need to bring food for the following day if you are scheduled for a nap study. A refrigerator and microwave are available.    16. A bathroom is available for your use.    17. We are able to adjust the room temperature for your comfort. Please let the technologist know if you are uncomfortable during the study.    18. If you sleep better with a special pillow or stuffed animal, you may bring it along. Service animals are the only live animals permitted.    19. Cable T.V. is available.    20. You will be scheduled for a follow-up appointment three to five days after the sleep study to review your results.    21. A copy of your sleep study is sent to the referring physician approximately two weeks after your study.    22. Any questions can be directed to our staff at [phone].    23. If CPAP therapy is applied, a home unit will be ordered for you through the [employer] medical equipment company. You will be contacted to schedule delivery after insurance authorization.      [facility] Letter of Caution    You have been given this prescription for Ambien in case you have difficulty sleeping the night of your sleep study. Your doctor would like you to get the prescription filled and bring the medication with you to your sleep study appointment. Do not use it before your sleep study.     We make every  effort to accomplish all we need in one sleep study. If you do not sleep enough during the first half of the night, you may need to have another sleep study to complete the treatment portion of the test. To help avoid the possibility of a second sleep study, we ask you to bring the Ambien with you. Do not take the Ambien before you arrive at the Sleep Center. The Ambien should only be taken once your sleep technologist has finished attaching the sensors. Your technologist may suggest you take the Ambien if you are unable to fall asleep within the first 30 minutes.     The prescription you have been given is for two (2) 5 mg doses. Do not take the Ambien if you have consumed any alcohol or have taken any narcotic medications on the day of your sleep test. Please tell your physician or your sleep technologist if you have any medication allergies or have had problems with sleep medications in the past.     Ambien does not affect the results of the sleep study. Your doctor has determined this medication is safe for you to take. You are not required to take the Ambien; however, we still recommend you fill the prescription and bring it with you even if you feel you won’t need it. Our technicians are unable to administer any sleep aides at your study but will do their best to aid in your comfort.     If you take Ambien during your study, you may need to sleep later to make sure you are fully awake before leaving the Sleep Center. Ambien can impair alertness and motor coordination. We recommend you plan on staying as long as needed in the morning or arrange transportation. Please read all the information provided to you by your pharmacist about Ambien.             Thank you,                      [facility]

## 2024-01-11 ENCOUNTER — APPOINTMENT (OUTPATIENT)
Dept: MEDICAL GROUP | Facility: PHYSICIAN GROUP | Age: 64
End: 2024-01-11
Payer: COMMERCIAL

## 2024-01-18 ENCOUNTER — APPOINTMENT (OUTPATIENT)
Dept: MEDICAL GROUP | Facility: PHYSICIAN GROUP | Age: 64
End: 2024-01-18
Payer: COMMERCIAL

## 2024-01-19 ENCOUNTER — APPOINTMENT (OUTPATIENT)
Dept: MEDICAL GROUP | Facility: PHYSICIAN GROUP | Age: 64
End: 2024-01-19
Payer: COMMERCIAL

## 2024-01-25 ENCOUNTER — OFFICE VISIT (OUTPATIENT)
Dept: MEDICAL GROUP | Facility: PHYSICIAN GROUP | Age: 64
End: 2024-01-25
Payer: COMMERCIAL

## 2024-01-25 ENCOUNTER — APPOINTMENT (OUTPATIENT)
Dept: RADIOLOGY | Facility: IMAGING CENTER | Age: 64
End: 2024-01-25
Payer: COMMERCIAL

## 2024-01-25 ENCOUNTER — APPOINTMENT (OUTPATIENT)
Dept: MEDICAL GROUP | Facility: PHYSICIAN GROUP | Age: 64
End: 2024-01-25
Payer: COMMERCIAL

## 2024-01-25 VITALS
HEIGHT: 62 IN | OXYGEN SATURATION: 97 % | DIASTOLIC BLOOD PRESSURE: 86 MMHG | RESPIRATION RATE: 20 BRPM | BODY MASS INDEX: 33.68 KG/M2 | HEART RATE: 70 BPM | SYSTOLIC BLOOD PRESSURE: 132 MMHG | TEMPERATURE: 97.6 F | WEIGHT: 183 LBS

## 2024-01-25 DIAGNOSIS — R05.3 CHRONIC COUGH: ICD-10-CM

## 2024-01-25 DIAGNOSIS — Z13.29 THYROID DISORDER SCREEN: ICD-10-CM

## 2024-01-25 DIAGNOSIS — I10 ESSENTIAL HYPERTENSION: ICD-10-CM

## 2024-01-25 DIAGNOSIS — R09.89 PULSE IRREGULARITY: ICD-10-CM

## 2024-01-25 DIAGNOSIS — Z13.0 SCREENING FOR DEFICIENCY ANEMIA: ICD-10-CM

## 2024-01-25 DIAGNOSIS — E55.9 VITAMIN D DEFICIENCY: ICD-10-CM

## 2024-01-25 DIAGNOSIS — F40.243 FEAR OF FLYING: ICD-10-CM

## 2024-01-25 DIAGNOSIS — Z13.6 SCREENING FOR CARDIOVASCULAR CONDITION: ICD-10-CM

## 2024-01-25 DIAGNOSIS — F17.211 TOBACCO DEPENDENCE DUE TO CIGARETTES, IN REMISSION: ICD-10-CM

## 2024-01-25 DIAGNOSIS — E66.9 OBESITY (BMI 30-39.9): ICD-10-CM

## 2024-01-25 PROCEDURE — 3079F DIAST BP 80-89 MM HG: CPT

## 2024-01-25 PROCEDURE — 99214 OFFICE O/P EST MOD 30 MIN: CPT

## 2024-01-25 PROCEDURE — 71046 X-RAY EXAM CHEST 2 VIEWS: CPT | Mod: TC | Performed by: RADIOLOGY

## 2024-01-25 PROCEDURE — 3075F SYST BP GE 130 - 139MM HG: CPT

## 2024-01-25 RX ORDER — NEBIVOLOL 5 MG/1
5 TABLET ORAL DAILY
Qty: 90 TABLET | Refills: 1 | Status: SHIPPED | OUTPATIENT
Start: 2024-01-25

## 2024-01-25 RX ORDER — DIAZEPAM 5 MG/1
5 TABLET ORAL EVERY 6 HOURS PRN
Qty: 30 TABLET | Refills: 0 | Status: SHIPPED | OUTPATIENT
Start: 2024-01-25 | End: 2024-02-24

## 2024-01-25 ASSESSMENT — PATIENT HEALTH QUESTIONNAIRE - PHQ9: CLINICAL INTERPRETATION OF PHQ2 SCORE: 0

## 2024-01-25 ASSESSMENT — ENCOUNTER SYMPTOMS
WHEEZING: 0
SHORTNESS OF BREATH: 0
DIZZINESS: 1
PALPITATIONS: 1
HEADACHES: 1
COUGH: 1

## 2024-01-25 ASSESSMENT — FIBROSIS 4 INDEX: FIB4 SCORE: 0.76

## 2024-01-25 NOTE — ASSESSMENT & PLAN NOTE
"Chronic, improved. 132/86 today in clinic. Home blood pressure readings 124-144/70-88 over the past 4 weeks.  Continues to have mild headaches, \"buzzing; weird\" head feeling, heart rate fluctuations/ intermittently feeling lightheaded.    HR - with majority of readings mid 60s  Reports symptoms have improved with bystolic 5 mg daily; continue this,  Continue amlodipine 5 mg daily, hctz 25 mg daily  "

## 2024-01-25 NOTE — PROGRESS NOTES
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    HPI:   Harmony presents today with    Problem   Tobacco Dependence Due to Cigarettes, in Remission   Pulse Irregularity   Fear of Flying   Essential Hypertension     ROS:  Review of Systems   Respiratory:  Positive for cough. Negative for shortness of breath and wheezing.    Cardiovascular:  Positive for palpitations. Negative for chest pain and leg swelling.   Neurological:  Positive for dizziness and headaches.   All other systems reviewed and are negative.      Objective:     Exam:  /86 (BP Location: Left arm, Patient Position: Sitting, BP Cuff Size: Adult)   Pulse 70   Temp 36.4 °C (97.6 °F) (Temporal)   Resp 20   Ht 1.575 m (5' 2\")   Wt 83 kg (183 lb)   SpO2 97%   BMI 33.47 kg/m²  Body mass index is 33.47 kg/m².    Physical Exam  Vitals reviewed.   Constitutional:       General: She is not in acute distress.     Appearance: Normal appearance. She is not ill-appearing.   HENT:      Head: Normocephalic and atraumatic.   Cardiovascular:      Rate and Rhythm: Normal rate.      Pulses: Normal pulses.   Pulmonary:      Effort: Pulmonary effort is normal. No respiratory distress.   Skin:     General: Skin is warm and dry.      Findings: No rash.   Neurological:      General: No focal deficit present.      Mental Status: She is alert and oriented to person, place, and time.   Psychiatric:         Mood and Affect: Mood normal.         Behavior: Behavior normal.       Assessment & Plan:     64 y.o. female with the following -     Problem List Items Addressed This Visit       Essential hypertension     Chronic, improved. 132/86 today in clinic. Home blood pressure readings " "124-144/70-88 over the past 4 weeks.  Continues to have mild headaches, \"buzzing; weird\" head feeling, heart rate fluctuations/ intermittently feeling lightheaded.    HR - with majority of readings mid 60s  Reports symptoms have improved with bystolic 5 mg daily; continue this,  Continue amlodipine 5 mg daily, hctz 25 mg daily         Relevant Medications    nebivolol (BYSTOLIC) 5 MG Tab tablet    Vitamin D deficiency    Relevant Orders    VITAMIN D,25 HYDROXY (DEFICIENCY)    Fear of flying     Chronic, has upcoming trip to [place] to see family. 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 Discussed/encouraged smoking cessation.  Will get cxr for chronic cough          Other Visit Diagnoses       Chronic cough        Relevant Orders    DX-CHEST-2 VIEWS    Thyroid disorder screen        Relevant Orders    TSH    Screening for cardiovascular condition        Relevant Orders    HEMOGLOBIN A1C    Comp Metabolic Panel    Lipid Profile    Screening for deficiency anemia        Relevant Orders    CBC WITHOUT DIFFERENTIAL    VITAMIN B12          Patient was educated in proper administration of medication(s) ordered today including safety, possible SE, risks, benefits, rationale and alternatives to therapy.   Supportive care, differential diagnoses, and indications for immediate follow-up discussed with patient.    Pathogenesis of diagnosis discussed including typical length and natural progression.    Instructed to return to clinic or nearest emergency department for any change in condition, further concerns, or worsening of symptoms.  Patient states understanding of the plan of care and discharge " instructions.    Return in about 3 months (around 4/25/2024) for Controlled Substance, Anxiety.    Please note that this dictation was created using voice recognition software. I have made every reasonable attempt to correct obvious errors, but I expect that there are errors of grammar and possibly content that I did not discover before finalizing the note.

## 2024-02-01 ENCOUNTER — HOSPITAL ENCOUNTER (OUTPATIENT)
Dept: LAB | Facility: MEDICAL CENTER | Age: 64
End: 2024-02-01
Payer: COMMERCIAL

## 2024-02-01 DIAGNOSIS — F51.01 PRIMARY INSOMNIA: ICD-10-CM

## 2024-02-01 DIAGNOSIS — Z13.6 SCREENING FOR CARDIOVASCULAR CONDITION: ICD-10-CM

## 2024-02-01 DIAGNOSIS — Z13.0 SCREENING FOR DEFICIENCY ANEMIA: ICD-10-CM

## 2024-02-01 DIAGNOSIS — E55.9 VITAMIN D DEFICIENCY: ICD-10-CM

## 2024-02-01 DIAGNOSIS — F40.243 FEAR OF FLYING: ICD-10-CM

## 2024-02-01 DIAGNOSIS — E66.9 OBESITY (BMI 30-39.9): ICD-10-CM

## 2024-02-01 DIAGNOSIS — Z13.29 THYROID DISORDER SCREEN: ICD-10-CM

## 2024-02-01 LAB
25(OH)D3 SERPL-MCNC: 93 NG/ML (ref 30–100)
ALBUMIN SERPL BCP-MCNC: 3.9 G/DL (ref 3.2–4.9)
ALBUMIN/GLOB SERPL: 1.5 G/DL
ALP SERPL-CCNC: 68 U/L (ref 30–99)
ALT SERPL-CCNC: 19 U/L (ref 2–50)
ANION GAP SERPL CALC-SCNC: 13 MMOL/L (ref 7–16)
AST SERPL-CCNC: 15 U/L (ref 12–45)
BILIRUB SERPL-MCNC: 0.5 MG/DL (ref 0.1–1.5)
BUN SERPL-MCNC: 16 MG/DL (ref 8–22)
CALCIUM ALBUM COR SERPL-MCNC: 9.6 MG/DL (ref 8.5–10.5)
CALCIUM SERPL-MCNC: 9.5 MG/DL (ref 8.5–10.5)
CHLORIDE SERPL-SCNC: 101 MMOL/L (ref 96–112)
CHOLEST SERPL-MCNC: 188 MG/DL (ref 100–199)
CO2 SERPL-SCNC: 23 MMOL/L (ref 20–33)
CREAT SERPL-MCNC: 0.73 MG/DL (ref 0.5–1.4)
ERYTHROCYTE [DISTWIDTH] IN BLOOD BY AUTOMATED COUNT: 42.3 FL (ref 35.9–50)
EST. AVERAGE GLUCOSE BLD GHB EST-MCNC: 111 MG/DL
FASTING STATUS PATIENT QL REPORTED: NORMAL
GFR SERPLBLD CREATININE-BSD FMLA CKD-EPI: 92 ML/MIN/1.73 M 2
GLOBULIN SER CALC-MCNC: 2.6 G/DL (ref 1.9–3.5)
GLUCOSE SERPL-MCNC: 92 MG/DL (ref 65–99)
HBA1C MFR BLD: 5.5 % (ref 4–5.6)
HCT VFR BLD AUTO: 48.4 % (ref 37–47)
HDLC SERPL-MCNC: 47 MG/DL
HGB BLD-MCNC: 16.4 G/DL (ref 12–16)
LDLC SERPL CALC-MCNC: 120 MG/DL
MCH RBC QN AUTO: 30.3 PG (ref 27–33)
MCHC RBC AUTO-ENTMCNC: 33.9 G/DL (ref 32.2–35.5)
MCV RBC AUTO: 89.3 FL (ref 81.4–97.8)
PLATELET # BLD AUTO: 349 K/UL (ref 164–446)
PMV BLD AUTO: 10.2 FL (ref 9–12.9)
POTASSIUM SERPL-SCNC: 3.5 MMOL/L (ref 3.6–5.5)
PROT SERPL-MCNC: 6.5 G/DL (ref 6–8.2)
RBC # BLD AUTO: 5.42 M/UL (ref 4.2–5.4)
SODIUM SERPL-SCNC: 137 MMOL/L (ref 135–145)
TRIGL SERPL-MCNC: 105 MG/DL (ref 0–149)
TSH SERPL DL<=0.005 MIU/L-ACNC: 4.3 UIU/ML (ref 0.38–5.33)
VIT B12 SERPL-MCNC: 637 PG/ML (ref 211–911)
WBC # BLD AUTO: 10.3 K/UL (ref 4.8–10.8)

## 2024-02-01 PROCEDURE — 82306 VITAMIN D 25 HYDROXY: CPT

## 2024-02-01 PROCEDURE — 80061 LIPID PANEL: CPT

## 2024-02-01 PROCEDURE — 82607 VITAMIN B-12: CPT

## 2024-02-01 PROCEDURE — 84443 ASSAY THYROID STIM HORMONE: CPT

## 2024-02-01 PROCEDURE — 85027 COMPLETE CBC AUTOMATED: CPT

## 2024-02-01 PROCEDURE — 36415 COLL VENOUS BLD VENIPUNCTURE: CPT

## 2024-02-01 PROCEDURE — 83036 HEMOGLOBIN GLYCOSYLATED A1C: CPT

## 2024-02-01 PROCEDURE — 80053 COMPREHEN METABOLIC PANEL: CPT

## 2024-02-05 ENCOUNTER — APPOINTMENT (OUTPATIENT)
Dept: CARDIOLOGY | Facility: MEDICAL CENTER | Age: 64
End: 2024-02-05
Payer: COMMERCIAL

## 2024-02-29 ENCOUNTER — APPOINTMENT (OUTPATIENT)
Dept: SLEEP MEDICINE | Facility: MEDICAL CENTER | Age: 64
End: 2024-02-29
Payer: COMMERCIAL

## 2024-03-07 ENCOUNTER — APPOINTMENT (OUTPATIENT)
Dept: MEDICAL GROUP | Facility: PHYSICIAN GROUP | Age: 64
End: 2024-03-07
Payer: COMMERCIAL

## 2024-03-23 DIAGNOSIS — F51.01 PRIMARY INSOMNIA: ICD-10-CM

## 2024-03-26 RX ORDER — TRAZODONE HYDROCHLORIDE 50 MG/1
50 TABLET ORAL EVERY EVENING
Qty: 90 TABLET | Refills: 3 | Status: SHIPPED | OUTPATIENT
Start: 2024-03-26

## 2024-03-26 NOTE — TELEPHONE ENCOUNTER
Received request via: Patient    Was the patient seen in the last year in this department? Yes    Does the patient have an active prescription (recently filled or refills available) for medication(s) requested? No    Pharmacy Name:   [id number] - [place], [place] - [address]     Does the patient have custodial Plus and need 100 day supply (blood pressure, diabetes and cholesterol meds only)? Patient does not have SCP

## 2024-03-28 ENCOUNTER — APPOINTMENT (OUTPATIENT)
Dept: CARDIOLOGY | Facility: MEDICAL CENTER | Age: 64
End: 2024-03-28
Payer: COMMERCIAL

## 2024-04-03 ENCOUNTER — OFFICE VISIT (OUTPATIENT)
Dept: URGENT CARE | Facility: PHYSICIAN GROUP | Age: 64
End: 2024-04-03
Payer: COMMERCIAL

## 2024-04-03 VITALS
BODY MASS INDEX: 34.23 KG/M2 | HEART RATE: 67 BPM | WEIGHT: 186 LBS | DIASTOLIC BLOOD PRESSURE: 62 MMHG | HEIGHT: 62 IN | OXYGEN SATURATION: 97 % | SYSTOLIC BLOOD PRESSURE: 110 MMHG | TEMPERATURE: 99 F | RESPIRATION RATE: 16 BRPM

## 2024-04-03 DIAGNOSIS — S00.86XA INSECT BITE OF FACE WITH LOCAL REACTION, INITIAL ENCOUNTER: ICD-10-CM
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
## 2024-04-03 PROCEDURE — 1125F AMNT PAIN NOTED PAIN PRSNT: CPT | Performed by: PHYSICIAN ASSISTANT

## 2024-04-03 PROCEDURE — 3074F SYST BP LT 130 MM HG: CPT | Performed by: PHYSICIAN ASSISTANT

## 2024-04-03 PROCEDURE — 99213 OFFICE O/P EST LOW 20 MIN: CPT | Performed by: PHYSICIAN ASSISTANT

## 2024-04-03 PROCEDURE — 3078F DIAST BP <80 MM HG: CPT | Performed by: PHYSICIAN ASSISTANT

## 2024-04-03 RX ORDER — AMOXICILLIN AND CLAVULANATE POTASSIUM 875; 125 MG/1; MG/1
1 TABLET, FILM COATED ORAL 2 TIMES DAILY
Qty: 10 TABLET | Refills: 0 | Status: SHIPPED | OUTPATIENT
Start: 2024-04-03 | End: 2024-04-08

## 2024-04-03 ASSESSMENT — ENCOUNTER SYMPTOMS
EYE DISCHARGE: 0
EYE REDNESS: 0
FEVER: 0

## 2024-04-03 ASSESSMENT — FIBROSIS 4 INDEX: FIB4 SCORE: 0.63

## 2024-04-03 ASSESSMENT — PAIN SCALES - GENERAL: PAINLEVEL: 2=MINIMAL-SLIGHT

## 2024-04-03 NOTE — PROGRESS NOTES
Subjective     Harmony Monroe is a 64 y.o. female who presents with Insect Bite (X 2 days left side of cheek )            HPI  This is a new problem.   The patient presents to clinic complaining of a possible infection to her left cheek x 1 day.  The patient states she was working in her yard yesterday afternoon when she may have sustained an insect bite to her left cheek.  The patient states she was crawling underneath her pine bush at the time, and is not sure if she was bit by an insect possible prick from the pine needle. The patient states she developed a subsequent localized area of pain and swelling with slight itchiness.  The patient reports no discharge/drainage from the area.  She also reports no redness.  No fever.  The patient has taken OTC ibuprofen for her current symptoms, as well as Benadryl.      PMH:  has a past medical history of Allergy, Arthritis, ASTHMA, Basal cell carcinoma of left medial cheek (11/30/2015), Basal cell carcinoma of left medial cheek (11/30/2015), Constipation (10/14/2022), Depression (1/7/2010), Dyspnea (3/21/2018), High cholesterol, History of tobacco use disorder (1/7/2010), Hypertension, Pneumonia (1990,2001), and Post-menopausal (8/26/2014).  MEDS:   Current Outpatient Medications:     traZODone (DESYREL) 50 MG Tab, TAKE 1 TABLET BY MOUTH EVERY DAY IN THE EVENING, Disp: 90 Tablet, Rfl: 3    nebivolol (BYSTOLIC) 5 MG Tab tablet, Take 1 Tablet by mouth every day., Disp: 90 Tablet, Rfl: 1    temazepam (RESTORIL) 15 MG Cap, Take 1 Capsule by mouth at bedtime as needed for Sleep for up to 90 days., Disp: 90 Capsule, Rfl: 0    amLODIPine (NORVASC) 5 MG Tab, Take 1 Tablet by mouth every day., Disp: 90 Tablet, Rfl: 3    sertraline (ZOLOFT) 50 MG Tab, Take 1 Tablet by mouth every day., Disp: 90 Tablet, Rfl: 1    estradiol (ESTRACE) 1 MG Tab, TAKE 1 TABLET BY MOUTH EVERY DAY, Disp: 90 Tablet, Rfl: 3    simvastatin (ZOCOR) 40 MG Tab, Take 1 Tablet by mouth every evening.,  "Disp: 90 Tablet, Rfl: 3    ipratropium-albuterol (DUONEB) 0.5-2.5 (3) MG/3ML nebulizer solution, Take 3 mL by nebulization 4 times a day., Disp: 60 Each, Rfl: 1    hydroCHLOROthiazide (HYDRODIURIL) 25 MG Tab, Take 1 Tablet by mouth every day., Disp: 90 Tablet, Rfl: 3    polyethylene glycol/lytes (MIRALAX) 17 g Pack, Take 17 g by mouth every day., Disp: , Rfl:     EPINEPHrine (EPIPEN) 0.3 MG/0.3ML Solution Auto-injector solution for injection, Inject 0.3 mL into the thigh one time for 1 dose., Disp: 1 Each, Rfl: 1    albuterol 108 (90 Base) MCG/ACT Aero Soln inhalation aerosol, Inhale 2 Puffs every 6 hours as needed for Shortness of Breath., Disp: 3 Each, Rfl: 3  ALLERGIES:   Allergies   Allergen Reactions    Bee Anaphylaxis    Levaquin Anaphylaxis and Unspecified     Myalgias arthralgias     Levofloxacin Unspecified    Tape Hives     Redness; itching \"paper tape ok\"    Moxifloxacin Hcl In Nacl Swelling    Polysporin [Bacitracin-Polymyxin B]      rash    Promethazine      Twitching feeling     SURGHX:   Past Surgical History:   Procedure Laterality Date    KNEE ARTHROSCOPY Left 2/5/2018    Procedure: KNEE ARTHROSCOPY;  Surgeon: Andrews Castro M.D.;  Location: [facility];  Service: Orthopedics    MENISCECTOMY, KNEE, MEDIAL Left 2/5/2018    Procedure: MEDIAL MENISCECTOMY - PARTIAL;  Surgeon: Andrews Castro M.D.;  Location: [facility];  Service: Orthopedics    KNEE ARTHROSCOPY Right 3/23/2017    Procedure: KNEE ARTHROSCOPY;  Surgeon: Andrews Castro M.D.;  Location: [facility];  Service:     MENISCECTOMY, KNEE, MEDIAL  3/23/2017    Procedure: MEDIAL and lateral  MENISCECTOMY - PARTIAL;  Surgeon: Andrews Castro M.D.;  Location: [facility];  Service:     CHONDROPLASTY  3/23/2017    Procedure: CHONDROPLASTY -  PATELLAR;  Surgeon: Andrews Castro M.D.;  Location: [facility];  Service:     ABDOMINAL HYSTERECTOMY TOTAL  11/2005    TONSILLECTOMY AND " "ADENOIDECTOMY  1980    NASAL SEPTAL RECONSTRUCTION  1980    CYST EXCISION  1974    Anterior Left Foot     SOCHX:  reports that she quit smoking about 4 years ago. Her smoking use included cigarettes. She started smoking about 26 years ago. She has a 5.6 pack-year smoking history. She has never used smokeless tobacco. She reports that she does not drink alcohol and does not use drugs.  FH: Family history was reviewed, no pertinent findings to report    Review of Systems   Constitutional:  Negative for fever.   Eyes:  Negative for discharge and redness.              Objective     /62 (BP Location: Left arm, Patient Position: Sitting, BP Cuff Size: Adult)   Pulse 67   Temp 37.2 °C (99 °F) (Temporal)   Resp 16   Ht 1.575 m (5' 2\")   Wt 84.4 kg (186 lb) Comment: pt reported  SpO2 97%   BMI 34.02 kg/m²      Physical Exam  Constitutional:       General: She is not in acute distress.     Appearance: Normal appearance. She is well-developed. She is not ill-appearing.   HENT:      Head: Normocephalic and atraumatic.      Comments:   A localized area of swelling is present to the left cheek with a central pea-sized area of induration and tenderness to palpation.  No overlying erythema.  No increased warmth.  No palpable fluctuance.  No active discharge/drainage.     Right Ear: Tympanic membrane, ear canal and external ear normal.      Left Ear: Tympanic membrane, ear canal and external ear normal.      Nose: Nose normal.      Mouth/Throat:      Mouth: Mucous membranes are moist.      Dentition: No gum lesions.      Pharynx: Oropharynx is clear. Uvula midline. No posterior oropharyngeal erythema.   Eyes:      Extraocular Movements: Extraocular movements intact.      Conjunctiva/sclera: Conjunctivae normal.   Cardiovascular:      Rate and Rhythm: Normal rate.   Pulmonary:      Effort: Pulmonary effort is normal.   Musculoskeletal:         General: Normal range of motion.      Cervical back: Normal range of motion " and neck supple.   Skin:     General: Skin is warm and dry.   Neurological:      Mental Status: She is alert and oriented to person, place, and time.                             Assessment & Plan          1. Insect bite of face with local reaction, initial encounter  - amoxicillin-clavulanate (AUGMENTIN) 875-125 MG Tab; Take 1 Tablet by mouth 2 times a day for 5 days.  Dispense: 10 Tablet; Refill: 0    The patient's presenting symptoms and physical exam findings are consistent with a likely insect bite of the face with a localized reaction.  Will prescribe the patient Augmentin to cover for a possible secondary infection, as the patient is getting ready to travel on vacation.  Advised the patient to monitor for worsening signs or symptoms.  Recommend OTC medications and supportive care for symptomatic management.  Recommend patient follow-up with primary care as needed.  Discussed return precautions with the patient, and she verbalized understanding.    Differential diagnoses, supportive care, and indications for immediate follow-up discussed with patient.   Instructed to return to clinic or nearest emergency department for any change in condition, further concerns, or worsening of symptoms.    OTC NSAIDs for pain/discomfort  Apply warm and/or cool compresses to the affected area for symptomatic relief  Monitor for worsening signs or symptoms  Follow-up with PCP as needed  Return to clinic or go to the ED if symptoms worsen or fail to improve, or if the patient develop worsening/increasing/persistent pain/tenderness, swelling, increased redness warmth, discharge/drainage, itchiness, fever/chills, secondary signs of infection, and/or any concerning symptoms.    Discussed plan with the patient, and she agrees to the above.     I personally reviewed prior external notes and test results pertinent to today's visit.  I have independently reviewed and interpreted all diagnostics ordered during this urgent care visit.      Please note that this dictation was created using voice recognition software. I have made every reasonable attempt to correct obvious errors, but I expect that there may be errors of grammar and possibly content that I did not discover before finalizing the note.     This note was electronically signed by Bernadine Akers PA-C

## 2024-04-25 ENCOUNTER — OFFICE VISIT (OUTPATIENT)
Dept: DERMATOLOGY | Facility: IMAGING CENTER | Age: 64
End: 2024-04-25
Payer: COMMERCIAL

## 2024-04-25 DIAGNOSIS — L02.91 ABSCESS: ICD-10-CM

## 2024-04-25 DIAGNOSIS — L72.0 EPIDERMAL CYST OF FACE: ICD-10-CM

## 2024-04-25 PROCEDURE — 99212 OFFICE O/P EST SF 10 MIN: CPT | Performed by: NURSE PRACTITIONER

## 2024-04-25 RX ORDER — DOXYCYCLINE HYCLATE 100 MG
100 TABLET ORAL 2 TIMES DAILY
Qty: 14 TABLET | Refills: 0 | Status: SHIPPED | OUTPATIENT
Start: 2024-04-25

## 2024-04-25 NOTE — PROGRESS NOTES
"DERMATOLOGY NOTE  FOLLOW UP VISIT       Chief complaint: Cyst     HPI/location: lt cheek   Time present: 5 weeks   Painful lesion: Yes  Itching lesion: No  Enlarging lesion: Yes  Anything make it better or worse? Was given abx didn't finish due to vacation trip.       History of skin cancer: Yes, Details:  BCC, 10/2015  History of blistering/severe sunburns:No  Family history of skin cancer:No - but brother with ocular melanoma, dx 03/2017  Family history of atypical moles:No    Allergies   Allergen Reactions    Bee Anaphylaxis    Levaquin Anaphylaxis and Unspecified     Myalgias arthralgias     Levofloxacin Unspecified    Tape Hives     Redness; itching \"paper tape ok\"    Moxifloxacin Hcl In Nacl Swelling    Polysporin [Bacitracin-Polymyxin B]      rash    Promethazine      Twitching feeling        MEDICATIONS:  Medications relevant to specialty reviewed.     REVIEW OF SYSTEMS:   Positive for skin (see HPI)  Negative for fevers and chills       EXAM:  There were no vitals taken for this visit.  Constitutional: Well-developed, well-nourished, and in no distress.     A focused skin exam was performed including the affected areas of the face. Notable findings on exam today listed below and/or in assessment/plan.     Approx. 1.3 cm cystic structure to L cheek, no evidence of inflammation, surrounding erythema or drainage      IMPRESSION / PLAN:    1. Epidermal cyst of face  Recently abscessed, resolved  Would like removed,  tender at times, had abscessed before  PA form completed and given to PARs to process and schedule    2. Abscess  Previously tx'd w/antibiotics, but did not finish  Will empirically tx with Rx below  - doxycycline (VIBRAMYCIN) 100 MG Tab; Take 1 Tablet by mouth 2 times a day.  Dispense: 14 Tablet; Refill: 0    Patient verbalized understanding and agrees with plan regarding the above        Please note that this dictation was created using voice recognition software. I have made every reasonable " attempt to correct obvious errors, but I expect that there are errors of grammar and possibly content that I did not discover before finalizing the note.      Return to clinic in: Return for pending PA for cyst excision. and as needed for any new or changing skin lesions.

## 2024-05-08 ENCOUNTER — PATIENT MESSAGE (OUTPATIENT)
Dept: DERMATOLOGY | Facility: IMAGING CENTER | Age: 64
End: 2024-05-08
Payer: COMMERCIAL

## 2024-05-08 NOTE — PATIENT COMMUNICATION
Spoke with patient referral has been done and is being processed through ins. Once approved the schedulers up front will call her to schedule surgery time and date with Dr. Fajardo .

## 2024-05-23 ENCOUNTER — GYNECOLOGY VISIT (OUTPATIENT)
Dept: DERMATOLOGY | Facility: IMAGING CENTER | Age: 64
End: 2024-05-23
Payer: COMMERCIAL

## 2024-05-23 VITALS
TEMPERATURE: 98.8 F | SYSTOLIC BLOOD PRESSURE: 110 MMHG | BODY MASS INDEX: 34.74 KG/M2 | WEIGHT: 184 LBS | HEIGHT: 61 IN | DIASTOLIC BLOOD PRESSURE: 64 MMHG

## 2024-05-23 DIAGNOSIS — L72.0 EPIDERMAL CYST OF FACE: ICD-10-CM

## 2024-05-23 DIAGNOSIS — D49.2 NEOPLASM OF SKIN: ICD-10-CM

## 2024-05-23 ASSESSMENT — FIBROSIS 4 INDEX: FIB4 SCORE: 0.63

## 2024-05-23 NOTE — PROGRESS NOTES
"BENIGN EXCISION PROCEDURE NOTE:    Risks, benefits and alternatives of procedure, including, but not limited to scar/poor cosmetic outcome, bleeding, pain, infection, nerve damage, recurrence of lesion, failed surgery, and need for further surgery, were discussed and written informed consent obtained. Verbal time out completed.     Allergies reviewed: Yes  Pacemaker/defibrillator: No  Artificial joints: No  Antibiotics given: No  Blood thinners/anticoagulants: No    Pre-op diagnosis: suspected cyst/benign adnexal tumor cannot exclude skin cancer/R20.8 (disturbance of skin)  Post-op diagnosis: Same  Indication: symptomatic (growth/diagnosis)    Site:left cheek  Pre-op size:1.3cm +1-2mm margins narrow margin excision  Post-op antibiotics: no    /64 (BP Location: Left arm, Patient Position: Sitting, BP Cuff Size: Adult)   Temp 37.1 °C (98.8 °F) (Temporal)   Ht 1.549 m (5' 1\")   Wt 83.5 kg (184 lb)     Procedure: Area of surgery was prepped with alcohol, marked with a sterile marking pen. Anesthesia with 1% lidocaine with epinephrine administered with 30 gauge needle. The area was again cleaned with chlorhexidine swab. With sterile technique, a 15 blade scalpel was used to make a elliptical incision over the lesion to the level of the subcutaneous fat. Dissection was completed with iris/tissue scissors, and the mass was removed. Hemostasis was achieved with electrodessication.  Specimen was placed into biopsy container and sent to pathology by staff.    Intermediate closure note:  5.0 monocryl buried vertical mattress sutures were placed to close dead space. 6.0 prolene superficial interrupted sutures were placed to approximate wound edges.  Vaseline applied to wound with bandage. Patient tolerated procedure well and there were no complications, blood loss was minimal.     Final wound size: 4.5 cm    Bandage was placed with vaseline, telfa, gauze and tape. Wound care was discussed with the patient, and written " instructions were provided. Patient to return to clinic in 7 days for suture removal. Patient to call us if any problems or concerns with the procedure site arise prior to scheduled appointment.    Chyna Fajardo MD

## 2024-05-30 ENCOUNTER — NON-PROVIDER VISIT (OUTPATIENT)
Dept: DERMATOLOGY | Facility: IMAGING CENTER | Age: 64
End: 2024-05-30
Payer: COMMERCIAL

## 2024-05-30 NOTE — PROGRESS NOTES
Harmony Monroe is a 64 y.o. female here for a Non-Provider Visit for Suture Removal.    Sutures were placed by Dr. Fajardo on date: 04/25/24  Skin is healed: No  Provider notified if skin is not healed, or if there is redness, heat, pain, or drainage from incision: n/a  Sutures removed.   Mastisol and steristips are placed: Yes    Advised to use emollient (vaseline, aquaphor, etc.) as needed, avoid peroxide and antibiotic ointment to reduce irritation.     Path report has not been reviewed by provider.  Path report has not not reviewed with patient.

## 2024-06-20 ENCOUNTER — OFFICE VISIT (OUTPATIENT)
Dept: MEDICAL GROUP | Facility: PHYSICIAN GROUP | Age: 64
End: 2024-06-20
Payer: COMMERCIAL

## 2024-06-20 ENCOUNTER — APPOINTMENT (OUTPATIENT)
Dept: MEDICAL GROUP | Facility: PHYSICIAN GROUP | Age: 64
End: 2024-06-20
Payer: COMMERCIAL

## 2024-06-20 VITALS
WEIGHT: 196 LBS | TEMPERATURE: 98.1 F | DIASTOLIC BLOOD PRESSURE: 82 MMHG | BODY MASS INDEX: 37 KG/M2 | RESPIRATION RATE: 18 BRPM | HEIGHT: 61 IN | HEART RATE: 72 BPM | SYSTOLIC BLOOD PRESSURE: 130 MMHG | OXYGEN SATURATION: 96 %

## 2024-06-20 DIAGNOSIS — E87.6 HYPOKALEMIA: ICD-10-CM

## 2024-06-20 DIAGNOSIS — J45.20 MILD INTERMITTENT ASTHMA IN ADULT WITHOUT COMPLICATION: ICD-10-CM

## 2024-06-20 DIAGNOSIS — F41.9 ANXIETY: ICD-10-CM

## 2024-06-20 DIAGNOSIS — Z12.12 ENCOUNTER FOR SCREENING FOR COLORECTAL MALIGNANT NEOPLASM: ICD-10-CM

## 2024-06-20 DIAGNOSIS — M79.10 MUSCLE PAIN: ICD-10-CM

## 2024-06-20 DIAGNOSIS — I10 ESSENTIAL HYPERTENSION: ICD-10-CM

## 2024-06-20 DIAGNOSIS — F51.01 PRIMARY INSOMNIA: ICD-10-CM

## 2024-06-20 DIAGNOSIS — M25.60 JOINT STIFFNESS: ICD-10-CM

## 2024-06-20 DIAGNOSIS — Z79.890 POSTMENOPAUSAL HRT (HORMONE REPLACEMENT THERAPY): ICD-10-CM

## 2024-06-20 DIAGNOSIS — Z12.11 ENCOUNTER FOR SCREENING FOR COLORECTAL MALIGNANT NEOPLASM: ICD-10-CM

## 2024-06-20 DIAGNOSIS — F40.243 FEAR OF FLYING: ICD-10-CM

## 2024-06-20 PROCEDURE — 3079F DIAST BP 80-89 MM HG: CPT | Performed by: NURSE PRACTITIONER

## 2024-06-20 PROCEDURE — 99214 OFFICE O/P EST MOD 30 MIN: CPT | Performed by: NURSE PRACTITIONER

## 2024-06-20 PROCEDURE — 3075F SYST BP GE 130 - 139MM HG: CPT | Performed by: NURSE PRACTITIONER

## 2024-06-20 RX ORDER — AMLODIPINE BESYLATE 5 MG/1
5 TABLET ORAL DAILY
Qty: 90 TABLET | Refills: 3 | Status: SHIPPED | OUTPATIENT
Start: 2024-06-20

## 2024-06-20 RX ORDER — IPRATROPIUM BROMIDE AND ALBUTEROL SULFATE 2.5; .5 MG/3ML; MG/3ML
3 SOLUTION RESPIRATORY (INHALATION) 4 TIMES DAILY
Qty: 60 EACH | Refills: 1 | Status: SHIPPED | OUTPATIENT
Start: 2024-06-20

## 2024-06-20 RX ORDER — TEMAZEPAM 15 MG/1
15-30 CAPSULE ORAL NIGHTLY PRN
Qty: 180 CAPSULE | Refills: 0 | Status: SHIPPED | OUTPATIENT
Start: 2024-06-20 | End: 2024-09-18

## 2024-06-20 RX ORDER — NEBIVOLOL 5 MG/1
5 TABLET ORAL DAILY
Qty: 90 TABLET | Refills: 1 | Status: SHIPPED | OUTPATIENT
Start: 2024-06-20

## 2024-06-20 RX ORDER — ESTRADIOL 1 MG/1
1 TABLET ORAL
Qty: 90 TABLET | Refills: 3 | Status: SHIPPED | OUTPATIENT
Start: 2024-06-20

## 2024-06-20 RX ORDER — SERTRALINE HYDROCHLORIDE 25 MG/1
25 TABLET, FILM COATED ORAL DAILY
Qty: 90 TABLET | Refills: 3 | Status: SHIPPED | OUTPATIENT
Start: 2024-06-20

## 2024-06-20 RX ORDER — HYDROCHLOROTHIAZIDE 25 MG/1
25 TABLET ORAL DAILY
Qty: 90 TABLET | Refills: 3 | Status: SHIPPED | OUTPATIENT
Start: 2024-06-20

## 2024-06-20 RX ORDER — DIAZEPAM 5 MG/1
5 TABLET ORAL EVERY 6 HOURS PRN
Qty: 30 TABLET | Refills: 0 | Status: SHIPPED | OUTPATIENT
Start: 2024-06-20 | End: 2024-07-20

## 2024-06-20 ASSESSMENT — FIBROSIS 4 INDEX: FIB4 SCORE: 0.63

## 2024-06-20 NOTE — PROGRESS NOTES
"  Chief Complaint   Patient presents with    Medication Refill                                                                                                                                       HPI:   Harmony presents today with the following.    The patient reports that Bystolic has been effective in managing their blood pressure over the past five months. However, they attribute today's elevated blood pressure reading to a poor night's sleep. The patient believes that a previous episode of feeling unwell was due to anxiety and low potassium levels, which they have addressed by increasing potassium intake through diet and over-the-counter potassium gluconate supplements, resulting in a weight gain of 10 pounds.    Sleep remains a significant concern for the patient, with their body adjusting to only four and a half to five hours of sleep per night. They find relief when taking 2 temazepam tablets and occasionally supplementing with half of a Valium tablet. The patient uses Valium on an as-needed basis and anticipates needing more during an upcoming vacation.    Regarding medication, the patient confirms the continued use of Bystolic, Hydrochlorothiazide, Estrogen, Amlodipine, and DuoNeb. They mention that Albuterol and DuoNeb are used infrequently but should be kept on hand during fire season. The patient is no longer taking Simvastatin.    The patient's current health management strategies and concerns have been discussed, with particular attention to blood pressure management, sleep issues, potassium levels, and medication regimen.    ROS:  All systems negative expect as addressed in assessment and plan.     /82 (BP Location: Left arm, Patient Position: Sitting, BP Cuff Size: Adult)   Pulse 72   Temp 36.7 °C (98.1 °F) (Temporal)   Resp 18   Ht 1.549 m (5' 1\")   Wt 88.9 kg (196 lb)   SpO2 96%   BMI 37.03 kg/m²     Objective:    Physical Exam  Vitals reviewed.   Constitutional:       Appearance: " Normal appearance.   HENT:      Head: Normocephalic and atraumatic.      Mouth/Throat:      Mouth: Mucous membranes are moist.   Eyes:      Extraocular Movements: Extraocular movements intact.      Conjunctiva/sclera: Conjunctivae normal.   Pulmonary:      Effort: Pulmonary effort is normal.   Musculoskeletal:         General: Normal range of motion.      Cervical back: Normal range of motion.   Skin:     General: Skin is warm and dry.   Neurological:      General: No focal deficit present.      Mental Status: She is alert and oriented to person, place, and time.   Psychiatric:         Mood and Affect: Mood normal.         Behavior: Behavior normal.         Thought Content: Thought content normal.         Assessment and Plan:  64 y.o. female with the following issues.    1. Anxiety  - sertraline (ZOLOFT) 25 MG tablet; Take 1 Tablet by mouth every day.  Dispense: 90 Tablet; Refill: 3  - diazePAM (VALIUM) 5 MG Tab; Take 1 Tablet by mouth every 6 hours as needed for Anxiety (muscle spasms) for up to 30 days. Indications: Feeling Anxious, Muscle Spasm  Dispense: 30 Tablet; Refill: 0    2. Primary insomnia  - temazepam (RESTORIL) 15 MG Cap; Take 1-2 Capsules by mouth at bedtime as needed for Sleep for up to 90 days.  Dispense: 180 Capsule; Refill: 0    3. Essential hypertension  - hydroCHLOROthiazide 25 MG Tab; Take 1 Tablet by mouth every day.  Dispense: 90 Tablet; Refill: 3  - amLODIPine (NORVASC) 5 MG Tab; Take 1 Tablet by mouth every day.  Dispense: 90 Tablet; Refill: 3  - nebivolol (BYSTOLIC) 5 MG Tab tablet; Take 1 Tablet by mouth every day.  Dispense: 90 Tablet; Refill: 1    4. Postmenopausal HRT (hormone replacement therapy)  - estradiol (ESTRACE) 1 MG Tab; Take 1 Tablet by mouth every day.  Dispense: 90 Tablet; Refill: 3    5. Mild intermittent asthma in adult without complication  - ipratropium-albuterol (DUONEB) 0.5-2.5 (3) MG/3ML nebulizer solution; Take 3 mL by nebulization 4 times a day.  Dispense: 60 Each;  Refill: 1    6. Encounter for screening for colorectal malignant neoplasm  - COLKIMBERLY (FIT DNA)    7. Fear of flying  - diazePAM (VALIUM) 5 MG Tab; Take 1 Tablet by mouth every 6 hours as needed for Anxiety (muscle spasms) for up to 30 days. Indications: Feeling Anxious, Muscle Spasm  Dispense: 30 Tablet; Refill: 0    8. Joint stiffness  - diazePAM (VALIUM) 5 MG Tab; Take 1 Tablet by mouth every 6 hours as needed for Anxiety (muscle spasms) for up to 30 days. Indications: Feeling Anxious, Muscle Spasm  Dispense: 30 Tablet; Refill: 0    9. Muscle pain  - diazePAM (VALIUM) 5 MG Tab; Take 1 Tablet by mouth every 6 hours as needed for Anxiety (muscle spasms) for up to 30 days. Indications: Feeling Anxious, Muscle Spasm  Dispense: 30 Tablet; Refill: 0    10. Hypokalemia  - Basic Metabolic Panel; Future    1. Hypertension:     - Plan: Patient reports good control with Bystolic, with recent blood pressure readings around 120/64. Continue Bystolic as prescribed. Monitor blood pressure regularly.    2. Potassium Deficiency:       - Plan: Patient has increased potassium intake through diet and over-the-counter potassium gluconate supplementation. Order a basic metabolic panel to recheck potassium levels before the next appointment.    3. Insomnia:     - Plan: Patient reports difficulty sleeping and requests an increase in temazepam dosage. Increase temazepam to 15-30 mg nightly as needed. Refill prescription with enough quantity for two nightly and add a note for early refill approval. Schedule follow-up appointment in September to monitor progress.    4. Medication Management:     - Plan: Refill estradiol prescription as patient will be out of refills soon.      - Confirm patient is still taking Hydrochlorothiazide, amlodipine, and DuoNeb as prescribed. Ensure patient has Albuterol and DuoNeb on hand for fire season.     - Refill Valium prescription (30 tablets) for as-needed use during vacation.    5. Follow-up Appointment  Scheduling:     - Plan: Schedule follow-up appointment on Friday, September 20th at 9:40 AM with a 9:25 check-in. Ensure patient is aware of appointment change and update in the system.    6. Labs:     - Plan: Order a basic metabolic panel for the patient to be completed before the next appointment. No fasting required.    Return in about 12 weeks (around 9/12/2024) for Insomnia.      Please note that this dictation was created using voice recognition software. I have worked with consultants from the vendor as well as technical experts from [facility] [employer] to optimize the interface. I have made every reasonable attempt to correct obvious errors, but I expect that there are errors of grammar and possibly content that I did not discover before finalizing the note.

## 2024-06-21 ENCOUNTER — PATIENT MESSAGE (OUTPATIENT)
Dept: MEDICAL GROUP | Facility: PHYSICIAN GROUP | Age: 64
End: 2024-06-21
Payer: COMMERCIAL

## 2024-06-21 DIAGNOSIS — R11.0 NAUSEA: ICD-10-CM

## 2024-06-21 NOTE — PATIENT COMMUNICATION
Received request via: Patient    Was the patient seen in the last year in this department? Yes    Does the patient have an active prescription (recently filled or refills available) for medication(s) requested? No    [facility]    Does the patient have intermediate Plus and need 100 day supply (blood pressure, diabetes and cholesterol meds only)? Patient does not have SCP

## 2024-06-24 RX ORDER — ONDANSETRON 4 MG/1
4 TABLET, FILM COATED ORAL EVERY 4 HOURS PRN
Qty: 20 TABLET | Refills: 1 | Status: SHIPPED | OUTPATIENT
Start: 2024-06-24 | End: 2024-07-04

## 2024-06-24 RX ORDER — EPINEPHRINE 0.3 MG/.3ML
INJECTION SUBCUTANEOUS
Qty: 1 EACH | Refills: 1 | Status: SHIPPED | OUTPATIENT
Start: 2024-06-24

## 2024-10-03 DIAGNOSIS — F51.01 PRIMARY INSOMNIA: ICD-10-CM

## 2024-10-07 RX ORDER — TEMAZEPAM 15 MG/1
15-30 CAPSULE ORAL NIGHTLY PRN
Qty: 180 CAPSULE | Refills: 0 | Status: SHIPPED | OUTPATIENT
Start: 2024-10-07 | End: 2025-01-05

## 2024-10-10 ENCOUNTER — APPOINTMENT (OUTPATIENT)
Dept: MEDICAL GROUP | Facility: PHYSICIAN GROUP | Age: 64
End: 2024-10-10
Payer: COMMERCIAL

## 2024-10-10 ENCOUNTER — PATIENT MESSAGE (OUTPATIENT)
Dept: HEALTH INFORMATION MANAGEMENT | Facility: OTHER | Age: 64
End: 2024-10-10

## 2024-10-22 DIAGNOSIS — I10 ESSENTIAL HYPERTENSION: ICD-10-CM

## 2024-10-23 RX ORDER — NEBIVOLOL 5 MG/1
5 TABLET ORAL DAILY
Qty: 90 TABLET | Refills: 3 | Status: SHIPPED | OUTPATIENT
Start: 2024-10-23

## 2024-10-29 ENCOUNTER — HOSPITAL ENCOUNTER (OUTPATIENT)
Dept: LAB | Facility: MEDICAL CENTER | Age: 64
End: 2024-10-29
Attending: NURSE PRACTITIONER
Payer: COMMERCIAL

## 2024-10-29 DIAGNOSIS — E87.6 HYPOKALEMIA: ICD-10-CM

## 2024-10-29 LAB
ANION GAP SERPL CALC-SCNC: 13 MMOL/L (ref 7–16)
BUN SERPL-MCNC: 16 MG/DL (ref 8–22)
CALCIUM SERPL-MCNC: 10.3 MG/DL (ref 8.5–10.5)
CHLORIDE SERPL-SCNC: 98 MMOL/L (ref 96–112)
CO2 SERPL-SCNC: 25 MMOL/L (ref 20–33)
CREAT SERPL-MCNC: 0.63 MG/DL (ref 0.5–1.4)
GFR SERPLBLD CREATININE-BSD FMLA CKD-EPI: 99 ML/MIN/1.73 M 2
GLUCOSE SERPL-MCNC: 121 MG/DL (ref 65–99)
POTASSIUM SERPL-SCNC: 3.7 MMOL/L (ref 3.6–5.5)
SODIUM SERPL-SCNC: 136 MMOL/L (ref 135–145)

## 2024-10-29 PROCEDURE — 80048 BASIC METABOLIC PNL TOTAL CA: CPT

## 2024-10-29 PROCEDURE — 36415 COLL VENOUS BLD VENIPUNCTURE: CPT

## 2024-11-01 ENCOUNTER — APPOINTMENT (OUTPATIENT)
Dept: MEDICAL GROUP | Facility: PHYSICIAN GROUP | Age: 64
End: 2024-11-01
Payer: COMMERCIAL

## 2024-11-22 ENCOUNTER — APPOINTMENT (OUTPATIENT)
Dept: MEDICAL GROUP | Facility: PHYSICIAN GROUP | Age: 64
End: 2024-11-22
Payer: COMMERCIAL

## 2024-12-06 ENCOUNTER — OFFICE VISIT (OUTPATIENT)
Dept: MEDICAL GROUP | Facility: PHYSICIAN GROUP | Age: 64
End: 2024-12-06
Payer: COMMERCIAL

## 2024-12-06 VITALS
DIASTOLIC BLOOD PRESSURE: 64 MMHG | TEMPERATURE: 98.1 F | HEART RATE: 78 BPM | RESPIRATION RATE: 16 BRPM | SYSTOLIC BLOOD PRESSURE: 108 MMHG | WEIGHT: 192 LBS | BODY MASS INDEX: 36.25 KG/M2 | OXYGEN SATURATION: 95 % | HEIGHT: 61 IN

## 2024-12-06 DIAGNOSIS — J45.20 MILD INTERMITTENT ASTHMA IN ADULT WITHOUT COMPLICATION: ICD-10-CM

## 2024-12-06 DIAGNOSIS — F40.243 FEAR OF FLYING: ICD-10-CM

## 2024-12-06 DIAGNOSIS — Z12.12 ENCOUNTER FOR SCREENING FOR COLORECTAL MALIGNANT NEOPLASM: ICD-10-CM

## 2024-12-06 DIAGNOSIS — Z12.11 ENCOUNTER FOR SCREENING FOR COLORECTAL MALIGNANT NEOPLASM: ICD-10-CM

## 2024-12-06 DIAGNOSIS — F41.9 ANXIETY: ICD-10-CM

## 2024-12-06 DIAGNOSIS — Z12.31 ENCOUNTER FOR SCREENING MAMMOGRAM FOR MALIGNANT NEOPLASM OF BREAST: ICD-10-CM

## 2024-12-06 DIAGNOSIS — R06.02 SOB (SHORTNESS OF BREATH): ICD-10-CM

## 2024-12-06 DIAGNOSIS — R05.2 SUBACUTE COUGH: ICD-10-CM

## 2024-12-06 DIAGNOSIS — F51.01 PRIMARY INSOMNIA: ICD-10-CM

## 2024-12-06 DIAGNOSIS — Z77.098 EXPOSURE TO CHEMICAL INHALATION: ICD-10-CM

## 2024-12-06 PROCEDURE — 99214 OFFICE O/P EST MOD 30 MIN: CPT | Performed by: NURSE PRACTITIONER

## 2024-12-06 PROCEDURE — 3078F DIAST BP <80 MM HG: CPT | Performed by: NURSE PRACTITIONER

## 2024-12-06 PROCEDURE — 3074F SYST BP LT 130 MM HG: CPT | Performed by: NURSE PRACTITIONER

## 2024-12-06 RX ORDER — TEMAZEPAM 15 MG/1
15-30 CAPSULE ORAL NIGHTLY PRN
Qty: 180 CAPSULE | Refills: 1 | Status: SHIPPED | OUTPATIENT
Start: 2024-12-06 | End: 2025-03-06

## 2024-12-06 RX ORDER — METHYLPREDNISOLONE 4 MG/1
TABLET ORAL
Qty: 21 TABLET | Refills: 0 | Status: SHIPPED | OUTPATIENT
Start: 2024-12-06

## 2024-12-06 RX ORDER — ALBUTEROL SULFATE 90 UG/1
2 INHALANT RESPIRATORY (INHALATION) EVERY 6 HOURS PRN
Qty: 3 EACH | Refills: 3 | Status: SHIPPED | OUTPATIENT
Start: 2024-12-06

## 2024-12-06 RX ORDER — FLUTICASONE PROPIONATE AND SALMETEROL XINAFOATE 115; 21 UG/1; UG/1
2 AEROSOL, METERED RESPIRATORY (INHALATION) 2 TIMES DAILY
Qty: 36 G | Refills: 3 | Status: SHIPPED | OUTPATIENT
Start: 2024-12-06 | End: 2024-12-09

## 2024-12-06 RX ORDER — DIAZEPAM 5 MG/1
2.5-7.5 TABLET ORAL EVERY 6 HOURS PRN
Qty: 30 TABLET | Refills: 0 | Status: SHIPPED | OUTPATIENT
Start: 2024-12-06 | End: 2025-01-05

## 2024-12-06 ASSESSMENT — FIBROSIS 4 INDEX: FIB4 SCORE: 0.63

## 2024-12-06 NOTE — PROGRESS NOTES
"  Chief Complaint   Patient presents with    Shortness of Breath     Cough/ sinus drainage/ ear pressure x 5 weeks                                                                                                                                       HPI:   Harmony presents today with the following.    Patient consented to the use of [employer] to record and transcribe notes during this visit.      The patient attended the consultation today to discuss ongoing respiratory symptoms and related concerns.    The chief complaints presented by the patient are wheezing and cough (both dry and productive) for 5 weeks, sinus problems, left ear pain with popping and fluid sensation, and shortness of breath.    The patient attributes these symptoms to paint dust exposure that occurred 8 weeks ago and lasted for 3 weeks. The symptoms have not shown improvement over the past weeks and tend to worsen with activity throughout the day. Coughing episodes at work are severe enough to require isolation to control.    Regarding the current state, the patient reports feeling okay after using an inhaler prior to the visit. The cough, when productive, produces clear sputum. She has been using Flonase, Benadryl, and Lizy-[place] Plus twice daily for symptom management. However, her last inhaler has been used up, and a nebulizer is unavailable.    The patient's ongoing respiratory symptoms, their progression, and current management strategies have been discussed, with particular attention to the history of paint dust exposure and the impact of symptoms on daily activities.        ROS:  All systems negative expect as addressed in assessment and plan.     /64 (BP Location: Left arm, Patient Position: Sitting)   Pulse 78   Temp 36.7 °C (98.1 °F) (Temporal)   Resp 16   Ht 1.549 m (5' 1\")   Wt 87.1 kg (192 lb)   SpO2 95%   BMI 36.28 kg/m²     Objective:    Physical Exam  HENT:      Right Ear: A middle ear effusion is present.      Left " Ear: A middle ear effusion is present. Tympanic membrane is injected.   Pulmonary:      Breath sounds: Normal air entry. Examination of the right-upper field reveals rhonchi. Examination of the left-upper field reveals rhonchi. Rhonchi present. No decreased breath sounds, wheezing or rales.         Assessment and Plan:  64 y.o. female with the following issues.    1. SOB (shortness of breath)    2. Subacute cough    3. Mild intermittent asthma in adult without complication  - albuterol 108 (90 Base) MCG/ACT Aero Soln inhalation aerosol; Inhale 2 Puffs every 6 hours as needed for Shortness of Breath.  Dispense: 3 Each; Refill: 3  - methylPREDNISolone (MEDROL DOSEPAK) 4 MG Tablet Therapy Pack; As directed on the packaging label.  Dispense: 21 Tablet; Refill: 0    4. Exposure to chemical inhalation  - albuterol 108 (90 Base) MCG/ACT Aero Soln inhalation aerosol; Inhale 2 Puffs every 6 hours as needed for Shortness of Breath.  Dispense: 3 Each; Refill: 3  - methylPREDNISolone (MEDROL DOSEPAK) 4 MG Tablet Therapy Pack; As directed on the packaging label.  Dispense: 21 Tablet; Refill: 0    5. Encounter for screening for colorectal malignant neoplasm  - Cologuard® colon cancer screening    6. Encounter for screening mammogram for malignant neoplasm of breast  - MA-SCREENING MAMMO BILAT W/CAD; Future    7. Anxiety  - diazePAM (VALIUM) 5 MG Tab; Take 0.5-1.5 Tablets by mouth every 6 hours as needed for Anxiety for up to 30 days. Indications: Feeling Anxious, Muscle Spasm, fear of travel and fear of dental work  Dispense: 30 Tablet; Refill: 0    8. Fear of flying  - diazePAM (VALIUM) 5 MG Tab; Take 0.5-1.5 Tablets by mouth every 6 hours as needed for Anxiety for up to 30 days. Indications: Feeling Anxious, Muscle Spasm, fear of travel and fear of dental work  Dispense: 30 Tablet; Refill: 0    9. Primary insomnia  - diazePAM (VALIUM) 5 MG Tab; Take 0.5-1.5 Tablets by mouth every 6 hours as needed for Anxiety for up to 30 days.  Indications: Feeling Anxious, Muscle Spasm, fear of travel and fear of dental work  Dispense: 30 Tablet; Refill: 0  - temazepam (RESTORIL) 15 MG Cap; Take 1-2 Capsules by mouth at bedtime as needed for Sleep for up to 90 days.  Dispense: 180 Capsule; Refill: 1    1. Wheezing and cough:     - Plan: Patient experiencing symptoms for 5 weeks, likely due to chemical exposure from paint dust.          a. Prescribe Advair HFA inhaler with spacer for >=1 month to reduce inflammation and improve lung function.          b. Instruct patient to rinse mouth post-use to prevent thrush.          c. Consider methylprednisolone dose pack if wheezing worsens or Advair ineffective.          d. Defer chest x-ray; reassess if SOB persists/worsens in 1-2 weeks.    2. Sinus congestion and left ear pain:     - Plan: Patient reports sinus congestion and left ear pain with fluid.          a. Continue Flonase, Benadryl, and [place]y-[place] Plus PRN for sinus relief.          b. Avoid Sudafed due to potential rebound congestion.          c. Apply warm compress/heating pad to drain mucus and alleviate ear pain.          d. Monitor for ear infection signs; consider antibiotics if symptoms worsen.    3. SOB (Shortness of Breath):     - Plan:          a. Use handheld nebulizer for asthma exacerbation if inhaler ineffective.          b. Monitor progress with Advair.    4. Insomnia:     - Plan: Patient experiencing insomnia while on steroids.          a. Prescribe Valium PRN for sleep, up to 7.5-10 mg if necessary.          b. Refill temazepam for 6-month supply (90-day with one refill).    5. Preventive care:     - Plan:          a. Order mammogram for routine screening.          b. Order Cologuard for routine screening.    6. Follow-up:     - Plan:          a. Schedule appointment to assess lung function and overall health.          b. Patient to contact clinic if symptoms worsen or new concerns arise.      Return in about 3 months (around 3/6/2025)  for insomnia.      Please note that this dictation was created using voice recognition software. I have worked with consultants from the vendor as well as technical experts from [facility] to optimize the interface. I have made every reasonable attempt to correct obvious errors, but I expect that there are errors of grammar and possibly content that I did not discover before finalizing the note.

## 2024-12-09 DIAGNOSIS — J45.20 MILD INTERMITTENT ASTHMA IN ADULT WITHOUT COMPLICATION: ICD-10-CM

## 2024-12-09 RX ORDER — FLUTICASONE PROPIONATE AND SALMETEROL 250; 50 UG/1; UG/1
1 POWDER RESPIRATORY (INHALATION) EVERY 12 HOURS
Qty: 3 EACH | Refills: 3 | Status: SHIPPED | OUTPATIENT
Start: 2024-12-09 | End: 2024-12-17

## 2024-12-12 DIAGNOSIS — J45.20 MILD INTERMITTENT ASTHMA IN ADULT WITHOUT COMPLICATION: ICD-10-CM

## 2024-12-17 RX ORDER — BUDESONIDE AND FORMOTEROL FUMARATE DIHYDRATE 160; 4.5 UG/1; UG/1
1 AEROSOL RESPIRATORY (INHALATION) 2 TIMES DAILY
Qty: 3 EACH | Refills: 3 | Status: SHIPPED
Start: 2024-12-17

## 2024-12-17 NOTE — TELEPHONE ENCOUNTER
Received request via: PharmacyAlternative Requested:THE PRESCRIBED MEDICATION IS NOT COVERED BY INSURANCE. PLEASE CONSIDER CHANGING TO ONE OF THE SUGGESTED COVERED ALTERNATIVES.      Was the patient seen in the last year in this department? Yes    Does the patient have an active prescription (recently filled or refills available) for medication(s) requested? No    [facility]     Does the patient have [facility] Plus and need 100-day supply? (This applies to ALL medications) Patient does not have SCP

## 2025-01-06 ENCOUNTER — GYNECOLOGY VISIT (OUTPATIENT)
Dept: OBGYN | Facility: CLINIC | Age: 65
End: 2025-01-06
Payer: COMMERCIAL

## 2025-01-06 VITALS — WEIGHT: 191.6 LBS | SYSTOLIC BLOOD PRESSURE: 130 MMHG | BODY MASS INDEX: 36.2 KG/M2 | DIASTOLIC BLOOD PRESSURE: 79 MMHG

## 2025-01-06 DIAGNOSIS — Z76.89 ENCOUNTER TO ESTABLISH CARE: ICD-10-CM

## 2025-01-06 DIAGNOSIS — N90.7 SEBACEOUS CYST OF LABIA: ICD-10-CM

## 2025-01-06 PROCEDURE — 3075F SYST BP GE 130 - 139MM HG: CPT | Performed by: NURSE PRACTITIONER

## 2025-01-06 PROCEDURE — 3078F DIAST BP <80 MM HG: CPT | Performed by: NURSE PRACTITIONER

## 2025-01-06 PROCEDURE — 10060 I&D ABSCESS SIMPLE/SINGLE: CPT | Performed by: NURSE PRACTITIONER

## 2025-01-06 RX ORDER — SULFAMETHOXAZOLE AND TRIMETHOPRIM 800; 160 MG/1; MG/1
1 TABLET ORAL 2 TIMES DAILY
Qty: 14 TABLET | Refills: 0 | Status: SHIPPED | OUTPATIENT
Start: 2025-01-06 | End: 2025-01-13

## 2025-01-06 ASSESSMENT — FIBROSIS 4 INDEX: FIB4 SCORE: 0.63

## 2025-01-06 NOTE — PROGRESS NOTES
Patient is here for GYN visit.  LMP: 1974  Age of first period:14  Age of Menopause: 45  VB since mesopause: NO  Hysterectomy: 2005  Mammo: ordered 12.06.2024  Pt states has a lump on her right inner labia for about 7 months. She reports slightly bigger than a pea sized lump.   Patient phone number is [phone]  Pharmacy has been verified.   Pt reports not having vaginal bleeding/spotting, unusual odor, any discharge, discomfort or pain.   Pt doesn't have any questions, comment or concerns at this moment.

## 2025-01-06 NOTE — PROGRESS NOTES
"GYN PROBLEM VISIT    CC:  New Patient (Lump on right labia)       HPI: Patient is a 64 y.o.  No LMP recorded. Patient has had a total abdominal hysterectomy, BSO in  due to fibroids.  Patient is here to establish care. She complains of a right lump on the labia that started 7 months ago. It is not painful or draining, and she has not had it before.  She did note over the last month that it has grown in size.  She has not been sexually active for about 10 years.  She does have to be more sexually active in the near future.  She does have a history of HPV (genital warts) that occurred only once in .  Her PCP is managing HRT. She is currently taking estradiol 1 mg once a day.  Patient was ordered Cologuard on 2024 and needs to submit it.  Her PCP also ordered a mammogram for her which she is planning to get completed.       ROS:   General: denies fever / chills  HEENT: denies sore throat:  CV: denies chest pain:  Repiratory: denies shortness of breath  GI: denies abdominal pain  : denies dysuria, + lump on right labia    PFSH:  I personally reviewed the past medical and surgical histories.     Social History     Tobacco Use    Smoking status: Former     Current packs/day: 0.00     Average packs/day: 0.3 packs/day for 22.5 years (5.6 ttl pk-yrs)     Types: Cigarettes     Start date: 1997     Quit date: 2/15/2020     Years since quittin.8    Smokeless tobacco: Never    Tobacco comments:     Quit 2023   Vaping Use    Vaping status: Never Used   Substance Use Topics    Alcohol use: No     Alcohol/week: 0.0 oz    Drug use: No        Social History     Substance and Sexual Activity   Sexual Activity Never        ALLERGIES / REACTIONS:  Allergies   Allergen Reactions    Bee Anaphylaxis    Levaquin Anaphylaxis and Unspecified     Myalgias arthralgias     Levofloxacin Unspecified    Tape Hives     Redness; itching \"paper tape ok\"    Moxifloxacin Hcl In Nacl Swelling    Polysporin " [Bacitracin-Polymyxin B]      rash    Promethazine      Twitching feeling                           PHYSICAL EXAMINATION:  Vital Signs:   /79 (BP Location: Right arm, Patient Position: Sitting, BP Cuff Size: Adult)   Wt 191 lb 9.6 oz   BMI 36.20 kg/m²     Gen: appears well, NAD  Respiratory: normal effort.   Heart auscultation: RR  Breast: Patient deferred.  Pelvic Exam:    Vulva: normal, except noted a small (pea size) white sebaceous cyst along the right labia majora.    Urethra: normal.   Vagina: normal, no lesions. Some atrophy.    Cervix: surgically absent.    Uterus: surgically absent    left adnexa: surgically absent.   right adnexa: surgically absent.   Perineum: normal.  Chaperone Present: Abbie Lewis MA    ASSESSMENT AND PLAN:  64 y.o. . See HPI for additional details.       1. Sebaceous cyst of labia  - sulfamethoxazole-trimethoprim (BACTRIM DS) 800-160 MG tablet; Take 1 Tablet by mouth 2 times a day for 7 days.  Dispense: 14 Tablet; Refill: 0    Patient was amenable to drainage of sebaceous cyst.  See procedure note. No complications.  Advised patient to complete antibiotics.  She may also do sitz bath's (educational information provided) to help with healing.     2. Encounter to establish care         Follow up PRN, or Annually.     CLEO Cisneros.      This dictation was created using voice recognition software. The accuracy of the dictation is limited to the abilities of the software. I expect there may be some errors of grammar and possibly content.

## 2025-01-07 NOTE — PROCEDURES
I and D    Date/Time: 1/6/2025 1:30 PM    Performed by: CORINNA Cisneros  Authorized by: CORINNA Cisneros  Type: cyst (Sebaceous)  Location: Right labia majora.  Anesthesia method: N/A.    Anesthesia:  Local anesthetic: Not indicated.    Sedation:  Patient sedated: no    Risk factors: Risk for infection was reviewed with the patient and post procedure antibiotics were prescribed.  Scalpel size: 11  Incision type: single straight (Very small incision was made on the top of the sebacous cyst)  Incision depth: dermal  Complexity: simple  Drainage characteristics: sebum.  Drainage amount: small amount (pea size or slightly more)  Wound treatment: wound left open  Packing material: none (Incision was left open)  Patient tolerance: patient tolerated the procedure well with no immediate complications  Comments: Patient was amenable to drainage of sebaceous cyst.  Right labia majora was prepped with betadine pre and post procedure.   Abbie Lewis MA and Smiley Toro MA chaperoned and assisted during the procedure.

## 2025-01-17 DIAGNOSIS — R19.5 POSITIVE COLORECTAL CANCER SCREENING USING COLOGUARD TEST: ICD-10-CM

## 2025-01-17 LAB — NONINV COLON CA DNA+OCC BLD SCRN STL QL: POSITIVE

## 2025-02-14 ENCOUNTER — RESULTS FOLLOW-UP (OUTPATIENT)
Dept: URGENT CARE | Facility: PHYSICIAN GROUP | Age: 65
End: 2025-02-14

## 2025-02-14 ENCOUNTER — OFFICE VISIT (OUTPATIENT)
Dept: URGENT CARE | Facility: PHYSICIAN GROUP | Age: 65
End: 2025-02-14
Payer: COMMERCIAL

## 2025-02-14 ENCOUNTER — APPOINTMENT (OUTPATIENT)
Dept: RADIOLOGY | Facility: IMAGING CENTER | Age: 65
End: 2025-02-14
Attending: NURSE PRACTITIONER
Payer: COMMERCIAL

## 2025-02-14 VITALS
BODY MASS INDEX: 35.68 KG/M2 | TEMPERATURE: 99.5 F | WEIGHT: 189 LBS | SYSTOLIC BLOOD PRESSURE: 124 MMHG | DIASTOLIC BLOOD PRESSURE: 66 MMHG | HEIGHT: 61 IN | HEART RATE: 84 BPM | OXYGEN SATURATION: 94 % | RESPIRATION RATE: 16 BRPM

## 2025-02-14 DIAGNOSIS — R05.1 ACUTE COUGH: ICD-10-CM

## 2025-02-14 DIAGNOSIS — J22 UNSPECIFIED ACUTE LOWER RESPIRATORY INFECTION: ICD-10-CM

## 2025-02-14 LAB
FLUAV RNA SPEC QL NAA+PROBE: NEGATIVE
FLUBV RNA SPEC QL NAA+PROBE: NEGATIVE
RSV RNA SPEC QL NAA+PROBE: NEGATIVE
SARS-COV-2 RNA RESP QL NAA+PROBE: NEGATIVE

## 2025-02-14 PROCEDURE — 3078F DIAST BP <80 MM HG: CPT | Performed by: NURSE PRACTITIONER

## 2025-02-14 PROCEDURE — 99213 OFFICE O/P EST LOW 20 MIN: CPT | Performed by: NURSE PRACTITIONER

## 2025-02-14 PROCEDURE — 71046 X-RAY EXAM CHEST 2 VIEWS: CPT | Mod: TC | Performed by: RADIOLOGY

## 2025-02-14 PROCEDURE — 0241U POCT CEPHEID COV-2, FLU A/B, RSV - PCR: CPT | Performed by: NURSE PRACTITIONER

## 2025-02-14 PROCEDURE — 3074F SYST BP LT 130 MM HG: CPT | Performed by: NURSE PRACTITIONER

## 2025-02-14 RX ORDER — PREDNISONE 20 MG/1
TABLET ORAL
Qty: 10 TABLET | Refills: 0 | Status: SHIPPED | OUTPATIENT
Start: 2025-02-14

## 2025-02-14 RX ORDER — FLUTICASONE PROPIONATE AND SALMETEROL 250; 50 UG/1; UG/1
1 POWDER RESPIRATORY (INHALATION) EVERY 12 HOURS
COMMUNITY
Start: 2024-12-23

## 2025-02-14 RX ORDER — DOXYCYCLINE 100 MG/1
100 CAPSULE ORAL 2 TIMES DAILY
Qty: 14 CAPSULE | Refills: 0 | Status: SHIPPED | OUTPATIENT
Start: 2025-02-14 | End: 2025-02-21

## 2025-02-14 ASSESSMENT — ENCOUNTER SYMPTOMS
SHORTNESS OF BREATH: 0
MYALGIAS: 0
SORE THROAT: 0
EYE DISCHARGE: 0
ORTHOPNEA: 0
HEADACHES: 1
NAUSEA: 0
WHEEZING: 0
COUGH: 1
SPUTUM PRODUCTION: 1
FEVER: 0
CHILLS: 0
DIARRHEA: 0

## 2025-02-14 ASSESSMENT — FIBROSIS 4 INDEX: FIB4 SCORE: 0.64

## 2025-02-14 NOTE — PROGRESS NOTES
Subjective     Harmony Monroe is a 65 y.o. female who presents with Fever (Hot and cold flashes, cough, sinus pressure x3 days)            HPI  New problem.  Patient is a very pleasant 65-year-old female presents with fever, cough, and sinus pressure x 3 days.  She does have a history of asthma but does not have any shortness of breath or wheezing.  She has fever of up to 100.  She denies any nausea, or diarrhea.  She has been taking over-the-counter ibuprofen as well as her normal inhalers for her symptoms.    Bee, Levaquin, Levofloxacin, Tape, Moxifloxacin hcl in nacl, Polysporin [bacitracin-polymyxin b], and Promethazine  Current Outpatient Medications on File Prior to Visit   Medication Sig Dispense Refill    WIXELA INHUB 250-50 MCG/ACT AEROSOL POWDER, BREATH ACTIVATED Inhale 1 Puff every 12 hours.      budesonide-formoterol (SYMBICORT) 160-4.5 MCG/ACT Aerosol Inhale 1 Puff 2 times a day. 3 Each 3    albuterol 108 (90 Base) MCG/ACT Aero Soln inhalation aerosol Inhale 2 Puffs every 6 hours as needed for Shortness of Breath. 3 Each 3    temazepam (RESTORIL) 15 MG Cap Take 1-2 Capsules by mouth at bedtime as needed for Sleep for up to 90 days. 180 Capsule 1    nebivolol (BYSTOLIC) 5 MG Tab tablet TAKE 1 TABLET BY MOUTH EVERY DAY 90 Tablet 3    EPINEPHrine (EPIPEN) 0.3 MG/0.3ML Solution Auto-injector solution for injection Inject 0.3 mL into the thigh one time for 1 dose. 1 Each 1    sertraline (ZOLOFT) 25 MG tablet Take 1 Tablet by mouth every day. 90 Tablet 3    hydroCHLOROthiazide 25 MG Tab Take 1 Tablet by mouth every day. 90 Tablet 3    estradiol (ESTRACE) 1 MG Tab Take 1 Tablet by mouth every day. 90 Tablet 3    amLODIPine (NORVASC) 5 MG Tab Take 1 Tablet by mouth every day. 90 Tablet 3    ipratropium-albuterol (DUONEB) 0.5-2.5 (3) MG/3ML nebulizer solution Take 3 mL by nebulization 4 times a day. 60 Each 1    methylPREDNISolone (MEDROL DOSEPAK) 4 MG Tablet Therapy Pack As directed on the packaging  label. 21 Tablet 0     No current facility-administered medications on file prior to visit.     Social History     Socioeconomic History    Marital status: Single     Spouse name: Not on file    Number of children: Not on file    Years of education: Not on file    Highest education level: Not on file   Occupational History    Not on file   Tobacco Use    Smoking status: Former     Current packs/day: 0.00     Average packs/day: 0.3 packs/day for 22.5 years (5.6 ttl pk-yrs)     Types: Cigarettes     Start date: 1997     Quit date: 2/15/2020     Years since quittin.0    Smokeless tobacco: Never    Tobacco comments:     Quit 2023   Vaping Use    Vaping status: Never Used   Substance and Sexual Activity    Alcohol use: No     Alcohol/week: 0.0 oz    Drug use: No    Sexual activity: Never   Other Topics Concern    Not on file   Social History Narrative    Not on file     Social Drivers of Health     Financial Resource Strain: Not on file   Food Insecurity: Not on file   Transportation Needs: Not on file   Physical Activity: Not on file   Stress: Not on file   Social Connections: Not on file   Intimate Partner Violence: Not on file   Housing Stability: Not on file     Breast Cancer-related family history is not on file.      Review of Systems   Constitutional:  Positive for malaise/fatigue. Negative for chills and fever.   HENT:  Positive for congestion. Negative for sore throat.    Eyes:  Negative for discharge.   Respiratory:  Positive for cough and sputum production. Negative for shortness of breath and wheezing.    Cardiovascular:  Negative for chest pain and orthopnea.   Gastrointestinal:  Negative for diarrhea and nausea.   Musculoskeletal:  Negative for myalgias.   Neurological:  Positive for headaches.   Endo/Heme/Allergies:  Negative for environmental allergies.              Objective     /66 (BP Location: Right arm, Patient Position: Sitting, BP Cuff Size: Adult)   Pulse 84   Temp 37.5 °C (99.5  "°F) (Temporal)   Resp 16   Ht 1.549 m (5' 1\")   Wt 85.7 kg (189 lb)   SpO2 94%   BMI 35.71 kg/m²      Physical Exam  Vitals and nursing note reviewed.   Constitutional:       General: She is not in acute distress.     Appearance: She is well-developed.   HENT:      Head: Normocephalic.      Right Ear: Tympanic membrane and external ear normal.      Left Ear: Tympanic membrane and external ear normal.      Nose: Mucosal edema and rhinorrhea present. No congestion.      Mouth/Throat:      Pharynx: No posterior oropharyngeal erythema.   Eyes:      General:         Right eye: No discharge.         Left eye: No discharge.      Conjunctiva/sclera: Conjunctivae normal.   Cardiovascular:      Rate and Rhythm: Normal rate and regular rhythm.      Heart sounds: Normal heart sounds.   Pulmonary:      Effort: Pulmonary effort is normal.      Breath sounds: Rhonchi present. No wheezing or rales.   Musculoskeletal:         General: Normal range of motion.      Cervical back: Normal range of motion and neck supple.   Lymphadenopathy:      Cervical: No cervical adenopathy.   Skin:     General: Skin is warm and dry.   Neurological:      Mental Status: She is alert and oriented to person, place, and time.   Psychiatric:         Behavior: Behavior normal.         Thought Content: Thought content normal.                                  Assessment & Plan  Unspecified acute lower respiratory infection    Orders:    doxycycline (VIBRAMYCIN) 100 MG Cap; Take 1 Capsule by mouth 2 times a day for 7 days.    predniSONE (DELTASONE) 20 MG Tab; Take 2 tabs daily in the morning with food.    Acute cough    Orders:    DX-CHEST-2 VIEWS; Future    POCT CoV-2, Flu A/B, RSV by PCR    Chest imaging negative.  Doxy/prednisone  Differential diagnosis, natural history, supportive care, and indications for immediate follow-up were discussed.                 "

## 2025-02-14 NOTE — LETTER
February 14, 2025        Harmony Monroe  [id number] [facility] [id number]        Harmony was seen in our clinic today and she is excused from work for Sunday and Monday, Feb 16-17. Thank you.  If you have any questions or concerns, please don't hesitate to call.        Sincerely,        Trey Otoole A.P.N.    Electronically Signed

## 2025-03-12 ENCOUNTER — APPOINTMENT (OUTPATIENT)
Dept: MEDICAL GROUP | Facility: PHYSICIAN GROUP | Age: 65
End: 2025-03-12
Payer: COMMERCIAL

## 2025-04-01 ENCOUNTER — OFFICE VISIT (OUTPATIENT)
Dept: URGENT CARE | Facility: PHYSICIAN GROUP | Age: 65
End: 2025-04-01
Payer: COMMERCIAL

## 2025-04-01 VITALS
SYSTOLIC BLOOD PRESSURE: 118 MMHG | WEIGHT: 175 LBS | DIASTOLIC BLOOD PRESSURE: 68 MMHG | RESPIRATION RATE: 18 BRPM | OXYGEN SATURATION: 95 % | BODY MASS INDEX: 33.04 KG/M2 | HEART RATE: 78 BPM | HEIGHT: 61 IN | TEMPERATURE: 98.4 F

## 2025-04-01 DIAGNOSIS — B96.89 ACUTE BACTERIAL SINUSITIS: ICD-10-CM

## 2025-04-01 DIAGNOSIS — J01.90 ACUTE BACTERIAL SINUSITIS: ICD-10-CM

## 2025-04-01 DIAGNOSIS — J45.20 MILD INTERMITTENT ASTHMA IN ADULT WITHOUT COMPLICATION: ICD-10-CM

## 2025-04-01 PROCEDURE — 3074F SYST BP LT 130 MM HG: CPT | Performed by: NURSE PRACTITIONER

## 2025-04-01 PROCEDURE — 99213 OFFICE O/P EST LOW 20 MIN: CPT | Performed by: NURSE PRACTITIONER

## 2025-04-01 PROCEDURE — 3078F DIAST BP <80 MM HG: CPT | Performed by: NURSE PRACTITIONER

## 2025-04-01 RX ORDER — CEFUROXIME AXETIL 250 MG/1
250 TABLET ORAL 2 TIMES DAILY
Qty: 14 TABLET | Refills: 0 | Status: SHIPPED | OUTPATIENT
Start: 2025-04-01 | End: 2025-04-08

## 2025-04-01 ASSESSMENT — ENCOUNTER SYMPTOMS
FEVER: 0
EYE DISCHARGE: 0
SINUS PAIN: 1
DIARRHEA: 0
SPUTUM PRODUCTION: 1
MYALGIAS: 0
SORE THROAT: 0
NAUSEA: 0
SHORTNESS OF BREATH: 0
COUGH: 1
ORTHOPNEA: 0
HEADACHES: 1
CHILLS: 0
WHEEZING: 0

## 2025-04-01 ASSESSMENT — FIBROSIS 4 INDEX: FIB4 SCORE: 0.64

## 2025-04-01 NOTE — PROGRESS NOTES
Subjective     Harmony Monroe is a 65 y.o. female who presents with Cough (Sinus pain, colored phlegm, nausea, red eyes x1 week)            HPI  New problem.  Patient is a very pleasant 65-year-old female presents with a 1 week history of cough, sinus pain, brown-colored discharge, nausea, and red eyes.  She denies any discharge of the eyes but states they are watery.  She does have a history of asthma for which she takes Wixela and albuterol.  She just took a dose of albuterol prior to being seen.  She denies any shortness of breath, wheezing, or diarrhea.  She has had no vomiting with her nausea.  She has been taking her usual medications for this with minimal improvement of symptoms.    Bee, Levaquin, Levofloxacin, Tape, Moxifloxacin hcl in nacl, Polysporin [bacitracin-polymyxin b], and Promethazine  Current Outpatient Medications on File Prior to Visit   Medication Sig Dispense Refill    WIXELA INHUB 250-50 MCG/ACT AEROSOL POWDER, BREATH ACTIVATED Inhale 1 Puff every 12 hours.      budesonide-formoterol (SYMBICORT) 160-4.5 MCG/ACT Aerosol Inhale 1 Puff 2 times a day. 3 Each 3    albuterol 108 (90 Base) MCG/ACT Aero Soln inhalation aerosol Inhale 2 Puffs every 6 hours as needed for Shortness of Breath. 3 Each 3    nebivolol (BYSTOLIC) 5 MG Tab tablet TAKE 1 TABLET BY MOUTH EVERY DAY 90 Tablet 3    EPINEPHrine (EPIPEN) 0.3 MG/0.3ML Solution Auto-injector solution for injection Inject 0.3 mL into the thigh one time for 1 dose. 1 Each 1    sertraline (ZOLOFT) 25 MG tablet Take 1 Tablet by mouth every day. 90 Tablet 3    hydroCHLOROthiazide 25 MG Tab Take 1 Tablet by mouth every day. 90 Tablet 3    estradiol (ESTRACE) 1 MG Tab Take 1 Tablet by mouth every day. 90 Tablet 3    amLODIPine (NORVASC) 5 MG Tab Take 1 Tablet by mouth every day. 90 Tablet 3    ipratropium-albuterol (DUONEB) 0.5-2.5 (3) MG/3ML nebulizer solution Take 3 mL by nebulization 4 times a day. 60 Each 1    predniSONE (DELTASONE) 20 MG Tab Take  2 tabs daily in the morning with food. (Patient not taking: Reported on 2025) 10 Tablet 0    methylPREDNISolone (MEDROL DOSEPAK) 4 MG Tablet Therapy Pack As directed on the packaging label. 21 Tablet 0     No current facility-administered medications on file prior to visit.     Social History     Socioeconomic History    Marital status: Single     Spouse name: Not on file    Number of children: Not on file    Years of education: Not on file    Highest education level: Not on file   Occupational History    Not on file   Tobacco Use    Smoking status: Former     Current packs/day: 0.00     Average packs/day: 0.3 packs/day for 22.5 years (5.6 ttl pk-yrs)     Types: Cigarettes     Start date: 1997     Quit date: 2/15/2020     Years since quittin.1    Smokeless tobacco: Never    Tobacco comments:     Quit 2023   Vaping Use    Vaping status: Never Used   Substance and Sexual Activity    Alcohol use: No     Alcohol/week: 0.0 oz    Drug use: No    Sexual activity: Never   Other Topics Concern    Not on file   Social History Narrative    Not on file     Social Drivers of Health     Financial Resource Strain: Not on file   Food Insecurity: Not on file   Transportation Needs: Not on file   Physical Activity: Not on file   Stress: Not on file   Social Connections: Not on file   Intimate Partner Violence: Not on file   Housing Stability: Not on file     Breast Cancer-related family history is not on file.      Review of Systems   Constitutional:  Positive for malaise/fatigue. Negative for chills and fever.   HENT:  Positive for congestion and sinus pain. Negative for sore throat.    Eyes:  Negative for discharge.   Respiratory:  Positive for cough and sputum production. Negative for shortness of breath and wheezing.    Cardiovascular:  Negative for chest pain and orthopnea.   Gastrointestinal:  Negative for diarrhea and nausea.   Musculoskeletal:  Negative for myalgias.   Neurological:  Positive for headaches.  "  Endo/Heme/Allergies:  Negative for environmental allergies.   All other systems reviewed and are negative.             Objective     /68 (BP Location: Right arm, Patient Position: Sitting, BP Cuff Size: Adult)   Pulse 78   Temp 36.9 °C (98.4 °F) (Temporal)   Resp 18   Ht 1.549 m (5' 1\")   Wt 79.4 kg (175 lb)   SpO2 95%   BMI 33.07 kg/m²      Physical Exam  Vitals and nursing note reviewed.   Constitutional:       General: She is not in acute distress.     Appearance: She is well-developed.   HENT:      Head: Normocephalic.      Right Ear: Tympanic membrane and external ear normal.      Left Ear: Tympanic membrane and external ear normal.      Nose: Mucosal edema and congestion present. No rhinorrhea.      Mouth/Throat:      Pharynx: No posterior oropharyngeal erythema.   Eyes:      General:         Right eye: No discharge.         Left eye: No discharge.      Conjunctiva/sclera: Conjunctivae normal.   Cardiovascular:      Rate and Rhythm: Normal rate and regular rhythm.      Heart sounds: Normal heart sounds.   Pulmonary:      Effort: Pulmonary effort is normal.      Breath sounds: Normal breath sounds.   Musculoskeletal:         General: Normal range of motion.      Cervical back: Normal range of motion and neck supple.   Lymphadenopathy:      Cervical: No cervical adenopathy.   Skin:     General: Skin is warm and dry.   Neurological:      Mental Status: She is alert and oriented to person, place, and time.   Psychiatric:         Behavior: Behavior normal.         Thought Content: Thought content normal.                                  Assessment & Plan       1. Acute bacterial sinusitis  cefUROXime (CEFTIN) 250 MG Tab      2. Mild intermittent asthma in adult without complication          Ceftin.  Continue asthma medications as directed- no wheezing on exam.  Consider sinus rinses.  Fluids/rest.  Differential diagnosis, natural history, supportive care, and indications for immediate follow-up were " discussed.

## 2025-05-15 ENCOUNTER — APPOINTMENT (OUTPATIENT)
Dept: MEDICAL GROUP | Facility: PHYSICIAN GROUP | Age: 65
End: 2025-05-15
Payer: COMMERCIAL

## 2025-05-15 VITALS
BODY MASS INDEX: 33.99 KG/M2 | HEIGHT: 61 IN | HEART RATE: 74 BPM | RESPIRATION RATE: 16 BRPM | OXYGEN SATURATION: 97 % | WEIGHT: 180 LBS | SYSTOLIC BLOOD PRESSURE: 122 MMHG | TEMPERATURE: 98 F | DIASTOLIC BLOOD PRESSURE: 70 MMHG

## 2025-05-15 DIAGNOSIS — F41.9 ANXIETY: ICD-10-CM

## 2025-05-15 DIAGNOSIS — F51.01 PRIMARY INSOMNIA: ICD-10-CM

## 2025-05-15 DIAGNOSIS — E55.9 VITAMIN D DEFICIENCY: ICD-10-CM

## 2025-05-15 DIAGNOSIS — F40.243 FEAR OF FLYING: ICD-10-CM

## 2025-05-15 DIAGNOSIS — Z23 NEED FOR VACCINATION: ICD-10-CM

## 2025-05-15 DIAGNOSIS — Z78.0 ENCOUNTER FOR OSTEOPOROSIS SCREENING IN ASYMPTOMATIC POSTMENOPAUSAL PATIENT: Primary | ICD-10-CM

## 2025-05-15 DIAGNOSIS — G47.09 OTHER INSOMNIA: ICD-10-CM

## 2025-05-15 DIAGNOSIS — Z13.820 ENCOUNTER FOR OSTEOPOROSIS SCREENING IN ASYMPTOMATIC POSTMENOPAUSAL PATIENT: Primary | ICD-10-CM

## 2025-05-15 DIAGNOSIS — Z79.890 POSTMENOPAUSAL HRT (HORMONE REPLACEMENT THERAPY): ICD-10-CM

## 2025-05-15 DIAGNOSIS — Z79.899 HIGH RISK MEDICATION USE: ICD-10-CM

## 2025-05-15 DIAGNOSIS — E78.5 HYPERLIPIDEMIA, UNSPECIFIED HYPERLIPIDEMIA TYPE: ICD-10-CM

## 2025-05-15 DIAGNOSIS — I10 ESSENTIAL HYPERTENSION: ICD-10-CM

## 2025-05-15 DIAGNOSIS — Z13.29 SCREENING FOR THYROID DISORDER: ICD-10-CM

## 2025-05-15 PROBLEM — R09.89 PULSE IRREGULARITY: Status: RESOLVED | Noted: 2023-05-25 | Resolved: 2025-05-15

## 2025-05-15 PROCEDURE — 90471 IMMUNIZATION ADMIN: CPT | Performed by: NURSE PRACTITIONER

## 2025-05-15 PROCEDURE — 99214 OFFICE O/P EST MOD 30 MIN: CPT | Mod: 25 | Performed by: NURSE PRACTITIONER

## 2025-05-15 PROCEDURE — 3074F SYST BP LT 130 MM HG: CPT | Performed by: NURSE PRACTITIONER

## 2025-05-15 PROCEDURE — 3078F DIAST BP <80 MM HG: CPT | Performed by: NURSE PRACTITIONER

## 2025-05-15 PROCEDURE — 90677 PCV20 VACCINE IM: CPT | Performed by: NURSE PRACTITIONER

## 2025-05-15 RX ORDER — DIAZEPAM 5 MG/1
2.5-7.5 TABLET ORAL EVERY 6 HOURS PRN
Qty: 30 TABLET | Refills: 0 | Status: SHIPPED | OUTPATIENT
Start: 2025-05-15 | End: 2025-06-14

## 2025-05-15 RX ORDER — SERTRALINE HYDROCHLORIDE 25 MG/1
25 TABLET, FILM COATED ORAL DAILY
Qty: 90 TABLET | Refills: 3 | Status: SHIPPED | OUTPATIENT
Start: 2025-05-15

## 2025-05-15 RX ORDER — EPINEPHRINE 0.3 MG/.3ML
INJECTION SUBCUTANEOUS
Qty: 1 EACH | Refills: 1 | Status: SHIPPED | OUTPATIENT
Start: 2025-05-15

## 2025-05-15 RX ORDER — NEBIVOLOL 5 MG/1
5 TABLET ORAL DAILY
Qty: 90 TABLET | Refills: 3 | Status: SHIPPED | OUTPATIENT
Start: 2025-05-15

## 2025-05-15 RX ORDER — TEMAZEPAM 15 MG/1
15-30 CAPSULE ORAL NIGHTLY PRN
Qty: 60 CAPSULE | Refills: 2 | Status: SHIPPED | OUTPATIENT
Start: 2025-05-25 | End: 2025-06-24

## 2025-05-15 RX ORDER — AMLODIPINE BESYLATE 5 MG/1
5 TABLET ORAL DAILY
Qty: 90 TABLET | Refills: 3 | Status: SHIPPED | OUTPATIENT
Start: 2025-05-15

## 2025-05-15 RX ORDER — HYDROCHLOROTHIAZIDE 25 MG/1
25 TABLET ORAL DAILY
Qty: 90 TABLET | Refills: 3 | Status: SHIPPED | OUTPATIENT
Start: 2025-05-15

## 2025-05-15 RX ORDER — ESTRADIOL 1 MG/1
1 TABLET ORAL
Qty: 90 TABLET | Refills: 3 | Status: SHIPPED | OUTPATIENT
Start: 2025-05-15

## 2025-05-15 ASSESSMENT — FIBROSIS 4 INDEX: FIB4 SCORE: 0.64

## 2025-05-15 ASSESSMENT — PATIENT HEALTH QUESTIONNAIRE - PHQ9: CLINICAL INTERPRETATION OF PHQ2 SCORE: 0

## 2025-05-15 NOTE — PROGRESS NOTES
"  Chief Complaint   Patient presents with    Medication Refill                                                                                                                                       HPI:   Harmony presents today with the following.    Patient consented to the use of [facility] to record and transcribe notes during this visit.    The patient attended the consultation today for follow-up and medication management. Harmony Monroe, a female patient, consented to the use of [facility] to record and transcribe notes during this visit.    The patient reports feeling \"really good, physically\" with improved blood pressure control. She had a viral illness one month ago, which has now resolved. The patient is scheduled for a 24-hour urine test and CT scan, with possible surgery in the future. She expresses concerns about unpaid leave affecting her work and patients, as she has used most of her FMLA leave.    The chief complaints presented by the patient include anxiety related to medical and dental procedures. She uses Valium for travel and dental appointments, reporting past dental trauma and panic attacks. The patient also inquired about a possible skin cyst, which was identified as a skin tag by the provider.    In terms of current treatments, the patient is adherent to her medications and requests refills for temazepam and Valium. Her current medications include amlodipine, estradiol, hydrochlorothiazide, sertraline, nebivolol, EpiPen, Valium (for travel/dental), and temazepam (90-day supply). She has a history of lidocaine with epinephrine use for dental procedures.    The patient's medical history includes anxiety disorder (medical/dental) and well-controlled hypertension. She has an allergy to lidocaine with epinephrine, requiring multiple numbing during dental procedures.    The review of systems reveals no issues with hypertension in cardiovascular and dental anxiety in psychiatric.    The patient's current " "health management strategies and concerns have been discussed, with particular attention to her anxiety related to medical and dental procedures, work-related stress, and medication management. The patient is considering transitioning to part-time work or leaving her job due to work-related stress, including patient care and insurance issues.    ROS:  All systems negative expect as addressed in assessment and plan.     /70 (BP Location: Left arm, Patient Position: Sitting)   Pulse 74   Temp 36.7 °C (98 °F) (Temporal)   Resp 16   Ht 1.549 m (5' 1\")   Wt 81.6 kg (180 lb)   SpO2 97%   BMI 34.01 kg/m²     Objective:    Physical Exam  Vitals reviewed.   Constitutional:       Appearance: Normal appearance.   HENT:      Head: Normocephalic and atraumatic.      Mouth/Throat:      Mouth: Mucous membranes are moist.   Eyes:      Extraocular Movements: Extraocular movements intact.      Conjunctiva/sclera: Conjunctivae normal.   Pulmonary:      Effort: Pulmonary effort is normal.   Musculoskeletal:         General: Normal range of motion.      Cervical back: Normal range of motion.   Skin:     General: Skin is warm and dry.   Neurological:      General: No focal deficit present.      Mental Status: She is alert and oriented to person, place, and time.   Psychiatric:         Mood and Affect: Mood normal.         Behavior: Behavior normal.         Thought Content: Thought content normal.         Assessment and Plan:  65 y.o. female with the following issues.    1. Essential hypertension  - amLODIPine (NORVASC) 5 MG Tab; Take 1 Tablet by mouth every day.  Dispense: 90 Tablet; Refill: 3  - hydroCHLOROthiazide 25 MG Tab; Take 1 Tablet by mouth every day.  Dispense: 90 Tablet; Refill: 3  - nebivolol (BYSTOLIC) 5 MG Tab tablet; Take 1 Tablet by mouth every day.  Dispense: 90 Tablet; Refill: 3  - Comp Metabolic Panel; Future    2. Postmenopausal HRT (hormone replacement therapy)  - estradiol (ESTRACE) 1 MG Tab; Take 1 " Tablet by mouth every day.  Dispense: 90 Tablet; Refill: 3    3. Anxiety  - sertraline (ZOLOFT) 25 MG tablet; Take 1 Tablet by mouth every day.  Dispense: 90 Tablet; Refill: 3  - diazePAM (VALIUM) 5 MG Tab; Take 0.5-1.5 Tablets by mouth every 6 hours as needed for Anxiety for up to 30 days. Indications: Feeling Anxious, Muscle Spasm, fear of travel and fear of dental work  Dispense: 30 Tablet; Refill: 0    4. Encounter for osteoporosis screening in asymptomatic postmenopausal patient  - DS-BONE DENSITY STUDY (DEXA); Future    5. Need for vaccination  - Pneumococcal Conjugate Vaccine 20-Valent (6 wks+)    6. Fear of flying  - diazePAM (VALIUM) 5 MG Tab; Take 0.5-1.5 Tablets by mouth every 6 hours as needed for Anxiety for up to 30 days. Indications: Feeling Anxious, Muscle Spasm, fear of travel and fear of dental work  Dispense: 30 Tablet; Refill: 0    7. Primary insomnia  - diazePAM (VALIUM) 5 MG Tab; Take 0.5-1.5 Tablets by mouth every 6 hours as needed for Anxiety for up to 30 days. Indications: Feeling Anxious, Muscle Spasm, fear of travel and fear of dental work  Dispense: 30 Tablet; Refill: 0    8. Other insomnia  - temazepam (RESTORIL) 15 MG Cap; Take 1-2 Capsules by mouth at bedtime as needed for Sleep for up to 30 days. Indications: Trouble Sleeping  Dispense: 60 Capsule; Refill: 2    9. High risk medication use  - temazepam (RESTORIL) 15 MG Cap; Take 1-2 Capsules by mouth at bedtime as needed for Sleep for up to 30 days. Indications: Trouble Sleeping  Dispense: 60 Capsule; Refill: 2    10. Hyperlipidemia, unspecified hyperlipidemia type  - Lipid Profile; Future    11. Vitamin D deficiency  - VITAMIN D,25 HYDROXY (DEFICIENCY); Future    12. Screening for thyroid disorder  - TSH; Future  - FREE THYROXINE; Future    Other orders  - EPINEPHrine (EPIPEN) 0.3 MG/0.3ML Solution Auto-injector solution for injection; Inject 0.3 mL into the thigh one time for 1 dose.  Dispense: 1 Each; Refill: 1    1. Hypertension  "(HTN):     - Assessment: Patient reports BP \"beautiful\" and \"much better\" recently. Current regimen: amlodipine, hydrochlorothiazide, nebivolol.     - Plan:           a. Continue current antihypertensive medications          b. Order routine labs, including CMP    2. Anxiety:     - Assessment: Ongoing anxiety, particularly related to medical and dental procedures. Managed with PRN medications.     - Plan:          a. Refill Valium (diazepam) 30 tablets for travel and dental appointments          b. Refill temazepam for 90-day supply          c. Discussed potential dermatology referral; patient to initiate if desired    3. Recent Viral Illness:     - Assessment: Patient recovered from viral illness ~1 month ago. Many patients in practice experienced walking pneumonia from viral infections and allergies during this period.     - Plan: No specific interventions required; condition resolved    4. Preventive Care:     - Assessment: Patient due for routine health maintenance.     - Plan:          a. Order routine labs, including Vitamin D level          b. Patient to complete labs at convenience, no urgency          c. Annual follow-up appointment scheduled for mid-July 5. Medication Management:     - Plan:          a. Refill for 1 year: amlodipine, estradiol, hydrochlorothiazide, sertraline, nebivolol          b. Renew EpiPen prescription          c. Refill Valium (diazepam) 30 tablets          d. Refill temazepam for 90-day supply          e. Ensure all prescriptions sent to [facility] on [address]    Return in about 10 weeks (around 7/24/2025).      Please note that this dictation was created using voice recognition software. I have worked with consultants from the vendor as well as technical experts from [employer] to optimize the interface. I have made every reasonable attempt to correct obvious errors, but I expect that there are errors of grammar and possibly content that I did not discover before finalizing the " note.

## 2025-05-22 ENCOUNTER — APPOINTMENT (OUTPATIENT)
Dept: RADIOLOGY | Facility: MEDICAL CENTER | Age: 65
End: 2025-05-22
Payer: COMMERCIAL

## 2025-05-22 DIAGNOSIS — Z12.31 VISIT FOR SCREENING MAMMOGRAM: ICD-10-CM

## 2025-07-16 ENCOUNTER — OFFICE VISIT (OUTPATIENT)
Dept: MEDICAL GROUP | Facility: PHYSICIAN GROUP | Age: 65
End: 2025-07-16
Payer: COMMERCIAL

## 2025-07-16 VITALS
OXYGEN SATURATION: 96 % | BODY MASS INDEX: 32.85 KG/M2 | TEMPERATURE: 97.5 F | SYSTOLIC BLOOD PRESSURE: 118 MMHG | HEIGHT: 61 IN | HEART RATE: 72 BPM | RESPIRATION RATE: 12 BRPM | DIASTOLIC BLOOD PRESSURE: 74 MMHG | WEIGHT: 174 LBS

## 2025-07-16 DIAGNOSIS — G47.19 EXCESSIVE DAYTIME SLEEPINESS: ICD-10-CM

## 2025-07-16 DIAGNOSIS — Z79.899 HIGH RISK MEDICATION USE: ICD-10-CM

## 2025-07-16 DIAGNOSIS — R06.83 SNORING: ICD-10-CM

## 2025-07-16 DIAGNOSIS — F51.01 PRIMARY INSOMNIA: Primary | ICD-10-CM

## 2025-07-16 PROCEDURE — 3078F DIAST BP <80 MM HG: CPT | Performed by: FAMILY MEDICINE

## 2025-07-16 PROCEDURE — 3074F SYST BP LT 130 MM HG: CPT | Performed by: FAMILY MEDICINE

## 2025-07-16 PROCEDURE — 99214 OFFICE O/P EST MOD 30 MIN: CPT | Performed by: FAMILY MEDICINE

## 2025-07-16 RX ORDER — TEMAZEPAM 15 MG/1
15 CAPSULE ORAL NIGHTLY PRN
COMMUNITY
End: 2025-07-16 | Stop reason: SDUPTHER

## 2025-07-16 RX ORDER — TRAZODONE HYDROCHLORIDE 50 MG/1
25-50 TABLET ORAL NIGHTLY PRN
Qty: 90 TABLET | Refills: 1 | Status: SHIPPED | OUTPATIENT
Start: 2025-07-16

## 2025-07-16 RX ORDER — TEMAZEPAM 15 MG/1
15 CAPSULE ORAL NIGHTLY PRN
Qty: 30 CAPSULE | Refills: 2 | Status: SHIPPED | OUTPATIENT
Start: 2025-07-16 | End: 2025-08-15

## 2025-07-16 ASSESSMENT — FIBROSIS 4 INDEX: FIB4 SCORE: 0.64
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
## 2025-07-17 NOTE — Clinical Note
REFERRAL APPROVAL NOTICE         Sent on July 17, 2025                   Harmony Monroe  [address]  [place] [address]                   Dear Ms. Monroe,    After a careful review of the medical information and benefit coverage, [facility] has processed your referral. See below for additional details.    If applicable, you must be actively enrolled with your insurance for coverage of the authorized service. If you have any questions regarding your coverage, please contact your insurance directly.    REFERRAL INFORMATION   Referral #:  [id number]  Referred-To Department    Referred-By Provider:  Pulmonary and Sleep Medicine    Sophie Edwards M.D.   [facility]      [address]  [address]  [place] [id number] [phone] [address]  [place] [id number] [phone]    Referral Start Date:  07/16/2025  Referral End Date:   07/16/2026             SCHEDULING  If you do not already have an appointment, please call [phone] to make an appointment.     MORE INFORMATION  If you do not already have a On[facility] account, sign up at: [email or web address]  You can access your medical information, make appointments, see lab results, billing information, and more.  If you have questions regarding this referral, please contact  the [facility] Referrals department at:             [phone]. Monday - Friday 8:00AM - 5:00PM.     Sincerely,    [facility]

## 2025-07-24 ENCOUNTER — APPOINTMENT (OUTPATIENT)
Dept: MEDICAL GROUP | Facility: PHYSICIAN GROUP | Age: 65
End: 2025-07-24
Payer: COMMERCIAL

## 2025-07-27 DIAGNOSIS — R11.0 NAUSEA: ICD-10-CM

## 2025-07-28 RX ORDER — ONDANSETRON 4 MG/1
4 TABLET, FILM COATED ORAL EVERY 4 HOURS PRN
Qty: 8 TABLET | Refills: 0 | Status: SHIPPED | OUTPATIENT
Start: 2025-07-28 | End: 2025-08-07

## 2025-07-28 NOTE — TELEPHONE ENCOUNTER
Received request via: Pharmacy    Was the patient seen in the last year in this department? Yes    Does the patient have an active prescription (recently filled or refills available) for medication(s) requested? No    Pharmacy Name:   [facility]/pharmacy #[id number] - [place] - [address]  [address] [address]  Phone: [phone] Fax: [phone]        Does the patient have assisted Plus and need 100-day supply? (This applies to ALL medications) Patient does not have SCP

## 2025-08-21 ENCOUNTER — TELEPHONE (OUTPATIENT)
Dept: HEALTH INFORMATION MANAGEMENT | Facility: OTHER | Age: 65
End: 2025-08-21
Payer: COMMERCIAL

## 2025-09-10 ENCOUNTER — APPOINTMENT (OUTPATIENT)
Dept: MEDICAL GROUP | Facility: PHYSICIAN GROUP | Age: 65
End: 2025-09-10
Payer: COMMERCIAL

## (undated) DEVICE — PADDING CAST 6 IN STERILE - 6 X 4 YDS (24/CA)

## (undated) DEVICE — SUTURE GENERAL

## (undated) DEVICE — LACTATED RINGERS INJ 1000 ML - (14EA/CA 60CA/PF)

## (undated) DEVICE — SUTURE 3-0 ETHILON FS-1 - (36/BX) 30 INCH

## (undated) DEVICE — TUBING, SPIROMETRY KIT

## (undated) DEVICE — SENSOR SPO2 NEO LNCS ADHESIVE (20/BX) SEE USER NOTES

## (undated) DEVICE — KIT ROOM DECONTAMINATION

## (undated) DEVICE — DRESSING XEROFORM 1X8 - (50/BX 4BX/CA)

## (undated) DEVICE — NEEDLE SAFETY 18 GA X 1 1/2 IN (100EA/BX)

## (undated) DEVICE — DRAPE LARGE 3 QUARTER - (20/CA)

## (undated) DEVICE — SYRINGE 30 ML LL (56/BX)

## (undated) DEVICE — SPONGE GAUZE STER 4X4 8-PL - (2/PK 50PK/BX 12BX/CS)

## (undated) DEVICE — CHLORAPREP 26 ML APPLICATOR - ORANGE TINT(25/CA)

## (undated) DEVICE — LEGGINGS IN-VIEW CLEAR POLY - (20PR/CA)

## (undated) DEVICE — DRAPE IOBAN II INCISE 23X17 - (10EA/BX 4BX/CA)

## (undated) DEVICE — HEAD HOLDER JUNIOR/ADULT

## (undated) DEVICE — DRAPE U ORTHOPEDIC - (10/BX)

## (undated) DEVICE — ELECTRODE DUAL RETURN W/ CORD - (50/PK)

## (undated) DEVICE — MASK, LARYNGEAL AIRWAY #4

## (undated) DEVICE — SODIUM CHL. IRRIGATION 0.9% 3000ML (4EA/CA 65CA/PF)

## (undated) DEVICE — TUBING PUMP WITH CONNECTOR REDEUCE (1EA)

## (undated) DEVICE — SHAVER4.0 AGGRESSIVE + FORMLA (5EA/BX)

## (undated) DEVICE — TOURNIQUET CUFF 34 X 4 ONE PORT DISP - STERILE (10/BX)

## (undated) DEVICE — GLOVE BIOGEL SZ 6 PF LATEX - (50EA/BX 4BX/CA)

## (undated) DEVICE — PACK KNEE ARTHROSCOPY SM OR - (2EA/CA)

## (undated) DEVICE — PROTECTOR ULNA NERVE - (36PR/CA)

## (undated) DEVICE — GLOVE BIOGEL SZ 8 SURGICAL PF LTX - (50PR/BX 4BX/CA)

## (undated) DEVICE — IMMOBILIZER KNEE 20 INCH - (1/EA)

## (undated) DEVICE — GLOVE, LITE (PAIR)

## (undated) DEVICE — GLOVE BIOGEL PI INDICATOR SZ 6.5 SURGICAL PF LF - (50/BX 4BX/CA)

## (undated) DEVICE — GLOVE BIOGEL ECLIPSE PF LATEX SIZE 8.0  (50PR/BX)

## (undated) DEVICE — NEPTUNE 4 PORT MANIFOLD - (20/PK)

## (undated) DEVICE — TUBING CASSETTE CROSSFLOW INTEGRATED (10EA/CA)

## (undated) DEVICE — GLOVE BIOGEL PI ORTHO SZ 7 PF LF (40PR/BX)

## (undated) DEVICE — SPONGE GAUZESTER 4 X 4 4PLY - (128PK/CA)